# Patient Record
Sex: FEMALE | Race: WHITE | NOT HISPANIC OR LATINO | Employment: PART TIME | URBAN - METROPOLITAN AREA
[De-identification: names, ages, dates, MRNs, and addresses within clinical notes are randomized per-mention and may not be internally consistent; named-entity substitution may affect disease eponyms.]

---

## 2017-01-04 ENCOUNTER — OFFICE VISIT (OUTPATIENT)
Dept: BARIATRICS/WEIGHT MGMT | Facility: CLINIC | Age: 62
End: 2017-01-04

## 2017-01-04 VITALS
DIASTOLIC BLOOD PRESSURE: 76 MMHG | BODY MASS INDEX: 34.38 KG/M2 | HEART RATE: 82 BPM | HEIGHT: 63 IN | SYSTOLIC BLOOD PRESSURE: 122 MMHG | WEIGHT: 194 LBS | TEMPERATURE: 98.2 F | RESPIRATION RATE: 16 BRPM

## 2017-01-04 DIAGNOSIS — Z86.14 HX MRSA INFECTION: ICD-10-CM

## 2017-01-04 DIAGNOSIS — R53.82 CHRONIC FATIGUE: ICD-10-CM

## 2017-01-04 DIAGNOSIS — E66.9 OBESITY (BMI 30-39.9): ICD-10-CM

## 2017-01-04 DIAGNOSIS — IMO0001 INCISIONAL INFECTION, INITIAL ENCOUNTER: Primary | ICD-10-CM

## 2017-01-04 DIAGNOSIS — E11.8 TYPE 2 DIABETES MELLITUS WITH COMPLICATION, UNSPECIFIED LONG TERM INSULIN USE STATUS: ICD-10-CM

## 2017-01-04 PROBLEM — T81.49XA INCISIONAL INFECTION: Status: ACTIVE | Noted: 2017-01-04

## 2017-01-04 PROCEDURE — 99024 POSTOP FOLLOW-UP VISIT: CPT | Performed by: SURGERY

## 2017-01-04 RX ORDER — LINEZOLID 600 MG/1
600 TABLET, FILM COATED ORAL 2 TIMES DAILY
Qty: 20 TABLET | Refills: 0 | Status: SHIPPED | OUTPATIENT
Start: 2017-01-04 | End: 2017-02-09

## 2017-01-04 RX ORDER — AMOXICILLIN AND CLAVULANATE POTASSIUM 875; 125 MG/1; MG/1
1 TABLET, FILM COATED ORAL 2 TIMES DAILY
Qty: 20 TABLET | Refills: 0 | Status: SHIPPED | OUTPATIENT
Start: 2017-01-04 | End: 2017-02-09

## 2017-01-04 NOTE — MR AVS SNAPSHOT
Vivian Kauffman   1/4/2017 3:00 PM   Office Visit    Dept Phone:  [phone]   Encounter #:  [id number]    Provider:  Chris Ackerman Jr., MD   Department:  [facility] BARIATRIC SURGERY                Your Full Care Plan              Your Updated Medication List          This list is accurate as of: 1/4/17  1:25 PM.  Always use your most recent med list.                ADVAIR DISKUS 250-50 MCG/DOSE DISKUS   Generic drug:  fluticasone-salmeterol       colestipol 1 G tablet   Commonly known as:  COLESTID       FLUoxetine 20 MG tablet   Commonly known as:  PROzac       gabapentin 300 MG capsule   Commonly known as:  NEURONTIN       glipiZIDE 10 MG tablet   Commonly known as:  GLUCOTROL       HAIR/SKIN/NAILS tablet       HYDROcodone-acetaminophen 7.5-325 MG/15ML solution   Commonly known as:  HYCET   Take 15 mL by mouth Every 6 (Six) Hours As Needed for moderate pain (4-6).       levETIRAcetam 1000 MG tablet   Commonly known as:  KEPPRA       LIPITOR 20 MG tablet   Generic drug:  atorvastatin       MAGNESIUM OXIDE PO       metFORMIN 500 MG tablet   Commonly known as:  GLUCOPHAGE       metoprolol tartrate 100 MG tablet   Commonly known as:  LOPRESSOR       nortriptyline 10 MG capsule   Commonly known as:  PAMELOR       NOVOLIN N 100 UNIT/ML injection   Generic drug:  insulin NPH       PLAVIX 75 MG tablet   Generic drug:  clopidogrel       PROAIR  (90 BASE) MCG/ACT inhaler   Generic drug:  albuterol       promethazine 25 MG suppository   Commonly known as:  PHENERGAN   Insert 1 suppository into the rectum Every 6 (Six) Hours As Needed for nausea or vomiting.       quinapril 10 MG tablet   Commonly known as:  ACCUPRIL       SINGULAIR 10 MG tablet   Generic drug:  montelukast       spironolactone 25 MG tablet   Commonly known as:  ALDACTONE               You Were Diagnosed With        Codes Comments    Incisional infection, initial encounter    -  Primary ICD-10-CM:  "T81.4XXA  ICD-9-CM: 998.59     Obesity (BMI 30-39.9)     ICD-10-CM: E66.9  ICD-9-CM: 278.00     Type 2 diabetes mellitus with complication, unspecified long term insulin use status     ICD-10-CM: E11.8  ICD-9-CM: 250.90     Chronic fatigue     ICD-10-CM: R53.82  ICD-9-CM: 780.79       Instructions     None    Patient Instructions History      Upcoming Appointments     Visit Type Date Time Department    POST-OP 1/4/2017  3:00 PM [facility]    POST-OP 2/9/2017  1:30 PM [facility]      MyChart Signup     Our records indicate that you have declined [employer] signup. If you would like to sign up for [employer], please email [email or web address] or call [phone] to obtain an activation code.             Other Info from Your Visit           Your Appointments     Jan 04, 2017  3:00 PM EST   Post-Op with Chris Ackerman Jr., MD   [facility] BARIATRIC SURGERY (--)    [address] [id number] [phone]            Feb 09, 2017  1:30 PM EST   Post-Op with JIM Peña   [facility] BARIATRIC SURGERY (--)    [address] [address]   [phone]              Allergies     Latex  Other (See Comments)    SKIN BLISTERS    Peanut Oil      Peanuts w/ red shell    Sulfa Antibiotics  Other (See Comments)    unknnown      Reason for Visit     Follow-up s/p lapband removal c/o incisional soreness, fatigue and alopecia      Vital Signs     Blood Pressure Pulse Temperature Respirations Height Weight    122/76 82 98.2 °F (36.8 °C) (Oral) 16 62.5\" (158.8 cm) 194 lb (88 kg)    Body Mass Index Smoking Status                34.92 kg/m2 Former Smoker          Problems and Diagnoses Noted     Diabetes    Tiredness    Incisional infection    Obesity (BMI 30-39.9)      No Longer an Issue     Difficulty swallowing    Non-intractable vomiting with nausea    Regurgitation        "

## 2017-01-04 NOTE — PROGRESS NOTES
[facility] BARIATRIC SURGERY  [address]  [facility] [place] [facility] [address]  [address]  [place] [address]  Dept: [phone]  1/4/2017      Vivian Kauffman.  [id number]  [id number]  1955  female      Chief Complaint   Patient presents with   • Follow-up     s/p lapband removal c/o incisional soreness, fatigue and alopecia       [facility] Post-Op Bariatric Surgery:   Vivian Kauffman is status post laparopscopic Band removal procedure, performed on 11/30/16 with complaints of a redness around incision.     HPI:   Today's weight is 194 lb (88 kg) pounds, today's BMI is Body mass index is 34.92 kg/(m^2)., she has a  gain of 3 pounds since the last visit and her weight loss since surgery is  pounds.  Vivian Kauffman denies nausea, vomiting, dysphagia, or heartburn. The patient c/o post-op redness and drainage around previous port site incision that is improving and redness and swelling right upper quadrant incision that is new. Patient denies fever chills chest pain shortness of air or other problems.  She does complain of feeling fatigued.  She states that her sugars have been doing well. Denies fevers, chills, chest pain or shortness of air.      Diet and Exercise: Diet history reviewed and discussed with the patient. Weight loss/gains to date discussed with the patient. No carbonated beverage consumption and exercising regularly- walking frequently.   Supplements:     Review of Systems   Constitutional: Positive for fatigue.   Gastrointestinal: Positive for abdominal pain.   All other systems reviewed and are negative.      Patient Active Problem List   Diagnosis   • Essential hypertension   • Hyperlipidemia   • Fatigue   • Diabetes mellitus   • Alopecia   • Obesity (BMI 30-39.9)   • Dietary counseling   • Abnormal electrocardiogram   • Anxiety   • Asthma   • Carpal tunnel syndrome   • Chronic coronary artery disease   • Depression   • Exercise  counseling   • Incisional infection       The following portions of the patient's history were reviewed and updated as appropriate: allergies, current medications, past family history, past medical history, past social history, past surgical history and problem list.    Vitals:    01/04/17 1306   BP: 122/76   Pulse: 82   Resp: 16   Temp: 98.2 °F (36.8 °C)       Physical Exam   Constitutional: She is oriented to person, place, and time. She appears well-nourished.   HENT:   Head: Normocephalic and atraumatic.   Mouth/Throat: Oropharynx is clear and moist.   Eyes: Conjunctivae and EOM are normal. Pupils are equal, round, and reactive to light. No scleral icterus.   Neck: Normal range of motion. Neck supple. No thyromegaly present.   Cardiovascular: Normal rate and regular rhythm.    Pulmonary/Chest: Effort normal and breath sounds normal.   Abdominal: Soft. Bowel sounds are normal. She exhibits mass. She exhibits no distension. There is no tenderness. There is no rebound and no guarding. No hernia.   ruq incision with erythema and induration approx 1x1cm. Port site incision without erythema but did have induration but less   Musculoskeletal: Normal range of motion.   Lymphadenopathy:     She has no cervical adenopathy.   Neurological: She is alert and oriented to person, place, and time. No cranial nerve deficit. Coordination normal.   Skin: Skin is warm and dry. No erythema.   Psychiatric: She has a normal mood and affect. Her behavior is normal.   Vitals reviewed.      Assessment:   Post-op, the patient is one half months status post LAP-BAND and port removal with incisional infection right upper quadrant.  The small abscess was opened up with a scalpel blade in the office and evacuated.  The wound was then irrigated and then wicked with sterile gauze.  The area was approximately 5 mm in diameter and depth.     Plan:   I instructed the patient to irrigate the wound and change dressings twice a day to week with  gauze.  Does have a history of MRSA and we will call in antibiotic.  Patient follow-up in 2 weeks and to call if any fevers or chills pain or increase in redness.    Instructions / Recommendations: dietary counseling recommended, recommended a daily protein intake of  grams, vitamin supplement(s) recommended, recommended exercising at least 150 minutes per week, behavior modifications recommended and instructed to call the office for concerns, questions, or problems.     The patient was instructed to follow up in 2 wks follow up appt.

## 2017-02-09 ENCOUNTER — OFFICE VISIT (OUTPATIENT)
Dept: BARIATRICS/WEIGHT MGMT | Facility: CLINIC | Age: 62
End: 2017-02-09

## 2017-02-09 VITALS
TEMPERATURE: 98.2 F | HEART RATE: 73 BPM | WEIGHT: 199 LBS | SYSTOLIC BLOOD PRESSURE: 114 MMHG | HEIGHT: 63 IN | BODY MASS INDEX: 35.26 KG/M2 | DIASTOLIC BLOOD PRESSURE: 66 MMHG | RESPIRATION RATE: 16 BRPM

## 2017-02-09 DIAGNOSIS — Z71.3 DIETARY COUNSELING: ICD-10-CM

## 2017-02-09 DIAGNOSIS — E66.9 OBESITY (BMI 30-39.9): Primary | ICD-10-CM

## 2017-02-09 DIAGNOSIS — I10 ESSENTIAL HYPERTENSION: ICD-10-CM

## 2017-02-09 DIAGNOSIS — Z71.82 EXERCISE COUNSELING: ICD-10-CM

## 2017-02-09 DIAGNOSIS — E11.8 TYPE 2 DIABETES MELLITUS WITH COMPLICATION, UNSPECIFIED LONG TERM INSULIN USE STATUS: ICD-10-CM

## 2017-02-09 DIAGNOSIS — E78.5 HYPERLIPIDEMIA, UNSPECIFIED HYPERLIPIDEMIA TYPE: ICD-10-CM

## 2017-02-09 PROBLEM — T81.49XA INCISIONAL INFECTION: Status: RESOLVED | Noted: 2017-01-04 | Resolved: 2017-02-09

## 2017-02-09 PROCEDURE — 99024 POSTOP FOLLOW-UP VISIT: CPT | Performed by: NURSE PRACTITIONER

## 2017-02-09 NOTE — PROGRESS NOTES
[facility] BARIATRIC SURGERY  [address]  [facility] [address]  [address]  [facility] [address]  Dept: [phone]  2/9/2017      Vivian Kauffman.  [id number]  [id number]  1955  female      Chief Complaint   Patient presents with   • Follow-up     s/p lapband removal       BH Post-Op Bariatric Surgery:   Vivian Kauffman is status post laparopscopic Band removal procedure, performed on 11/30/16.     HPI:   Today's weight is 199 lb (90.3 kg) pounds, today's BMI is Body mass index is 35.82 kg/(m^2)., she has a  gain of 5 pounds since the last visit. The patient reports a decreased portion size and loss of appetite.  Vivian Kauffman denies nausea, vomiting, dysphagia, or heartburn. The patient c/o post-op pain that is resolved. she is doing well with an advanced diet without any problems. Taking their vitamins, walking and using IS. Denies fevers, chills, chest pain or shortness of air. Incision has now healed really well. Constipation improved with metamucil.     Diet and Exercise: Diet history reviewed and discussed with the patient. Weight loss/gains to date discussed with the patient. No carbonated beverage consumption and exercising regularly- walking frequently but not doing routine exercise yet.   Supplements: multivitamins, B-12, calcium, iron, B-1 and Vitamin D.     Review of Systems   All other systems reviewed and are negative.      Patient Active Problem List   Diagnosis   • Essential hypertension   • Hyperlipidemia   • Fatigue   • Diabetes mellitus   • Alopecia   • Obesity (BMI 30-39.9)   • Dietary counseling   • Abnormal electrocardiogram   • Anxiety   • Asthma   • Carpal tunnel syndrome   • Chronic coronary artery disease   • Depression   • Exercise counseling   • Hx MRSA infection       The following portions of the patient's history were reviewed and updated as appropriate: allergies, current medications, past family  history, past medical history, past social history, past surgical history and problem list.    Vitals:    02/09/17 1323   BP: 114/66   Pulse: 73   Resp: 16   Temp: 98.2 °F (36.8 °C)       Physical Exam   Constitutional: She is oriented to person, place, and time. She appears well-developed and well-nourished.   HENT:   Head: Normocephalic and atraumatic.   Eyes: EOM are normal.   Cardiovascular: Normal rate.    Pulmonary/Chest: Effort normal.   Abdominal: Soft. She exhibits no distension. There is no tenderness.   Musculoskeletal: Normal range of motion.   Neurological: She is alert and oriented to person, place, and time.   Skin: Skin is warm and dry.   Incision healing well   Psychiatric: She has a normal mood and affect. Her behavior is normal. Judgment and thought content normal.   Vitals reviewed.      Assessment:   Post-op, the patient is doing well.     Plan:   Activity restrictions: no lifting, pushing or pulling over 25lbs for 3 weeks.   Recommended patient be sure to get at least 70 grams of protein per day. Discussed with the patient the recommended amount of water per day to intake. Reviewed vitamin requirements. Be sure to do routine exercise and increase activity as tolerated.     Instructions / Recommendations: dietary counseling recommended, recommended a daily protein intake of  grams, vitamin supplement(s) recommended, recommended exercising at least 150 minutes per week, behavior modifications recommended and instructed to call the office for concerns, questions, or problems.     The patient was instructed to follow up with Dr. Ackerman regarding possible revision.     The patient was counseled regarding

## 2017-03-10 ENCOUNTER — OFFICE VISIT (OUTPATIENT)
Dept: CARDIOLOGY | Facility: CLINIC | Age: 62
End: 2017-03-10

## 2017-03-10 VITALS
WEIGHT: 201 LBS | HEIGHT: 63 IN | BODY MASS INDEX: 35.61 KG/M2 | HEART RATE: 78 BPM | SYSTOLIC BLOOD PRESSURE: 132 MMHG | DIASTOLIC BLOOD PRESSURE: 80 MMHG

## 2017-03-10 DIAGNOSIS — E78.5 HYPERLIPIDEMIA, UNSPECIFIED HYPERLIPIDEMIA TYPE: ICD-10-CM

## 2017-03-10 DIAGNOSIS — E11.8 TYPE 2 DIABETES MELLITUS WITH COMPLICATION, UNSPECIFIED LONG TERM INSULIN USE STATUS: ICD-10-CM

## 2017-03-10 DIAGNOSIS — I10 ESSENTIAL HYPERTENSION: Primary | ICD-10-CM

## 2017-03-10 DIAGNOSIS — I25.10 CHRONIC CORONARY ARTERY DISEASE: ICD-10-CM

## 2017-03-10 DIAGNOSIS — J45.909 UNCOMPLICATED ASTHMA, UNSPECIFIED ASTHMA SEVERITY: ICD-10-CM

## 2017-03-10 PROCEDURE — 93000 ELECTROCARDIOGRAM COMPLETE: CPT | Performed by: INTERNAL MEDICINE

## 2017-03-10 PROCEDURE — 99214 OFFICE O/P EST MOD 30 MIN: CPT | Performed by: INTERNAL MEDICINE

## 2017-03-10 NOTE — PROGRESS NOTES
Subjective:     Encounter Date:03/10/2017      Patient ID: Vivian Kauffman is a 62 y.o. female.    Chief Complaint:  History of Present Illness     The patient is a 52-year-old female with history of nonobstructive coronary artery disease, hypertension, dyslipidemia, diabetes mellitus type 2, prior stroke, seizure disorder, obesity status post lap band, and subsequent removal of the lap band, who presents for followup. I saw the patient initially for a preoperative evaluation.   She was getting ready to get her lab band removed and was noted to have an abnormal EKG at Dr. Ritter office.   She was previously followed by a cardiologist in [place], but was unable to get in with them. She came in to see us. I compared these findings with her prior EKGs and it showed no change.  Her prior cardiac workup included an evaluation for an abnormal EKG back in 11/2013.  She saw  ______ at that time.  She underwent a stress test that showed anterior ischemia and subsequently underwent a cardiac catheterization that showed 50% stenosis of her proximal LAD, luminal irregularities of her left circumflex, and 50% stenosis of her right coronary artery.  Her echocardiogram showed pulmonary artery pressure of 40 mmHg, moderate TR, but otherwise unremarkable.   She was not having any other symptoms, so I cleared her for surgery at that time.   When I saw her last she was doing well. She was planning on starting exercising, and we discussed whether we should do a stress test, like a treadmill stress test, at that point before she started exercising versus monitoring her symptoms.  The patient opted to just monitor her symptoms.      Today she presents for a routine annual followup.  She has been doing well over the last year.   She had lap band removal about six weeks ago, and she did fine with that.   She denies any chest pain, shortness of breath, PND, orthopnea, presyncope or syncope.  She developed some shortness of  breath associated with her asthma.  She has occasional palpitations that are chronic and unchanged.   She was followed by Dr. Acosta who asked her if she has been rechecked regarding her nonobstructive coronary artery disease, and she is wondering if something new should be done regarding this.           Review of Systems   Constitution: Positive for malaise/fatigue. Negative for weakness.   HENT: Positive for hearing loss and sore throat. Negative for headaches, hoarse voice and nosebleeds.    Eyes: Negative for pain.   Cardiovascular: Positive for dyspnea on exertion. Negative for chest pain, claudication, cyanosis, irregular heartbeat, leg swelling, near-syncope, orthopnea, palpitations, paroxysmal nocturnal dyspnea and syncope.   Respiratory: Positive for cough. Negative for shortness of breath and snoring.    Endocrine: Negative for cold intolerance, heat intolerance, polydipsia, polyphagia and polyuria.   Skin: Positive for itching and rash.   Musculoskeletal: Positive for myalgias. Negative for arthritis, falls, joint pain, joint swelling, muscle cramps and muscle weakness.   Gastrointestinal: Negative for constipation, diarrhea, dysphagia, heartburn, hematemesis, hematochezia, melena, nausea and vomiting.   Genitourinary: Positive for frequency. Negative for hematuria and hesitancy.   Neurological: Positive for numbness, paresthesias and seizures. Negative for excessive daytime sleepiness, dizziness and light-headedness.   Psychiatric/Behavioral: Positive for depression. The patient is nervous/anxious.           Current Outpatient Prescriptions:   •  albuterol (PROAIR HFA) 108 (90 BASE) MCG/ACT inhaler, ProAir  (90 Base) MCG/ACT Inhalation Aerosol Solution; Patient Sig: ProAir  (90 Base) MCG/ACT Inhalation Aerosol Solution INHALE 1-2 PUFFS EVERY 4-6 HOURS AS NEEDED AND AS DIRECTED.; 0; 26-Aug-2015; Active, Disp: , Rfl:   •  atorvastatin (LIPITOR) 20 MG tablet, Take 20 mg by mouth Daily., Disp:  , Rfl:   •  clopidogrel (PLAVIX) 75 MG tablet, Take 1 tablet by mouth Daily. HOLDING FOR SX, Disp: , Rfl:   •  colestipol (COLESTID) 1 G tablet, Take 1 tablet by mouth daily., Disp: , Rfl:   •  FLUoxetine (PROzac) 20 MG tablet, Take 4 tablets by mouth daily., Disp: , Rfl:   •  fluticasone-salmeterol (ADVAIR DISKUS) 250-50 MCG/DOSE DISKUS, Inhale 1 puff every 12 (twelve) hours., Disp: , Rfl:   •  gabapentin (NEURONTIN) 300 MG capsule, Take 300 mg by mouth 3 (Three) Times a Day., Disp: , Rfl:   •  glipiZIDE (GLUCOTROL) 10 MG tablet, Take by mouth. 1/2 tablet in the morning and 1 tablet in the evening, Disp: , Rfl:   •  insulin NPH (NOVOLIN N) 100 UNIT/ML injection, Inject  under the skin. 12 units at night/ 6units in am, Disp: , Rfl:   •  levETIRAcetam (KEPPRA) 1000 MG tablet, Take 1 tablet by mouth 2 (Two) Times a Day. 15OOMG AT HS, Disp: , Rfl:   •  MAGNESIUM OXIDE PO, Take 1 tablet by mouth Daily., Disp: , Rfl:   •  metFORMIN (GLUCOPHAGE) 500 MG tablet, Take 1,000 mg by mouth 2 (Two) Times a Day With Meals., Disp: , Rfl:   •  metoprolol tartrate (LOPRESSOR) 100 MG tablet, Take 50 mg by mouth 2 (Two) Times a Day. 100MG AT HS, Disp: , Rfl:   •  montelukast (SINGULAIR) 10 MG tablet, Take 1 tablet by mouth daily., Disp: , Rfl:   •  Multiple Vitamins-Minerals (HAIR/SKIN/NAILS) tablet, Take 1 tablet by mouth daily., Disp: , Rfl:   •  nortriptyline (PAMELOR) 10 MG capsule, Take 1 capsule by mouth daily., Disp: , Rfl:   •  quinapril (ACCUPRIL) 10 MG tablet, Take 1 tablet by mouth daily., Disp: , Rfl:   •  spironolactone (ALDACTONE) 25 MG tablet, Take 25 mg by mouth daily, Disp: , Rfl:     Past Medical History   Diagnosis Date   • Abdominal pain    • Ankle joint pain    • Anxiety    • Asthma    • Bilateral wrist pain    • Carpal tunnel syndrome    • Chronic lower back pain    • Coronary arteriosclerosis    • Depression    • Diabetes mellitus    • Diarrhea    • Dysphagia    • Fatigue    • Fatty liver    • Fibromyositis    •  GERD (gastroesophageal reflux disease)    • Heartburn    • Hyperlipidemia    • Hypertension    • Insomnia    • Morbid obesity    • Muscle spasm    • Nausea    • Neck pain    • Numbness    • Obesity    • Pain of both hip joints    • Polyphagia    • Polyphagia    • PONV (postoperative nausea and vomiting)    • Poor vision    • Problems with hearing    • Restless leg syndrome    • Seizures    • Sinus drainage    • Skin rash      Under both breasts   • Stroke syndrome    • Tingling    • Vomiting    • Weight gain      Past Surgical History   Procedure Laterality Date   • Cardiac catheterization       no stents 9/2013 at [facility]   • Cholecystectomy     • Foot surgery     • Laparoscopic gastric banding     • Nose surgery     • Tonsillectomy     • Colonoscopy     • Breast biopsy     • Cholecystectomy     • Nose surgery     • Endoscopy N/A 9/9/2016     Procedure: ESOPHAGOGASTRODUODENOSCOPY WITH BIOPSY;  Surgeon: Chris Ackerman Jr., MD;  Location: [facility] ENDOSCOPY;  Service:    • Gastric banding removal N/A 11/30/2016     Procedure: LAPAROSCOPIC GASTRIC BANDING AND PORT REMOVAL ;  Surgeon: Chris Ackerman Jr., MD;  Location: [facility] OR [facility];  Service:      Family History   Problem Relation Age of Onset   • Heart attack Mother    • Diabetes Mother    • Hypertension Mother    • Stroke Mother    • Heart attack Father    • Diabetes Father    • Hypertension Father    • Obesity Father      Morbid Obesity   • Stroke Father    • Heart attack Brother    • Cancer Brother      Bladder   • Heart attack Maternal Grandmother    • Heart attack Maternal Grandfather    • Stroke Paternal Grandmother    • Heart attack Paternal Grandfather      Social History   Substance Use Topics   • Smoking status: Former Smoker   • Smokeless tobacco: None      Comment: caffeine use   • Alcohol use No      Comment: very rare           ECG 12 Lead  Date/Time: 3/10/2017 10:38 AM  Performed by: ZENAIDA MARROQUIN  Authorized by: LOPEZ  "ZENAIDA   Comparison: compared with previous ECG   Similar to previous ECG  Rhythm: sinus rhythm  Comments: Non specific anterior T wave flattening               Objective:         Visit Vitals   • /80 (BP Location: Right arm, Patient Position: Sitting)   • Pulse 78   • Ht 63\" (160 cm)   • Wt 201 lb (91.2 kg)   • BMI 35.61 kg/m2          Physical Exam   Constitutional: She is oriented to person, place, and time. She appears well-developed and well-nourished.   HENT:   Head: Normocephalic and atraumatic.   Eyes: Conjunctivae, EOM and lids are normal. Pupils are equal, round, and reactive to light.   Neck: Normal range of motion and full passive range of motion without pain. Neck supple. No JVD present. Carotid bruit is not present.   Cardiovascular: Normal rate, regular rhythm, S1 normal and S2 normal.  Exam reveals no gallop.    No murmur heard.  Pulses:       Radial pulses are 2+ on the right side, and 2+ on the left side.   No bilateral lower extremity edema   Pulmonary/Chest: Effort normal and breath sounds normal.   Abdominal: Soft. Normal appearance.   Lymphadenopathy:     She has no cervical adenopathy.   Neurological: She is alert and oriented to person, place, and time.   Skin: Skin is warm, dry and intact.   Psychiatric: She has a normal mood and affect.       Lab Review:       Assessment:          Diagnosis Plan   1. Essential hypertension     2. Hyperlipidemia, unspecified hyperlipidemia type     3. Chronic coronary artery disease     4. Uncomplicated asthma, unspecified asthma severity     5. Type 2 diabetes mellitus with complication, unspecified long term insulin use status            Plan:        1. Nonobstructive coronary artery disease.   Again, we discussed possible stress test last  year.  We decided we would just monitor his symptoms at that time. I brought up the options again  of proceeding with a plain treadmill stress test and an echocardiogram versus just monitoring her for any " development of symptoms before pursuing any testing.   The patient at this time opts to pursue testing.  We will get her set up.  I think this is reasonable considering she is going to be starting an exercise regimen.    2. Hypertension. Her blood pressures are well controlled on her current regimen of medications.  Continue the same.     3. Dyslipidemia.  She is on atorvastatin as per Dr. Acosta.     4. Asthma.     5. Diabetes mellitus, type 2.       We will call and discuss the results of the stress test and the echocardiogram.   If our workup is normal, we will see the patient back in one year.

## 2017-03-20 ENCOUNTER — HOSPITAL ENCOUNTER (OUTPATIENT)
Dept: CARDIOLOGY | Facility: HOSPITAL | Age: 62
Discharge: HOME | End: 2017-03-20
Attending: INTERNAL MEDICINE

## 2017-03-20 ENCOUNTER — HOSPITAL ENCOUNTER (OUTPATIENT)
Dept: CARDIOLOGY | Facility: HOSPITAL | Age: 62
Discharge: HOME | End: 2017-03-20
Attending: INTERNAL MEDICINE | Admitting: INTERNAL MEDICINE

## 2017-03-20 VITALS
DIASTOLIC BLOOD PRESSURE: 60 MMHG | OXYGEN SATURATION: 97 % | SYSTOLIC BLOOD PRESSURE: 100 MMHG | HEART RATE: 76 BPM | HEIGHT: 63 IN | WEIGHT: 201 LBS | BODY MASS INDEX: 35.61 KG/M2

## 2017-03-20 LAB
BH CV ECHO MEAS - ACS: 1.8 CM
BH CV ECHO MEAS - AO MAX PG (FULL): 3.4 MMHG
BH CV ECHO MEAS - AO MAX PG: 7 MMHG
BH CV ECHO MEAS - AO MEAN PG (FULL): 1.6 MMHG
BH CV ECHO MEAS - AO MEAN PG: 3.5 MMHG
BH CV ECHO MEAS - AO ROOT AREA (BSA CORRECTED): 1.5
BH CV ECHO MEAS - AO ROOT AREA: 6.4 CM^2
BH CV ECHO MEAS - AO ROOT DIAM: 2.9 CM
BH CV ECHO MEAS - AO V2 MAX: 132.7 CM/SEC
BH CV ECHO MEAS - AO V2 MEAN: 86.7 CM/SEC
BH CV ECHO MEAS - AO V2 VTI: 28.4 CM
BH CV ECHO MEAS - AVA(I,A): 1.8 CM^2
BH CV ECHO MEAS - AVA(I,D): 1.8 CM^2
BH CV ECHO MEAS - AVA(V,A): 1.9 CM^2
BH CV ECHO MEAS - AVA(V,D): 1.9 CM^2
BH CV ECHO MEAS - BSA(HAYCOCK): 2.1 M^2
BH CV ECHO MEAS - BSA: 1.9 M^2
BH CV ECHO MEAS - BZI_BMI: 35.6 KILOGRAMS/M^2
BH CV ECHO MEAS - BZI_METRIC_HEIGHT: 160 CM
BH CV ECHO MEAS - BZI_METRIC_WEIGHT: 91.2 KG
BH CV ECHO MEAS - EDV(CUBED): 81.8 ML
BH CV ECHO MEAS - EDV(TEICH): 84.9 ML
BH CV ECHO MEAS - EF(CUBED): 78.2 %
BH CV ECHO MEAS - EF(TEICH): 70.7 %
BH CV ECHO MEAS - ESV(CUBED): 17.8 ML
BH CV ECHO MEAS - ESV(TEICH): 24.9 ML
BH CV ECHO MEAS - FS: 39.8 %
BH CV ECHO MEAS - IVS/LVPW: 0.96
BH CV ECHO MEAS - IVSD: 0.92 CM
BH CV ECHO MEAS - LAT PEAK E' VEL: 5 CM/SEC
BH CV ECHO MEAS - LV MASS(C)D: 133 GRAMS
BH CV ECHO MEAS - LV MASS(C)DI: 68.6 GRAMS/M^2
BH CV ECHO MEAS - LV MAX PG: 3.7 MMHG
BH CV ECHO MEAS - LV MEAN PG: 1.9 MMHG
BH CV ECHO MEAS - LV V1 MAX: 96 CM/SEC
BH CV ECHO MEAS - LV V1 MEAN: 65.2 CM/SEC
BH CV ECHO MEAS - LV V1 VTI: 19.3 CM
BH CV ECHO MEAS - LVIDD: 4.3 CM
BH CV ECHO MEAS - LVIDS: 2.6 CM
BH CV ECHO MEAS - LVOT AREA (M): 2.5 CM^2
BH CV ECHO MEAS - LVOT AREA: 2.6 CM^2
BH CV ECHO MEAS - LVOT DIAM: 1.8 CM
BH CV ECHO MEAS - LVPWD: 0.96 CM
BH CV ECHO MEAS - MED PEAK E' VEL: 7 CM/SEC
BH CV ECHO MEAS - MR MAX PG: 25.4 MMHG
BH CV ECHO MEAS - MR MAX VEL: 252.2 CM/SEC
BH CV ECHO MEAS - MV A DUR: 0.11 SEC
BH CV ECHO MEAS - MV A MAX VEL: 90.2 CM/SEC
BH CV ECHO MEAS - MV DEC SLOPE: 164.2 CM/SEC^2
BH CV ECHO MEAS - MV DEC TIME: 0.26 SEC
BH CV ECHO MEAS - MV E MAX VEL: 48.5 CM/SEC
BH CV ECHO MEAS - MV E/A: 0.54
BH CV ECHO MEAS - MV MAX PG: 4.6 MMHG
BH CV ECHO MEAS - MV MEAN PG: 1.7 MMHG
BH CV ECHO MEAS - MV P1/2T MAX VEL: 46.6 CM/SEC
BH CV ECHO MEAS - MV P1/2T: 83 MSEC
BH CV ECHO MEAS - MV V2 MAX: 106.9 CM/SEC
BH CV ECHO MEAS - MV V2 MEAN: 61.4 CM/SEC
BH CV ECHO MEAS - MV V2 VTI: 21 CM
BH CV ECHO MEAS - MVA P1/2T LCG: 4.7 CM^2
BH CV ECHO MEAS - MVA(P1/2T): 2.6 CM^2
BH CV ECHO MEAS - MVA(VTI): 2.4 CM^2
BH CV ECHO MEAS - PA ACC TIME: 0.1 SEC
BH CV ECHO MEAS - PA MAX PG (FULL): 2 MMHG
BH CV ECHO MEAS - PA MAX PG: 3.3 MMHG
BH CV ECHO MEAS - PA PR(ACCEL): 36.2 MMHG
BH CV ECHO MEAS - PA V2 MAX: 91.2 CM/SEC
BH CV ECHO MEAS - PVA(V,A): 2.1 CM^2
BH CV ECHO MEAS - PVA(V,D): 2.1 CM^2
BH CV ECHO MEAS - QP/QS: 0.84
BH CV ECHO MEAS - RAP SYSTOLE: 3 MMHG
BH CV ECHO MEAS - RV MAX PG: 1.4 MMHG
BH CV ECHO MEAS - RV MEAN PG: 0.78 MMHG
BH CV ECHO MEAS - RV V1 MAX: 58.7 CM/SEC
BH CV ECHO MEAS - RV V1 MEAN: 41.5 CM/SEC
BH CV ECHO MEAS - RV V1 VTI: 12.9 CM
BH CV ECHO MEAS - RVOT AREA: 3.3 CM^2
BH CV ECHO MEAS - RVOT DIAM: 2 CM
BH CV ECHO MEAS - RVSP: 28 MMHG
BH CV ECHO MEAS - SI(AO): 93.9 ML/M^2
BH CV ECHO MEAS - SI(CUBED): 33 ML/M^2
BH CV ECHO MEAS - SI(LVOT): 25.9 ML/M^2
BH CV ECHO MEAS - SI(TEICH): 31 ML/M^2
BH CV ECHO MEAS - SUP REN AO DIAM: 2.2 CM
BH CV ECHO MEAS - SV(AO): 182 ML
BH CV ECHO MEAS - SV(CUBED): 64 ML
BH CV ECHO MEAS - SV(LVOT): 50.2 ML
BH CV ECHO MEAS - SV(RVOT): 42.1 ML
BH CV ECHO MEAS - SV(TEICH): 60 ML
BH CV ECHO MEAS - TAPSE (>1.6): 2.6 CM2
BH CV ECHO MEAS - TR MAX VEL: 252.2 CM/SEC
BH CV STRESS BP STAGE 1: NORMAL
BH CV STRESS BP STAGE 2: NORMAL
BH CV STRESS DURATION MIN STAGE 1: 3
BH CV STRESS DURATION MIN STAGE 2: 1
BH CV STRESS DURATION SEC STAGE 1: 0
BH CV STRESS DURATION SEC STAGE 2: 30
BH CV STRESS GRADE STAGE 1: 10
BH CV STRESS GRADE STAGE 2: 12
BH CV STRESS HR STAGE 1: 119
BH CV STRESS HR STAGE 2: 142
BH CV STRESS METS STAGE 1: 5
BH CV STRESS METS STAGE 2: 7.5
BH CV STRESS PROTOCOL 1: NORMAL
BH CV STRESS RECOVERY BP: NORMAL MMHG
BH CV STRESS RECOVERY HR: 89 BPM
BH CV STRESS SPEED STAGE 1: 1.7
BH CV STRESS SPEED STAGE 2: 2.5
BH CV STRESS STAGE 1: 1
BH CV STRESS STAGE 2: 2
BH CV XLRA - RV BASE: 3.4 CM
BH CV XLRA - TDI S': 14 CM/SEC
E/E' RATIO: 6
LEFT ATRIUM VOLUME INDEX: 26 ML/M2
MAXIMAL PREDICTED HEART RATE: 158 BPM
PERCENT MAX PREDICTED HR: 89.87 %
STRESS BASELINE BP: NORMAL MMHG
STRESS BASELINE HR: 88 BPM
STRESS PERCENT HR: 106 %
STRESS POST ESTIMATED WORKLOAD: 5 METS
STRESS POST EXERCISE DUR MIN: 4 MIN
STRESS POST EXERCISE DUR SEC: 30 SEC
STRESS POST PEAK BP: NORMAL MMHG
STRESS POST PEAK HR: 142 BPM
STRESS TARGET HR: 134 BPM

## 2017-03-20 PROCEDURE — 93018 CV STRESS TEST I&R ONLY: CPT | Performed by: INTERNAL MEDICINE

## 2017-03-20 PROCEDURE — C8929 TTE W OR WO FOL WCON,DOPPLER: HCPCS

## 2017-03-20 PROCEDURE — 25010000002 PERFLUTREN (DEFINITY) 8.476 MG IN SODIUM CHLORIDE 10 ML INJECTION: Performed by: INTERNAL MEDICINE

## 2017-03-20 PROCEDURE — 93306 TTE W/DOPPLER COMPLETE: CPT | Performed by: INTERNAL MEDICINE

## 2017-03-20 PROCEDURE — 93016 CV STRESS TEST SUPVJ ONLY: CPT | Performed by: INTERNAL MEDICINE

## 2017-03-20 PROCEDURE — 93017 CV STRESS TEST TRACING ONLY: CPT

## 2017-03-20 RX ADMIN — PERFLUTREN 1.5 ML: 6.52 INJECTION, SUSPENSION INTRAVENOUS at 08:35

## 2017-03-22 ENCOUNTER — TELEPHONE (OUTPATIENT)
Dept: CARDIOLOGY | Facility: CLINIC | Age: 62
End: 2017-03-22

## 2017-03-22 DIAGNOSIS — R07.2 PRECORDIAL PAIN: Primary | ICD-10-CM

## 2017-03-22 NOTE — TELEPHONE ENCOUNTER
Pt called to request test results for Echo and stress test performed on 3/20/17. Please call pt. Thanks, Obey

## 2017-03-23 NOTE — TELEPHONE ENCOUNTER
Called and discussed results.  Due to treadmill stress test not being non-diagnostic will get set up for a treadmill myoview stress test.  She does report a couple of episodes of chest pressure at night.

## 2017-03-30 ENCOUNTER — HOSPITAL ENCOUNTER (OUTPATIENT)
Dept: CARDIOLOGY | Facility: HOSPITAL | Age: 62
Discharge: HOME | End: 2017-03-30
Attending: INTERNAL MEDICINE | Admitting: INTERNAL MEDICINE

## 2017-03-30 DIAGNOSIS — R07.2 PRECORDIAL PAIN: ICD-10-CM

## 2017-03-30 LAB
BH CV STRESS BP STAGE 1: NORMAL
BH CV STRESS DURATION MIN STAGE 1: 3
BH CV STRESS DURATION SEC STAGE 1: 0
BH CV STRESS GRADE STAGE 1: 10
BH CV STRESS HR STAGE 1: 118
BH CV STRESS METS STAGE 1: 5
BH CV STRESS PROTOCOL 1: NORMAL
BH CV STRESS PROTOCOL 2 BP STAGE 1: NORMAL
BH CV STRESS PROTOCOL 2 COMMENTS STAGE 1: NORMAL
BH CV STRESS PROTOCOL 2 DOSE REGADENOSON STAGE 1: 0.4
BH CV STRESS PROTOCOL 2 DURATION MIN STAGE 1: 0
BH CV STRESS PROTOCOL 2 DURATION SEC STAGE 1: 15
BH CV STRESS PROTOCOL 2 HR STAGE 1: 129
BH CV STRESS PROTOCOL 2 STAGE 1: 1
BH CV STRESS PROTOCOL 2: NORMAL
BH CV STRESS RECOVERY BP: NORMAL MMHG
BH CV STRESS RECOVERY HR: 95 BPM
BH CV STRESS SPEED STAGE 1: 1.7
BH CV STRESS STAGE 1: 1
LV EF NUC BP: 78 %
MAXIMAL PREDICTED HEART RATE: 158 BPM
PERCENT MAX PREDICTED HR: 81.65 %
STRESS BASELINE BP: NORMAL MMHG
STRESS BASELINE HR: 83 BPM
STRESS PERCENT HR: 96 %
STRESS POST EXERCISE DUR SEC: 15 SEC
STRESS POST PEAK BP: NORMAL MMHG
STRESS POST PEAK HR: 129 BPM
STRESS TARGET HR: 134 BPM

## 2017-03-30 PROCEDURE — 93018 CV STRESS TEST I&R ONLY: CPT | Performed by: INTERNAL MEDICINE

## 2017-03-30 PROCEDURE — 78452 HT MUSCLE IMAGE SPECT MULT: CPT | Performed by: INTERNAL MEDICINE

## 2017-03-30 PROCEDURE — 0 TECHNETIUM TETROFOSMIN KIT: Performed by: INTERNAL MEDICINE

## 2017-03-30 PROCEDURE — A9502 TC99M TETROFOSMIN: HCPCS | Performed by: INTERNAL MEDICINE

## 2017-03-30 PROCEDURE — 25010000002 REGADENOSON 0.4 MG/5ML SOLUTION: Performed by: INTERNAL MEDICINE

## 2017-03-30 PROCEDURE — 93016 CV STRESS TEST SUPVJ ONLY: CPT | Performed by: INTERNAL MEDICINE

## 2017-03-30 PROCEDURE — 93017 CV STRESS TEST TRACING ONLY: CPT

## 2017-03-30 PROCEDURE — 78452 HT MUSCLE IMAGE SPECT MULT: CPT

## 2017-03-30 RX ADMIN — TETROFOSMIN 1 DOSE: 1.38 INJECTION, POWDER, LYOPHILIZED, FOR SOLUTION INTRAVENOUS at 07:54

## 2017-03-30 RX ADMIN — TETROFOSMIN 1 DOSE: 1.38 INJECTION, POWDER, LYOPHILIZED, FOR SOLUTION INTRAVENOUS at 09:00

## 2017-03-30 RX ADMIN — REGADENOSON 0.4 MG: 0.08 INJECTION, SOLUTION INTRAVENOUS at 09:01

## 2017-04-21 ENCOUNTER — OFFICE VISIT (OUTPATIENT)
Dept: BARIATRICS/WEIGHT MGMT | Facility: CLINIC | Age: 62
End: 2017-04-21

## 2017-04-21 VITALS
TEMPERATURE: 98.6 F | BODY MASS INDEX: 35.97 KG/M2 | RESPIRATION RATE: 16 BRPM | DIASTOLIC BLOOD PRESSURE: 74 MMHG | HEIGHT: 63 IN | SYSTOLIC BLOOD PRESSURE: 114 MMHG | HEART RATE: 89 BPM | WEIGHT: 203 LBS

## 2017-04-21 DIAGNOSIS — E66.9 OBESITY (BMI 30-39.9): Primary | ICD-10-CM

## 2017-04-21 DIAGNOSIS — R53.82 CHRONIC FATIGUE: ICD-10-CM

## 2017-04-21 DIAGNOSIS — L65.9 ALOPECIA: ICD-10-CM

## 2017-04-21 DIAGNOSIS — Z86.14 HX MRSA INFECTION: ICD-10-CM

## 2017-04-21 DIAGNOSIS — I10 ESSENTIAL HYPERTENSION: ICD-10-CM

## 2017-04-21 DIAGNOSIS — E78.5 HYPERLIPIDEMIA, UNSPECIFIED HYPERLIPIDEMIA TYPE: ICD-10-CM

## 2017-04-21 DIAGNOSIS — E11.8 TYPE 2 DIABETES MELLITUS WITH COMPLICATION, UNSPECIFIED LONG TERM INSULIN USE STATUS: ICD-10-CM

## 2017-04-21 DIAGNOSIS — Z71.3 DIETARY COUNSELING: ICD-10-CM

## 2017-04-21 PROCEDURE — 99213 OFFICE O/P EST LOW 20 MIN: CPT | Performed by: SURGERY

## 2017-04-21 NOTE — PROGRESS NOTES
[facility] BARIATRIC SURGERY  [address]  [facility] [address]  [phone]  [address]  [facility] [id number] [phone]  Dept: [phone]  4/21/2017      Vivian Kauffman.  [id number]  [id number]  1955  female      Chief Complaint   Patient presents with   • Follow-up     s/p lapband removal        BH Post-Op Bariatric Surgery:   Vivian Kauffman is status post Lapband procedure (removal), performed on 11/30/16 Secondary to chronic dysphagia    HPI:   Today's weight is 203 lb (92.1 kg) pounds, today's BMI is Body mass index is 36.54 kg/(m^2). and she has a gain of 9 pounds since the last visit. The patient reports experiencing hunger, but decreased hunger and loss of appetite.     Vivian Kauffman denies nausea, dysphagia or abdominal pain. The patientdoes not have vomiting or regurgitation. The patientdoes not have heartburn/reflux.    62-year-old female status post LAP-BAND removal November 2016 at [facility] which was removed because of chronic dysphagia.  Patient has been regaining weight since the band has been removed in which she was around 185 pounds.  She now is interested in revision to sleeve gastrectomy.  She did have a little wound infection which has completely healed.  She denies fever chills chest pain shortness of air bowel or urinary problems.    Patient states their portion size is the small plate and their hunger is appropriate.    Diet and Exercise: Diet history reviewed and discussed with the patient. Weight loss/gains to date discussed with the patient. She reports eating 3 meals per day, a typical portion size of 2 cup, eating 2 snack per day, drinking 5 8-oz. glasses of water per day. The patient can tolerate solid protein.   The patient is eating protein first. The patient is not limiting food volume. The patient is taking vitamins. The patient is not limiting snacking. The patient is not  regular exercise. She is not drinking carbonated beverages.     The following portions of the patient's history were reviewed and updated as appropriate: allergies, current medications, past family history, past medical history, past social history, past surgical history and problem list.    Review of Systems   Constitutional: Positive for fatigue.   Musculoskeletal: Positive for arthralgias and back pain.   All other systems reviewed and are negative.      Vitals:    04/21/17 1003   BP: 114/74   Pulse: 89   Resp: 16   Temp: 98.6 °F (37 °C)       Physical Exam   Constitutional: She is oriented to person, place, and time. She appears well-nourished.   HENT:   Head: Normocephalic and atraumatic.   Mouth/Throat: Oropharynx is clear and moist.   Eyes: Conjunctivae and EOM are normal. Pupils are equal, round, and reactive to light. No scleral icterus.   Neck: Normal range of motion. Neck supple. No thyromegaly present.   Cardiovascular: Normal rate and regular rhythm.    Pulmonary/Chest: Effort normal and breath sounds normal.   Abdominal: Soft. Bowel sounds are normal. She exhibits no distension and no mass. There is no tenderness. There is no rebound and no guarding. A hernia is present.   Wound has completely healed   Musculoskeletal: Normal range of motion.   Lymphadenopathy:     She has no cervical adenopathy.   Neurological: She is alert and oriented to person, place, and time. No cranial nerve deficit. Coordination normal.   Skin: Skin is warm and dry. No erythema.   Psychiatric: She has a normal mood and affect. Her behavior is normal.   Vitals reviewed.      Assessment: Post-operatively the patient Status post LAP-BAND removal November 2016 is interested in revision to sleeve gastrectomy.  Her incisional infection has completely heals and is interested in the revision.  I spent time with her going over the revisional surgery and increased risk involved which she understands.  She is a diabetic and would like to  have this under better control.  She has a difficult time with weight gain since the band has come out.  We'll work on insurance approval and will follow-up in one month.     Plan:   As above        Activity restrictions: None.   Instructions / Recommendations: dietary counseling recommended, recommended a daily protein intake of  grams, patient was advised that the lap band system works best when consuming solid foods, vitamin supplement(s) recommended, recommended exercising at least 150 minutes per week inclduing both cardio and strength training, behavior modifications recommended and instructed to call the office for concerns, questions, or problems.     The patient was instructed to follow up in 4 weeks      The patient was counseled regarding. Total time spent face to face was 20 minutes and more than half the time was spent counseling.

## 2017-06-02 DIAGNOSIS — I10 ESSENTIAL HYPERTENSION: ICD-10-CM

## 2017-06-02 DIAGNOSIS — E66.9 DIABETES MELLITUS TYPE 2 IN OBESE (HCC): ICD-10-CM

## 2017-06-02 DIAGNOSIS — E66.9 OBESITY, CLASS II, BMI 35-39.9: Primary | ICD-10-CM

## 2017-06-02 DIAGNOSIS — E78.5 DYSLIPIDEMIA: ICD-10-CM

## 2017-06-02 DIAGNOSIS — E11.69 DIABETES MELLITUS TYPE 2 IN OBESE (HCC): ICD-10-CM

## 2017-06-06 ENCOUNTER — TELEPHONE (OUTPATIENT)
Dept: CARDIOLOGY | Facility: CLINIC | Age: 62
End: 2017-06-06

## 2017-06-06 ENCOUNTER — CONSULT (OUTPATIENT)
Dept: BARIATRICS/WEIGHT MGMT | Facility: CLINIC | Age: 62
End: 2017-06-06

## 2017-06-06 ENCOUNTER — APPOINTMENT (OUTPATIENT)
Dept: LAB | Facility: HOSPITAL | Age: 62
End: 2017-06-06

## 2017-06-06 VITALS
TEMPERATURE: 98.5 F | RESPIRATION RATE: 16 BRPM | HEART RATE: 80 BPM | DIASTOLIC BLOOD PRESSURE: 75 MMHG | SYSTOLIC BLOOD PRESSURE: 115 MMHG | WEIGHT: 210 LBS | HEIGHT: 63 IN | BODY MASS INDEX: 37.21 KG/M2

## 2017-06-06 DIAGNOSIS — E78.5 HYPERLIPIDEMIA, UNSPECIFIED HYPERLIPIDEMIA TYPE: ICD-10-CM

## 2017-06-06 DIAGNOSIS — E66.9 OBESITY (BMI 30-39.9): Primary | ICD-10-CM

## 2017-06-06 DIAGNOSIS — E66.9 DIABETES MELLITUS TYPE 2 IN OBESE (HCC): ICD-10-CM

## 2017-06-06 DIAGNOSIS — I10 ESSENTIAL HYPERTENSION: ICD-10-CM

## 2017-06-06 DIAGNOSIS — K76.0 FATTY LIVER: ICD-10-CM

## 2017-06-06 DIAGNOSIS — R53.82 CHRONIC FATIGUE: ICD-10-CM

## 2017-06-06 DIAGNOSIS — E11.69 DIABETES MELLITUS TYPE 2 IN OBESE (HCC): ICD-10-CM

## 2017-06-06 DIAGNOSIS — R60.9 EDEMA, UNSPECIFIED TYPE: ICD-10-CM

## 2017-06-06 PROBLEM — M25.50 MULTIPLE JOINT PAIN: Status: ACTIVE | Noted: 2017-06-06

## 2017-06-06 PROBLEM — G40.909 SEIZURE DISORDER: Status: ACTIVE | Noted: 2017-06-06

## 2017-06-06 LAB
ALBUMIN SERPL-MCNC: 4.4 G/DL (ref 3.5–5.2)
ALBUMIN/GLOB SERPL: 1.6 G/DL
ALP SERPL-CCNC: 86 U/L (ref 39–117)
ALT SERPL W P-5'-P-CCNC: 77 U/L (ref 1–33)
ANION GAP SERPL CALCULATED.3IONS-SCNC: 13.5 MMOL/L
AST SERPL-CCNC: 55 U/L (ref 1–32)
BASOPHILS # BLD AUTO: 0.03 10*3/MM3 (ref 0–0.2)
BASOPHILS NFR BLD AUTO: 0.6 % (ref 0–1.5)
BILIRUB SERPL-MCNC: 0.4 MG/DL (ref 0.1–1.2)
BUN BLD-MCNC: 9 MG/DL (ref 8–23)
BUN/CREAT SERPL: 19.6 (ref 7–25)
CALCIUM SPEC-SCNC: 9.5 MG/DL (ref 8.6–10.5)
CHLORIDE SERPL-SCNC: 95 MMOL/L (ref 98–107)
CHOLEST SERPL-MCNC: 163 MG/DL (ref 0–200)
CO2 SERPL-SCNC: 26.5 MMOL/L (ref 22–29)
CREAT BLD-MCNC: 0.46 MG/DL (ref 0.57–1)
DEPRECATED RDW RBC AUTO: 41.7 FL (ref 37–54)
EOSINOPHIL # BLD AUTO: 0.12 10*3/MM3 (ref 0–0.7)
EOSINOPHIL NFR BLD AUTO: 2.4 % (ref 0.3–6.2)
ERYTHROCYTE [DISTWIDTH] IN BLOOD BY AUTOMATED COUNT: 13.4 % (ref 11.7–13)
GFR SERPL CREATININE-BSD FRML MDRD: 138 ML/MIN/1.73
GLOBULIN UR ELPH-MCNC: 2.8 GM/DL
GLUCOSE BLD-MCNC: 178 MG/DL (ref 65–99)
HBA1C MFR BLD: 9.53 % (ref 4.8–5.6)
HCT VFR BLD AUTO: 42.9 % (ref 35.6–45.5)
HDLC SERPL-MCNC: 53 MG/DL (ref 40–60)
HGB BLD-MCNC: 14.3 G/DL (ref 11.9–15.5)
IMM GRANULOCYTES # BLD: 0 10*3/MM3 (ref 0–0.03)
IMM GRANULOCYTES NFR BLD: 0 % (ref 0–0.5)
LDLC SERPL CALC-MCNC: 81 MG/DL (ref 0–100)
LDLC/HDLC SERPL: 1.53 {RATIO}
LYMPHOCYTES # BLD AUTO: 1.76 10*3/MM3 (ref 0.9–4.8)
LYMPHOCYTES NFR BLD AUTO: 34.5 % (ref 19.6–45.3)
MCH RBC QN AUTO: 28.5 PG (ref 26.9–32)
MCHC RBC AUTO-ENTMCNC: 33.3 G/DL (ref 32.4–36.3)
MCV RBC AUTO: 85.6 FL (ref 80.5–98.2)
MONOCYTES # BLD AUTO: 0.43 10*3/MM3 (ref 0.2–1.2)
MONOCYTES NFR BLD AUTO: 8.4 % (ref 5–12)
NEUTROPHILS # BLD AUTO: 2.76 10*3/MM3 (ref 1.9–8.1)
NEUTROPHILS NFR BLD AUTO: 54.1 % (ref 42.7–76)
PLATELET # BLD AUTO: 152 10*3/MM3 (ref 140–500)
PMV BLD AUTO: 9.9 FL (ref 6–12)
POTASSIUM BLD-SCNC: 4.1 MMOL/L (ref 3.5–5.2)
PROT SERPL-MCNC: 7.2 G/DL (ref 6–8.5)
RBC # BLD AUTO: 5.01 10*6/MM3 (ref 3.9–5.2)
SODIUM BLD-SCNC: 135 MMOL/L (ref 136–145)
TRIGL SERPL-MCNC: 145 MG/DL (ref 0–150)
TSH SERPL DL<=0.05 MIU/L-ACNC: 1.71 MIU/ML (ref 0.27–4.2)
VLDLC SERPL-MCNC: 29 MG/DL (ref 5–40)
WBC NRBC COR # BLD: 5.1 10*3/MM3 (ref 4.5–10.7)

## 2017-06-06 PROCEDURE — 85025 COMPLETE CBC W/AUTO DIFF WBC: CPT | Performed by: NURSE PRACTITIONER

## 2017-06-06 PROCEDURE — 36415 COLL VENOUS BLD VENIPUNCTURE: CPT | Performed by: NURSE PRACTITIONER

## 2017-06-06 PROCEDURE — 84443 ASSAY THYROID STIM HORMONE: CPT | Performed by: NURSE PRACTITIONER

## 2017-06-06 PROCEDURE — 99214 OFFICE O/P EST MOD 30 MIN: CPT | Performed by: NURSE PRACTITIONER

## 2017-06-06 PROCEDURE — 80053 COMPREHEN METABOLIC PANEL: CPT | Performed by: NURSE PRACTITIONER

## 2017-06-06 PROCEDURE — 80061 LIPID PANEL: CPT | Performed by: NURSE PRACTITIONER

## 2017-06-06 PROCEDURE — 83036 HEMOGLOBIN GLYCOSYLATED A1C: CPT | Performed by: NURSE PRACTITIONER

## 2017-06-06 RX ORDER — BIOTIN 10000 MCG
CAPSULE ORAL
COMMUNITY
End: 2017-09-26 | Stop reason: SDUPTHER

## 2017-06-06 NOTE — PROGRESS NOTES
[facility] BARIATRIC SURGERY  [address]  [facility] [id number] [phone]  [address]  [facility] [id number] [phone]  Dept: [phone]  6/6/2017      Vivian Kauffman.  [id number]  [id number]  1955  female      Chief Complaint of weight gain; unable to maintain weight loss    History of Present Illness:   Vivian is a 62 y.o. female who presents today for evaluation, education and consultation regarding weight loss surgery. The patient is interested in the sleeve gastrectomy revision. Lapband removed 11/30/16.      Diet History:Vivian has been overweight for at least 34 years, has been 35 pounds or more overweight for at least 30+ years, has been 100 pounds or more overweight for 0 or more years currently but was in the past and started dieting at age 40.  The most weight Vivian lost was 75 pounds on lapband and maintained the weight loss for a year or so. Vivian describes her eating habits as snacker. Vivian Kauffman has tried Atkins, Weight Watchers, Fasting, OTC medications, prescription medications and previous weight loss surgery among others with success of losing up to 75 pounds, but in each instance regained the weight.     See dietician documentation for complete history.    Bariatric Surgery Evaluation: The patient is being seen for an initial visit for bariatric surgery evaluation.     Bariatric Co-morbidities:  diabetes, hypertension, dyslipidemia, cardiovascular disease, fibromyalgia, asthma, edema and depression    Patient Active Problem List   Diagnosis   • Essential hypertension   • Hyperlipidemia   • Fatigue   • Alopecia   • Obesity (BMI 30-39.9)   • Dietary counseling   • Abnormal electrocardiogram   • Anxiety   • Asthma   • Carpal tunnel syndrome   • Non-obstructive coronary artery disease   • Depression   • Exercise counseling   • Hx MRSA infection   • Diabetes mellitus type 2 in obese   • Edema   • Fatty liver    • Multiple joint pain   • Seizure disorder       Past Medical History:   Diagnosis Date   • Abdominal pain    • Ankle joint pain    • Anxiety    • Asthma    • Bilateral wrist pain    • Carpal tunnel syndrome    • Chronic lower back pain    • Coronary arteriosclerosis    • Depression    • Diabetes mellitus    • Diarrhea    • Dysphagia    • Fatigue    • Fatty liver    • Fibromyalgia    • Fibromyositis    • GERD (gastroesophageal reflux disease)    • Heartburn    • Hyperlipidemia    • Hypertension    • Insomnia    • Kidney stone    • Morbid obesity    • Muscle spasm    • Nausea    • Neck pain    • Numbness    • Obesity    • Pain of both hip joints    • Polyphagia    • Polyphagia    • PONV (postoperative nausea and vomiting)    • Poor vision    • Problems with hearing    • Restless leg syndrome    • Seizures    • Sinus drainage    • Skin rash     Under both breasts   • Stroke syndrome    • Tingling    • Vertigo    • Vomiting    • Weight gain        Past Surgical History:   Procedure Laterality Date   • BREAST BIOPSY     • CARDIAC CATHETERIZATION      no stents 9/2013 at [facility]   • COLONOSCOPY     • ENDOSCOPY N/A 9/9/2016    Procedure: ESOPHAGOGASTRODUODENOSCOPY WITH BIOPSY;  Surgeon: Chris Ackerman Jr., MD;  Location: [facility] ENDOSCOPY;  Service:    • EXTRACORPOREAL SHOCKWAVE LITHOTRIPSY (ESWL), STENT INSERTION/REMOVAL     • FOOT SURGERY     • GASTRIC BANDING REMOVAL N/A 11/30/2016    Procedure: LAPAROSCOPIC GASTRIC BANDING AND PORT REMOVAL ;  Surgeon: Chris Ackerman Jr., MD;  [place] [facility] [place] [facility] [place]  Service:    • LAPAROSCOPIC CHOLECYSTECTOMY     • LAPAROSCOPIC GASTRIC BANDING     • NOSE SURGERY     • TONSILLECTOMY         Allergies   Allergen Reactions   • Latex Other (See Comments)     SKIN BLISTERS   • Peanut Oil      Peanuts w/ red shell   • Sulfa Antibiotics Other (See Comments)     unknnown         Current Outpatient Prescriptions:   •  albuterol (PROAIR HFA) 108 (90 BASE) MCG/ACT  inhaler, ProAir  (90 Base) MCG/ACT Inhalation Aerosol Solution; Patient Sig: ProAir  (90 Base) MCG/ACT Inhalation Aerosol Solution INHALE 1-2 PUFFS EVERY 4-6 HOURS AS NEEDED AND AS DIRECTED.; 0; 26-Aug-2015; Active, Disp: , Rfl:   •  atorvastatin (LIPITOR) 20 MG tablet, Take 20 mg by mouth Daily., Disp: , Rfl:   •  Biotin 10 MG capsule, Take  by mouth., Disp: , Rfl:   •  clopidogrel (PLAVIX) 75 MG tablet, Take 1 tablet by mouth Daily. HOLDING FOR SX, Disp: , Rfl:   •  colestipol (COLESTID) 1 G tablet, Take 1 tablet by mouth daily., Disp: , Rfl:   •  FLUoxetine (PROzac) 20 MG tablet, Take 4 tablets by mouth daily., Disp: , Rfl:   •  fluticasone-salmeterol (ADVAIR) 500-50 MCG/DOSE DISKUS, Inhale 2 (Two) Times a Day., Disp: , Rfl:   •  gabapentin (NEURONTIN) 300 MG capsule, Take 300 mg by mouth 3 (Three) Times a Day., Disp: , Rfl:   •  glipiZIDE (GLUCOTROL) 10 MG tablet, Take by mouth. 1/2 tablet in the morning and 1 tablet in the evening, Disp: , Rfl:   •  insulin NPH (NOVOLIN N) 100 UNIT/ML injection, Inject  under the skin. 12 units at night/ 6units in am, Disp: , Rfl:   •  levETIRAcetam (KEPPRA) 1000 MG tablet, Take 1 tablet by mouth 2 (Two) Times a Day. 15OOMG AT HS, Disp: , Rfl:   •  MAGNESIUM OXIDE PO, Take 1 tablet by mouth Daily., Disp: , Rfl:   •  metFORMIN (GLUCOPHAGE) 500 MG tablet, Take 1,000 mg by mouth 2 (Two) Times a Day With Meals., Disp: , Rfl:   •  metoprolol tartrate (LOPRESSOR) 100 MG tablet, Take 50 mg by mouth 2 (Two) Times a Day. 100MG AT HS, Disp: , Rfl:   •  montelukast (SINGULAIR) 10 MG tablet, Take 1 tablet by mouth daily., Disp: , Rfl:   •  Multiple Vitamins-Minerals (HAIR/SKIN/NAILS) tablet, Take 1 tablet by mouth daily., Disp: , Rfl:   •  nortriptyline (PAMELOR) 10 MG capsule, Take 1 capsule by mouth daily., Disp: , Rfl:   •  Probiotic Product (PROBIOTIC ADVANCED PO), Take  by mouth., Disp: , Rfl:   •  quinapril (ACCUPRIL) 10 MG tablet, Take 1 tablet by mouth daily., Disp: ,  Rfl:   •  spironolactone (ALDACTONE) 25 MG tablet, Take 25 mg by mouth daily, Disp: , Rfl:     Social History     Social History   • Marital status: Single     Spouse name: N/A   • Number of children: 1   • Years of education: N/A     Occupational History   • on disability      Social History Main Topics   • Smoking status: Former Smoker   • Smokeless tobacco: Not on file      Comment: caffeine use   • Alcohol use No      Comment: very rare   • Drug use: No   • Sexual activity: Defer     Other Topics Concern   • Not on file     Social History Narrative       Family History   Problem Relation Age of Onset   • Heart attack Mother    • Diabetes Mother    • Hypertension Mother    • Stroke Mother    • Heart attack Father    • Diabetes Father    • Hypertension Father    • Obesity Father      Morbid Obesity   • Stroke Father    • Heart attack Brother    • Cancer Brother      Bladder   • Heart attack Maternal Grandmother    • Heart attack Maternal Grandfather    • Stroke Paternal Grandmother    • Heart attack Paternal Grandfather          Review of Systems:  Review of Systems   All other systems reviewed and are negative.      Physical Exam:  Vital Signs:  Weight: 210 lb (95.3 kg)   Body mass index is 37.8 kg/(m^2).  Temp: 98.5 °F (36.9 °C)   Heart Rate: 80   BP: 115/75     Physical Exam   Constitutional: She is oriented to person, place, and time. She appears well-developed and well-nourished.   HENT:   Head: Normocephalic and atraumatic.   Eyes: EOM are normal.   Cardiovascular: Normal rate, regular rhythm and normal heart sounds.    Pulmonary/Chest: Effort normal and breath sounds normal.   Abdominal: Soft. Bowel sounds are normal. She exhibits no distension. There is no tenderness.   Musculoskeletal: Normal range of motion.   Neurological: She is alert and oriented to person, place, and time.   Skin: Skin is warm and dry.   Psychiatric: She has a normal mood and affect. Her behavior is normal. Judgment and thought  content normal.   Vitals reviewed.         Assessment:         Vivian Kauffman is a 62 y.o. year old female with medically complicated severe obesity. Weight: 210 lb (95.3 kg), Body mass index is 37.8 kg/(m^2). and weight related problems including diabetes, hypertension, dyslipidemia, cardiovascular disease, fibromyalgia, asthma, edema and depression.    I explained in detail the procedures that we are performing.  All of those procedures can be performed laparoscopically but there is a chance to convert to open if any technical challenges or complications do occur.  Bariatric surgery is not cosmetic surgery but rather a tool to help a patient make a life-long commitment lifestyle changes including diet, exercise, behavior changes, and taking supplemental vitamins and minerals.    Due to the patient's BMI and co-morbidities they are at a high risk for surgery and will obtain the following:  The patient has been advised that a letter of medical support and a history and physical must be obtained from her primary care physician. A psychological evaluation will be arranged for this patient. CBC, CMP, FLP, TSH and HgbA1C will be drawn. Vivian Kauffman will obtain a pre-operative CXR and EKG. Vivian Kauffman will obtain clearance from her cardiologist prior to surgery along with clearance to stop her blood thinner.     Vivian Kauffman has already had a diagnostic esophagogastroduodenoscopy with biopsy for evaluation.     The risks, benefits, alternatives, and potential complications of all of the procedures were explained in detail including, but not limited to death, anesthesia and medication adverse effect/DVT, pulmonary embolism, trocar site/incisional hernia, wound infection, abdominal infection, bleeding, failure to lose weight or gain weight and change in body image, metabolic complications with calcium, thiamine, vitamin B12, folate, iron, and anemia.    The patient was advised to start a high protein,  low fat and low carbohydrate diet. The patient was given individualized information by our dietician along with general group information and handouts.     If the patient had the Basal Metabolic Rate test performed in our office today then I reviewed the results with them including changes to help increase metabolism and a recommended daily caloric intake range- see scanned results.    The patient was given information regarding the NADYA educational video. NADYA is an internet based educational video which explains the surgical procedure and answers basic questions regarding the procedure. The patient was provided with instructions and a password to watch the video.    The patient was encouraged to start routine exercise including but not limited to 150 minutes per week. The patient received a resistance band along with a handout of exercises.     The consultation plan was reviewed with the patient.    The patient understands the surgical procedures and the different surgical options that are available.  She understands the lifestyle changes that would be required after surgery and has agreed to participate in a pre-operative and postoperative weight management program.  She also expressed understanding of possible risks, had several questions answered and desires to proceed.    I think she is a good candidate for this surgery, and is interested in a sleeve gastrectomy revision.    Plan:    Patient will have evaluations and follow up with bariatric dieticians and a psychologist before undergoing a multidisciplinary review of her candidacy.  We also discussed the weight loss requirement and rationale, and other program requirements.      Lorene Stevens, APRN  6/6/2017

## 2017-06-06 NOTE — TELEPHONE ENCOUNTER
Pt called requesting surgery clearance to have gastric sleeve done by Dr. Ackerman. Pt also requesting to know when to stop blood-thinner. Please advise! Thanks, Obey

## 2017-06-23 NOTE — PROGRESS NOTES
"Bariatric Nutrition Counseling Interview    Patient Name:  Vivian Kauffman  YOB: 1955  Age:  62 y.o.  Sex:  female  MRN: [id number]  Date:  06/23/17    Procedure Considering:  Sleeve,revision from LAGB    Last Documented Height:    Ht Readings from Last 1 Encounters:   06/06/17 62.5\" (158.8 cm)     Last Documented Weight:   Wt Readings from Last 1 Encounters:   06/06/17 210 lb (95.3 kg)      Body mass index is 37.8 kg/(m^2).    Highest Weight:  256 lb.  Goal Weight: 145 lb.    History:  Past Medical History:   Diagnosis Date   • Abdominal pain    • Ankle joint pain    • Anxiety    • Asthma    • Bilateral wrist pain    • Carpal tunnel syndrome    • Chronic lower back pain    • Coronary arteriosclerosis    • Depression    • Diabetes mellitus    • Diarrhea    • Dysphagia    • Fatigue    • Fatty liver    • Fibromyalgia    • Fibromyositis    • GERD (gastroesophageal reflux disease)    • Heartburn    • Hyperlipidemia    • Hypertension    • Insomnia    • Kidney stone    • Morbid obesity    • Muscle spasm    • Nausea    • Neck pain    • Numbness    • Obesity    • Pain of both hip joints    • Polyphagia    • Polyphagia    • PONV (postoperative nausea and vomiting)    • Poor vision    • Problems with hearing    • Restless leg syndrome    • Seizures    • Sinus drainage    • Skin rash     Under both breasts   • Stroke syndrome    • Tingling    • Vertigo    • Vomiting    • Weight gain      Past Surgical History:   Procedure Laterality Date   • BREAST BIOPSY     • CARDIAC CATHETERIZATION      no stents 9/2013 at [facility]   • COLONOSCOPY     • ENDOSCOPY N/A 9/9/2016    Procedure: ESOPHAGOGASTRODUODENOSCOPY WITH BIOPSY;  Surgeon: Chris Ackerman Jr., MD;  Location: [facility] ENDOSCOPY;  Service:    • EXTRACORPOREAL SHOCKWAVE LITHOTRIPSY (ESWL), STENT INSERTION/REMOVAL     • FOOT SURGERY     • GASTRIC BANDING REMOVAL N/A 11/30/2016    Procedure: LAPAROSCOPIC GASTRIC BANDING AND PORT REMOVAL ;  " Surgeon: Chris Ackerman Jr., MD;  [place] [facility] [place] [facility] [place]  [place]:    • LAPAROSCOPIC CHOLECYSTECTOMY     • LAPAROSCOPIC GASTRIC BANDING     • NOSE SURGERY     • TONSILLECTOMY       Family History   Problem Relation Age of Onset   • Heart attack Mother    • Diabetes Mother    • Hypertension Mother    • Stroke Mother    • Heart attack Father    • Diabetes Father    • Hypertension Father    • Obesity Father      Morbid Obesity   • Stroke Father    • Heart attack Brother    • Cancer Brother      Bladder   • Heart attack Maternal Grandmother    • Heart attack Maternal Grandfather    • Stroke Paternal Grandmother    • Heart attack Paternal Grandfather      Social History     Social History   • Marital status: Single     Spouse name: N/A   • Number of children: 1   • Years of education: N/A     Occupational History   • on disability      Social History Main Topics   • Smoking status: Former Smoker   • Smokeless tobacco: None      Comment: caffeine use   • Alcohol use No      Comment: very rare   • Drug use: No   • Sexual activity: Defer     Other Topics Concern   • None     Social History Narrative     Additional Health Issues to Consider:  HTN, Diabetes,Coronary Artery Disease    Weight History:  Always been overweight    Previous Weight Loss Efforts:  Weight Watchers, TOPPS, LAGB  Most Successful Weight Loss Effort:  LAGB    Eating Habits: Eat in response to stress, Eat out of boredom, Snacking on high calorie foods  Eat three meals on most days?  No  Worst eating habit?  Snacking on high calorie foods    How often do you eat fast food? weekly    Do you exercise regularly? (at least 3 times each week)  No    Occupation:  Disabled    Personal Goal After Procedure:  Vivian believes that the sleeve procedure will be the best remaining option for long term weight. Success from the LAGB was moderately successful prior to complications that  resulted in removal.   Personal Support:  family    Assessment:  We reviewed  program requirements for weight loss following surgery. We discussed personal habits and lifestyle behaviors that may influence diet efforts. Program materials including a reduced calorie diet, were provided, discussed and recommended as the regimen to follow for pre and post diet planning. Vivian demonstrated a fair comprehension of diet requirements. She will benefit from additional nutrition and behavior modification education. She will make a fair candidate for weight loss surgery.    Electronically signed by:  Idalia Mclain RD  06/23/17 2:51 PM

## 2017-07-06 ENCOUNTER — HOSPITAL ENCOUNTER (OUTPATIENT)
Dept: GENERAL RADIOLOGY | Facility: HOSPITAL | Age: 62
Discharge: HOME | End: 2017-07-06
Admitting: NURSE PRACTITIONER

## 2017-07-06 ENCOUNTER — OFFICE VISIT (OUTPATIENT)
Dept: BARIATRICS/WEIGHT MGMT | Facility: CLINIC | Age: 62
End: 2017-07-06

## 2017-07-06 VITALS
HEIGHT: 63 IN | DIASTOLIC BLOOD PRESSURE: 63 MMHG | WEIGHT: 211 LBS | TEMPERATURE: 98 F | BODY MASS INDEX: 37.39 KG/M2 | SYSTOLIC BLOOD PRESSURE: 133 MMHG | HEART RATE: 73 BPM | RESPIRATION RATE: 16 BRPM

## 2017-07-06 DIAGNOSIS — E66.9 DIABETES MELLITUS TYPE 2 IN OBESE (HCC): ICD-10-CM

## 2017-07-06 DIAGNOSIS — Z71.82 EXERCISE COUNSELING: ICD-10-CM

## 2017-07-06 DIAGNOSIS — E66.9 OBESITY (BMI 30-39.9): Primary | ICD-10-CM

## 2017-07-06 DIAGNOSIS — Z71.3 DIETARY COUNSELING: ICD-10-CM

## 2017-07-06 DIAGNOSIS — L65.9 ALOPECIA: ICD-10-CM

## 2017-07-06 DIAGNOSIS — I10 ESSENTIAL HYPERTENSION: ICD-10-CM

## 2017-07-06 DIAGNOSIS — E11.69 DIABETES MELLITUS TYPE 2 IN OBESE (HCC): ICD-10-CM

## 2017-07-06 DIAGNOSIS — R53.82 CHRONIC FATIGUE: ICD-10-CM

## 2017-07-06 DIAGNOSIS — E66.9 OBESITY (BMI 30-39.9): ICD-10-CM

## 2017-07-06 DIAGNOSIS — R60.9 EDEMA, UNSPECIFIED TYPE: ICD-10-CM

## 2017-07-06 DIAGNOSIS — E78.5 HYPERLIPIDEMIA, UNSPECIFIED HYPERLIPIDEMIA TYPE: ICD-10-CM

## 2017-07-06 DIAGNOSIS — K76.0 FATTY LIVER: ICD-10-CM

## 2017-07-06 PROCEDURE — 71020 HC CHEST PA AND LATERAL: CPT

## 2017-07-06 PROCEDURE — 99214 OFFICE O/P EST MOD 30 MIN: CPT | Performed by: NURSE PRACTITIONER

## 2017-07-06 NOTE — PROGRESS NOTES
[facility] BARIATRIC SURGERY  [address]  [place] [address] [phone]  [address]  [facility] [id number] [phone]  Dept: [phone]  7/6/2017      Vivian Kauffman.  [id number]  [id number]  1955  female      Chief Complaint   Patient presents with   • Obesity     Diet management 2/4       The patient is here for month 2/4 of their pre-operative physician supervised diet. She had a gain of 1 lbs. The patient states that she is following the recommendations given by our office and dietician including a high lean protein, low carb and low fat diet. We recommended adequate fruits and vegetable intake along with limited portion sizes. Patient is working on eliminating fast foods, fried foods, sweets and soda. Vivian Kauffman has been increasing her daily water intake. She has been exercising: walking at the gym 4 days per week at least 1 mile    Patient states they have made positive changes including getting at least 64 oz of water, avoiding sodas and sugar sweetened beverages, getting at least 70 grams of protein daily  The patient admits to be struggling with skipping meals, especially breakfast or lunch many days    Review of Systems   Constitutional: Positive for appetite change. Negative for fatigue and unexpected weight change.   HENT: Negative.    Eyes: Negative.    Respiratory: Negative.    Cardiovascular: Negative.  Negative for leg swelling.   Gastrointestinal: Negative for abdominal distention, abdominal pain, constipation, diarrhea, nausea and vomiting.   Genitourinary: Negative for difficulty urinating, frequency and urgency.   Musculoskeletal: Negative for back pain.   Skin: Negative.    Psychiatric/Behavioral: Negative.    All other systems reviewed and are negative.    Vitals:    07/06/17 0926   BP: 133/63   Pulse: 73   Resp: 16   Temp: 98 °F (36.7 °C)     Patient Active Problem List   Diagnosis   • Essential  hypertension   • Hyperlipidemia   • Fatigue   • Alopecia   • Obesity (BMI 30-39.9)   • Dietary counseling   • Abnormal electrocardiogram   • Anxiety   • Asthma   • Carpal tunnel syndrome   • Non-obstructive coronary artery disease   • Depression   • Exercise counseling   • Hx MRSA infection   • Diabetes mellitus type 2 in obese   • Edema   • Fatty liver   • Multiple joint pain   • Seizure disorder     Body mass index is 37.98 kg/(m^2).    The following portions of the patient's history were reviewed and updated as appropriate: active problem list, allergies, family history, notes from last encounter, lab results    Physical Exam   Cardiovascular: Normal rate, regular rhythm, normal heart sounds and intact distal pulses.    Pulmonary/Chest: Effort normal and breath sounds normal. No respiratory distress. She has no wheezes.   Abdominal: Soft. Bowel sounds are normal. She exhibits no distension. There is no tenderness.   Skin: Skin is warm, dry and intact. No rash noted.       Discussion/Plan:  Obesity/Morbid Obesity: Currently the patient's weight is stable. There are no medications prescribed.Treatment plan includes prescribed diet, prescribed exercise regimen and behavior modification.    I reviewed the appropriate dietary choices with the patient and encouraged the necessary changes. Recommended at least 70 grams of protein per day, around 35 grams of fats and less than 100 grams of carbohydrates. Reviewed calorie intake if patient wanted to calorie count and/or had BMR. Instructed patient to drink half of body weight in ounces per day and exercise a minimum of 150 minutes per week including both cardio and strength training. Discussed the option of keeping a food journal which will help patient become more aware of the nutritional value of foods so they are more prepared after surgery.    Patient encouraged not to skip meals and to focus on trying to eat every 3-4 hours throughout the day getting high protein  meals and snacks. Patient reports that she has recently had and EGD and EKG done but has not yet obtained her CXR. Insurance requirements prior to surgery were reviewed. Patient encouraged to vary her exercise routine and to keep up the good work regarding fluid intake and avoiding sugar sweetened beverages.     The patient was given written materials from our office for education.   I answered all of the patients questions regarding dietary changes, exercise or surgical options.  The patient will follow up in 1 month. The total time spent face to face was 28 minutes with more than half our time was spent counseling.

## 2017-08-07 ENCOUNTER — OFFICE VISIT (OUTPATIENT)
Dept: BARIATRICS/WEIGHT MGMT | Facility: CLINIC | Age: 62
End: 2017-08-07

## 2017-08-07 VITALS
SYSTOLIC BLOOD PRESSURE: 125 MMHG | HEART RATE: 77 BPM | TEMPERATURE: 98.5 F | BODY MASS INDEX: 37.03 KG/M2 | DIASTOLIC BLOOD PRESSURE: 62 MMHG | RESPIRATION RATE: 16 BRPM | HEIGHT: 63 IN | WEIGHT: 209 LBS

## 2017-08-07 DIAGNOSIS — E11.69 DIABETES MELLITUS TYPE 2 IN OBESE (HCC): ICD-10-CM

## 2017-08-07 DIAGNOSIS — R53.82 CHRONIC FATIGUE: ICD-10-CM

## 2017-08-07 DIAGNOSIS — Z71.82 EXERCISE COUNSELING: ICD-10-CM

## 2017-08-07 DIAGNOSIS — I10 ESSENTIAL HYPERTENSION: ICD-10-CM

## 2017-08-07 DIAGNOSIS — E78.5 HYPERLIPIDEMIA, UNSPECIFIED HYPERLIPIDEMIA TYPE: ICD-10-CM

## 2017-08-07 DIAGNOSIS — Z71.3 DIETARY COUNSELING: ICD-10-CM

## 2017-08-07 DIAGNOSIS — E66.9 DIABETES MELLITUS TYPE 2 IN OBESE (HCC): ICD-10-CM

## 2017-08-07 DIAGNOSIS — E66.9 OBESITY (BMI 30-39.9): Primary | ICD-10-CM

## 2017-08-07 PROCEDURE — 99213 OFFICE O/P EST LOW 20 MIN: CPT | Performed by: NURSE PRACTITIONER

## 2017-08-07 NOTE — PROGRESS NOTES
[facility] BARIATRIC SURGERY  [address]  [place] [id number] [phone]  [address]  [place] [address] [phone]  Dept: [phone]  8/7/2017      Vivian Kauffman.  [id number]  [id number]  1955  female      Chief Complaint   Patient presents with   • Obesity     Diet management 3/4       The patient is here for month 3/4 of their pre-operative physician supervised diet. She had a loss of 2 lbs. The patient states that she is following the recommendations given by our office and dietician including a high lean protein, low carb and low fat diet. We recommended adequate fruits and vegetable intake along with limited portion sizes. Patient is working on eliminating fast foods, fried foods, sweets and soda. Vivian Kauffman has been increasing her daily water intake. She has been exercising: reports that she has been walking 3 times per week 1 mile per day, swimming 1-2 times per week.    Patient previously had a gastric band that was removed on 11/30/16.    Patient states they have made positive changes including getting at least 70 grams of protein, gets at least 9 glasses of water per day. Patient denies struggling with sweet cravings or mindless snacking. Reports that she has been measuring out snack sizes portions of high protein foods between meals and keeping her portions controlled.     Review of Systems   Constitutional: Negative for fatigue and unexpected weight change.   HENT: Negative.    Eyes: Negative.    Respiratory: Negative.    Cardiovascular: Negative.  Negative for leg swelling.   Gastrointestinal: Negative for abdominal distention, abdominal pain, constipation, diarrhea, nausea and vomiting.   Genitourinary: Negative for difficulty urinating, frequency and urgency.   Musculoskeletal: Negative for back pain.   Skin: Negative.    Psychiatric/Behavioral: Negative.    All other systems reviewed and are  negative.    Vitals:    08/07/17 0906   BP: 125/62   Pulse: 77   Resp: 16   Temp: 98.5 °F (36.9 °C)     Patient Active Problem List   Diagnosis   • Essential hypertension   • Hyperlipidemia   • Fatigue   • Alopecia   • Obesity (BMI 30-39.9)   • Dietary counseling   • Abnormal electrocardiogram   • Anxiety   • Asthma   • Carpal tunnel syndrome   • Non-obstructive coronary artery disease   • Depression   • Exercise counseling   • Hx MRSA infection   • Diabetes mellitus type 2 in obese   • Edema   • Fatty liver   • Multiple joint pain   • Seizure disorder     Body mass index is 37.62 kg/(m^2).    The following portions of the patient's history were reviewed and updated as appropriate: active problem list, medication list, family history, social history, health maintenance, notes from last encounter    Physical Exam   Cardiovascular: Normal rate, regular rhythm, normal heart sounds and intact distal pulses.    Pulmonary/Chest: Effort normal and breath sounds normal. No respiratory distress. She has no wheezes.   Abdominal: Soft. Bowel sounds are normal. She exhibits no distension. There is no tenderness.   Skin: Skin is warm, dry and intact. No rash noted.       Discussion/Plan:  Obesity/Morbid Obesity: Currently the patient's weight is decreasing. There are no medications prescribed.Treatment plan includes prescribed diet, prescribed exercise regimen and behavior modification.    I reviewed the appropriate dietary choices with the patient and encouraged the necessary changes. Recommended at least 70 grams of protein per day, around 35 grams of fats and less than 100 grams of carbohydrates. Reviewed calorie intake if patient wanted to calorie count and/or had BMR. Instructed patient to drink half of body weight in ounces per day and exercise a minimum of 150 minutes per week including both cardio and strength training. Discussed the option of keeping a food journal which will help patient become more aware of the  nutritional value of foods so they are more prepared after surgery.    The patient was given written materials from our office for education.   I answered all of the patients questions regarding dietary changes, exercise or surgical options.  The patient will follow up in 1 month. The total time spent face to face was 20 minutes with more than half our time was spent counseling.    Encounter Diagnoses   Name Primary?   • Obesity (BMI 30-39.9) Yes   • Essential hypertension    • Hyperlipidemia, unspecified hyperlipidemia type    • Diabetes mellitus type 2 in obese    • Chronic fatigue    • Dietary counseling

## 2017-09-06 ENCOUNTER — OFFICE VISIT (OUTPATIENT)
Dept: BARIATRICS/WEIGHT MGMT | Facility: CLINIC | Age: 62
End: 2017-09-06

## 2017-09-06 VITALS
BODY MASS INDEX: 36.59 KG/M2 | WEIGHT: 206.5 LBS | SYSTOLIC BLOOD PRESSURE: 148 MMHG | RESPIRATION RATE: 16 BRPM | DIASTOLIC BLOOD PRESSURE: 80 MMHG | HEART RATE: 69 BPM | TEMPERATURE: 98.4 F | HEIGHT: 63 IN

## 2017-09-06 DIAGNOSIS — G40.909 SEIZURE DISORDER (HCC): ICD-10-CM

## 2017-09-06 DIAGNOSIS — I10 ESSENTIAL HYPERTENSION: ICD-10-CM

## 2017-09-06 DIAGNOSIS — Z71.82 EXERCISE COUNSELING: ICD-10-CM

## 2017-09-06 DIAGNOSIS — E66.9 DIABETES MELLITUS TYPE 2 IN OBESE (HCC): ICD-10-CM

## 2017-09-06 DIAGNOSIS — F32.A DEPRESSION, UNSPECIFIED DEPRESSION TYPE: ICD-10-CM

## 2017-09-06 DIAGNOSIS — Z71.3 DIETARY COUNSELING: ICD-10-CM

## 2017-09-06 DIAGNOSIS — E11.69 DIABETES MELLITUS TYPE 2 IN OBESE (HCC): ICD-10-CM

## 2017-09-06 DIAGNOSIS — E66.9 OBESITY (BMI 30-39.9): Primary | ICD-10-CM

## 2017-09-06 DIAGNOSIS — R53.82 CHRONIC FATIGUE: ICD-10-CM

## 2017-09-06 DIAGNOSIS — E78.5 HYPERLIPIDEMIA, UNSPECIFIED HYPERLIPIDEMIA TYPE: ICD-10-CM

## 2017-09-06 PROCEDURE — 99213 OFFICE O/P EST LOW 20 MIN: CPT | Performed by: NURSE PRACTITIONER

## 2017-09-06 NOTE — PROGRESS NOTES
[facility] BARIATRIC SURGERY  [address]  [facility] [id number] [phone]  [address]  [facility] [id number] [phone]  Dept: [phone]  9/6/2017      Vivian Kauffman.  [id number]  [id number]  1955  female      Chief Complaint   Patient presents with   • Follow-up     Diet 4/4       The patient is here for month 4/4 of their pre-operative physician supervised diet. She had a loss of 3.5 lbs. The patient states that she is following the recommendations given by our office and dietician including a high lean protein, low carb and low fat diet. We recommended adequate fruits and vegetable intake along with limited portion sizes. Patient is working on eliminating fast foods, fried foods, sweets and soda. Vivian Kauffman has been increasing her daily water intake. She has been exercising: walking most days around 20-30 minutes    Patient states they have made positive changes including paying close attention to what she is eating. Reports that she has been doing more steamed vegetables and crock pot meals. Reports drinking one protein shake per day. Reports that she gets at least 70 grams of protein per day. Reports that she only drinks water or unsweetened iced tea and gets 70ml per day.   The patient admits to be struggling with getting enough sleep and having a stressful month.     Review of Systems   Constitutional: Negative for fatigue and unexpected weight change.   HENT: Negative.    Eyes: Negative.    Respiratory: Negative.    Cardiovascular: Negative.  Negative for leg swelling.   Gastrointestinal: Negative for abdominal distention, abdominal pain, constipation, diarrhea, nausea and vomiting.   Genitourinary: Negative for difficulty urinating, frequency and urgency.   Musculoskeletal: Negative for back pain.   Skin: Negative.    Psychiatric/Behavioral: Negative.    All other systems reviewed and are negative.    Vitals:     09/06/17 0917   BP: 148/80   Pulse: 69   Resp: 16   Temp: 98.4 °F (36.9 °C)     Patient Active Problem List   Diagnosis   • Essential hypertension   • Hyperlipidemia   • Fatigue   • Alopecia   • Obesity (BMI 30-39.9)   • Dietary counseling   • Abnormal electrocardiogram   • Anxiety   • Asthma   • Carpal tunnel syndrome   • Non-obstructive coronary artery disease   • Depression   • Exercise counseling   • Hx MRSA infection   • Diabetes mellitus type 2 in obese   • Edema   • Fatty liver   • Multiple joint pain   • Seizure disorder     Body mass index is 37.17 kg/(m^2).    The following portions of the patient's history were reviewed and updated as appropriate: active problem list, medication list, health maintenance, notes from last encounter    Physical Exam   Constitutional: She appears well-developed and well-nourished.   Neck: No thyromegaly present.   Cardiovascular: Normal rate, regular rhythm and normal heart sounds.    Pulmonary/Chest: Effort normal and breath sounds normal. No respiratory distress. She has no wheezes.   Abdominal: Soft. Bowel sounds are normal. She exhibits no distension. There is no tenderness. There is no guarding. No hernia.   Musculoskeletal: She exhibits no edema or tenderness.   Neurological: She is alert.   Skin: Skin is warm and dry. No rash noted. No erythema.   Psychiatric: She has a normal mood and affect. Her behavior is normal.   Nursing note and vitals reviewed.      Discussion/Plan:  Obesity/Morbid Obesity: Currently the patient's weight is decreasing. There are no medications prescribed.Treatment plan includes prescribed diet, prescribed exercise regimen and behavior modification.    I reviewed the appropriate dietary choices with the patient and encouraged the necessary changes. Recommended at least 70 grams of protein per day, around 35 grams of fats and less than 100 grams of carbohydrates. Reviewed calorie intake if patient wanted to calorie count and/or had BMR.  Instructed patient to drink half of body weight in ounces per day and exercise a minimum of 150 minutes per week including both cardio and strength training. Discussed the option of keeping a food journal which will help patient become more aware of the nutritional value of foods so they are more prepared after surgery.    The patient was given written materials from our office for education.   I answered all of the patients questions regarding dietary changes, exercise or surgical options.  The patient will follow up in 1 month. The total time spent face to face was 20 minutes with 15 minutes spent counseling.    Encounter Diagnoses   Name Primary?   • Obesity (BMI 30-39.9) Yes   • Essential hypertension    • Hyperlipidemia, unspecified hyperlipidemia type    • Diabetes mellitus type 2 in obese    • Seizure disorder    • Chronic fatigue    • Dietary counseling    • Depression, unspecified depression type    • Exercise counseling

## 2017-09-14 ENCOUNTER — PREP FOR SURGERY (OUTPATIENT)
Dept: BARIATRICS/WEIGHT MGMT | Facility: CLINIC | Age: 62
End: 2017-09-14

## 2017-09-14 DIAGNOSIS — E66.9 OBESITY, CLASS II, BMI 35-39.9: Primary | ICD-10-CM

## 2017-09-14 RX ORDER — SODIUM CHLORIDE 0.9 % (FLUSH) 0.9 %
1-10 SYRINGE (ML) INJECTION AS NEEDED
Status: CANCELLED | OUTPATIENT
Start: 2017-10-02

## 2017-09-14 RX ORDER — SODIUM CHLORIDE, SODIUM LACTATE, POTASSIUM CHLORIDE, CALCIUM CHLORIDE 600; 310; 30; 20 MG/100ML; MG/100ML; MG/100ML; MG/100ML
100 INJECTION, SOLUTION INTRAVENOUS CONTINUOUS
Status: CANCELLED | OUTPATIENT
Start: 2017-10-02

## 2017-09-14 RX ORDER — CHLORHEXIDINE GLUCONATE 0.12 MG/ML
15 RINSE ORAL SEE ADMIN INSTRUCTIONS
Status: CANCELLED | OUTPATIENT
Start: 2017-10-02

## 2017-09-14 RX ORDER — SCOLOPAMINE TRANSDERMAL SYSTEM 1 MG/1
1 PATCH, EXTENDED RELEASE TRANSDERMAL ONCE
Status: CANCELLED | OUTPATIENT
Start: 2017-10-02 | End: 2017-09-14

## 2017-09-14 RX ORDER — PANTOPRAZOLE SODIUM 40 MG/10ML
40 INJECTION, POWDER, LYOPHILIZED, FOR SOLUTION INTRAVENOUS ONCE
Status: CANCELLED | OUTPATIENT
Start: 2017-10-02 | End: 2017-09-14

## 2017-09-14 RX ORDER — CEFAZOLIN SODIUM IN 0.9 % NACL 3 G/100 ML
3 INTRAVENOUS SOLUTION, PIGGYBACK (ML) INTRAVENOUS ONCE
Status: CANCELLED | OUTPATIENT
Start: 2017-10-02 | End: 2017-09-14

## 2017-09-18 PROBLEM — E66.9 OBESITY, CLASS II, BMI 35-39.9: Status: ACTIVE | Noted: 2017-09-18

## 2017-09-18 PROBLEM — E66.812 OBESITY, CLASS II, BMI 35-39.9: Status: ACTIVE | Noted: 2017-09-18

## 2017-09-21 ENCOUNTER — CONSULT (OUTPATIENT)
Dept: BARIATRICS/WEIGHT MGMT | Facility: CLINIC | Age: 62
End: 2017-09-21

## 2017-09-21 VITALS
SYSTOLIC BLOOD PRESSURE: 116 MMHG | TEMPERATURE: 98.2 F | BODY MASS INDEX: 37.21 KG/M2 | RESPIRATION RATE: 16 BRPM | HEART RATE: 67 BPM | HEIGHT: 63 IN | WEIGHT: 210 LBS | DIASTOLIC BLOOD PRESSURE: 64 MMHG

## 2017-09-21 DIAGNOSIS — Z71.3 DIETARY COUNSELING: ICD-10-CM

## 2017-09-21 DIAGNOSIS — J45.909 UNCOMPLICATED ASTHMA, UNSPECIFIED ASTHMA SEVERITY: ICD-10-CM

## 2017-09-21 DIAGNOSIS — E66.9 DIABETES MELLITUS TYPE 2 IN OBESE (HCC): ICD-10-CM

## 2017-09-21 DIAGNOSIS — E78.5 HYPERLIPIDEMIA, UNSPECIFIED HYPERLIPIDEMIA TYPE: ICD-10-CM

## 2017-09-21 DIAGNOSIS — R53.82 CHRONIC FATIGUE: ICD-10-CM

## 2017-09-21 DIAGNOSIS — K76.0 FATTY LIVER: ICD-10-CM

## 2017-09-21 DIAGNOSIS — Z98.84 HISTORY OF REMOVAL OF LAPAROSCOPIC GASTRIC BANDING DEVICE: ICD-10-CM

## 2017-09-21 DIAGNOSIS — E66.9 OBESITY, CLASS II, BMI 35-39.9: Primary | ICD-10-CM

## 2017-09-21 DIAGNOSIS — I10 ESSENTIAL HYPERTENSION: ICD-10-CM

## 2017-09-21 DIAGNOSIS — I25.10 CHRONIC CORONARY ARTERY DISEASE: ICD-10-CM

## 2017-09-21 DIAGNOSIS — Z86.14 HX MRSA INFECTION: ICD-10-CM

## 2017-09-21 DIAGNOSIS — E11.69 DIABETES MELLITUS TYPE 2 IN OBESE (HCC): ICD-10-CM

## 2017-09-21 DIAGNOSIS — M25.50 MULTIPLE JOINT PAIN: ICD-10-CM

## 2017-09-21 PROCEDURE — 99215 OFFICE O/P EST HI 40 MIN: CPT | Performed by: SURGERY

## 2017-09-21 RX ORDER — FOLIC ACID 1 MG/1
TABLET ORAL
Qty: 40 TABLET | Refills: 0 | Status: SHIPPED | OUTPATIENT
Start: 2017-09-21 | End: 2018-03-13 | Stop reason: ALTCHOICE

## 2017-09-21 RX ORDER — ASCORBIC ACID 1000 MG
100 TABLET ORAL EVERY EVENING
COMMUNITY
End: 2018-03-13 | Stop reason: ALTCHOICE

## 2017-09-21 RX ORDER — PROCHLORPERAZINE MALEATE 5 MG/1
5 TABLET ORAL EVERY 6 HOURS PRN
COMMUNITY
End: 2017-12-27

## 2017-09-21 NOTE — H&P
Bariatric Consult:  Referred by Shahbaz Acosta MD    Vivian Kauffman is here today for consult on Consult (Obesity, unable to maintain weight loss; HTN, DM, hyperlipidemia)      History of Present Illness:     Vivian Kauffman is a 62 y.o. female with morbid obesity with co-morbidities including diabetes, hypertension, dyslipidemia, osteoarthritis and mental health disease who presents for surgical consultation for the above procedure. Vivian has completed the initial intake visit and has been examined by our nurse practitioner, dietician, psychologist and underwent the extensive educational teaching process under the guidance of our bariatric coordinator and myself. Vivian also has seen the educational video NADYA on the surgical procedure if available. Vivian attended today more educational teaching from our bariatric coordinator and myself. Vivian has had an extensive medical workup including a visit with their primary care physician, EKG, chest radiograph, blood work, EGD or UGI and possibly further testing. These have been reviewed by me and discussed with the patient. Vivian is now ready to proceed with surgery. Vivian presently denies nausea, vomiting, fever, chills, chest pain, shortness of air, melena, hematochezia, hemetemesis, dysuria, frequency, hematuria, jaundice or abdominal pain.       Past Medical History:   Diagnosis Date   • Abdominal pain    • Ankle joint pain    • Anxiety    • Asthma    • Bilateral wrist pain    • Carpal tunnel syndrome    • Chronic lower back pain    • Coronary arteriosclerosis    • Depression    • Diabetes mellitus    • Diarrhea    • Dysphagia    • Fatigue    • Fatty liver    • Fibromyalgia    • Fibromyositis    • GERD (gastroesophageal reflux disease)    • Heartburn    • Hyperlipidemia    • Hypertension    • Insomnia    • Kidney stone    • Morbid obesity    • Muscle spasm    • Nausea    • Neck pain    • Numbness    • Obesity    • Pain of both hip joints    • Polyphagia    •  Polyphagia    • PONV (postoperative nausea and vomiting)    • Poor vision    • Problems with hearing    • Restless leg syndrome    • Seizures    • Sinus drainage    • Skin rash     Under both breasts   • Stroke syndrome    • Tingling    • Vertigo    • Vomiting    • Weight gain        Encounter Diagnoses   Name Primary?   • Obesity, Class II, BMI 35-39.9 Yes   • Multiple joint pain    • Fatty liver    • Diabetes mellitus type 2 in obese    • Hx MRSA infection    • Non-obstructive coronary artery disease    • Uncomplicated asthma, unspecified asthma severity    • Dietary counseling    • Chronic fatigue    • Essential hypertension    • Hyperlipidemia, unspecified hyperlipidemia type    • History of removal of laparoscopic gastric banding device        Past Surgical History:   Procedure Laterality Date   • BREAST BIOPSY     • CARDIAC CATHETERIZATION      no stents 9/2013 at [facility]   • COLONOSCOPY     • ENDOSCOPY N/A 9/9/2016    Procedure: ESOPHAGOGASTRODUODENOSCOPY WITH BIOPSY;  Surgeon: Chris Ackerman Jr., MD;  Location: [facility] ENDOSCOPY;  Service:    • EXTRACORPOREAL SHOCKWAVE LITHOTRIPSY (ESWL), STENT INSERTION/REMOVAL     • FOOT SURGERY     • GASTRIC BANDING REMOVAL N/A 11/30/2016    Procedure: LAPAROSCOPIC GASTRIC BANDING AND PORT REMOVAL ;  Surgeon: Chris Ackerman Jr., MD;  Location: [facility] OR [facility];  Service:    • LAPAROSCOPIC CHOLECYSTECTOMY     • LAPAROSCOPIC GASTRIC BANDING     • NOSE SURGERY     • TONSILLECTOMY         Patient Active Problem List   Diagnosis   • Essential hypertension   • Hyperlipidemia   • Fatigue   • Alopecia   • Dietary counseling   • Abnormal electrocardiogram   • Anxiety   • Asthma   • Carpal tunnel syndrome   • Non-obstructive coronary artery disease   • Depression   • Exercise counseling   • Hx MRSA infection   • Diabetes mellitus type 2 in obese   • Edema   • Fatty liver   • Multiple joint pain   • Seizure disorder   • Obesity, Class II, BMI 35-39.9   • History  of removal of laparoscopic gastric banding device       Allergies   Allergen Reactions   • Latex Other (See Comments)     SKIN BLISTERS   • Peanut Oil      Peanuts w/ red shell   • Sulfa Antibiotics Other (See Comments)     unknnown         Current Outpatient Prescriptions:   •  albuterol (PROAIR HFA) 108 (90 BASE) MCG/ACT inhaler, ProAir  (90 Base) MCG/ACT Inhalation Aerosol Solution; Patient Sig: ProAir  (90 Base) MCG/ACT Inhalation Aerosol Solution INHALE 1-2 PUFFS EVERY 4-6 HOURS AS NEEDED AND AS DIRECTED.; 0; 26-Aug-2015; Active, Disp: , Rfl:   •  atorvastatin (LIPITOR) 20 MG tablet, Take 20 mg by mouth Daily., Disp: , Rfl:   •  Biotin 10 MG capsule, Take  by mouth., Disp: , Rfl:   •  clopidogrel (PLAVIX) 75 MG tablet, Take 1 tablet by mouth Daily. HOLDING FOR SX, Disp: , Rfl:   •  Coenzyme Q10 (CO Q 10) 10 MG capsule, Take  by mouth., Disp: , Rfl:   •  colestipol (COLESTID) 1 G tablet, Take 1 tablet by mouth daily., Disp: , Rfl:   •  FLUoxetine (PROzac) 20 MG tablet, Take 4 tablets by mouth daily., Disp: , Rfl:   •  fluticasone-salmeterol (ADVAIR) 500-50 MCG/DOSE DISKUS, Inhale 2 (Two) Times a Day., Disp: , Rfl:   •  gabapentin (NEURONTIN) 300 MG capsule, Take 300 mg by mouth 3 (Three) Times a Day., Disp: , Rfl:   •  glipiZIDE (GLUCOTROL) 10 MG tablet, Take by mouth. 1/2 tablet in the morning and 1 tablet in the evening, Disp: , Rfl:   •  insulin NPH (NOVOLIN N) 100 UNIT/ML injection, Inject  under the skin. 12 units at night/ 6units in am, Disp: , Rfl:   •  levETIRAcetam (KEPPRA) 1000 MG tablet, Take 1 tablet by mouth 2 (Two) Times a Day. 15OOMG AT HS, Disp: , Rfl:   •  metFORMIN (GLUCOPHAGE) 500 MG tablet, Take 1,000 mg by mouth 2 (Two) Times a Day With Meals., Disp: , Rfl:   •  metoprolol tartrate (LOPRESSOR) 100 MG tablet, Take 50 mg by mouth 2 (Two) Times a Day. 100MG AT HS, Disp: , Rfl:   •  montelukast (SINGULAIR) 10 MG tablet, Take 1 tablet by mouth daily., Disp: , Rfl:   •  Multiple  Vitamins-Minerals (HAIR/SKIN/NAILS) tablet, Take 1 tablet by mouth daily., Disp: , Rfl:   •  nortriptyline (PAMELOR) 10 MG capsule, Take 1 capsule by mouth daily., Disp: , Rfl:   •  Probiotic Product (PROBIOTIC ADVANCED PO), Take  by mouth., Disp: , Rfl:   •  prochlorperazine (COMPAZINE) 5 MG tablet, Take 5 mg by mouth Every 6 (Six) Hours As Needed for Nausea or Vomiting., Disp: , Rfl:   •  quinapril (ACCUPRIL) 10 MG tablet, Take 1 tablet by mouth daily., Disp: , Rfl:   •  spironolactone (ALDACTONE) 25 MG tablet, Take 25 mg by mouth daily, Disp: , Rfl:     Social History     Social History   • Marital status: Single     Spouse name: N/A   • Number of children: 1   • Years of education: N/A     Occupational History   • on disability      Social History Main Topics   • Smoking status: Former Smoker   • Smokeless tobacco: Not on file      Comment: caffeine use   • Alcohol use No      Comment: very rare   • Drug use: No   • Sexual activity: Defer     Other Topics Concern   • Not on file     Social History Narrative       Family History   Problem Relation Age of Onset   • Heart attack Mother    • Diabetes Mother    • Hypertension Mother    • Stroke Mother    • Heart attack Father    • Diabetes Father    • Hypertension Father    • Obesity Father      Morbid Obesity   • Stroke Father    • Heart attack Brother    • Cancer Brother      Bladder   • Heart attack Maternal Grandmother    • Heart attack Maternal Grandfather    • Stroke Paternal Grandmother    • Heart attack Paternal Grandfather        Review of Systems:  Review of Systems   Constitutional: Positive for fatigue.   Musculoskeletal: Positive for arthralgias and back pain.   All other systems reviewed and are negative.        Physical Exam:    Vital Signs:  Weight: 210 lb (95.3 kg)   Body mass index is 37.8 kg/(m^2).  Temp: 98.2 °F (36.8 °C)   Heart Rate: 67   BP: 116/64       Physical Exam   Constitutional: She is oriented to person, place, and time. She appears  well-nourished.   HENT:   Head: Normocephalic and atraumatic.   Mouth/Throat: Oropharynx is clear and moist.   Eyes: Conjunctivae and EOM are normal. Pupils are equal, round, and reactive to light. No scleral icterus.   Neck: Normal range of motion. Neck supple. No thyromegaly present.   Cardiovascular: Normal rate and regular rhythm.    Pulmonary/Chest: Effort normal and breath sounds normal.   Abdominal: Soft. Bowel sounds are normal. She exhibits no distension and no mass. There is no tenderness. There is no rebound and no guarding. No hernia.   Musculoskeletal: Normal range of motion.   Lymphadenopathy:     She has no cervical adenopathy.   Neurological: She is alert and oriented to person, place, and time. No cranial nerve deficit. Coordination normal.   Skin: Skin is warm and dry. No erythema.   Psychiatric: She has a normal mood and affect. Her behavior is normal.   Vitals reviewed.        Assessment:    Vivian Kauffman is a 62 y.o. year old female with medically complicated severe obesity with a BMI of Body mass index is 37.8 kg/(m^2). and multiple co-morbidities listed in the encounter diagnosis.    I think she is an appropriate candidate for this surgery, and is ready to proceed.      Plan/Discussion/Summary:  No hiatal hernia per me.  No PPI.  No polyps.  Helicobacter pylori negative.  Patient status post LAP-BAND removal November 2016    Patient understands that they are at an increased risk for complications because of this being a revisional surgery  to another procedure.  The patient understands the increased risk of gastric injury/leak, bleeding, etc because of the scarring and previous surgery.  Also increased risk of other complications that are listed because of increased OR time.       The patient has returned to the office for a surgical consultation and has requested to proceed with a laparoscopic gastric sleeve.  I have had the opportunity to obtain a history, examine the patient and review  the patient's chart.    The patient understands that surgery is a tool and that weight loss is not guaranteed but only seen in the context of appropriate use, regular follow up, exercise and making appropriate food choices.     I personally discussed the potential complications of the laparoscopic gastric sleeve with this patient.  The patient is well aware of potential complications of the surgery that include but not limited to bleeding, infections, deep vein thrombosis, pulmonary embolism, pulmonary complications such as pneumonia, cardiac event, hernias, small bowel obstruction, damage to the spleen or other organs, bowel injury, disfiguring scars, failure to lose weight, need for additional surgery, conversion to an open procedure and death.  The patient is also aware of complications which apply in particular to the gastric sleeve and can include but not limited to the leakage of gastric contents at the staple line, the development of an intra-abdominal abscess, gastroesophageal reflux disease, Jenkins's esophagus, ulcers, vitamin/mineral deficiencies, strictures, and the possibility of converting this procedure to a Ruthie-en-Y gastric bypass. The patient also understands the possibility of requiring an acid reducer medication for the rest of their life.    The risks, benefits, potential complications and alternative therapies were discussed at great length as outlined in our extensive consent forms, online consent and educational teaching processes.    The patient has confirmed the participation in the programs extensive educational activities.    All questions and concerns were answered to patient's satisfaction.  The patient now wishes to proceed with surgery.    Patient has declined the pre-operative insertion of an IVC filter.     The patient has declined a postoperative course of anitcoagulant therapy.        I instructed patient to start on a H2 blocker or proton pump inhibitor if not already on one of  these medications.    I explained in detail the procedures that we perform.  All of these procedures have a chance to convert to open if any technical challenges or complications do occur.  Bariatric surgery is not cosmetic surgery but rather a tool to help a patient make a life-long commitment lifestyle change including diet, exercise, behavior changes, and taking supplemental vitamins and minerals.    Problems after surgery may require more operations to correct them.    The risks, benefits, alternatives, and potential complications of all of the procedures were explained in detail including, but not limited to death, anesthesia and medication adverse effect, deep venous thrombosis, pulmonary embolism, trocar site/incisional hernia, wound infection, abdominal infection, bleeding, failure to lose weight, gain weight, a change in body image, metabolic complications with vitamin deficiences and anemia.    Weight loss expectations were discussed with the patient in detail. The weight loss operations most commonly performed are the sleeve gastrectomy and the Ruthie-en-Y gastric bypass. These operations result in weight losses up to approximately 25-35% of initial body weight 12 to 24 months after surgery with the gastric bypass usually the higher percent of weight loss. The longitudinal data shows maintenance of 75% of lost weight after 10 years for the gastric bypass.    For the gastric bypass and loop duodenal switch (PRADIP-S) the risks include but not limited to the following early complications:  Anastomotic leak/peritonitis, Ruthie/Alimentary/biliopancreatic limb obstruction, severe & minor wound infection/seroma, and nausea/vomiting.  Late complications can include but are not limited to malnutrition, vitamin deficiencies, frequent loose stools,  stomal stenosis, marginal ulcer, bowel obstruction, intussusception, internal, and incisional hernia.    Regarding the gastric sleeve, there is less long-term outcome data  and higher risk of dysphagia and reflux compared to a gastric bypass, as well as risk of internal visceral/organ injury, splenectomy, bleeding, infection, leak (which could require further intervention possible conversion to Ruthie-en-Y gastric bypass), stenosis and possibility of regaining weight.    Vivian was counseled regarding diagnostic results, instructions for management, risk factor reductions, prognosis, patient and family education, impressions, risks and benefits of treatment options and importance of compliance with treatment. Total face to face time of the encounter was over 45 minutes and over 30 minutes was spent counseling.     Joann Report   As part of this patient's treatment plan I am prescribing controlled substances. The patient has been made aware of appropriate use of such medications, including potential risk of somnolence, limited ability to drive and /or work safely, and potential for dependence or overdose. It has also been made clear that these medications are for use by this patient only, without concomitant use of alcohol or other substances unless prescribed.    Vivian has completed prescribing agreement detailing terms of continued prescribing of controlled substances, including monitoring JOANN reports, urine drug screening, and pill counts if necessary. Vivian is aware that inappropriate use will result in cessation of prescribing such medications.    JOANN report has been reviewed      History and physical exam exhibit continued safe and appropriate use of controlled substances.      Vivian understands the surgical procedures and the different surgical options that are available.  She understands the lifestyle changes that are required after surgery and has agreed to follow the guidelines outlined in the weight management program.  She also expressed understanding of the risks involved and had all of female questions answered and desires to proceed.      Chris Ackerman,  MD  9/21/2017

## 2017-09-21 NOTE — PATIENT INSTRUCTIONS
Bariatric Manual    You were provided a manual specific to the procedure that you have chosen.  Please refer to that with any questions or call the office at [phone]

## 2017-09-26 ENCOUNTER — APPOINTMENT (OUTPATIENT)
Dept: PREADMISSION TESTING | Facility: HOSPITAL | Age: 62
End: 2017-09-26

## 2017-09-26 ENCOUNTER — TELEPHONE (OUTPATIENT)
Dept: BARIATRICS/WEIGHT MGMT | Facility: CLINIC | Age: 62
End: 2017-09-26

## 2017-09-26 VITALS
HEART RATE: 72 BPM | SYSTOLIC BLOOD PRESSURE: 107 MMHG | TEMPERATURE: 98.3 F | OXYGEN SATURATION: 98 % | RESPIRATION RATE: 20 BRPM | HEIGHT: 63 IN | DIASTOLIC BLOOD PRESSURE: 72 MMHG

## 2017-09-26 DIAGNOSIS — E66.9 OBESITY, CLASS II, BMI 35-39.9: ICD-10-CM

## 2017-09-26 LAB
ALBUMIN SERPL-MCNC: 4.5 G/DL (ref 3.5–5.2)
ALBUMIN/GLOB SERPL: 1.4 G/DL
ALP SERPL-CCNC: 72 U/L (ref 39–117)
ALT SERPL W P-5'-P-CCNC: 80 U/L (ref 1–33)
ANION GAP SERPL CALCULATED.3IONS-SCNC: 15.3 MMOL/L
AST SERPL-CCNC: 60 U/L (ref 1–32)
BILIRUB SERPL-MCNC: 0.6 MG/DL (ref 0.1–1.2)
BUN BLD-MCNC: 20 MG/DL (ref 8–23)
BUN/CREAT SERPL: 32.3 (ref 7–25)
CALCIUM SPEC-SCNC: 9.8 MG/DL (ref 8.6–10.5)
CHLORIDE SERPL-SCNC: 94 MMOL/L (ref 98–107)
CO2 SERPL-SCNC: 24.7 MMOL/L (ref 22–29)
CREAT BLD-MCNC: 0.62 MG/DL (ref 0.57–1)
DEPRECATED RDW RBC AUTO: 43 FL (ref 37–54)
ERYTHROCYTE [DISTWIDTH] IN BLOOD BY AUTOMATED COUNT: 13.9 % (ref 11.7–13)
GFR SERPL CREATININE-BSD FRML MDRD: 98 ML/MIN/1.73
GLOBULIN UR ELPH-MCNC: 3.2 GM/DL
GLUCOSE BLD-MCNC: 245 MG/DL (ref 65–99)
HCT VFR BLD AUTO: 41.2 % (ref 35.6–45.5)
HGB BLD-MCNC: 14.1 G/DL (ref 11.9–15.5)
MCH RBC QN AUTO: 29.1 PG (ref 26.9–32)
MCHC RBC AUTO-ENTMCNC: 34.2 G/DL (ref 32.4–36.3)
MCV RBC AUTO: 85.1 FL (ref 80.5–98.2)
PLATELET # BLD AUTO: 167 10*3/MM3 (ref 140–500)
PMV BLD AUTO: 10.2 FL (ref 6–12)
POTASSIUM BLD-SCNC: 4.3 MMOL/L (ref 3.5–5.2)
PROT SERPL-MCNC: 7.7 G/DL (ref 6–8.5)
RBC # BLD AUTO: 4.84 10*6/MM3 (ref 3.9–5.2)
SODIUM BLD-SCNC: 134 MMOL/L (ref 136–145)
WBC NRBC COR # BLD: 5.45 10*3/MM3 (ref 4.5–10.7)

## 2017-09-26 PROCEDURE — 80053 COMPREHEN METABOLIC PANEL: CPT | Performed by: SURGERY

## 2017-09-26 PROCEDURE — 36415 COLL VENOUS BLD VENIPUNCTURE: CPT

## 2017-09-26 PROCEDURE — 85027 COMPLETE CBC AUTOMATED: CPT | Performed by: SURGERY

## 2017-09-26 RX ORDER — METOPROLOL TARTRATE 100 MG/1
100 TABLET ORAL EVERY EVENING
COMMUNITY
End: 2018-09-28 | Stop reason: DRUGHIGH

## 2017-09-26 RX ORDER — GABAPENTIN 600 MG/1
600 TABLET ORAL EVERY EVENING
COMMUNITY
End: 2018-03-13 | Stop reason: ALTCHOICE

## 2017-09-26 RX ORDER — METOPROLOL TARTRATE 50 MG/1
50 TABLET, FILM COATED ORAL EVERY MORNING
COMMUNITY
End: 2017-12-27

## 2017-09-26 RX ORDER — FAMOTIDINE 20 MG/1
20 TABLET, FILM COATED ORAL EVERY EVENING
COMMUNITY
End: 2017-12-27

## 2017-09-26 RX ORDER — QUINAPRIL 5 1/1
5 TABLET ORAL EVERY MORNING
COMMUNITY
End: 2019-03-13

## 2017-09-26 RX ORDER — LEVETIRACETAM 1000 MG/1
3000 TABLET ORAL EVERY EVENING
COMMUNITY
End: 2018-09-28 | Stop reason: DRUGHIGH

## 2017-09-26 RX ORDER — CHOLECALCIFEROL (VITAMIN D3) 125 MCG
2000 CAPSULE ORAL EVERY EVENING
COMMUNITY
End: 2018-03-13 | Stop reason: ALTCHOICE

## 2017-09-26 NOTE — TELEPHONE ENCOUNTER
Spoke with the patient.  Her insurance will not cover the Nascobal.  I explained to the patient that she should followup with her PCP to have her vitamin levels re-checked.  We discussed injections with her PCP versus sublingual b12 that can be purchased here in the office.

## 2017-10-02 ENCOUNTER — ANESTHESIA EVENT (OUTPATIENT)
Dept: PERIOP | Facility: HOSPITAL | Age: 62
End: 2017-10-02

## 2017-10-02 ENCOUNTER — ANESTHESIA (OUTPATIENT)
Dept: PERIOP | Facility: HOSPITAL | Age: 62
End: 2017-10-02

## 2017-10-02 ENCOUNTER — HOSPITAL ENCOUNTER (INPATIENT)
Facility: HOSPITAL | Age: 62
LOS: 2 days | Discharge: HOME | End: 2017-10-04
Attending: SURGERY | Admitting: SURGERY

## 2017-10-02 DIAGNOSIS — E66.9 OBESITY, CLASS II, BMI 35-39.9: ICD-10-CM

## 2017-10-02 LAB
GLUCOSE BLDC GLUCOMTR-MCNC: 172 MG/DL (ref 70–130)
GLUCOSE BLDC GLUCOMTR-MCNC: 226 MG/DL (ref 70–130)
GLUCOSE BLDC GLUCOMTR-MCNC: 230 MG/DL (ref 70–130)
GLUCOSE BLDC GLUCOMTR-MCNC: 262 MG/DL (ref 70–130)

## 2017-10-02 PROCEDURE — 94799 UNLISTED PULMONARY SVC/PX: CPT

## 2017-10-02 PROCEDURE — 43775 LAP SLEEVE GASTRECTOMY: CPT | Performed by: PHYSICIAN ASSISTANT

## 2017-10-02 PROCEDURE — 63710000001 INSULIN ASPART PER 5 UNITS: Performed by: SURGERY

## 2017-10-02 PROCEDURE — 25010000002 PROMETHAZINE PER 50 MG: Performed by: SURGERY

## 2017-10-02 PROCEDURE — 25010000002 METOCLOPRAMIDE PER 10 MG: Performed by: SURGERY

## 2017-10-02 PROCEDURE — 25010000002 ONDANSETRON PER 1 MG: Performed by: SURGERY

## 2017-10-02 PROCEDURE — 0FN24ZZ RELEASE LEFT LOBE LIVER, PERCUTANEOUS ENDOSCOPIC APPROACH: ICD-10-PCS | Performed by: SURGERY

## 2017-10-02 PROCEDURE — 43775 LAP SLEEVE GASTRECTOMY: CPT | Performed by: SURGERY

## 2017-10-02 PROCEDURE — 25010000002 HYDROMORPHONE PER 4 MG: Performed by: SURGERY

## 2017-10-02 PROCEDURE — 94640 AIRWAY INHALATION TREATMENT: CPT

## 2017-10-02 PROCEDURE — 25010000002 PROPOFOL 10 MG/ML EMULSION: Performed by: NURSE ANESTHETIST, CERTIFIED REGISTERED

## 2017-10-02 PROCEDURE — 25010000002 FENTANYL CITRATE (PF) 100 MCG/2ML SOLUTION: Performed by: NURSE ANESTHETIST, CERTIFIED REGISTERED

## 2017-10-02 PROCEDURE — 25010000003 CEFAZOLIN IN DEXTROSE 2-4 GM/100ML-% SOLUTION: Performed by: SURGERY

## 2017-10-02 PROCEDURE — 0DB64Z3 EXCISION OF STOMACH, PERCUTANEOUS ENDOSCOPIC APPROACH, VERTICAL: ICD-10-PCS | Performed by: SURGERY

## 2017-10-02 PROCEDURE — 25010000002 ONDANSETRON PER 1 MG: Performed by: NURSE ANESTHETIST, CERTIFIED REGISTERED

## 2017-10-02 PROCEDURE — 25010000002 MIDAZOLAM PER 1 MG: Performed by: ANESTHESIOLOGY

## 2017-10-02 PROCEDURE — 25010000002 ENOXAPARIN PER 10 MG: Performed by: SURGERY

## 2017-10-02 PROCEDURE — 0BQT4ZZ REPAIR DIAPHRAGM, PERCUTANEOUS ENDOSCOPIC APPROACH: ICD-10-PCS | Performed by: SURGERY

## 2017-10-02 PROCEDURE — 82962 GLUCOSE BLOOD TEST: CPT

## 2017-10-02 DEVICE — PERI-STRIPS DRY WITH VERITAS COLLAGEN MATRIX (PSD-V) IS PREPARED FROM DEHYDRATED BOVINE PERICARDIUM PROCURED FROM CATTLE UNDER 30 MONTHS OF AGE IN THE UNITED STATES. ONE (1) TUBE OF PSD GEL (GEL) IS PROVIDED FOR EVERY TWO (2) POUCHES OF PSD-V. THE GEL IS USED TO CREATE A TEMPORARY BOND BETWEEN THE PSD-V BUTTRESS AND THE SURGICAL STAPLER JAWS UNTIL THE STAPLER IS POSITIONED AND FIRED.
Type: IMPLANTABLE DEVICE | Site: STOMACH | Status: FUNCTIONAL
Brand: PERI-STRIPS DRY WITH VERITAS COLLAGEN MATRIX

## 2017-10-02 DEVICE — SEALANT FIBRIN TISSEEL FZ 4ML: Type: IMPLANTABLE DEVICE | Site: STOMACH | Status: FUNCTIONAL

## 2017-10-02 RX ORDER — PROMETHAZINE HYDROCHLORIDE 25 MG/1
25 SUPPOSITORY RECTAL ONCE AS NEEDED
Status: DISCONTINUED | OUTPATIENT
Start: 2017-10-02 | End: 2017-10-02 | Stop reason: HOSPADM

## 2017-10-02 RX ORDER — CHLORHEXIDINE GLUCONATE 0.12 MG/ML
15 RINSE ORAL SEE ADMIN INSTRUCTIONS
Status: COMPLETED | OUTPATIENT
Start: 2017-10-02 | End: 2017-10-02

## 2017-10-02 RX ORDER — LISINOPRIL 5 MG/1
5 TABLET ORAL
Status: DISCONTINUED | OUTPATIENT
Start: 2017-10-02 | End: 2017-10-04 | Stop reason: HOSPADM

## 2017-10-02 RX ORDER — SODIUM CHLORIDE, SODIUM LACTATE, POTASSIUM CHLORIDE, CALCIUM CHLORIDE 600; 310; 30; 20 MG/100ML; MG/100ML; MG/100ML; MG/100ML
100 INJECTION, SOLUTION INTRAVENOUS CONTINUOUS
Status: DISCONTINUED | OUTPATIENT
Start: 2017-10-02 | End: 2017-10-02 | Stop reason: HOSPADM

## 2017-10-02 RX ORDER — ACETAMINOPHEN 160 MG/5ML
975 SOLUTION ORAL ONCE
Status: DISCONTINUED | OUTPATIENT
Start: 2017-10-02 | End: 2017-10-02 | Stop reason: HOSPADM

## 2017-10-02 RX ORDER — OXYCODONE AND ACETAMINOPHEN 7.5; 325 MG/1; MG/1
1 TABLET ORAL ONCE AS NEEDED
Status: DISCONTINUED | OUTPATIENT
Start: 2017-10-02 | End: 2017-10-02 | Stop reason: HOSPADM

## 2017-10-02 RX ORDER — SODIUM CHLORIDE 0.9 % (FLUSH) 0.9 %
1-10 SYRINGE (ML) INJECTION AS NEEDED
Status: DISCONTINUED | OUTPATIENT
Start: 2017-10-02 | End: 2017-10-02 | Stop reason: HOSPADM

## 2017-10-02 RX ORDER — ONDANSETRON 2 MG/ML
INJECTION INTRAMUSCULAR; INTRAVENOUS AS NEEDED
Status: DISCONTINUED | OUTPATIENT
Start: 2017-10-02 | End: 2017-10-02 | Stop reason: SURG

## 2017-10-02 RX ORDER — DIPHENHYDRAMINE HYDROCHLORIDE 50 MG/ML
25 INJECTION INTRAMUSCULAR; INTRAVENOUS EVERY 4 HOURS PRN
Status: DISCONTINUED | OUTPATIENT
Start: 2017-10-02 | End: 2017-10-04 | Stop reason: HOSPADM

## 2017-10-02 RX ORDER — LABETALOL HYDROCHLORIDE 5 MG/ML
5 INJECTION, SOLUTION INTRAVENOUS
Status: DISCONTINUED | OUTPATIENT
Start: 2017-10-02 | End: 2017-10-02 | Stop reason: HOSPADM

## 2017-10-02 RX ORDER — SODIUM CHLORIDE, SODIUM LACTATE, POTASSIUM CHLORIDE, CALCIUM CHLORIDE 600; 310; 30; 20 MG/100ML; MG/100ML; MG/100ML; MG/100ML
150 INJECTION, SOLUTION INTRAVENOUS CONTINUOUS
Status: DISCONTINUED | OUTPATIENT
Start: 2017-10-02 | End: 2017-10-04 | Stop reason: HOSPADM

## 2017-10-02 RX ORDER — ACETAMINOPHEN 160 MG/5ML
650 SOLUTION ORAL EVERY 4 HOURS PRN
Status: DISCONTINUED | OUTPATIENT
Start: 2017-10-02 | End: 2017-10-04 | Stop reason: HOSPADM

## 2017-10-02 RX ORDER — ONDANSETRON 2 MG/ML
4 INJECTION INTRAMUSCULAR; INTRAVENOUS ONCE AS NEEDED
Status: DISCONTINUED | OUTPATIENT
Start: 2017-10-02 | End: 2017-10-02 | Stop reason: HOSPADM

## 2017-10-02 RX ORDER — HYDROCODONE BITARTRATE AND ACETAMINOPHEN 7.5; 325 MG/1; MG/1
1 TABLET ORAL ONCE AS NEEDED
Status: DISCONTINUED | OUTPATIENT
Start: 2017-10-02 | End: 2017-10-02 | Stop reason: HOSPADM

## 2017-10-02 RX ORDER — ONDANSETRON 2 MG/ML
4 INJECTION INTRAMUSCULAR; INTRAVENOUS EVERY 6 HOURS PRN
Status: DISCONTINUED | OUTPATIENT
Start: 2017-10-02 | End: 2017-10-02

## 2017-10-02 RX ORDER — HYDROMORPHONE HYDROCHLORIDE 1 MG/ML
0.5 INJECTION, SOLUTION INTRAMUSCULAR; INTRAVENOUS; SUBCUTANEOUS
Status: DISCONTINUED | OUTPATIENT
Start: 2017-10-02 | End: 2017-10-02 | Stop reason: HOSPADM

## 2017-10-02 RX ORDER — MIDAZOLAM HYDROCHLORIDE 1 MG/ML
2 INJECTION INTRAMUSCULAR; INTRAVENOUS
Status: DISCONTINUED | OUTPATIENT
Start: 2017-10-02 | End: 2017-10-02 | Stop reason: HOSPADM

## 2017-10-02 RX ORDER — NALOXONE HCL 0.4 MG/ML
0.1 VIAL (ML) INJECTION
Status: DISCONTINUED | OUTPATIENT
Start: 2017-10-02 | End: 2017-10-04 | Stop reason: HOSPADM

## 2017-10-02 RX ORDER — BUPIVACAINE HYDROCHLORIDE AND EPINEPHRINE 5; 5 MG/ML; UG/ML
INJECTION, SOLUTION PERINEURAL AS NEEDED
Status: DISCONTINUED | OUTPATIENT
Start: 2017-10-02 | End: 2017-10-02 | Stop reason: HOSPADM

## 2017-10-02 RX ORDER — ONDANSETRON 4 MG/1
4 TABLET, FILM COATED ORAL EVERY 6 HOURS PRN
Status: DISCONTINUED | OUTPATIENT
Start: 2017-10-02 | End: 2017-10-04 | Stop reason: HOSPADM

## 2017-10-02 RX ORDER — PROMETHAZINE HYDROCHLORIDE 25 MG/ML
12.5 INJECTION, SOLUTION INTRAMUSCULAR; INTRAVENOUS
Status: DISCONTINUED | OUTPATIENT
Start: 2017-10-02 | End: 2017-10-04 | Stop reason: HOSPADM

## 2017-10-02 RX ORDER — HYDROMORPHONE HYDROCHLORIDE 1 MG/ML
0.5 INJECTION, SOLUTION INTRAMUSCULAR; INTRAVENOUS; SUBCUTANEOUS
Status: DISCONTINUED | OUTPATIENT
Start: 2017-10-02 | End: 2017-10-04 | Stop reason: HOSPADM

## 2017-10-02 RX ORDER — FENTANYL CITRATE 50 UG/ML
50 INJECTION, SOLUTION INTRAMUSCULAR; INTRAVENOUS
Status: DISCONTINUED | OUTPATIENT
Start: 2017-10-02 | End: 2017-10-02 | Stop reason: HOSPADM

## 2017-10-02 RX ORDER — FENTANYL CITRATE 50 UG/ML
INJECTION, SOLUTION INTRAMUSCULAR; INTRAVENOUS AS NEEDED
Status: DISCONTINUED | OUTPATIENT
Start: 2017-10-02 | End: 2017-10-02 | Stop reason: SURG

## 2017-10-02 RX ORDER — ONDANSETRON 4 MG/1
4 TABLET, FILM COATED ORAL EVERY 6 HOURS PRN
Status: DISCONTINUED | OUTPATIENT
Start: 2017-10-02 | End: 2017-10-02

## 2017-10-02 RX ORDER — SCOLOPAMINE TRANSDERMAL SYSTEM 1 MG/1
1 PATCH, EXTENDED RELEASE TRANSDERMAL ONCE
Status: DISCONTINUED | OUTPATIENT
Start: 2017-10-02 | End: 2017-10-02 | Stop reason: HOSPADM

## 2017-10-02 RX ORDER — ONDANSETRON 2 MG/ML
4 INJECTION INTRAMUSCULAR; INTRAVENOUS
Status: DISCONTINUED | OUTPATIENT
Start: 2017-10-02 | End: 2017-10-04 | Stop reason: HOSPADM

## 2017-10-02 RX ORDER — MORPHINE SULFATE 2 MG/ML
2 INJECTION, SOLUTION INTRAMUSCULAR; INTRAVENOUS
Status: DISCONTINUED | OUTPATIENT
Start: 2017-10-02 | End: 2017-10-04 | Stop reason: HOSPADM

## 2017-10-02 RX ORDER — FAMOTIDINE 10 MG/ML
20 INJECTION, SOLUTION INTRAVENOUS ONCE
Status: COMPLETED | OUTPATIENT
Start: 2017-10-02 | End: 2017-10-02

## 2017-10-02 RX ORDER — NALOXONE HCL 0.4 MG/ML
0.2 VIAL (ML) INJECTION AS NEEDED
Status: DISCONTINUED | OUTPATIENT
Start: 2017-10-02 | End: 2017-10-02 | Stop reason: HOSPADM

## 2017-10-02 RX ORDER — CLINDAMYCIN PHOSPHATE 900 MG/50ML
900 INJECTION INTRAVENOUS EVERY 12 HOURS
Status: COMPLETED | OUTPATIENT
Start: 2017-10-02 | End: 2017-10-02

## 2017-10-02 RX ORDER — LABETALOL HYDROCHLORIDE 5 MG/ML
10 INJECTION, SOLUTION INTRAVENOUS
Status: DISCONTINUED | OUTPATIENT
Start: 2017-10-02 | End: 2017-10-04 | Stop reason: HOSPADM

## 2017-10-02 RX ORDER — ALBUTEROL SULFATE 2.5 MG/3ML
2.5 SOLUTION RESPIRATORY (INHALATION)
Status: DISCONTINUED | OUTPATIENT
Start: 2017-10-02 | End: 2017-10-04 | Stop reason: HOSPADM

## 2017-10-02 RX ORDER — PROPOFOL 10 MG/ML
VIAL (ML) INTRAVENOUS AS NEEDED
Status: DISCONTINUED | OUTPATIENT
Start: 2017-10-02 | End: 2017-10-02 | Stop reason: SURG

## 2017-10-02 RX ORDER — QUINAPRIL 5 1/1
5 TABLET ORAL DAILY
Status: DISCONTINUED | OUTPATIENT
Start: 2017-10-02 | End: 2017-10-02 | Stop reason: CLARIF

## 2017-10-02 RX ORDER — METOCLOPRAMIDE HYDROCHLORIDE 5 MG/ML
INJECTION INTRAMUSCULAR; INTRAVENOUS
Status: DISPENSED
Start: 2017-10-02 | End: 2017-10-03

## 2017-10-02 RX ORDER — PROMETHAZINE HYDROCHLORIDE 25 MG/ML
12.5 INJECTION, SOLUTION INTRAMUSCULAR; INTRAVENOUS ONCE AS NEEDED
Status: DISCONTINUED | OUTPATIENT
Start: 2017-10-02 | End: 2017-10-02 | Stop reason: HOSPADM

## 2017-10-02 RX ORDER — DIPHENHYDRAMINE HYDROCHLORIDE 50 MG/ML
12.5 INJECTION INTRAMUSCULAR; INTRAVENOUS
Status: DISCONTINUED | OUTPATIENT
Start: 2017-10-02 | End: 2017-10-02 | Stop reason: HOSPADM

## 2017-10-02 RX ORDER — HYDROMORPHONE HYDROCHLORIDE 2 MG/1
2 TABLET ORAL EVERY 4 HOURS PRN
Status: DISCONTINUED | OUTPATIENT
Start: 2017-10-02 | End: 2017-10-04 | Stop reason: HOSPADM

## 2017-10-02 RX ORDER — LORAZEPAM 2 MG/ML
1 INJECTION INTRAMUSCULAR EVERY 12 HOURS PRN
Status: DISCONTINUED | OUTPATIENT
Start: 2017-10-02 | End: 2017-10-04 | Stop reason: HOSPADM

## 2017-10-02 RX ORDER — SCOLOPAMINE TRANSDERMAL SYSTEM 1 MG/1
1 PATCH, EXTENDED RELEASE TRANSDERMAL ONCE
Status: DISCONTINUED | OUTPATIENT
Start: 2017-10-02 | End: 2017-10-02

## 2017-10-02 RX ORDER — HYDRALAZINE HYDROCHLORIDE 20 MG/ML
5 INJECTION INTRAMUSCULAR; INTRAVENOUS
Status: DISCONTINUED | OUTPATIENT
Start: 2017-10-02 | End: 2017-10-02 | Stop reason: HOSPADM

## 2017-10-02 RX ORDER — METOPROLOL TARTRATE 50 MG/1
50 TABLET, FILM COATED ORAL DAILY
Status: DISCONTINUED | OUTPATIENT
Start: 2017-10-03 | End: 2017-10-04 | Stop reason: HOSPADM

## 2017-10-02 RX ORDER — PROMETHAZINE HYDROCHLORIDE 25 MG/1
25 TABLET ORAL ONCE AS NEEDED
Status: DISCONTINUED | OUTPATIENT
Start: 2017-10-02 | End: 2017-10-02 | Stop reason: HOSPADM

## 2017-10-02 RX ORDER — SODIUM CHLORIDE 9 MG/ML
INJECTION, SOLUTION INTRAVENOUS AS NEEDED
Status: DISCONTINUED | OUTPATIENT
Start: 2017-10-02 | End: 2017-10-02 | Stop reason: HOSPADM

## 2017-10-02 RX ORDER — NITROGLYCERIN 0.4 MG/1
0.4 TABLET SUBLINGUAL
Status: DISCONTINUED | OUTPATIENT
Start: 2017-10-02 | End: 2017-10-04 | Stop reason: HOSPADM

## 2017-10-02 RX ORDER — METOCLOPRAMIDE HYDROCHLORIDE 5 MG/ML
10 INJECTION INTRAMUSCULAR; INTRAVENOUS EVERY 6 HOURS
Status: DISCONTINUED | OUTPATIENT
Start: 2017-10-02 | End: 2017-10-04 | Stop reason: HOSPADM

## 2017-10-02 RX ORDER — CEFAZOLIN SODIUM 2 G/100ML
2 INJECTION, SOLUTION INTRAVENOUS EVERY 8 HOURS
Status: COMPLETED | OUTPATIENT
Start: 2017-10-02 | End: 2017-10-03

## 2017-10-02 RX ORDER — SODIUM CHLORIDE, SODIUM LACTATE, POTASSIUM CHLORIDE, CALCIUM CHLORIDE 600; 310; 30; 20 MG/100ML; MG/100ML; MG/100ML; MG/100ML
9 INJECTION, SOLUTION INTRAVENOUS CONTINUOUS
Status: DISCONTINUED | OUTPATIENT
Start: 2017-10-02 | End: 2017-10-02 | Stop reason: HOSPADM

## 2017-10-02 RX ORDER — FAMOTIDINE 10 MG/ML
20 INJECTION, SOLUTION INTRAVENOUS EVERY 12 HOURS SCHEDULED
Status: DISCONTINUED | OUTPATIENT
Start: 2017-10-02 | End: 2017-10-04 | Stop reason: HOSPADM

## 2017-10-02 RX ORDER — ROCURONIUM BROMIDE 10 MG/ML
INJECTION, SOLUTION INTRAVENOUS AS NEEDED
Status: DISCONTINUED | OUTPATIENT
Start: 2017-10-02 | End: 2017-10-02 | Stop reason: SURG

## 2017-10-02 RX ORDER — FLUMAZENIL 0.1 MG/ML
0.2 INJECTION INTRAVENOUS AS NEEDED
Status: DISCONTINUED | OUTPATIENT
Start: 2017-10-02 | End: 2017-10-02 | Stop reason: HOSPADM

## 2017-10-02 RX ORDER — EPHEDRINE SULFATE 50 MG/ML
INJECTION, SOLUTION INTRAVENOUS AS NEEDED
Status: DISCONTINUED | OUTPATIENT
Start: 2017-10-02 | End: 2017-10-02 | Stop reason: SURG

## 2017-10-02 RX ORDER — ONDANSETRON 4 MG/1
4 TABLET, ORALLY DISINTEGRATING ORAL EVERY 6 HOURS PRN
Status: DISCONTINUED | OUTPATIENT
Start: 2017-10-02 | End: 2017-10-04 | Stop reason: HOSPADM

## 2017-10-02 RX ORDER — NALOXONE HCL 0.4 MG/ML
0.4 VIAL (ML) INJECTION
Status: DISCONTINUED | OUTPATIENT
Start: 2017-10-02 | End: 2017-10-04 | Stop reason: HOSPADM

## 2017-10-02 RX ORDER — EPHEDRINE SULFATE 50 MG/ML
5 INJECTION, SOLUTION INTRAVENOUS ONCE AS NEEDED
Status: DISCONTINUED | OUTPATIENT
Start: 2017-10-02 | End: 2017-10-02 | Stop reason: HOSPADM

## 2017-10-02 RX ORDER — PROMETHAZINE HYDROCHLORIDE 25 MG/ML
5 INJECTION, SOLUTION INTRAMUSCULAR; INTRAVENOUS
Status: DISCONTINUED | OUTPATIENT
Start: 2017-10-02 | End: 2017-10-02 | Stop reason: HOSPADM

## 2017-10-02 RX ORDER — CYANOCOBALAMIN 1000 UG/ML
1000 INJECTION, SOLUTION INTRAMUSCULAR; SUBCUTANEOUS ONCE
Status: COMPLETED | OUTPATIENT
Start: 2017-10-03 | End: 2017-10-03

## 2017-10-02 RX ORDER — CEFAZOLIN SODIUM IN 0.9 % NACL 3 G/100 ML
3 INTRAVENOUS SOLUTION, PIGGYBACK (ML) INTRAVENOUS ONCE
Status: DISCONTINUED | OUTPATIENT
Start: 2017-10-02 | End: 2017-10-02

## 2017-10-02 RX ORDER — MAGNESIUM HYDROXIDE 1200 MG/15ML
LIQUID ORAL AS NEEDED
Status: DISCONTINUED | OUTPATIENT
Start: 2017-10-02 | End: 2017-10-02 | Stop reason: HOSPADM

## 2017-10-02 RX ORDER — LIDOCAINE HYDROCHLORIDE 20 MG/ML
INJECTION, SOLUTION INFILTRATION; PERINEURAL AS NEEDED
Status: DISCONTINUED | OUTPATIENT
Start: 2017-10-02 | End: 2017-10-02 | Stop reason: SURG

## 2017-10-02 RX ORDER — CLONIDINE HYDROCHLORIDE 0.1 MG/1
0.1 TABLET ORAL EVERY 6 HOURS PRN
Status: DISCONTINUED | OUTPATIENT
Start: 2017-10-02 | End: 2017-10-04 | Stop reason: HOSPADM

## 2017-10-02 RX ORDER — PANTOPRAZOLE SODIUM 40 MG/10ML
40 INJECTION, POWDER, LYOPHILIZED, FOR SOLUTION INTRAVENOUS ONCE
Status: COMPLETED | OUTPATIENT
Start: 2017-10-02 | End: 2017-10-02

## 2017-10-02 RX ORDER — MIDAZOLAM HYDROCHLORIDE 1 MG/ML
1 INJECTION INTRAMUSCULAR; INTRAVENOUS
Status: DISCONTINUED | OUTPATIENT
Start: 2017-10-02 | End: 2017-10-02 | Stop reason: HOSPADM

## 2017-10-02 RX ORDER — ONDANSETRON 4 MG/1
4 TABLET, ORALLY DISINTEGRATING ORAL EVERY 6 HOURS PRN
Status: DISCONTINUED | OUTPATIENT
Start: 2017-10-02 | End: 2017-10-02

## 2017-10-02 RX ORDER — PROMETHAZINE HYDROCHLORIDE 25 MG/1
12.5 TABLET ORAL ONCE AS NEEDED
Status: DISCONTINUED | OUTPATIENT
Start: 2017-10-02 | End: 2017-10-02 | Stop reason: HOSPADM

## 2017-10-02 RX ADMIN — ONDANSETRON 4 MG: 2 INJECTION INTRAMUSCULAR; INTRAVENOUS at 12:11

## 2017-10-02 RX ADMIN — CLINDAMYCIN PHOSPHATE 900 MG: 18 INJECTION, SOLUTION INTRAMUSCULAR; INTRAVENOUS at 11:01

## 2017-10-02 RX ADMIN — SUGAMMADEX 200 MG: 100 INJECTION, SOLUTION INTRAVENOUS at 12:26

## 2017-10-02 RX ADMIN — HYOSCYAMINE SULFATE 125 MCG: 0.12 TABLET ORAL at 17:06

## 2017-10-02 RX ADMIN — CHLORHEXIDINE GLUCONATE 15 ML: 1.2 RINSE ORAL at 09:07

## 2017-10-02 RX ADMIN — SCOPALAMINE 1 PATCH: 1 PATCH, EXTENDED RELEASE TRANSDERMAL at 09:07

## 2017-10-02 RX ADMIN — ROCURONIUM BROMIDE 40 MG: 10 INJECTION INTRAVENOUS at 11:16

## 2017-10-02 RX ADMIN — SODIUM CHLORIDE, POTASSIUM CHLORIDE, SODIUM LACTATE AND CALCIUM CHLORIDE 150 ML/HR: 600; 310; 30; 20 INJECTION, SOLUTION INTRAVENOUS at 13:49

## 2017-10-02 RX ADMIN — ALBUTEROL SULFATE 2.5 MG: 2.5 SOLUTION RESPIRATORY (INHALATION) at 20:18

## 2017-10-02 RX ADMIN — PROPOFOL 200 MG: 10 INJECTION, EMULSION INTRAVENOUS at 11:16

## 2017-10-02 RX ADMIN — ENOXAPARIN SODIUM 40 MG: 40 INJECTION SUBCUTANEOUS at 11:01

## 2017-10-02 RX ADMIN — SODIUM CHLORIDE, POTASSIUM CHLORIDE, SODIUM LACTATE AND CALCIUM CHLORIDE: 600; 310; 30; 20 INJECTION, SOLUTION INTRAVENOUS at 11:20

## 2017-10-02 RX ADMIN — MIDAZOLAM 2 MG: 1 INJECTION INTRAMUSCULAR; INTRAVENOUS at 09:36

## 2017-10-02 RX ADMIN — LIDOCAINE HYDROCHLORIDE 100 MG: 20 INJECTION, SOLUTION INFILTRATION; PERINEURAL at 11:16

## 2017-10-02 RX ADMIN — INSULIN ASPART 8 UNITS: 100 INJECTION, SOLUTION INTRAVENOUS; SUBCUTANEOUS at 18:38

## 2017-10-02 RX ADMIN — HYOSCYAMINE SULFATE 125 MCG: 0.12 TABLET ORAL at 20:01

## 2017-10-02 RX ADMIN — METOCLOPRAMIDE 10 MG: 5 INJECTION, SOLUTION INTRAMUSCULAR; INTRAVENOUS at 13:08

## 2017-10-02 RX ADMIN — CHLORHEXIDINE GLUCONATE 15 ML: 1.2 RINSE ORAL at 09:20

## 2017-10-02 RX ADMIN — METOCLOPRAMIDE 10 MG: 5 INJECTION, SOLUTION INTRAMUSCULAR; INTRAVENOUS at 20:03

## 2017-10-02 RX ADMIN — CEFAZOLIN SODIUM 2 G: 2 INJECTION, SOLUTION INTRAVENOUS at 17:04

## 2017-10-02 RX ADMIN — PANTOPRAZOLE SODIUM 40 MG: 40 INJECTION, POWDER, FOR SOLUTION INTRAVENOUS at 09:07

## 2017-10-02 RX ADMIN — HYDROMORPHONE HYDROCHLORIDE 0.5 MG: 1 INJECTION, SOLUTION INTRAMUSCULAR; INTRAVENOUS; SUBCUTANEOUS at 18:47

## 2017-10-02 RX ADMIN — SODIUM CHLORIDE, POTASSIUM CHLORIDE, SODIUM LACTATE AND CALCIUM CHLORIDE 500 ML: 600; 310; 30; 20 INJECTION, SOLUTION INTRAVENOUS at 08:36

## 2017-10-02 RX ADMIN — LISINOPRIL 5 MG: 5 TABLET ORAL at 22:30

## 2017-10-02 RX ADMIN — EPHEDRINE SULFATE 10 MG: 50 INJECTION INTRAMUSCULAR; INTRAVENOUS; SUBCUTANEOUS at 11:30

## 2017-10-02 RX ADMIN — SODIUM CHLORIDE, POTASSIUM CHLORIDE, SODIUM LACTATE AND CALCIUM CHLORIDE 150 ML/HR: 600; 310; 30; 20 INJECTION, SOLUTION INTRAVENOUS at 23:24

## 2017-10-02 RX ADMIN — PROMETHAZINE HYDROCHLORIDE 12.5 MG: 25 INJECTION INTRAMUSCULAR; INTRAVENOUS at 14:21

## 2017-10-02 RX ADMIN — FENTANYL CITRATE 100 MCG: 50 INJECTION INTRAMUSCULAR; INTRAVENOUS at 11:16

## 2017-10-02 RX ADMIN — FAMOTIDINE 20 MG: 10 INJECTION, SOLUTION INTRAVENOUS at 20:02

## 2017-10-02 RX ADMIN — FAMOTIDINE 20 MG: 10 INJECTION, SOLUTION INTRAVENOUS at 09:35

## 2017-10-02 RX ADMIN — SODIUM CHLORIDE, POTASSIUM CHLORIDE, SODIUM LACTATE AND CALCIUM CHLORIDE 100 ML/HR: 600; 310; 30; 20 INJECTION, SOLUTION INTRAVENOUS at 09:00

## 2017-10-02 RX ADMIN — PROMETHAZINE HYDROCHLORIDE 12.5 MG: 25 INJECTION INTRAMUSCULAR; INTRAVENOUS at 22:42

## 2017-10-02 NOTE — ANESTHESIA PREPROCEDURE EVALUATION
Anesthesia Evaluation     Patient summary reviewed and Nursing notes reviewed   history of anesthetic complications: PONV         Airway   Mallampati: II  TM distance: >3 FB  Neck ROM: full  no difficulty expected  Dental - normal exam   (+) lower dentures and upper dentures    Pulmonary     breath sounds clear to auscultation  (+) asthma, shortness of breath,   (-) sleep apnea, decreased breath sounds, wheezes  Cardiovascular - normal exam  Exercise tolerance: good (4-7 METS)    Rhythm: regular  Rate: normal    (+) hypertension, CAD, DIAZ, hyperlipidemia  (-) past MI, angina, CHF, orthopnea, PND, PVD      Neuro/Psych  (+) seizures well controlled, CVA, dizziness/light headedness, numbness, psychiatric history Anxiety and Depression,    (-) neuromuscular disease, TIA, weakness  GI/Hepatic/Renal/Endo    (+) morbid obesity, liver disease (fatty liver), diabetes mellitus type 2 poorly controlled using insulin,     Musculoskeletal     (+) myalgias, neck pain,   Abdominal  - normal exam   Substance History - negative use  (-) alcohol use, drug use     OB/GYN negative ob/gyn ROS         Other - negative ROS                                       Anesthesia Plan    ASA 4     general     intravenous induction   Anesthetic plan and risks discussed with patient.    Plan discussed with CRNA.

## 2017-10-02 NOTE — PLAN OF CARE
Problem: Perioperative Period (Adult)  Goal: Signs and Symptoms of Listed Potential Problems Will be Absent or Manageable (Perioperative Period)  Outcome: Ongoing (interventions implemented as appropriate)    10/02/17 1314   Perioperative Period   Problems Assessed (Perioperative Period) all   Problems Present (Perioperative Period) none

## 2017-10-02 NOTE — PLAN OF CARE
Problem: Patient Care Overview (Adult)  Goal: Discharge Needs Assessment  Outcome: Ongoing (interventions implemented as appropriate)    10/02/17 1313   Discharge Needs Assessment   Concerns To Be Addressed no discharge needs identified   Readmission Within The Last 30 Days no previous admission in last 30 days   Living Environment   Transportation Available car

## 2017-10-02 NOTE — ANESTHESIA PROCEDURE NOTES
Airway  Urgency: elective    Airway not difficult    General Information and Staff    Patient location during procedure: OR  Anesthesiologist: IZA ORTEGA  CRNA: CHERYLE SOMMER    Indications and Patient Condition  Indications for airway management: airway protection    Preoxygenated: yes  MILS maintained throughout  Mask difficulty assessment: 1 - vent by mask    Final Airway Details  Final airway type: endotracheal airway      Successful airway: ETT  Cuffed: yes   Successful intubation technique: direct laryngoscopy  Facilitating devices/methods: intubating stylet  Endotracheal tube insertion site: oral  Blade: Quintanilla  Blade size: #2  ETT size: 7.0 mm  Cormack-Lehane Classification: grade I - full view of glottis  Placement verified by: chest auscultation   Measured from: lips  ETT to lips (cm): 21  Number of attempts at approach: 1    Additional Comments  Smooth iv induction with no complications

## 2017-10-02 NOTE — OP NOTE
PREOPERATIVE DIAGNOSIS:  Morbid obesity with multiple comorbidities as referenced in the most recent history and physical.  Patient status post laparoscopic adjustable gastric band removal and now presents for revision    POSTOPERATIVE DIAGNOSIS:  Morbid obesity with multiple comorbidities as referenced in the most recent history and physical.  Status post laparoscopic adjustable gastric band removal with adhesions. Hiatal Hernia    PROCEDURES PERFORMED:  1.  Laparoscopic sleeve gastrectomy revision from previous adjustable gastric band  2.  Laparoscopic hiatal hernia repair  3.  Extensive laparoscopic lysis of adhesions  4.  Tisseel application    SURGEON:  Chris Ackerman Jr., MD    ASSISTANT:  THOMAS Shepherd    Surgery assisted and facilitated by a certified physician assistant, who directly resulted in a decreased operative time, anesthetic time, wound exposure, and possibly of an operative wound infection, thereby decreasing patient morbidity and ultimately total expenditures. The surgical assistant assisted in placement of trochars, take down of the gastrocolic omentum, short gastric vessels and dissection at the angle of His.  Also assisted in retraction of the stomach during stapling so as not to kink the gastric sleeve.  Also assisted in removing of the gastric specimen, closure of the fascial defect as well as closure of the skin incisions. They also assisted in retraction of the stomach during the hiatal hernia repair, dissection of the hernia sac and closure of the crura.      ANESTHESIA:  General endotracheal.    ESTIMATED BLOOD LOSS:   Less than 25 mL unless dictated below.    FLUIDS:  Crystalloids.    SPECIMENS:  None.    DRAINS:  None.    COUNTS:  Correct.    COMPLICATIONS:  None.    INDICATIONS:  This patient with morbid obesity and associated comorbidities presents for elective laparoscopic, possible open sleeve gastrectomy revision.  The patient has received medical clearance to proceed.  The  patient has undergone our extensive educational process and consent process and wishes to proceed.    DESCRIPTION OF PROCEDURE:  The patient was brought to the operating room and placed supine upon the operating room table. SCD hose were placed.  The patient underwent uneventful general endotracheal anesthesia per the anesthesiology staff. The abdomen was prepped with ChloraPrep and draped in the usual sterile fashion.  An Ioban was used as well if not allergic. Anesthesia staff then passed a 36-Czech ViSiGi bougie into the stomach to decompress and then was pulled back to the mouth.    A 5-10 mm transverse incision was made a few centimeters above and to the left of the umbilicus and the peritoneal cavity entered under direct camera visualization using a 5 or 10 mm 0° laparoscope and an Optiview trocar.  The abdomen was then insufflated to a pressure of 16 mmHg with CO2 gas.  Exploratory laparoscopy revealed no evidence of injury from the entrance technique and no significant abnormalities unless addendum dictated below.  An angled laparoscope was then used.  The patient was placed in reverse Trendelenburg position.  Under direct camera visualization, a 5 mm trocar was placed in the right lateral subcostal position.  A 12 mm trocar was placed in the right midabdominal position.  A 5 mm trocar was placed in the left midabdominal position. A Jimi retractor was placed through an epigastric incision and used to elevate the left lobe of the liver.  Patient did have extensive adhesions in the upper abdomen from previous adjustable gastric band. Prior to placing the liver retractor the adhesions from the stomach to the left lobe of the liver were taking down using electrocautery, scissors and Enseal device.  The gastric plication from previous adjustable gastric band was taking down using sharp dissection with the scissors thus exposing the left james.  Any remaining sutures were removed or made sure that they were  lateral in the gastric specimen prior to dividing the stomach forming the gastric sleeve.  At this point, approximately senior care along the greater curvature, the gastrocolic omentum was divided with the Enseal and this proceeded superiorly to the angle of His taking down the short gastric vessels.  All posterior attachments of the lesser sac and posterior aspect of the stomach to the pancreas were taken down as well.  The left james was exposed along its length.  Dissection then proceeded medially taking down the greater curvature with an Enseal until just proximal to the pylorus.  The 36-French ViSiGi bougie was passed back down into the stomach by anesthesia and the sleeve gastrectomy was then performed.  The 1st load was a black, thick tissue load on the Stedman Flex stapler with Veritas Julisa-Strip and this was placed 3-4 cm proximal to the pylorus and up against the bougie pulling it anteriorly and posteriorly up against the bougie but paying close attention not to narrow the incisura.  The next 5-7 loads were green with absorbable Veritas Julisa-Strips depending on the size of the stomach. Careful attention was made to stay about 1 cm from the esophagus. Areas of the reinforced staple line were oversewn with absorbable sutures as needed for bleeding or questionable staple lines.  At this point, the gastrectomy specimen was withdrawn through the 12 mm trocar site incision. The specimen was then opened up on a back table and the staple line was inspected and was intact.  If no abnormalities were seen in the specimen the gastric specimen was then discarded.  If abnormalities were seen the specimen was then sent off to pathology. At this point, the sleeve was submerged under saline and using the ViSiGi bougie to insufflate the stomach, a leak test was performed.  This revealed the sleeve to be watertight, no air bubbles, no leak, and no bleeding seen from the staple lines and no significant abnormalities.  Irrigation  fluid from the abdomen was then suctioned free.  The gastric sleeve staple line was then treated with 4 mL Tisseel fibrin glue. The fascia at the 12 mm trocar site incision was closed with a single 0 Vicryl suture in a figure-of-eight fashion placed under direct laparoscopic camera visualization with a suture passer and tying the knot extracorporeally.  The fascia in the area was infiltrated with local anesthesia. All incisions were then infiltrated with local anesthetic. The remaining trocars were removed under direct camera visualization with no bleeding noted from their sites.  The abdomen was desufflated of gas. The skin in each incision was closed using 4-0 antibiotic impregnated Monocryl in a subcuticular stitch followed by Dermabond.  The patient tolerated the procedure well without complication and was taken to the recovery room in stable condition.  All sponge, needle, and instrument counts were correct.    The patient was noted to have a hiatal hernia.  The phrenoesophageal membrane was opened up with electrocautery and the right and left crura were cleaned off using sharp and blunt dissection.  The hernia sac was completely dissected free.  The intra-abdominal esophagus was now approximately 3 cm in length.  The base of the left james was exposed to evaluate for a posterior defect and lipomas.The short gastric vessels were taken down using the Enseal device and the fundus was removed as dictated above.  The hiatus was then reapproximated with 0 ethibond sutures in a figure-of-eight fashion.

## 2017-10-02 NOTE — PLAN OF CARE
Problem: Patient Care Overview (Adult)  Goal: Plan of Care Review  Outcome: Ongoing (interventions implemented as appropriate)    10/02/17 1313   Coping/Psychosocial Response Interventions   Plan Of Care Reviewed With patient   Patient Care Overview   Progress progress toward functional goals as expected

## 2017-10-02 NOTE — H&P (VIEW-ONLY)
Bariatric Consult:  Referred by Shahbaz Acosta MD    Vivian Kauffman is here today for consult on Consult (Obesity, unable to maintain weight loss; HTN, DM, hyperlipidemia)      History of Present Illness:     Vivian Kauffman is a 62 y.o. female with morbid obesity with co-morbidities including diabetes, hypertension, dyslipidemia, osteoarthritis and mental health disease who presents for surgical consultation for the above procedure. Vivian has completed the initial intake visit and has been examined by our nurse practitioner, dietician, psychologist and underwent the extensive educational teaching process under the guidance of our bariatric coordinator and myself. Vivian also has seen the educational video NADYA on the surgical procedure if available. Vivian attended today more educational teaching from our bariatric coordinator and myself. Vivian has had an extensive medical workup including a visit with their primary care physician, EKG, chest radiograph, blood work, EGD or UGI and possibly further testing. These have been reviewed by me and discussed with the patient. Vivian is now ready to proceed with surgery. Vivian presently denies nausea, vomiting, fever, chills, chest pain, shortness of air, melena, hematochezia, hemetemesis, dysuria, frequency, hematuria, jaundice or abdominal pain.       Past Medical History:   Diagnosis Date   • Abdominal pain    • Ankle joint pain    • Anxiety    • Asthma    • Bilateral wrist pain    • Carpal tunnel syndrome    • Chronic lower back pain    • Coronary arteriosclerosis    • Depression    • Diabetes mellitus    • Diarrhea    • Dysphagia    • Fatigue    • Fatty liver    • Fibromyalgia    • Fibromyositis    • GERD (gastroesophageal reflux disease)    • Heartburn    • Hyperlipidemia    • Hypertension    • Insomnia    • Kidney stone    • Morbid obesity    • Muscle spasm    • Nausea    • Neck pain    • Numbness    • Obesity    • Pain of both hip joints    • Polyphagia    •  Polyphagia    • PONV (postoperative nausea and vomiting)    • Poor vision    • Problems with hearing    • Restless leg syndrome    • Seizures    • Sinus drainage    • Skin rash     Under both breasts   • Stroke syndrome    • Tingling    • Vertigo    • Vomiting    • Weight gain        Encounter Diagnoses   Name Primary?   • Obesity, Class II, BMI 35-39.9 Yes   • Multiple joint pain    • Fatty liver    • Diabetes mellitus type 2 in obese    • Hx MRSA infection    • Non-obstructive coronary artery disease    • Uncomplicated asthma, unspecified asthma severity    • Dietary counseling    • Chronic fatigue    • Essential hypertension    • Hyperlipidemia, unspecified hyperlipidemia type    • History of removal of laparoscopic gastric banding device        Past Surgical History:   Procedure Laterality Date   • BREAST BIOPSY     • CARDIAC CATHETERIZATION      no stents 9/2013 at [facility]   • COLONOSCOPY     • ENDOSCOPY N/A 9/9/2016    Procedure: ESOPHAGOGASTRODUODENOSCOPY WITH BIOPSY;  Surgeon: Chris Ackerman Jr., MD;  Location: [facility] ENDOSCOPY;  Service:    • EXTRACORPOREAL SHOCKWAVE LITHOTRIPSY (ESWL), STENT INSERTION/REMOVAL     • FOOT SURGERY     • GASTRIC BANDING REMOVAL N/A 11/30/2016    Procedure: LAPAROSCOPIC GASTRIC BANDING AND PORT REMOVAL ;  Surgeon: Chris Ackerman Jr., MD;  [place] [facility] [place] [facility] [place]:    • LAPAROSCOPIC CHOLECYSTECTOMY     • LAPAROSCOPIC GASTRIC BANDING     • NOSE SURGERY     • TONSILLECTOMY         Patient Active Problem List   Diagnosis   • Essential hypertension   • Hyperlipidemia   • Fatigue   • Alopecia   • Dietary counseling   • Abnormal electrocardiogram   • Anxiety   • Asthma   • Carpal tunnel syndrome   • Non-obstructive coronary artery disease   • Depression   • Exercise counseling   • Hx MRSA infection   • Diabetes mellitus type 2 in obese   • Edema   • Fatty liver   • Multiple joint pain   • Seizure disorder   • Obesity, Class II, BMI 35-39.9   • History  of removal of laparoscopic gastric banding device       Allergies   Allergen Reactions   • Latex Other (See Comments)     SKIN BLISTERS   • Peanut Oil      Peanuts w/ red shell   • Sulfa Antibiotics Other (See Comments)     unknnown         Current Outpatient Prescriptions:   •  albuterol (PROAIR HFA) 108 (90 BASE) MCG/ACT inhaler, ProAir  (90 Base) MCG/ACT Inhalation Aerosol Solution; Patient Sig: ProAir  (90 Base) MCG/ACT Inhalation Aerosol Solution INHALE 1-2 PUFFS EVERY 4-6 HOURS AS NEEDED AND AS DIRECTED.; 0; 26-Aug-2015; Active, Disp: , Rfl:   •  atorvastatin (LIPITOR) 20 MG tablet, Take 20 mg by mouth Daily., Disp: , Rfl:   •  Biotin 10 MG capsule, Take  by mouth., Disp: , Rfl:   •  clopidogrel (PLAVIX) 75 MG tablet, Take 1 tablet by mouth Daily. HOLDING FOR SX, Disp: , Rfl:   •  Coenzyme Q10 (CO Q 10) 10 MG capsule, Take  by mouth., Disp: , Rfl:   •  colestipol (COLESTID) 1 G tablet, Take 1 tablet by mouth daily., Disp: , Rfl:   •  FLUoxetine (PROzac) 20 MG tablet, Take 4 tablets by mouth daily., Disp: , Rfl:   •  fluticasone-salmeterol (ADVAIR) 500-50 MCG/DOSE DISKUS, Inhale 2 (Two) Times a Day., Disp: , Rfl:   •  gabapentin (NEURONTIN) 300 MG capsule, Take 300 mg by mouth 3 (Three) Times a Day., Disp: , Rfl:   •  glipiZIDE (GLUCOTROL) 10 MG tablet, Take by mouth. 1/2 tablet in the morning and 1 tablet in the evening, Disp: , Rfl:   •  insulin NPH (NOVOLIN N) 100 UNIT/ML injection, Inject  under the skin. 12 units at night/ 6units in am, Disp: , Rfl:   •  levETIRAcetam (KEPPRA) 1000 MG tablet, Take 1 tablet by mouth 2 (Two) Times a Day. 15OOMG AT HS, Disp: , Rfl:   •  metFORMIN (GLUCOPHAGE) 500 MG tablet, Take 1,000 mg by mouth 2 (Two) Times a Day With Meals., Disp: , Rfl:   •  metoprolol tartrate (LOPRESSOR) 100 MG tablet, Take 50 mg by mouth 2 (Two) Times a Day. 100MG AT HS, Disp: , Rfl:   •  montelukast (SINGULAIR) 10 MG tablet, Take 1 tablet by mouth daily., Disp: , Rfl:   •  Multiple  Vitamins-Minerals (HAIR/SKIN/NAILS) tablet, Take 1 tablet by mouth daily., Disp: , Rfl:   •  nortriptyline (PAMELOR) 10 MG capsule, Take 1 capsule by mouth daily., Disp: , Rfl:   •  Probiotic Product (PROBIOTIC ADVANCED PO), Take  by mouth., Disp: , Rfl:   •  prochlorperazine (COMPAZINE) 5 MG tablet, Take 5 mg by mouth Every 6 (Six) Hours As Needed for Nausea or Vomiting., Disp: , Rfl:   •  quinapril (ACCUPRIL) 10 MG tablet, Take 1 tablet by mouth daily., Disp: , Rfl:   •  spironolactone (ALDACTONE) 25 MG tablet, Take 25 mg by mouth daily, Disp: , Rfl:     Social History     Social History   • Marital status: Single     Spouse name: N/A   • Number of children: 1   • Years of education: N/A     Occupational History   • on disability      Social History Main Topics   • Smoking status: Former Smoker   • Smokeless tobacco: Not on file      Comment: caffeine use   • Alcohol use No      Comment: very rare   • Drug use: No   • Sexual activity: Defer     Other Topics Concern   • Not on file     Social History Narrative       Family History   Problem Relation Age of Onset   • Heart attack Mother    • Diabetes Mother    • Hypertension Mother    • Stroke Mother    • Heart attack Father    • Diabetes Father    • Hypertension Father    • Obesity Father      Morbid Obesity   • Stroke Father    • Heart attack Brother    • Cancer Brother      Bladder   • Heart attack Maternal Grandmother    • Heart attack Maternal Grandfather    • Stroke Paternal Grandmother    • Heart attack Paternal Grandfather        Review of Systems:  Review of Systems   Constitutional: Positive for fatigue.   Musculoskeletal: Positive for arthralgias and back pain.   All other systems reviewed and are negative.        Physical Exam:    Vital Signs:  Weight: 210 lb (95.3 kg)   Body mass index is 37.8 kg/(m^2).  Temp: 98.2 °F (36.8 °C)   Heart Rate: 67   BP: 116/64       Physical Exam   Constitutional: She is oriented to person, place, and time. She appears  well-nourished.   HENT:   Head: Normocephalic and atraumatic.   Mouth/Throat: Oropharynx is clear and moist.   Eyes: Conjunctivae and EOM are normal. Pupils are equal, round, and reactive to light. No scleral icterus.   Neck: Normal range of motion. Neck supple. No thyromegaly present.   Cardiovascular: Normal rate and regular rhythm.    Pulmonary/Chest: Effort normal and breath sounds normal.   Abdominal: Soft. Bowel sounds are normal. She exhibits no distension and no mass. There is no tenderness. There is no rebound and no guarding. No hernia.   Musculoskeletal: Normal range of motion.   Lymphadenopathy:     She has no cervical adenopathy.   Neurological: She is alert and oriented to person, place, and time. No cranial nerve deficit. Coordination normal.   Skin: Skin is warm and dry. No erythema.   Psychiatric: She has a normal mood and affect. Her behavior is normal.   Vitals reviewed.        Assessment:    Vivian Kauffman is a 62 y.o. year old female with medically complicated severe obesity with a BMI of Body mass index is 37.8 kg/(m^2). and multiple co-morbidities listed in the encounter diagnosis.    I think she is an appropriate candidate for this surgery, and is ready to proceed.      Plan/Discussion/Summary:  No hiatal hernia per me.  No PPI.  No polyps.  Helicobacter pylori negative.  Patient status post LAP-BAND removal November 2016    Patient understands that they are at an increased risk for complications because of this being a revisional surgery  to another procedure.  The patient understands the increased risk of gastric injury/leak, bleeding, etc because of the scarring and previous surgery.  Also increased risk of other complications that are listed because of increased OR time.       The patient has returned to the office for a surgical consultation and has requested to proceed with a laparoscopic gastric sleeve.  I have had the opportunity to obtain a history, examine the patient and review  the patient's chart.    The patient understands that surgery is a tool and that weight loss is not guaranteed but only seen in the context of appropriate use, regular follow up, exercise and making appropriate food choices.     I personally discussed the potential complications of the laparoscopic gastric sleeve with this patient.  The patient is well aware of potential complications of the surgery that include but not limited to bleeding, infections, deep vein thrombosis, pulmonary embolism, pulmonary complications such as pneumonia, cardiac event, hernias, small bowel obstruction, damage to the spleen or other organs, bowel injury, disfiguring scars, failure to lose weight, need for additional surgery, conversion to an open procedure and death.  The patient is also aware of complications which apply in particular to the gastric sleeve and can include but not limited to the leakage of gastric contents at the staple line, the development of an intra-abdominal abscess, gastroesophageal reflux disease, Jenkins's esophagus, ulcers, vitamin/mineral deficiencies, strictures, and the possibility of converting this procedure to a Ruthie-en-Y gastric bypass. The patient also understands the possibility of requiring an acid reducer medication for the rest of their life.    The risks, benefits, potential complications and alternative therapies were discussed at great length as outlined in our extensive consent forms, online consent and educational teaching processes.    The patient has confirmed the participation in the programs extensive educational activities.    All questions and concerns were answered to patient's satisfaction.  The patient now wishes to proceed with surgery.    Patient has declined the pre-operative insertion of an IVC filter.     The patient has declined a postoperative course of anitcoagulant therapy.        I instructed patient to start on a H2 blocker or proton pump inhibitor if not already on one of  these medications.    I explained in detail the procedures that we perform.  All of these procedures have a chance to convert to open if any technical challenges or complications do occur.  Bariatric surgery is not cosmetic surgery but rather a tool to help a patient make a life-long commitment lifestyle change including diet, exercise, behavior changes, and taking supplemental vitamins and minerals.    Problems after surgery may require more operations to correct them.    The risks, benefits, alternatives, and potential complications of all of the procedures were explained in detail including, but not limited to death, anesthesia and medication adverse effect, deep venous thrombosis, pulmonary embolism, trocar site/incisional hernia, wound infection, abdominal infection, bleeding, failure to lose weight, gain weight, a change in body image, metabolic complications with vitamin deficiences and anemia.    Weight loss expectations were discussed with the patient in detail. The weight loss operations most commonly performed are the sleeve gastrectomy and the Ruthie-en-Y gastric bypass. These operations result in weight losses up to approximately 25-35% of initial body weight 12 to 24 months after surgery with the gastric bypass usually the higher percent of weight loss. The longitudinal data shows maintenance of 75% of lost weight after 10 years for the gastric bypass.    For the gastric bypass and loop duodenal switch (PRADIP-S) the risks include but not limited to the following early complications:  Anastomotic leak/peritonitis, Ruthie/Alimentary/biliopancreatic limb obstruction, severe & minor wound infection/seroma, and nausea/vomiting.  Late complications can include but are not limited to malnutrition, vitamin deficiencies, frequent loose stools,  stomal stenosis, marginal ulcer, bowel obstruction, intussusception, internal, and incisional hernia.    Regarding the gastric sleeve, there is less long-term outcome data  and higher risk of dysphagia and reflux compared to a gastric bypass, as well as risk of internal visceral/organ injury, splenectomy, bleeding, infection, leak (which could require further intervention possible conversion to Ruthie-en-Y gastric bypass), stenosis and possibility of regaining weight.    Vivian was counseled regarding diagnostic results, instructions for management, risk factor reductions, prognosis, patient and family education, impressions, risks and benefits of treatment options and importance of compliance with treatment. Total face to face time of the encounter was over 45 minutes and over 30 minutes was spent counseling.     Joann Report   As part of this patient's treatment plan I am prescribing controlled substances. The patient has been made aware of appropriate use of such medications, including potential risk of somnolence, limited ability to drive and /or work safely, and potential for dependence or overdose. It has also been made clear that these medications are for use by this patient only, without concomitant use of alcohol or other substances unless prescribed.    Vivian has completed prescribing agreement detailing terms of continued prescribing of controlled substances, including monitoring JOANN reports, urine drug screening, and pill counts if necessary. Vivian is aware that inappropriate use will result in cessation of prescribing such medications.    JOANN report has been reviewed      History and physical exam exhibit continued safe and appropriate use of controlled substances.      Vivian understands the surgical procedures and the different surgical options that are available.  She understands the lifestyle changes that are required after surgery and has agreed to follow the guidelines outlined in the weight management program.  She also expressed understanding of the risks involved and had all of female questions answered and desires to proceed.      Chris Ackerman,  MD  9/21/2017

## 2017-10-02 NOTE — ANESTHESIA POSTPROCEDURE EVALUATION
Patient: Vivian Kauffman    Procedure Summary     Date Anesthesia Start Anesthesia Stop Room / Location    10/02/17 1107 1238  [facility] /  [facility]       Procedure Diagnosis Surgeon Provider    GASTRIC SLEEVE LAPAROSCOPIC revision WITH LYSIS OF ADHESIONS AND HITAL HERNIA REPAIR  (N/A Abdomen) Obesity, Class II, BMI 35-39.9  (Obesity, Class II, BMI 35-39.9 [E66.01]) MD Doyle Simpson Jr., MD          Anesthesia Type: general  Last vitals  BP   148/75 (10/02/17 1245)    Temp   36.1 °C (97 °F) (10/02/17 1234)    Pulse   80 (10/02/17 1245)   Resp   16 (10/02/17 1245)    SpO2   100 % (10/02/17 1245)      Post Anesthesia Care and Evaluation    Patient location during evaluation: PACU  Patient participation: complete - patient participated  Level of consciousness: awake and alert  Pain management: adequate  Airway patency: patent  Anesthetic complications: No anesthetic complications    Cardiovascular status: acceptable  Respiratory status: acceptable  Hydration status: acceptable    Comments: /75  Pulse 80  Temp 36.1 °C (97 °F) (Temporal Artery )   Resp 16  Wt 204 lb 8 oz (92.8 kg)  SpO2 100%  BMI 36.23 kg/m2

## 2017-10-02 NOTE — PLAN OF CARE
Problem: Patient Care Overview (Adult)  Goal: Adult Individualization and Mutuality  Outcome: Ongoing (interventions implemented as appropriate)    10/02/17 1313   Individualization   Patient Specific Goals PAIN CONTROL AND EARLY AMBULATION

## 2017-10-02 NOTE — PLAN OF CARE
Problem: Patient Care Overview (Adult)  Goal: Plan of Care Review  Outcome: Ongoing (interventions implemented as appropriate)    10/02/17 6891   Coping/Psychosocial Response Interventions   Plan Of Care Reviewed With patient   Patient Care Overview   Progress improving   Outcome Evaluation   Outcome Summary/Follow up Plan VSS, nausea improved with IV phenergan x1, pt voiding without difficulty, pt has only ambulated once on my shift due to c/o nausea. Education given on importance of ambulating and patient verbalizes understanding but still refuses. will continue to monitor.          Problem: Pain, Acute (Adult)  Goal: Identify Related Risk Factors and Signs and Symptoms  Outcome: Ongoing (interventions implemented as appropriate)

## 2017-10-02 NOTE — PLAN OF CARE
Problem: Patient Care Overview (Adult)  Goal: Plan of Care Review  Outcome: Ongoing (interventions implemented as appropriate)    10/02/17 0813   Coping/Psychosocial Response Interventions   Plan Of Care Reviewed With patient       Goal: Adult Individualization and Mutuality  Outcome: Ongoing (interventions implemented as appropriate)    10/02/17 0813   Individualization   Patient Specific Preferences Vivian       Goal: Discharge Needs Assessment  Outcome: Ongoing (interventions implemented as appropriate)    10/02/17 0813   Discharge Needs Assessment   Concerns To Be Addressed denies needs/concerns at this time         Problem: Perioperative Period (Adult)  Goal: Signs and Symptoms of Listed Potential Problems Will be Absent or Manageable (Perioperative Period)  Outcome: Ongoing (interventions implemented as appropriate)    10/02/17 0813   Perioperative Period   Problems Assessed (Perioperative Period) all

## 2017-10-03 LAB
ALBUMIN SERPL-MCNC: 3.6 G/DL (ref 3.5–5.2)
ALBUMIN/GLOB SERPL: 1.2 G/DL
ALP SERPL-CCNC: 66 U/L (ref 39–117)
ALT SERPL W P-5'-P-CCNC: 95 U/L (ref 1–33)
ANION GAP SERPL CALCULATED.3IONS-SCNC: 20.9 MMOL/L
AST SERPL-CCNC: 63 U/L (ref 1–32)
BASOPHILS # BLD AUTO: 0.01 10*3/MM3 (ref 0–0.2)
BASOPHILS NFR BLD AUTO: 0.2 % (ref 0–1.5)
BILIRUB SERPL-MCNC: 0.5 MG/DL (ref 0.1–1.2)
BUN BLD-MCNC: 7 MG/DL (ref 8–23)
BUN/CREAT SERPL: 13.7 (ref 7–25)
CALCIUM SPEC-SCNC: 8.5 MG/DL (ref 8.6–10.5)
CHLORIDE SERPL-SCNC: 98 MMOL/L (ref 98–107)
CO2 SERPL-SCNC: 18.1 MMOL/L (ref 22–29)
CREAT BLD-MCNC: 0.51 MG/DL (ref 0.57–1)
DEPRECATED RDW RBC AUTO: 45.5 FL (ref 37–54)
EOSINOPHIL # BLD AUTO: 0.01 10*3/MM3 (ref 0–0.7)
EOSINOPHIL NFR BLD AUTO: 0.2 % (ref 0.3–6.2)
ERYTHROCYTE [DISTWIDTH] IN BLOOD BY AUTOMATED COUNT: 14 % (ref 11.7–13)
GFR SERPL CREATININE-BSD FRML MDRD: 122 ML/MIN/1.73
GLOBULIN UR ELPH-MCNC: 2.9 GM/DL
GLUCOSE BLD-MCNC: 210 MG/DL (ref 65–99)
GLUCOSE BLDC GLUCOMTR-MCNC: 161 MG/DL (ref 70–130)
GLUCOSE BLDC GLUCOMTR-MCNC: 251 MG/DL (ref 70–130)
GLUCOSE BLDC GLUCOMTR-MCNC: 261 MG/DL (ref 70–130)
GLUCOSE BLDC GLUCOMTR-MCNC: 317 MG/DL (ref 70–130)
HCT VFR BLD AUTO: 40 % (ref 35.6–45.5)
HGB BLD-MCNC: 13.2 G/DL (ref 11.9–15.5)
IMM GRANULOCYTES # BLD: 0 10*3/MM3 (ref 0–0.03)
IMM GRANULOCYTES NFR BLD: 0 % (ref 0–0.5)
LYMPHOCYTES # BLD AUTO: 1 10*3/MM3 (ref 0.9–4.8)
LYMPHOCYTES NFR BLD AUTO: 15.2 % (ref 19.6–45.3)
MAGNESIUM SERPL-MCNC: 1.8 MG/DL (ref 1.6–2.4)
MCH RBC QN AUTO: 29.7 PG (ref 26.9–32)
MCHC RBC AUTO-ENTMCNC: 33 G/DL (ref 32.4–36.3)
MCV RBC AUTO: 89.9 FL (ref 80.5–98.2)
MONOCYTES # BLD AUTO: 0.51 10*3/MM3 (ref 0.2–1.2)
MONOCYTES NFR BLD AUTO: 7.7 % (ref 5–12)
NEUTROPHILS # BLD AUTO: 5.06 10*3/MM3 (ref 1.9–8.1)
NEUTROPHILS NFR BLD AUTO: 76.7 % (ref 42.7–76)
PHOSPHATE SERPL-MCNC: 2.8 MG/DL (ref 2.5–4.5)
PLATELET # BLD AUTO: 129 10*3/MM3 (ref 140–500)
PMV BLD AUTO: 10.2 FL (ref 6–12)
POTASSIUM BLD-SCNC: 4.2 MMOL/L (ref 3.5–5.2)
PROT SERPL-MCNC: 6.5 G/DL (ref 6–8.5)
RBC # BLD AUTO: 4.45 10*6/MM3 (ref 3.9–5.2)
SODIUM BLD-SCNC: 137 MMOL/L (ref 136–145)
WBC NRBC COR # BLD: 6.59 10*3/MM3 (ref 4.5–10.7)

## 2017-10-03 PROCEDURE — 94799 UNLISTED PULMONARY SVC/PX: CPT

## 2017-10-03 PROCEDURE — 80053 COMPREHEN METABOLIC PANEL: CPT | Performed by: SURGERY

## 2017-10-03 PROCEDURE — 82962 GLUCOSE BLOOD TEST: CPT

## 2017-10-03 PROCEDURE — 25010000002 METOCLOPRAMIDE PER 10 MG: Performed by: SURGERY

## 2017-10-03 PROCEDURE — 25010000002 ONDANSETRON PER 1 MG: Performed by: SURGERY

## 2017-10-03 PROCEDURE — 25010000002 PROMETHAZINE PER 50 MG: Performed by: SURGERY

## 2017-10-03 PROCEDURE — 25010000003 CEFAZOLIN IN DEXTROSE 2-4 GM/100ML-% SOLUTION: Performed by: SURGERY

## 2017-10-03 PROCEDURE — 85025 COMPLETE CBC W/AUTO DIFF WBC: CPT | Performed by: SURGERY

## 2017-10-03 PROCEDURE — 25010000002 CYANOCOBALAMIN PER 1000 MCG: Performed by: SURGERY

## 2017-10-03 PROCEDURE — 25010000002 THIAMINE PER 100 MG: Performed by: SURGERY

## 2017-10-03 PROCEDURE — 84100 ASSAY OF PHOSPHORUS: CPT | Performed by: SURGERY

## 2017-10-03 PROCEDURE — 25010000002 PYRIDOXINE PER 100 MG: Performed by: SURGERY

## 2017-10-03 PROCEDURE — 63710000001 INSULIN ASPART PER 5 UNITS: Performed by: SURGERY

## 2017-10-03 PROCEDURE — 83735 ASSAY OF MAGNESIUM: CPT | Performed by: SURGERY

## 2017-10-03 PROCEDURE — 25010000002 ENOXAPARIN PER 10 MG: Performed by: SURGERY

## 2017-10-03 RX ORDER — CEFAZOLIN SODIUM 2 G/100ML
2 INJECTION, SOLUTION INTRAVENOUS ONCE
Status: COMPLETED | OUTPATIENT
Start: 2017-10-03 | End: 2017-10-03

## 2017-10-03 RX ADMIN — CYANOCOBALAMIN 1000 MCG: 1000 INJECTION, SOLUTION INTRAMUSCULAR at 08:24

## 2017-10-03 RX ADMIN — METOCLOPRAMIDE 10 MG: 5 INJECTION, SOLUTION INTRAMUSCULAR; INTRAVENOUS at 20:15

## 2017-10-03 RX ADMIN — CEFAZOLIN SODIUM 2 G: 2 INJECTION, SOLUTION INTRAVENOUS at 08:35

## 2017-10-03 RX ADMIN — FAMOTIDINE 20 MG: 10 INJECTION, SOLUTION INTRAVENOUS at 08:23

## 2017-10-03 RX ADMIN — PYRIDOXINE HYDROCHLORIDE 250 ML/HR: 100 INJECTION, SOLUTION INTRAMUSCULAR; INTRAVENOUS at 02:09

## 2017-10-03 RX ADMIN — HYOSCYAMINE SULFATE 125 MCG: 0.12 TABLET ORAL at 17:45

## 2017-10-03 RX ADMIN — ALBUTEROL SULFATE 2.5 MG: 2.5 SOLUTION RESPIRATORY (INHALATION) at 19:55

## 2017-10-03 RX ADMIN — PROMETHAZINE HYDROCHLORIDE 12.5 MG: 25 INJECTION INTRAMUSCULAR; INTRAVENOUS at 06:27

## 2017-10-03 RX ADMIN — ALBUTEROL SULFATE 2.5 MG: 2.5 SOLUTION RESPIRATORY (INHALATION) at 11:02

## 2017-10-03 RX ADMIN — METOCLOPRAMIDE 10 MG: 5 INJECTION, SOLUTION INTRAMUSCULAR; INTRAVENOUS at 08:23

## 2017-10-03 RX ADMIN — INSULIN ASPART 8 UNITS: 100 INJECTION, SOLUTION INTRAVENOUS; SUBCUTANEOUS at 17:46

## 2017-10-03 RX ADMIN — METOCLOPRAMIDE 10 MG: 5 INJECTION, SOLUTION INTRAMUSCULAR; INTRAVENOUS at 02:22

## 2017-10-03 RX ADMIN — FAMOTIDINE 20 MG: 10 INJECTION, SOLUTION INTRAVENOUS at 20:14

## 2017-10-03 RX ADMIN — SODIUM CHLORIDE, POTASSIUM CHLORIDE, SODIUM LACTATE AND CALCIUM CHLORIDE 150 ML/HR: 600; 310; 30; 20 INJECTION, SOLUTION INTRAVENOUS at 20:15

## 2017-10-03 RX ADMIN — CEFAZOLIN SODIUM 2 G: 2 INJECTION, SOLUTION INTRAVENOUS at 00:59

## 2017-10-03 RX ADMIN — HYDROCODONE BITARTRATE AND ACETAMINOPHEN 15 ML: 7.5; 325 SOLUTION ORAL at 13:54

## 2017-10-03 RX ADMIN — INSULIN ASPART 10 UNITS: 100 INJECTION, SOLUTION INTRAVENOUS; SUBCUTANEOUS at 11:30

## 2017-10-03 RX ADMIN — HYOSCYAMINE SULFATE 125 MCG: 0.12 TABLET ORAL at 11:51

## 2017-10-03 RX ADMIN — HYDROCODONE BITARTRATE AND ACETAMINOPHEN 15 ML: 7.5; 325 SOLUTION ORAL at 21:03

## 2017-10-03 RX ADMIN — METOCLOPRAMIDE 10 MG: 5 INJECTION, SOLUTION INTRAMUSCULAR; INTRAVENOUS at 13:48

## 2017-10-03 RX ADMIN — HYOSCYAMINE SULFATE 125 MCG: 0.12 TABLET ORAL at 20:15

## 2017-10-03 RX ADMIN — HYDROCODONE BITARTRATE AND ACETAMINOPHEN 15 ML: 7.5; 325 SOLUTION ORAL at 04:40

## 2017-10-03 RX ADMIN — ENOXAPARIN SODIUM 40 MG: 40 INJECTION SUBCUTANEOUS at 08:24

## 2017-10-03 RX ADMIN — ONDANSETRON 4 MG: 2 INJECTION INTRAMUSCULAR; INTRAVENOUS at 21:03

## 2017-10-03 RX ADMIN — HYDROCODONE BITARTRATE AND ACETAMINOPHEN 15 ML: 7.5; 325 SOLUTION ORAL at 08:33

## 2017-10-03 RX ADMIN — PROMETHAZINE HYDROCHLORIDE 12.5 MG: 25 INJECTION INTRAMUSCULAR; INTRAVENOUS at 02:08

## 2017-10-03 RX ADMIN — ALBUTEROL SULFATE 2.5 MG: 2.5 SOLUTION RESPIRATORY (INHALATION) at 06:55

## 2017-10-03 NOTE — PROGRESS NOTES
"..  Subjective:       Vivian Kauffman  is post op day one status post procedure listed. Patient denies shortness of air and lower extremity pain. Feels better than yesterday. No nausea or vomiting this am. Ambulating well and using incentive spirometer.       Objective:        /65 (BP Location: Right arm, Patient Position: Lying)  Pulse 87  Temp 98.4 °F (36.9 °C) (Oral)   Resp 20  Ht 63\" (160 cm)  Wt 204 lb (92.5 kg)  SpO2 93%  BMI 36.14 kg/m2    General:  alert, appears stated age and cooperative   Abdomen: soft, bowel sounds active, appropriate tenderness   Incision:   healing well, no drainage, no erythema, no hernia, no seroma, no swelling, no dehiscence, incision well approximated   Heart: Regular rate   Lungs: Clear to auscultation bilaterally     I reviewed the patient's new clinical results.     Lab Results (last 24 hours)     Procedure Component Value Units Date/Time    POC Glucose Fingerstick [[id number]]  (Abnormal) Collected:  10/02/17 1241    Specimen:  Blood Updated:  10/02/17 1243     Glucose 226 (H) mg/dL     Narrative:       Meter: [id number] : [id number] See SALDIVAR    POC Glucose Fingerstick [[id number]]  (Abnormal) Collected:  10/02/17 1749    Specimen:  Blood Updated:  10/02/17 1751     Glucose 262 (H) mg/dL     Narrative:       Meter: [id number] : [id number] Marcus HENRY    POC Glucose Fingerstick [[id number]]  (Abnormal) Collected:  10/02/17 2214    Specimen:  Blood Updated:  10/02/17 2215     Glucose 172 (H) mg/dL     Narrative:       Meter: [id number] : [id number] Jessica Jenna    CBC & Differential [[id number]] Collected:  10/03/17 0448    Specimen:  Blood Updated:  10/03/17 0502    Narrative:       The following orders were created for panel order CBC & Differential.  Procedure                               Abnormality         Status                     ---------                               -----------         ------                     CBC Auto " Differential[[id number]]        Abnormal            Final result                 Please view results for these tests on the individual orders.    CBC Auto Differential [[id number]]  (Abnormal) Collected:  10/03/17 0448    Specimen:  Blood Updated:  10/03/17 0525     WBC 6.59 10*3/mm3      RBC 4.45 10*6/mm3      Hemoglobin 13.2 g/dL      Hematocrit 40.0 %      MCV 89.9 fL      MCH 29.7 pg      MCHC 33.0 g/dL      RDW 14.0 (H) %      RDW-SD 45.5 fl      MPV 10.2 fL      Platelets 129 (L) 10*3/mm3      Neutrophil % 76.7 (H) %      Lymphocyte % 15.2 (L) %      Monocyte % 7.7 %      Eosinophil % 0.2 (L) %      Basophil % 0.2 %      Immature Grans % 0.0 %      Neutrophils, Absolute 5.06 10*3/mm3      Lymphocytes, Absolute 1.00 10*3/mm3      Monocytes, Absolute 0.51 10*3/mm3      Eosinophils, Absolute 0.01 10*3/mm3      Basophils, Absolute 0.01 10*3/mm3      Immature Grans, Absolute 0.00 10*3/mm3     Phosphorus [[id number]]  (Normal) Collected:  10/03/17 0448    Specimen:  Blood Updated:  10/03/17 0546     Phosphorus 2.8 mg/dL     Magnesium [[id number]]  (Normal) Collected:  10/03/17 0448    Specimen:  Blood Updated:  10/03/17 0546     Magnesium 1.8 mg/dL     Comprehensive Metabolic Panel [[id number]]  (Abnormal) Collected:  10/03/17 0448    Specimen:  Blood Updated:  10/03/17 0550     Glucose 210 (H) mg/dL      BUN 7 (L) mg/dL      Creatinine 0.51 (L) mg/dL      Sodium 137 mmol/L      Potassium 4.2 mmol/L      Chloride 98 mmol/L      CO2 18.1 (L) mmol/L      Calcium 8.5 (L) mg/dL      Total Protein 6.5 g/dL      Albumin 3.60 g/dL      ALT (SGPT) 95 (H) U/L      AST (SGOT) 63 (H) U/L      Alkaline Phosphatase 66 U/L      Total Bilirubin 0.5 mg/dL      eGFR Non African Amer 122 mL/min/1.73      Globulin 2.9 gm/dL      A/G Ratio 1.2 g/dL      BUN/Creatinine Ratio 13.7     Anion Gap 20.9 mmol/L     POC Glucose Fingerstick [[id number]]  (Abnormal) Collected:  10/03/17 0610    Specimen:  Blood Updated:  10/03/17 0611     Glucose 261  (H) mg/dL     Narrative:       Meter: [id number] : [id number] Karl Nazario             Assessment:      Doing well postoperatively.  Postoperative course complicated by nausea and inability to tolerate oral fluids this morning.       Plan:   1. Start diet  2. Continue with ambulation and Incentive spirometry  3. Plan for d/c home later today if tolerating diet  4. Continue Lovenox     Patient was seen and examined by JIM Mims.

## 2017-10-03 NOTE — PLAN OF CARE
Problem: Patient Care Overview (Adult)  Goal: Plan of Care Review  Outcome: Ongoing (interventions implemented as appropriate)    10/03/17 6595   Outcome Evaluation   Outcome Summary/Follow up Plan c/o nausea this am but better throughout the day. rec'd hycet for pain, ambulated in [place]       Goal: Adult Individualization and Mutuality  Outcome: Ongoing (interventions implemented as appropriate)  Goal: Discharge Needs Assessment  Outcome: Ongoing (interventions implemented as appropriate)    Problem: Perioperative Period (Adult)  Goal: Signs and Symptoms of Listed Potential Problems Will be Absent or Manageable (Perioperative Period)  Outcome: Ongoing (interventions implemented as appropriate)    Problem: Pain, Acute (Adult)  Goal: Identify Related Risk Factors and Signs and Symptoms  Outcome: Ongoing (interventions implemented as appropriate)  Goal: Acceptable Pain Control/Comfort Level  Outcome: Ongoing (interventions implemented as appropriate)

## 2017-10-03 NOTE — PLAN OF CARE
Problem: Patient Care Overview (Adult)  Goal: Plan of Care Review  Outcome: Ongoing (interventions implemented as appropriate)    10/03/17 0546   Patient Care Overview   Progress improving   Outcome Evaluation   Outcome Summary/Follow up Plan VSS. Pt ambulated in [place] 4 times during the night. Pt c/o pain at incision site that was moderately relieved by PO hycet. Pt had nausea and was given phenergan x2. No acute changes. Continue to monitor       Goal: Adult Individualization and Mutuality  Outcome: Ongoing (interventions implemented as appropriate)  Goal: Discharge Needs Assessment  Outcome: Ongoing (interventions implemented as appropriate)    Problem: Perioperative Period (Adult)  Goal: Signs and Symptoms of Listed Potential Problems Will be Absent or Manageable (Perioperative Period)  Outcome: Ongoing (interventions implemented as appropriate)    Problem: Pain, Acute (Adult)  Goal: Identify Related Risk Factors and Signs and Symptoms  Outcome: Ongoing (interventions implemented as appropriate)  Goal: Acceptable Pain Control/Comfort Level  Outcome: Ongoing (interventions implemented as appropriate)

## 2017-10-04 VITALS
SYSTOLIC BLOOD PRESSURE: 138 MMHG | RESPIRATION RATE: 16 BRPM | HEIGHT: 63 IN | TEMPERATURE: 98.5 F | WEIGHT: 204 LBS | BODY MASS INDEX: 36.14 KG/M2 | OXYGEN SATURATION: 95 % | DIASTOLIC BLOOD PRESSURE: 73 MMHG | HEART RATE: 92 BPM

## 2017-10-04 PROBLEM — E66.812 OBESITY, CLASS II, BMI 35-39.9: Status: ACTIVE | Noted: 2017-09-18

## 2017-10-04 PROBLEM — E66.9 OBESITY, CLASS II, BMI 35-39.9: Status: ACTIVE | Noted: 2017-09-18

## 2017-10-04 LAB
ANION GAP SERPL CALCULATED.3IONS-SCNC: 20.6 MMOL/L
BASOPHILS # BLD AUTO: 0.01 10*3/MM3 (ref 0–0.2)
BASOPHILS NFR BLD AUTO: 0.2 % (ref 0–1.5)
BUN BLD-MCNC: 3 MG/DL (ref 8–23)
BUN/CREAT SERPL: 6 (ref 7–25)
CALCIUM SPEC-SCNC: 8.8 MG/DL (ref 8.6–10.5)
CHLORIDE SERPL-SCNC: 99 MMOL/L (ref 98–107)
CO2 SERPL-SCNC: 18.4 MMOL/L (ref 22–29)
CREAT BLD-MCNC: 0.5 MG/DL (ref 0.57–1)
DEPRECATED RDW RBC AUTO: 45.5 FL (ref 37–54)
EOSINOPHIL # BLD AUTO: 0.02 10*3/MM3 (ref 0–0.7)
EOSINOPHIL NFR BLD AUTO: 0.3 % (ref 0.3–6.2)
ERYTHROCYTE [DISTWIDTH] IN BLOOD BY AUTOMATED COUNT: 14.1 % (ref 11.7–13)
GFR SERPL CREATININE-BSD FRML MDRD: 125 ML/MIN/1.73
GLUCOSE BLD-MCNC: 256 MG/DL (ref 65–99)
HCT VFR BLD AUTO: 40.1 % (ref 35.6–45.5)
HGB BLD-MCNC: 13.4 G/DL (ref 11.9–15.5)
IMM GRANULOCYTES # BLD: 0 10*3/MM3 (ref 0–0.03)
IMM GRANULOCYTES NFR BLD: 0 % (ref 0–0.5)
LYMPHOCYTES # BLD AUTO: 1.13 10*3/MM3 (ref 0.9–4.8)
LYMPHOCYTES NFR BLD AUTO: 19.2 % (ref 19.6–45.3)
MCH RBC QN AUTO: 29.6 PG (ref 26.9–32)
MCHC RBC AUTO-ENTMCNC: 33.4 G/DL (ref 32.4–36.3)
MCV RBC AUTO: 88.7 FL (ref 80.5–98.2)
MONOCYTES # BLD AUTO: 0.71 10*3/MM3 (ref 0.2–1.2)
MONOCYTES NFR BLD AUTO: 12.1 % (ref 5–12)
NEUTROPHILS # BLD AUTO: 4.01 10*3/MM3 (ref 1.9–8.1)
NEUTROPHILS NFR BLD AUTO: 68.2 % (ref 42.7–76)
PLATELET # BLD AUTO: 139 10*3/MM3 (ref 140–500)
PMV BLD AUTO: 10 FL (ref 6–12)
POTASSIUM BLD-SCNC: 4.4 MMOL/L (ref 3.5–5.2)
RBC # BLD AUTO: 4.52 10*6/MM3 (ref 3.9–5.2)
SODIUM BLD-SCNC: 138 MMOL/L (ref 136–145)
WBC NRBC COR # BLD: 5.88 10*3/MM3 (ref 4.5–10.7)

## 2017-10-04 PROCEDURE — 94799 UNLISTED PULMONARY SVC/PX: CPT

## 2017-10-04 PROCEDURE — 25010000002 METOCLOPRAMIDE PER 10 MG: Performed by: SURGERY

## 2017-10-04 PROCEDURE — 85025 COMPLETE CBC W/AUTO DIFF WBC: CPT | Performed by: SURGERY

## 2017-10-04 PROCEDURE — 80048 BASIC METABOLIC PNL TOTAL CA: CPT | Performed by: SURGERY

## 2017-10-04 PROCEDURE — 63710000001 INSULIN ASPART PER 5 UNITS: Performed by: SURGERY

## 2017-10-04 PROCEDURE — 25010000002 ONDANSETRON PER 1 MG: Performed by: SURGERY

## 2017-10-04 PROCEDURE — 25010000002 ENOXAPARIN PER 10 MG: Performed by: SURGERY

## 2017-10-04 RX ORDER — ONDANSETRON 4 MG/1
4 TABLET, FILM COATED ORAL EVERY 8 HOURS PRN
Qty: 16 TABLET | Refills: 0 | Status: SHIPPED | OUTPATIENT
Start: 2017-10-04 | End: 2017-12-27

## 2017-10-04 RX ADMIN — INSULIN ASPART 8 UNITS: 100 INJECTION, SOLUTION INTRAVENOUS; SUBCUTANEOUS at 09:24

## 2017-10-04 RX ADMIN — HYOSCYAMINE SULFATE 125 MCG: 0.12 TABLET ORAL at 09:24

## 2017-10-04 RX ADMIN — ONDANSETRON 4 MG: 2 INJECTION INTRAMUSCULAR; INTRAVENOUS at 10:36

## 2017-10-04 RX ADMIN — ENOXAPARIN SODIUM 40 MG: 40 INJECTION SUBCUTANEOUS at 09:23

## 2017-10-04 RX ADMIN — HYDROCODONE BITARTRATE AND ACETAMINOPHEN 15 ML: 7.5; 325 SOLUTION ORAL at 06:42

## 2017-10-04 RX ADMIN — HYDROCODONE BITARTRATE AND ACETAMINOPHEN 15 ML: 7.5; 325 SOLUTION ORAL at 10:36

## 2017-10-04 RX ADMIN — METOCLOPRAMIDE 10 MG: 5 INJECTION, SOLUTION INTRAMUSCULAR; INTRAVENOUS at 02:12

## 2017-10-04 RX ADMIN — FAMOTIDINE 20 MG: 10 INJECTION, SOLUTION INTRAVENOUS at 09:24

## 2017-10-04 RX ADMIN — METOCLOPRAMIDE 10 MG: 5 INJECTION, SOLUTION INTRAMUSCULAR; INTRAVENOUS at 06:43

## 2017-10-04 RX ADMIN — LISINOPRIL 5 MG: 5 TABLET ORAL at 09:24

## 2017-10-04 RX ADMIN — ALBUTEROL SULFATE 2.5 MG: 2.5 SOLUTION RESPIRATORY (INHALATION) at 08:58

## 2017-10-04 RX ADMIN — METOPROLOL TARTRATE 50 MG: 50 TABLET, FILM COATED ORAL at 09:25

## 2017-10-04 RX ADMIN — SODIUM CHLORIDE, POTASSIUM CHLORIDE, SODIUM LACTATE AND CALCIUM CHLORIDE 150 ML/HR: 600; 310; 30; 20 INJECTION, SOLUTION INTRAVENOUS at 04:54

## 2017-10-04 NOTE — DISCHARGE SUMMARY
"Discharge Summary    Patient name: Vivian Kauffman    Medical record number: [id number]    Admission date: 10/2/2017  Discharge date:  10/4/2017    Attending physician: Dr. Chris Ackerman    Primary care physician: Shahbaz Acosta MD    Referring physician: Chris Ackerman Jr., MD  [address] [address]    Condition on discharge: Stable    Primary Diagnoses:  Morbid obesity with co-morbidities    Operative Procedure:  Laparoscopic gastric sleeve revision with hiatal hernia repair and lysis of adhesions     Vivian Kauffman  is post op day one status post procedure listed. Patient denies shortness of air and lower extremity pain. Feels better than yesterday. No nausea or vomiting this am. Ambulating well and using incentive spirometer.          /73 (BP Location: Right arm, Patient Position: Sitting)  Pulse 92  Temp 98.5 °F (36.9 °C) (Oral)   Resp 16  Ht 63\" (160 cm)  Wt 204 lb (92.5 kg)  SpO2 95%  BMI 36.14 kg/m2    General:  alert, appears stated age and cooperative   Abdomen: soft, bowel sounds active, appropriate tenderness   Incision:   healing well, no drainage, no erythema, no hernia, no seroma, no swelling, no dehiscence, incision well approximated   Heart: Regular rate   Lungs: Clear to auscultation bilaterally     I reviewed the patient's new clinical results.     Lab Results (last 24 hours)     Procedure Component Value Units Date/Time    POC Glucose Fingerstick [[id number]]  (Abnormal) Collected:  10/03/17 1613    Specimen:  Blood Updated:  10/03/17 1614     Glucose 251 (H) mg/dL     Narrative:       Meter: [id number] : [id number] Leesa HENRY    POC Glucose Fingerstick [[id number]]  (Abnormal) Collected:  10/03/17 2033    Specimen:  Blood Updated:  10/03/17 2034     Glucose 161 (H) mg/dL     Narrative:       Meter: [id number] : [id number] MARTHA ARIZA    CBC & Differential [[id number]] Collected:  10/04/17 0348    Specimen:  Blood Updated:  10/04/17 0424 "    Narrative:       The following orders were created for panel order CBC & Differential.  Procedure                               Abnormality         Status                     ---------                               -----------         ------                     CBC Auto Differential[[id number]]        Abnormal            Final result                 Please view results for these tests on the individual orders.    CBC Auto Differential [[id number]]  (Abnormal) Collected:  10/04/17 0348    Specimen:  Blood Updated:  10/04/17 0424     WBC 5.88 10*3/mm3      RBC 4.52 10*6/mm3      Hemoglobin 13.4 g/dL      Hematocrit 40.1 %      MCV 88.7 fL      MCH 29.6 pg      MCHC 33.4 g/dL      RDW 14.1 (H) %      RDW-SD 45.5 fl      MPV 10.0 fL      Platelets 139 (L) 10*3/mm3      Neutrophil % 68.2 %      Lymphocyte % 19.2 (L) %      Monocyte % 12.1 (H) %      Eosinophil % 0.3 %      Basophil % 0.2 %      Immature Grans % 0.0 %      Neutrophils, Absolute 4.01 10*3/mm3      Lymphocytes, Absolute 1.13 10*3/mm3      Monocytes, Absolute 0.71 10*3/mm3      Eosinophils, Absolute 0.02 10*3/mm3      Basophils, Absolute 0.01 10*3/mm3      Immature Grans, Absolute 0.00 10*3/mm3     Basic Metabolic Panel [[id number]]  (Abnormal) Collected:  10/04/17 0348    Specimen:  Blood Updated:  10/04/17 0455     Glucose 256 (H) mg/dL      BUN 3 (L) mg/dL      Creatinine 0.50 (L) mg/dL      Sodium 138 mmol/L      Potassium 4.4 mmol/L      Chloride 99 mmol/L      CO2 18.4 (L) mmol/L      Calcium 8.8 mg/dL      eGFR Non African Amer 125 mL/min/1.73      BUN/Creatinine Ratio 6.0 (L)     Anion Gap 20.6 mmol/L     Narrative:       GFR Normal >60  Chronic Kidney Disease <60  Kidney Failure <15             Assessment:      Doing well postoperatively.      Plan:   1. Continue Stage 1 diet  2. Continue with ambulation and Incentive spirometry  3. Plan for d/c home    Patient was seen and examined by Dr. Ackerman.    Hospital Course: The patient is a very pleasant  62 y.o. female that was admitted to the hospital with morbid obesity with co-morbidities. Patient underwent laparoscopic sleeve gastrectomy revision with hiatal hernia repair and lysis of adhesions (see OP note) without complication. The patient was then admitted to the bariatric unit per protocol where they remained stable. POD #1 she was started on a stage 1 bariatric diet which she tolerated so she was able to be discharged home in good condition.  .    Discharge medications:    Vivian Kauffman   Home Medication Instructions ELLI:[id number]    Printed on:10/04/17 6484   Medication Information                      albuterol (PROAIR HFA) 108 (90 BASE) MCG/ACT inhaler  Inhale 2 puffs Every 4 (Four) Hours As Needed.             atorvastatin (LIPITOR) 20 MG tablet  Take 20 mg by mouth Every Night.             Biotin 5000 MCG capsule  Take 5,000 mcg by mouth Every Evening.             Cholecalciferol (VITAMIN D3) 2000 units tablet  Take 2,000 Units by mouth Every Evening.             clopidogrel (PLAVIX) 75 MG tablet  Take 75 mg by mouth Every Evening. HOLDING FOR SX             Coenzyme Q10 (CO Q 10) 10 MG capsule  Take 100 mg by mouth Every Evening.             colestipol (COLESTID) 1 G tablet  Take 1 g by mouth Every Morning.             famotidine (PEPCID) 20 MG tablet  Take 20 mg by mouth Every Evening.             FLUoxetine (PROzac) 20 MG tablet  Take 80 mg by mouth Every Morning.             fluticasone-salmeterol (ADVAIR) 500-50 MCG/DOSE DISKUS  Inhale 1 puff 2 (Two) Times a Day As Needed.             folic acid (FOLVITE) 1 MG tablet  Begin two weeks prior to surgery             gabapentin (NEURONTIN) 300 MG capsule  Take 300 mg by mouth Every Morning.             gabapentin (NEURONTIN) 600 MG tablet  Take 600 mg by mouth Every Evening.             glipiZIDE (GLUCOTROL) 10 MG tablet  Take 20 mg by mouth 2 (Two) Times a Day Before Meals. WITH BREAKFAST AND SUPPER             HYDROcodone-acetaminophen  (HYCET) 7.5-325 MG/15ML solution  Take 15 mL by mouth Every 4 (Four) Hours As Needed for Moderate Pain  for up to 3 days.             insulin NPH (NOVOLIN N) 100 UNIT/ML injection  Inject 12 Units under the skin 2 (Two) Times a Day Before Meals. 12 units at am and supper time             levETIRAcetam (KEPPRA) 1000 MG tablet  Take 1,500 mg by mouth Every Evening.             metFORMIN (GLUCOPHAGE) 500 MG tablet  Take 1,000 mg by mouth 2 (Two) Times a Day With Meals.             metoprolol tartrate (LOPRESSOR) 100 MG tablet  Take 100 mg by mouth Every Evening.             metoprolol tartrate (LOPRESSOR) 50 MG tablet  Take 50 mg by mouth Every Morning.             montelukast (SINGULAIR) 10 MG tablet  Take 10 mg by mouth Every Night.             nortriptyline (PAMELOR) 10 MG capsule  Take 10 mg by mouth Every Night.             ondansetron (ZOFRAN) 4 MG tablet  Take 1 tablet by mouth Every 8 (Eight) Hours As Needed for Nausea or Vomiting.             Probiotic Product (PROBIOTIC ADVANCED PO)  Take 1 tablet by mouth Every Morning.             prochlorperazine (COMPAZINE) 5 MG tablet  Take 5 mg by mouth Every 6 (Six) Hours As Needed for Nausea or Vomiting.             Psyllium (METAMUCIL PO)  Take 2 teaspoon(s) by mouth As Needed.             quinapril (ACCUPRIL) 5 MG tablet  Take 5 mg by mouth Every Morning.             spironolactone (ALDACTONE) 25 MG tablet  Take 25 mg by mouth Every Morning.                 Discharge instructions:  Per Bariatric manual; per our protocol      Follow-up appointment: Follow up with Dr. Ackerman in the office as scheduled.  If not already scheduled call for appointment at [phone].

## 2017-10-04 NOTE — DISCHARGE INSTRUCTIONS
GOING HOME AFTER GASTRIC SLEEVE/ GASTRIC BYPASS SURGERY  [facility] Weight Loss: Post-Operative Information/Instructions  Chris Ackerman Jr., MD  General Patient Instructions for Discharge   - Call Surgeon's office at [phone] for follow-up appointment.    - Be sure you, the patient, have a follow-up appointment to be seen within three (3) days after discharge. If not, please call [phone] to schedule an appointment. If you are discharged on a Saturday or Sunday, please call Monday to schedule the appointment.  - Contact the Surgeon at [phone] for any questions or concerns, including temperature greater than or equal to 101F, shortness of breath, leg swelling, redness at incision sites, nausea, vomiting, chills, or problems or questions.    - Follow the Gastric Stage 1 Diet    à Clear liquids, room temperature, sugar-free, caffeine-free, non-carbonated, 70 grams of protein, No Straws.  - You may shower. No tub bath for 2 weeks.  - No lifting, pushing, pulling, or tugging >25 pounds for 3 weeks.  - Ambulate 4 x per day minimum, increase distance daily.  - For the next several weeks, you are at an increased risk for blood clot formation. Therefore, you should walk regularly. You should not sit for prolonged periods of time, more than 45 minutes, without getting up and walking for 5-10 minutes. This includes any car rides, including the drive home from the hospital. If driving any distance greater than 30 miles over the next two (2) weeks, stop every 30-45 minutes and walk for 5-10 minutes each time.  - Continue using Incentive Spirometer and coughing exercises at least every two (2) hours while awake for one week.  - If you, the patient, use CPAP/BIPAP for diagnosis of sleep apnea, you may resume use tonight at home.  - No driving or operating machinery allowed while taking narcotic (prescription) pain medication, and until you feel comfortable forcefully applying the brakes if needed. (This  usually takes more than 3 days.)    - Make an appointment with your Primary Care Physician within one week post-op to look at your home medications for possible changes or discontinuity.   Medications  - The nurse will provide a list of medications for you to continue at home   - If you received a Lovenox (Enoxaparin) or Apixiban (Eliquis) prescription at pre-op visit with Surgeon, start taking the medicine the morning after discharge unless directed otherwise.    - If you were prescribed Lovenox (Enoxaparin), review the education/teaching material/video with the nurse.    - Take post op pain meds as prescribed as needed.   - Continue Foltx until finished.   - Start Actigall (Ursodiol) one (1) week after surgery if patient still has gallbladder. You should have been given a prescription at your pre-op visit. Contact the office if you do not have the prescription.   - Start bariatric vitamin regimen as instructed in pre-op education with bariatric coordinator.    - Zegerid or Prilosec OTC (or generic) by mouth once daily for four (4) weeks unless you are already taking a proton pump inhibitor as home medication. Follow dosing instructions on package.   Nausea/Vomiting:  The following are possible causes for nausea/vomiting:  - Drinking too much or too fast.  - Sinus drainage/post nasal drip for allergy sufferers (you may take Sudafed, Claritin, Tylenol Sinus/Allergy, or other decongestants and nose sprays to help with this discomfort).  - Low blood sugar (sweating, shaky, irritable, weakness, dizzy or tunnel-vision) - treatment is to sip 100% fruit juice - no sugar added until symptoms subside.  - Acid in fruit juice - (may dilute with water or avoid).  - Eating or drinking something that is not on clear liquid (stage 1) diet.  Any nausea/vomiting that prohibits you from keeping fluids down for greater than 24 hours requires a call to the surgeon's office.  Urine:  Use your urine color as a guide to determine if you  are drinking enough fluid. The darker the urine, the more fluids you need to drink. Urine should be clear to light yellow if you are getting enough fluid. If you should experience frequency, burning or pain with urination, blood in urine, contact us or your primary care physician for possible UTI (urinary tract infection), which could require antibiotics (liquid preferred).  Bowel Movements:  You may not have a bowel movement for 2-5 days after going home. You may then experience liquid, runny or loose stools for approximately 3-4 weeks following surgery. This would require you to drink even more fluids to prevent dehydration. Some patients may experience constipation, which can be treated with increased fluids, drinking warm liquids, increased activity and the use of a Fleets Enema, Milk of Magnesia, or suppositories. The first couple of bowel movements could be bloody, tarry black or dark maroon in color. This is OK as long as the stool returns to a normal color in 1-2 days. If however, you have frequent or a large amount of bloody or tarry black stools and/or become light-headed or dizzy, you may be bleeding and require urgent attention. Please call us right away.  Abdominal Incisions:  You will have small incisions. Do not scrub incisions, but allow the warm, soapy water to run over the incisions, rinse well, and pat dry. You may use any brand of anti-bacterial soap. Do not use Peroxide or Neosporin type ointments on sites, unless instructed to do so by a surgeon or nurse. Monitor daily for signs/symptoms of infection, which might include: drainage with a foul odor, pain, redness, swelling or heat at the incision sight; fever, body aches and chills. If you suspect infection or have a fever, give us a call.  Pain:  You will be given a prescription for pain medication to control your pain. If you feel the dose is too strong, you may take half the ordered dose, or you may take Tylenol adult liquid per package  instructions for minor pain. Do not take any medications that contain aspirin or aspirin products.  Do not take medications like: Motrin, Aleve, Ibuprofen, Advil, Naproxen, Celebrex, Daypro, Bextra, Meloxicam or other medications commonly used for arthritis or joint pain.  No steroids or cortisone injections. There may be pain, which should improve every few days. Pain should not suddenly get worse or more intense. Pain that suddenly changes and is constant and severe should be called in to the surgeon's office. Any sudden pain in the lower extremities with associated warmth and redness should be called in to the surgeon's office immediately. Do not rub or massage this area, as it could be a blood clot.  Diet:  Remain on the clear liquid diet (stage 1) per your  which includes 70 grams of protein each day, sugar free, non carbonated and no straws. Day 1 is the day of surgery. If you are tolerating the stage 1 diet, you may then proceed to stage 2 diet, as instructed in the . Do not progress to the stage 2 diet if you are having nausea/vomiting. Refer to the Basic Nutrition and Food Principles guide.  Medications:  The nurse will let you know which medications you will need to continue once you go home. Do not take any medications that are extended or time released if you had the gastric bypass procedure, OK to take if you had the gastric sleeve procedure. Large capsules can be opened and diluted with clear liquids. Check with your physician or pharmacist as to which pills may be crushed and which capsules may be opened and diluted safely. Continue taking Foltx as surgeon orders. If you still have your gall bladder and were prescribed Actigall (Ursodiol), you may start this medication one week after your surgery. You will remain on Actigall (Ursodiol) for approximately 6 months. The dose is 1 pill, 2 times each day for 6 months.  Activity:  Continue your deep breathing and coughing  exercises with your Incentive Spirometer breathing device at least every 2 hours while awake (10 repetitions each time) for one week. May use CPAP. This will help to prevent respiratory problems such as pneumonia. No lifting, pulling or tugging anything over 25 pounds for 3 weeks after surgery. You may shower but no tub baths, hot tubs or swimming for 2 weeks. Moderate walking is recommended every 2 hours and at least 4 times per day minimum, increase distance daily. Further exercise will be discussed at the first post-op visit. No driving or operating machinery allow until off narcotic pain medication and until you feel comfortable forcefully applying the brakes (usually takes 3 or more days). For the next few weeks you are at an increased risk for blood clot formation. Therefore you should walk regularly and you should not sit for prolonged periods of time, more than 45 minutes without getting up and walking for 5-10 minutes. This includes car rides. Including riding home from the hospital. If riding a distance greater than 30 miles over the next 2 weeks stop every 30-45 minutes and walk 5-10 mintues each time. No tanning bed use for 8 weeks after surgery and in general, not recommended due to the increased risk for skin cancer. Incisions will burn/blister very badly with tanning bed use.  Illness:  Your primary care physician should treat general illness such as ear infections, sinus infections, and viral type illnesses, etc. Medications prescribed should be liquid/elixir form when possible, for the first 30 days.  General:  In general, it is recommended that you weigh yourself no more than once per week. Let the weight come off you and concentrate on more important things. Remember the weight was not gained overnight, nor will it be lost overnight. Gastric Bypass/ Gastric Sleeve weight loss will continue over a period of 12-18 months. Do not  yourself according to how others are doing after surgery, as this  will cause unnecessary discouragement.  THE ABOVE ARE GENERAL GUIDELINES TO ASSIST YOU ONCE HOME, IF YOU ARE IN DOUBT, OR YOU HAVE ANY QUESTIONS, CALL US AT THE NUMBERS LISTED BELOW.  IN THE EVENT OF SUDDEN CHEST PAIN, SHORTNESS OF BREATH, OR ANY LIFE THREATENING CONDITION, CALL 911.  Any time you are evaluated or admitted to another facility, please have someone notify the surgeon's office.  Supplements:  70 grams of protein taken EVERY DAY. Remember to drink at least 64 ounces of fluid a day, sipping slowly early on. Increase this amount during the summertime. Sipping slowly will not stretch your new stomach. Drinking too fast or gulping liquids will cause brief discomfort and early could cause staple line disruption (leak). With eating, tiny bites, then chew, chew, chew, and swallow. Lay your fork/spoon down for 2-3 minutes, and then take your next bite. Your pouch will tell you within 1-2 bites if it is going to tolerate what you are eating.   Protein Vendors:  Refer to protein vendors' handout from consult class. You can always find protein drinks at the bariatric office, grocery stores, [employer], drug [employer], [employer], health food stores, and on the Internet. Find one high in protein (15-30 grams per serving) and low carb (less than 18 grams per serving).  Now is a great time to re-read your . Please review specific instructions given to you at discharge by your physician (surgeon).  HOW/WHEN TO CONTACT US:  It is imperative that you contact us with any of the following:    Ÿ fever greater than 101 degrees  Ÿ shortness of breath  Ÿ leg swelling  Ÿ body aches  Ÿ shaking chills  Ÿ nausea and vomitting  Ÿ pain that has worsened  Ÿ redness at incision sites  Ÿ pus or foul smelling drainage from an incision or wound  Ÿ inability to keep fluids down for more than a day  Ÿ any other condition you feel needs our attention.  [facility]: [phone] call this number anytime 24 hours  a day / 7 days a week.  Teach-back Questions to be answered by the patient prior to discharge.   What complications would prompt you to call your doctor when you return home? _________________    What is the purpose of your prescribed medication? ________________  What are some potential side effects of the medications you will be taking at home? _______________

## 2017-10-04 NOTE — PLAN OF CARE
Problem: Patient Care Overview (Adult)  Goal: Plan of Care Review  Outcome: Ongoing (interventions implemented as appropriate)    10/03/17 0546 10/03/17 2022 10/04/17 0601   Coping/Psychosocial Response Interventions   Plan Of Care Reviewed With --  patient --    Patient Care Overview   Progress improving --  --    Outcome Evaluation   Outcome Summary/Follow up Plan --  --  VSS. PT had some nausea treated with zofran. Will continue to monitor.        Goal: Adult Individualization and Mutuality  Outcome: Ongoing (interventions implemented as appropriate)  Goal: Discharge Needs Assessment  Outcome: Ongoing (interventions implemented as appropriate)    Problem: Perioperative Period (Adult)  Goal: Signs and Symptoms of Listed Potential Problems Will be Absent or Manageable (Perioperative Period)  Outcome: Ongoing (interventions implemented as appropriate)    Problem: Pain, Acute (Adult)  Goal: Identify Related Risk Factors and Signs and Symptoms  Outcome: Ongoing (interventions implemented as appropriate)  Goal: Acceptable Pain Control/Comfort Level  Outcome: Ongoing (interventions implemented as appropriate)

## 2017-10-05 ENCOUNTER — OFFICE VISIT (OUTPATIENT)
Dept: BARIATRICS/WEIGHT MGMT | Facility: CLINIC | Age: 62
End: 2017-10-05

## 2017-10-05 VITALS
HEIGHT: 63 IN | RESPIRATION RATE: 16 BRPM | SYSTOLIC BLOOD PRESSURE: 112 MMHG | WEIGHT: 202 LBS | TEMPERATURE: 97.3 F | BODY MASS INDEX: 35.79 KG/M2 | HEART RATE: 72 BPM | DIASTOLIC BLOOD PRESSURE: 82 MMHG

## 2017-10-05 DIAGNOSIS — I10 ESSENTIAL HYPERTENSION: ICD-10-CM

## 2017-10-05 DIAGNOSIS — M25.50 MULTIPLE JOINT PAIN: ICD-10-CM

## 2017-10-05 DIAGNOSIS — E66.9 DIABETES MELLITUS TYPE 2 IN OBESE (HCC): ICD-10-CM

## 2017-10-05 DIAGNOSIS — E78.5 HYPERLIPIDEMIA, UNSPECIFIED HYPERLIPIDEMIA TYPE: ICD-10-CM

## 2017-10-05 DIAGNOSIS — E66.9 OBESITY, CLASS II, BMI 35-39.9: Primary | ICD-10-CM

## 2017-10-05 DIAGNOSIS — F32.A DEPRESSION, UNSPECIFIED DEPRESSION TYPE: ICD-10-CM

## 2017-10-05 DIAGNOSIS — Z71.3 DIETARY COUNSELING: ICD-10-CM

## 2017-10-05 DIAGNOSIS — E11.69 DIABETES MELLITUS TYPE 2 IN OBESE (HCC): ICD-10-CM

## 2017-10-05 DIAGNOSIS — Z71.82 EXERCISE COUNSELING: ICD-10-CM

## 2017-10-05 PROCEDURE — 99024 POSTOP FOLLOW-UP VISIT: CPT | Performed by: NURSE PRACTITIONER

## 2017-10-05 NOTE — PROGRESS NOTES
[facility]  [address]  [facility] [id number] [phone]  [address]  [facility] [id number] [phone]  Dept: [phone]  10/5/2017      Vivian Kauffman.  [id number]  [id number]  1955  female      Chief Complaint   Patient presents with   • Follow-up     1 week PO GS, RVN from AGB       BH Post-Op Bariatric Surgery:   Vivian Kauffman is status post laparopscopic Laparoscopic Sleeve/HH revision procedure, performed on 10/2/17. Removal of AGB on 11/30/16    HPI:   Today's weight is 202 lb (91.6 kg) pounds, today's BMI is Body mass index is 35.78 kg/(m^2)., she has a  loss of 8 pounds since the last visit and her weight loss since surgery is 8 pounds. The patient reports a decreased portion size and loss of appetite.  Vivian Kauffman denies nausea, vomiting, dysphagia, or heartburn. The patient c/o post-op pain that is improving. she is doing well with protein and water intake so far. Taking their vitamins, walking and using IS. Denies fevers, chills, chest pain or shortness of air.      Diet and Exercise: Diet history reviewed and discussed with the patient. Weight loss/gains to date discussed with the patient. No carbonated beverage consumption and exercising regularly- walking frequently.   Supplements: multivitamins, B-12, calcium, iron, B-1 and Vitamin D.     Reports that she drank two shakes yesterday for her protein and is doing soups. Reports that she got three bottles of water yesterday.     Review of Systems   Constitutional: Positive for appetite change and fatigue. Negative for unexpected weight change.   HENT: Negative.    Eyes: Negative.    Respiratory: Negative.    Cardiovascular: Negative.  Negative for leg swelling.   Gastrointestinal: Positive for vomiting. Negative for abdominal distention, abdominal pain, constipation, diarrhea and nausea.   Genitourinary: Negative for difficulty urinating,  frequency and urgency.   Musculoskeletal: Negative for back pain.   Skin: Negative.    Psychiatric/Behavioral: Negative.    All other systems reviewed and are negative.      Patient Active Problem List   Diagnosis   • Essential hypertension   • Hyperlipidemia   • Fatigue   • Alopecia   • Dietary counseling   • Abnormal electrocardiogram   • Anxiety   • Asthma   • Carpal tunnel syndrome   • Non-obstructive coronary artery disease   • Depression   • Exercise counseling   • Hx MRSA infection   • Diabetes mellitus type 2 in obese   • Edema   • Fatty liver   • Multiple joint pain   • Seizure disorder   • Obesity, Class II, BMI 35-39.9   • History of removal of laparoscopic gastric banding device       The following portions of the patient's history were reviewed and updated as appropriate: allergies, current medications, past family history, past medical history, past social history, past surgical history and problem list.    Vitals:    10/05/17 1056   BP: 112/82   Pulse: 72   Resp: 16   Temp: 97.3 °F (36.3 °C)       Physical Exam   Constitutional: She appears well-developed and well-nourished.   Neck: No thyromegaly present.   Cardiovascular: Normal rate, regular rhythm and normal heart sounds.    Pulmonary/Chest: Effort normal and breath sounds normal. No respiratory distress. She has no wheezes.   Abdominal: Soft. Bowel sounds are normal. She exhibits no distension. There is no tenderness. There is no guarding. No hernia.   Musculoskeletal: She exhibits no edema or tenderness.   Neurological: She is alert.   Skin: Skin is warm and dry. No rash noted. No erythema.   Psychiatric: She has a normal mood and affect. Her behavior is normal.   Nursing note and vitals reviewed.      Assessment:   Post-op, the patient is doing well.     Encounter Diagnoses   Name Primary?   • Obesity, Class II, BMI 35-39.9 Yes   • Essential hypertension    • Hyperlipidemia, unspecified hyperlipidemia type    • Diabetes mellitus type 2 in obese     • Multiple joint pain    • Dietary counseling    • Depression, unspecified depression type    • Exercise counseling        Plan:   Reviewed with patient the importance of following the manual for diet progression. Increase activity as tolerated. Continue increasing daily intake of protein and water.   Return to work: the patient is to return to 3 weeks from their surgery date with no restrictions unless they develop medical problems in which we will see them back in the office. They received a note in our office today with their return to work date.  Activity restrictions: no lifting, pushing or pulling over 25lbs for 3 weeks.   Recommended patient be sure to get at least 70 grams of protein per day. Discussed with the patient the recommended amount of water per day to intake. Reviewed vitamin requirements. Be sure to do routine exercise and increase activity as tolerated. No asa, nsaids or steroids for 8 weeks. Patient may use miralax as needed if necessary.     Instructions / Recommendations: dietary counseling recommended, recommended a daily protein intake of  grams, vitamin supplement(s) recommended, recommended exercising at least 150 minutes per week, behavior modifications recommended and instructed to call the office for concerns, questions, or problems.     The patient was instructed to follow up at one month follow up appt.     The patient was counseled regarding post op bariatric manual

## 2017-10-06 ENCOUNTER — TELEPHONE (OUTPATIENT)
Dept: BARIATRICS/WEIGHT MGMT | Facility: CLINIC | Age: 62
End: 2017-10-06

## 2017-10-16 ENCOUNTER — TELEPHONE (OUTPATIENT)
Dept: BARIATRICS/WEIGHT MGMT | Facility: CLINIC | Age: 62
End: 2017-10-16

## 2017-10-16 NOTE — TELEPHONE ENCOUNTER
Spoke with the patient. She notes that she has been very sick over the last few days due to elevated blood sugars (noting that her readings have been above 390).     Advised the patient to get in touch with endocrinology or her PCP for advice on elevated blood sugars, as this is not our expertise.  She will followup with them.

## 2017-11-03 ENCOUNTER — OFFICE VISIT (OUTPATIENT)
Dept: BARIATRICS/WEIGHT MGMT | Facility: CLINIC | Age: 62
End: 2017-11-03

## 2017-11-03 ENCOUNTER — LAB (OUTPATIENT)
Dept: LAB | Facility: HOSPITAL | Age: 62
End: 2017-11-03

## 2017-11-03 VITALS
RESPIRATION RATE: 16 BRPM | HEART RATE: 70 BPM | HEIGHT: 63 IN | TEMPERATURE: 97.6 F | WEIGHT: 197 LBS | SYSTOLIC BLOOD PRESSURE: 126 MMHG | BODY MASS INDEX: 34.91 KG/M2 | DIASTOLIC BLOOD PRESSURE: 77 MMHG

## 2017-11-03 DIAGNOSIS — E66.9 DIABETES MELLITUS TYPE 2 IN OBESE (HCC): ICD-10-CM

## 2017-11-03 DIAGNOSIS — Z71.3 DIETARY COUNSELING: ICD-10-CM

## 2017-11-03 DIAGNOSIS — E78.5 HYPERLIPIDEMIA, UNSPECIFIED HYPERLIPIDEMIA TYPE: ICD-10-CM

## 2017-11-03 DIAGNOSIS — M25.50 MULTIPLE JOINT PAIN: ICD-10-CM

## 2017-11-03 DIAGNOSIS — E11.69 DIABETES MELLITUS TYPE 2 IN OBESE (HCC): ICD-10-CM

## 2017-11-03 DIAGNOSIS — I10 ESSENTIAL HYPERTENSION: ICD-10-CM

## 2017-11-03 DIAGNOSIS — F32.A DEPRESSION, UNSPECIFIED DEPRESSION TYPE: ICD-10-CM

## 2017-11-03 LAB
25(OH)D3 SERPL-MCNC: 58.9 NG/ML (ref 30–100)
ALBUMIN SERPL-MCNC: 4.5 G/DL (ref 3.5–5.2)
ALBUMIN/GLOB SERPL: 1.3 G/DL
ALP SERPL-CCNC: 91 U/L (ref 39–117)
ALT SERPL W P-5'-P-CCNC: 67 U/L (ref 1–33)
ANION GAP SERPL CALCULATED.3IONS-SCNC: 14.7 MMOL/L
AST SERPL-CCNC: 43 U/L (ref 1–32)
BASOPHILS # BLD AUTO: 0.02 10*3/MM3 (ref 0–0.2)
BASOPHILS NFR BLD AUTO: 0.4 % (ref 0–1.5)
BILIRUB SERPL-MCNC: 0.3 MG/DL (ref 0.1–1.2)
BUN BLD-MCNC: 15 MG/DL (ref 8–23)
BUN/CREAT SERPL: 27.3 (ref 7–25)
CALCIUM SPEC-SCNC: 9.9 MG/DL (ref 8.6–10.5)
CHLORIDE SERPL-SCNC: 101 MMOL/L (ref 98–107)
CO2 SERPL-SCNC: 27.3 MMOL/L (ref 22–29)
CREAT BLD-MCNC: 0.55 MG/DL (ref 0.57–1)
DEPRECATED RDW RBC AUTO: 44.4 FL (ref 37–54)
EOSINOPHIL # BLD AUTO: 0.11 10*3/MM3 (ref 0–0.7)
EOSINOPHIL NFR BLD AUTO: 2 % (ref 0.3–6.2)
ERYTHROCYTE [DISTWIDTH] IN BLOOD BY AUTOMATED COUNT: 13.4 % (ref 11.7–13)
FERRITIN SERPL-MCNC: 227.1 NG/ML (ref 13–150)
FOLATE SERPL-MCNC: >20 NG/ML (ref 4.78–24.2)
GFR SERPL CREATININE-BSD FRML MDRD: 112 ML/MIN/1.73
GLOBULIN UR ELPH-MCNC: 3.4 GM/DL
GLUCOSE BLD-MCNC: 106 MG/DL (ref 65–99)
HCT VFR BLD AUTO: 43.3 % (ref 35.6–45.5)
HGB BLD-MCNC: 13.9 G/DL (ref 11.9–15.5)
IMM GRANULOCYTES # BLD: 0 10*3/MM3 (ref 0–0.03)
IMM GRANULOCYTES NFR BLD: 0 % (ref 0–0.5)
IRON 24H UR-MRATE: 70 MCG/DL (ref 37–145)
LYMPHOCYTES # BLD AUTO: 1.9 10*3/MM3 (ref 0.9–4.8)
LYMPHOCYTES NFR BLD AUTO: 34.1 % (ref 19.6–45.3)
MAGNESIUM SERPL-MCNC: 2.2 MG/DL (ref 1.6–2.4)
MCH RBC QN AUTO: 29 PG (ref 26.9–32)
MCHC RBC AUTO-ENTMCNC: 32.1 G/DL (ref 32.4–36.3)
MCV RBC AUTO: 90.2 FL (ref 80.5–98.2)
MONOCYTES # BLD AUTO: 0.44 10*3/MM3 (ref 0.2–1.2)
MONOCYTES NFR BLD AUTO: 7.9 % (ref 5–12)
NEUTROPHILS # BLD AUTO: 3.1 10*3/MM3 (ref 1.9–8.1)
NEUTROPHILS NFR BLD AUTO: 55.6 % (ref 42.7–76)
PHOSPHATE SERPL-MCNC: 3.3 MG/DL (ref 2.5–4.5)
PLATELET # BLD AUTO: 171 10*3/MM3 (ref 140–500)
PMV BLD AUTO: 9.9 FL (ref 6–12)
POTASSIUM BLD-SCNC: 4 MMOL/L (ref 3.5–5.2)
PREALB SERPL-MCNC: 27.6 MG/DL (ref 20–40)
PROT SERPL-MCNC: 7.9 G/DL (ref 6–8.5)
RBC # BLD AUTO: 4.8 10*6/MM3 (ref 3.9–5.2)
SODIUM BLD-SCNC: 143 MMOL/L (ref 136–145)
WBC NRBC COR # BLD: 5.57 10*3/MM3 (ref 4.5–10.7)

## 2017-11-03 PROCEDURE — 84425 ASSAY OF VITAMIN B-1: CPT

## 2017-11-03 PROCEDURE — 84446 ASSAY OF VITAMIN E: CPT

## 2017-11-03 PROCEDURE — 82306 VITAMIN D 25 HYDROXY: CPT

## 2017-11-03 PROCEDURE — 83735 ASSAY OF MAGNESIUM: CPT

## 2017-11-03 PROCEDURE — 83540 ASSAY OF IRON: CPT

## 2017-11-03 PROCEDURE — 99024 POSTOP FOLLOW-UP VISIT: CPT | Performed by: NURSE PRACTITIONER

## 2017-11-03 PROCEDURE — 80053 COMPREHEN METABOLIC PANEL: CPT

## 2017-11-03 PROCEDURE — 84100 ASSAY OF PHOSPHORUS: CPT

## 2017-11-03 PROCEDURE — 84597 ASSAY OF VITAMIN K: CPT

## 2017-11-03 PROCEDURE — 83921 ORGANIC ACID SINGLE QUANT: CPT

## 2017-11-03 PROCEDURE — 84630 ASSAY OF ZINC: CPT

## 2017-11-03 PROCEDURE — 84134 ASSAY OF PREALBUMIN: CPT

## 2017-11-03 PROCEDURE — 82728 ASSAY OF FERRITIN: CPT

## 2017-11-03 PROCEDURE — 84590 ASSAY OF VITAMIN A: CPT

## 2017-11-03 PROCEDURE — 36415 COLL VENOUS BLD VENIPUNCTURE: CPT

## 2017-11-03 PROCEDURE — 85025 COMPLETE CBC W/AUTO DIFF WBC: CPT

## 2017-11-03 PROCEDURE — 82746 ASSAY OF FOLIC ACID SERUM: CPT

## 2017-11-03 NOTE — PROGRESS NOTES
[facility] BARIATRIC SURGERY  [address]  [facility] [address] [phone]  [address]  [facility] [address] [phone]  Dept: [phone]  11/3/2017      Vivian Kauffman.  [id number]  [id number]  1955  female      Chief Complaint   Patient presents with   • Follow-up     1 month postop GS       [facility] Post-Op Bariatric Surgery:   Vivian Kauffman is status post Laparoscopic Sleeve/HH revision procedure, performed on 10/2/2017     HPI:   Today's weight is 197 lb (89.4 kg) pounds, today's BMI is Body mass index is 34.9 kg/(m^2)., she has a  loss of 5 pounds since the last visit and her weight loss since surgery is 13 pounds. The patient reports a decreased portion size and loss of appetite.      Vivian Kauffman denies nausea, vomiting, dysphagia, abdominal pain or heartburn.     Diet and Exercise: Diet history reviewed and discussed with the patient. Weight loss/gains to date discussed with the patient. The patient states they are eating 60 grams of protein per day. She reports eating 3 meals per day, a typical portion size of 1 cup, eating 3 snacks per day, drinking 6 or more 8-oz. glasses of water per day, no carbonated beverage consumption and exercising regularly- walking daily    Supplements: BA MTV with iron and calcium.     Review of Systems   Constitutional: Positive for appetite change and fatigue. Negative for unexpected weight change.   HENT: Negative.    Eyes: Negative.    Respiratory: Negative.    Cardiovascular: Negative.  Negative for leg swelling.   Gastrointestinal: Negative for abdominal distention, abdominal pain, constipation, diarrhea, nausea and vomiting.   Genitourinary: Negative for difficulty urinating, frequency and urgency.   Musculoskeletal: Negative for back pain.   Skin: Negative.    Psychiatric/Behavioral: Negative.    All other systems reviewed and are negative.      Patient Active Problem List    Diagnosis   • Essential hypertension   • Hyperlipidemia   • Fatigue   • Alopecia   • Dietary counseling   • Abnormal electrocardiogram   • Anxiety   • Asthma   • Carpal tunnel syndrome   • Non-obstructive coronary artery disease   • Depression   • Exercise counseling   • Hx MRSA infection   • Diabetes mellitus type 2 in obese   • Edema   • Fatty liver   • Multiple joint pain   • Seizure disorder   • BMI 34.0-34.9,adult   • History of removal of laparoscopic gastric banding device       Past Medical History:   Diagnosis Date   • Anxiety    • Asthma    • Chronic lower back pain    • Coronary arteriosclerosis    • Depression    • Diabetes mellitus     TYPE 2   • Diarrhea    • Fatigue    • Fatty liver    • Fibromyalgia    • Fibromyositis    • GERD (gastroesophageal reflux disease)    • Heartburn    • Hyperlipidemia    • Hypertension    • Insomnia    • Kidney stone    • Morbid obesity    • Muscle spasm    • Neck pain    • Obesity    • Pain of both hip joints    • Polyphagia(783.6)    • Polyphagia(783.6)    • PONV (postoperative nausea and vomiting)    • Poor vision    • Problems with hearing    • Seizures    • Shortness of breath on exertion    • Sinus drainage    • Stroke syndrome    • Tremor, essential     IN NECK  AND HANDS   • Vertigo    • Weight gain        The following portions of the patient's history were reviewed and updated as appropriate: allergies, current medications, past family history, past medical history, past social history, past surgical history and problem list.    Vitals:    11/03/17 1455   BP: 126/77   Pulse: 70   Resp: 16   Temp: 97.6 °F (36.4 °C)       Physical Exam   Constitutional: She appears well-developed and well-nourished.   Neck: No thyromegaly present.   Cardiovascular: Normal rate, regular rhythm and normal heart sounds.    Pulmonary/Chest: Effort normal and breath sounds normal. No respiratory distress. She has no wheezes.   Abdominal: Soft. Bowel sounds are normal. She exhibits no  distension. There is no tenderness. There is no guarding. No hernia.   Musculoskeletal: She exhibits no edema or tenderness.   Neurological: She is alert.   Skin: Skin is warm and dry. No rash noted. No erythema.   Psychiatric: She has a normal mood and affect. Her behavior is normal.   Nursing note and vitals reviewed.        Assessment:   Post-op, the patient is doing well.     Encounter Diagnoses   Name Primary?   • BMI 34.0-34.9,adult Yes   • Essential hypertension    • Hyperlipidemia, unspecified hyperlipidemia type    • Diabetes mellitus type 2 in obese    • Multiple joint pain    • Dietary counseling    • Depression, unspecified depression type        Plan:     Encouraged patient to be sure to get plenty of lean protein per day through small frequent meals all with a protein source.   Activity restrictions: none.   Recommended patient be sure to get at least 70 grams of protein per day by eating small, frequent meals all with high lean protein choices. Be sure to limit/cut back on daily carbohydrate intake. Discussed with the patient the recommended amount of water per day to intake- half of body weight in ounces. Reviewed vitamin requirements. Be sure to do routine exercise, 150 minutes per week minimum, including both cardio and strength training.     Instructions / Recommendations: dietary counseling recommended, recommended a daily protein intake of  grams, vitamin supplement(s) recommended, recommended exercising at least 150 minutes per week, behavior modifications recommended and instructed to call the office for concerns, questions, or problems.     The patient was instructed to follow up in 2 months .     The patient was counseled regarding meal frequency, exercise, lab work, protein intake. Total time spent face to face was 25 minutes and 20 minutes was spent counseling.

## 2017-11-07 LAB
PHYTONADIONE SERPL-MCNC: 1.46 NG/ML (ref 0.13–1.88)
VIT B1 BLD-SCNC: 213 NMOL/L (ref 66.5–200)
ZINC SERPL-MCNC: 86 UG/DL (ref 56–134)

## 2017-11-08 LAB
A-TOCOPHEROL VIT E SERPL-MCNC: 13.2 MG/L (ref 6.5–21.5)
METHYLMALONATE SERPL-SCNC: 102 NMOL/L (ref 0–378)
VIT A SERPL-MCNC: 57 UG/DL (ref 26–82)

## 2017-11-09 ENCOUNTER — TELEPHONE (OUTPATIENT)
Dept: BARIATRICS/WEIGHT MGMT | Facility: CLINIC | Age: 62
End: 2017-11-09

## 2017-11-09 NOTE — TELEPHONE ENCOUNTER
----- Message from JIM Gabmle sent at 11/8/2017  4:08 PM EST -----  Labs look good except ferritin (stored iron) is trending up. I'd recommend patient switch to a bariatric MTV without iron

## 2017-11-09 NOTE — TELEPHONE ENCOUNTER
Spoke with the patient and went over her lab work with her.  She will switch to BMV without iron.

## 2017-12-14 ENCOUNTER — TELEPHONE (OUTPATIENT)
Dept: BARIATRICS/WEIGHT MGMT | Facility: CLINIC | Age: 62
End: 2017-12-14

## 2017-12-14 NOTE — TELEPHONE ENCOUNTER
The patient came by the office today and notes that she hasn't been losing any weight.  She notes that she is getting 0 protein and that her hair has begun to fall out.  She notes that she is working on getting a new job because she cannot afford the protein supplementation.    Discussed with Susi. The patient will increase her protein intake with eggs, nuts and peanut butter.  Encouraged her to start a food journal so we can take a look at how her eating habits are at her next appointment.    Of note, she has been eating a lot of peanut butter and boiled eggs, so she is getting some protein, but I encouraged her to make sure that she is getting her 60-70 grams of protein per day.    She will f/u as scheduled.

## 2017-12-27 ENCOUNTER — OFFICE VISIT (OUTPATIENT)
Dept: BARIATRICS/WEIGHT MGMT | Facility: CLINIC | Age: 62
End: 2017-12-27

## 2017-12-27 VITALS
HEART RATE: 74 BPM | TEMPERATURE: 97.8 F | BODY MASS INDEX: 35.44 KG/M2 | HEIGHT: 63 IN | SYSTOLIC BLOOD PRESSURE: 104 MMHG | RESPIRATION RATE: 16 BRPM | DIASTOLIC BLOOD PRESSURE: 66 MMHG | WEIGHT: 200 LBS

## 2017-12-27 DIAGNOSIS — Z71.3 DIETARY COUNSELING: ICD-10-CM

## 2017-12-27 DIAGNOSIS — F32.A DEPRESSION, UNSPECIFIED DEPRESSION TYPE: ICD-10-CM

## 2017-12-27 DIAGNOSIS — R53.82 CHRONIC FATIGUE: ICD-10-CM

## 2017-12-27 DIAGNOSIS — E66.9 OBESITY, CLASS II, BMI 35-39.9: Primary | ICD-10-CM

## 2017-12-27 PROCEDURE — 99024 POSTOP FOLLOW-UP VISIT: CPT | Performed by: NURSE PRACTITIONER

## 2017-12-27 NOTE — PROGRESS NOTES
[facility] BARIATRIC SURGERY  [address]  [facility] [address] [phone]  [address]  [facility] [id number] [phone]  Dept: [phone]  12/27/2017      Vivian Kauffman.  [id number]  [id number]  1955  female      Chief Complaint   Patient presents with   • Follow-up     3 month followup [facility] Post-Op Bariatric Surgery:   Vivian Kauffman is status post Laparoscopic Sleeve revision procedure, performed on 10/2/17     HPI:   Today's weight is 90.7 kg (200 lb) pounds, today's BMI is Body mass index is 35.44 kg/(m^2)., she has a  gain of 3 pounds since the last visit and her weight loss since surgery is 10 pounds. The patient reports a decreased portion size and loss of appetite.  Reports that she does rehab a few times per week to strengthen her arms and neck. Reports that she uses the resistance bands there for strength training. Reports that she cannot afford strength training.     Vivian Kauffman denies nausea, vomiting, dysphagia, abdominal pain or heartburn.     Diet and Exercise: Diet history reviewed and discussed with the patient. Weight loss/gains to date discussed with the patient. The patient states they are eating 30 grams of protein per day. She reports eating 2 meals per day, a typical portion size of 1/2 cup, eating 3 snacks per day, drinking 5-6 or more 8-oz. glasses of water per day, no carbonated beverage consumption and exercising regularly- walking 5 days per week. Patient reports that she is on a fixed budget and complains that she can't afford high protein foods.     Reports that she is drinking two premier protein shakes per day. Reports that she has been eggs, peanut butter, and tuna for dinner.     Supplements: KINJAL MTV with iron and calcium.     Review of Systems   Constitutional: Positive for appetite change and fatigue. Negative for unexpected weight change.   HENT: Negative.    Eyes: Negative.     Respiratory: Negative.    Cardiovascular: Negative.  Negative for leg swelling.   Gastrointestinal: Positive for constipation. Negative for abdominal distention, abdominal pain, diarrhea, nausea and vomiting.   Genitourinary: Negative for difficulty urinating, frequency and urgency.   Musculoskeletal: Negative for back pain.   Skin: Negative.    Psychiatric/Behavioral: Negative.    All other systems reviewed and are negative.      Patient Active Problem List   Diagnosis   • Essential hypertension   • Hyperlipidemia   • Fatigue   • Alopecia   • Dietary counseling   • Abnormal electrocardiogram   • Anxiety   • Asthma   • Carpal tunnel syndrome   • Non-obstructive coronary artery disease   • Depression   • Exercise counseling   • Hx MRSA infection   • Diabetes mellitus type 2 in obese   • Edema   • Fatty liver   • Multiple joint pain   • Seizure disorder   • Obesity, Class II, BMI 35-39.9   • History of removal of laparoscopic gastric banding device       Past Medical History:   Diagnosis Date   • Anxiety    • Asthma    • Chronic lower back pain    • Coronary arteriosclerosis    • Depression    • Diabetes mellitus     TYPE 2   • Diarrhea    • Fatigue    • Fatty liver    • Fibromyalgia    • Fibromyositis    • GERD (gastroesophageal reflux disease)    • Heartburn    • Hyperlipidemia    • Hypertension    • Insomnia    • Kidney stone    • Morbid obesity    • Muscle spasm    • Neck pain    • Obesity    • Pain of both hip joints    • Polyphagia(783.6)    • Polyphagia(783.6)    • PONV (postoperative nausea and vomiting)    • Poor vision    • Problems with hearing    • Seizures    • Shortness of breath on exertion    • Sinus drainage    • Stroke syndrome    • Tremor, essential     IN NECK  AND HANDS   • Vertigo    • Weight gain        The following portions of the patient's history were reviewed and updated as appropriate: allergies, current medications, past family history, past medical history, past social history, past  surgical history and problem list.    Vitals:    12/27/17 0954   BP: 104/66   Pulse: 74   Resp: 16   Temp: 97.8 °F (36.6 °C)       Physical Exam   Constitutional: She appears well-developed and well-nourished.   Neck: No thyromegaly present.   Cardiovascular: Normal rate, regular rhythm and normal heart sounds.    Pulmonary/Chest: Effort normal and breath sounds normal. No respiratory distress. She has no wheezes.   Abdominal: Soft. Bowel sounds are normal. She exhibits no distension. There is no tenderness. There is no guarding. No hernia.   Musculoskeletal: She exhibits no edema or tenderness.   Neurological: She is alert.   Skin: Skin is warm and dry. No rash noted. No erythema.   Psychiatric: She has a normal mood and affect. Her behavior is normal.   Nursing note and vitals reviewed.    Assessment:   Post-op, the patient is regressing.     Encounter Diagnoses   Name Primary?   • Obesity, Class II, BMI 35-39.9 Yes   • Dietary counseling    • Chronic fatigue    • Depression, unspecified depression type        Plan:     Encouraged patient to be sure to get plenty of lean protein per day through small frequent meals all with a protein source.   Activity restrictions: none.   Recommended patient be sure to get at least 70 grams of protein per day by eating small, frequent meals all with high lean protein choices. Be sure to limit/cut back on daily carbohydrate intake. Discussed with the patient the recommended amount of water per day to intake- half of body weight in ounces. Reviewed vitamin requirements. Be sure to do routine exercise, 150 minutes per week minimum, including both cardio and strength training.     Instructions / Recommendations: dietary counseling recommended, recommended a daily protein intake of  grams, vitamin supplement(s) recommended, recommended exercising at least 150 minutes per week, behavior modifications recommended and instructed to call the office for concerns, questions, or  problems.     The patient was instructed to follow up in 3 months .     The patient was counseled regarding exercise, fluid intake, supplements vs solid foods. Total time spent face to face was 20 minutes and 15 minutes was spent counseling.

## 2018-03-13 ENCOUNTER — OFFICE VISIT (OUTPATIENT)
Dept: CARDIOLOGY | Facility: CLINIC | Age: 63
End: 2018-03-13

## 2018-03-13 VITALS
DIASTOLIC BLOOD PRESSURE: 72 MMHG | BODY MASS INDEX: 35.08 KG/M2 | WEIGHT: 198 LBS | HEIGHT: 63 IN | SYSTOLIC BLOOD PRESSURE: 136 MMHG | HEART RATE: 67 BPM

## 2018-03-13 DIAGNOSIS — I10 ESSENTIAL HYPERTENSION: ICD-10-CM

## 2018-03-13 DIAGNOSIS — E66.9 DIABETES MELLITUS TYPE 2 IN OBESE (HCC): ICD-10-CM

## 2018-03-13 DIAGNOSIS — Z98.84 HISTORY OF REMOVAL OF LAPAROSCOPIC GASTRIC BANDING DEVICE: ICD-10-CM

## 2018-03-13 DIAGNOSIS — E78.5 HYPERLIPIDEMIA, UNSPECIFIED HYPERLIPIDEMIA TYPE: ICD-10-CM

## 2018-03-13 DIAGNOSIS — I25.10 CHRONIC CORONARY ARTERY DISEASE: Primary | ICD-10-CM

## 2018-03-13 DIAGNOSIS — E11.69 DIABETES MELLITUS TYPE 2 IN OBESE (HCC): ICD-10-CM

## 2018-03-13 PROCEDURE — 93000 ELECTROCARDIOGRAM COMPLETE: CPT | Performed by: INTERNAL MEDICINE

## 2018-03-13 PROCEDURE — 99213 OFFICE O/P EST LOW 20 MIN: CPT | Performed by: INTERNAL MEDICINE

## 2018-03-13 NOTE — PROGRESS NOTES
Subjective:     Encounter Date:03/13/2018      Patient ID: Vivian Kauffman is a 63 y.o. female.    Chief Complaint:  History of Present Illness    The patient is a 63-year-old female with history of nonobstructive coronary artery disease, hypertension, dyslipidemia, diabetes mellitus type 2, prior stroke, seizure disorder, obesity status post lap band, and subsequent removal of the lap band, who presents for followup.     I saw the patient initially for a preoperative evaluation in 8/2015.   She was getting ready to get her lap band removed and was noted to have an abnormal EKG at Dr. Ackerman's office.   She was previously followed by a cardiologist in [place], but was unable to get in with them at the time. Her prior cardiac workup included an evaluation for an abnormal EKG back in 11/2013 at which time she saw Dr. Duarte.  She underwent a stress test that showed anterior ischemia and subsequently underwent a cardiac catheterization that showed 50% stenosis of her proximal LAD, luminal irregularities of her left circumflex, and 50% stenosis of her right coronary artery.  Her echocardiogram showed pulmonary artery pressure of 40 mmHg, moderate TR, but otherwise unremarkable.     When I saw the patient initially, I compared these findings with her prior EKGs and it showed no change.  She also reported no new symptoms at the time and she was subsequently cleared for surgery.    Her last back in 3/2017 she had successfully undergone removal of her lap band.  She denied any symptoms but wanted to proceed with repeat workup to ensure that her nonobstructive coronary artery disease has not progressed.  Proceeded with an echocardiogram that showed normal left ventricular systolic function and wall motion with an estimated ejection fraction of 65-70% and grade 1 diastolic dysfunction.  We attempted to proceed with a treadmill stress test but her baseline EKG abnormalities precluded a diagnostic tests so we  proceeded with a patent stress test.  This was performed on 3/30/2017 and was negative for ischemia.    He presents for routine follow-up.  She feels well overall.  She denies any change in her chronic dyspnea on exertion.  She denies chest pain, palpitations, PND or orthopnea, presyncope or syncope, or lower extremity edema.  She does admit that she feels like her heart rate gets low when she lays down to sleep at night but denies any associated symptoms with this.  Her heart rate rises appropriately when she is up and active.    Review of Systems   Constitution: Positive for malaise/fatigue. Negative for weakness.   HENT: Negative for hearing loss, hoarse voice, nosebleeds and sore throat.    Eyes: Negative for pain.   Cardiovascular: Positive for dyspnea on exertion. Negative for chest pain, claudication, cyanosis, irregular heartbeat, leg swelling, near-syncope, orthopnea, palpitations, paroxysmal nocturnal dyspnea and syncope.   Respiratory: Negative for shortness of breath and snoring.    Endocrine: Negative for cold intolerance, heat intolerance, polydipsia, polyphagia and polyuria.   Skin: Negative for itching and rash.   Musculoskeletal: Negative for arthritis, falls, joint pain, joint swelling, muscle cramps, muscle weakness and myalgias.   Gastrointestinal: Negative for constipation, diarrhea, dysphagia, heartburn, hematemesis, hematochezia, melena, nausea and vomiting.   Genitourinary: Negative for frequency, hematuria and hesitancy.   Neurological: Negative for excessive daytime sleepiness, dizziness, headaches, light-headedness and numbness.   Psychiatric/Behavioral: Negative for depression. The patient is not nervous/anxious.           Current Outpatient Prescriptions:   •  atorvastatin (LIPITOR) 20 MG tablet, Take 20 mg by mouth Every Night., Disp: , Rfl:   •  Biotin 5000 MCG capsule, Take 5,000 mcg by mouth Every Evening., Disp: , Rfl:   •  clopidogrel (PLAVIX) 75 MG tablet, Take 75 mg by mouth  Every Evening. HOLDING FOR SX, Disp: , Rfl:   •  colestipol (COLESTID) 1 G tablet, Take 1 g by mouth Every Morning., Disp: , Rfl:   •  FLUoxetine (PROzac) 20 MG tablet, Take 80 mg by mouth Every Morning., Disp: , Rfl:   •  fluticasone-salmeterol (ADVAIR) 500-50 MCG/DOSE DISKUS, Inhale 1 puff 2 (Two) Times a Day As Needed., Disp: , Rfl:   •  gabapentin (NEURONTIN) 300 MG capsule, Take 300 mg by mouth Every Morning., Disp: , Rfl:   •  glipiZIDE (GLUCOTROL) 10 MG tablet, Take 20 mg by mouth 2 (Two) Times a Day Before Meals. WITH BREAKFAST AND SUPPER, Disp: , Rfl:   •  insulin NPH (NOVOLIN N) 100 UNIT/ML injection, Inject 12 Units under the skin 2 (Two) Times a Day Before Meals. 12 units at am and supper time, Disp: , Rfl:   •  levETIRAcetam (KEPPRA) 1000 MG tablet, Take 3,000 mg by mouth Every Evening., Disp: , Rfl:   •  metFORMIN (GLUCOPHAGE) 500 MG tablet, Take 500 mg by mouth 2 (Two) Times a Day With Meals., Disp: , Rfl:   •  metoprolol tartrate (LOPRESSOR) 100 MG tablet, Take 100 mg by mouth Every Evening., Disp: , Rfl:   •  montelukast (SINGULAIR) 10 MG tablet, Take 10 mg by mouth Every Night., Disp: , Rfl:   •  nortriptyline (PAMELOR) 10 MG capsule, Take 10 mg by mouth Every Night., Disp: , Rfl:   •  quinapril (ACCUPRIL) 5 MG tablet, Take 5 mg by mouth Every Morning., Disp: , Rfl:   •  spironolactone (ALDACTONE) 25 MG tablet, Take 25 mg by mouth Every Morning., Disp: , Rfl:     Past Medical History:   Diagnosis Date   • Anxiety    • Asthma    • Chronic lower back pain    • Coronary arteriosclerosis    • Depression    • Diabetes mellitus     TYPE 2   • Diarrhea    • Fatigue    • Fatty liver    • Fibromyalgia    • Fibromyositis    • GERD (gastroesophageal reflux disease)    • Heartburn    • Hyperlipidemia    • Hypertension    • Insomnia    • Kidney stone    • Morbid obesity    • Muscle spasm    • Neck pain    • Obesity    • Pain of both hip joints    • Polyphagia(783.6)    • Polyphagia(783.6)    • PONV  (postoperative nausea and vomiting)    • Poor vision    • Problems with hearing    • Seizures    • Shortness of breath on exertion    • Sinus drainage    • Stroke syndrome    • Tremor, essential     IN NECK  AND HANDS   • Vertigo    • Weight gain      Past Surgical History:   Procedure Laterality Date   • BREAST BIOPSY Right    • CARDIAC CATHETERIZATION      no stents 9/2013 at [facility]   • CARPAL TUNNEL RELEASE Bilateral 2009   • COLONOSCOPY     • ENDOSCOPY N/A 9/9/2016    Procedure: ESOPHAGOGASTRODUODENOSCOPY WITH BIOPSY;  Surgeon: Chris Ackerman Jr., MD;  Location: [facility] ENDOSCOPY;  Service:    • EXTRACORPOREAL SHOCKWAVE LITHOTRIPSY (ESWL), STENT INSERTION/REMOVAL  09/2013   • FOOT SURGERY      TOENAILS REMOVED   • GASTRIC BANDING REMOVAL N/A 11/30/2016    Procedure: LAPAROSCOPIC GASTRIC BANDING AND PORT REMOVAL ;  Surgeon: Chris Ackerman Jr., MD;  Location: [facility] OR [facility];  Service:    • GASTRIC SLEEVE LAPAROSCOPIC N/A 10/2/2017    Procedure: GASTRIC SLEEVE LAPAROSCOPIC revision WITH LYSIS OF ADHESIONS AND HITAL HERNIA REPAIR ;  Surgeon: Chris Ackerman Jr., MD;  Location: [facility] OR [facility];  Service:    • LAPAROSCOPIC CHOLECYSTECTOMY     • LAPAROSCOPIC GASTRIC BANDING     • MASS EXCISION Right     RT FOOT    • NOSE SURGERY     • TONSILLECTOMY       Family History   Problem Relation Age of Onset   • Heart attack Mother    • Diabetes Mother    • Hypertension Mother    • Stroke Mother    • Heart attack Father    • Diabetes Father    • Hypertension Father    • Obesity Father      Morbid Obesity   • Stroke Father    • Heart attack Brother    • Cancer Brother      Bladder   • Heart attack Maternal Grandmother    • Heart attack Maternal Grandfather    • Stroke Paternal Grandmother    • Heart attack Paternal Grandfather    • Malig Hyperthermia Neg Hx      Social History   Substance Use Topics   • Smoking status: Former Smoker     Packs/day: 2.00     Years: 30.00     Types: Cigarettes     Quit  "date: 1995   • Smokeless tobacco: Never Used      Comment: caffeine use   • Alcohol use Yes      Comment: OCCAS           ECG 12 Lead  Date/Time: 3/13/2018 12:57 PM  Performed by: ZENAIDA MARROQUIN  Authorized by: ZENAIDA MARROQUIN   Comparison: compared with previous ECG   Similar to previous ECG  Rhythm: sinus rhythm  Comments: Non-specific anterior T wave changes               Objective:         Visit Vitals  /72 (BP Location: Right arm, Patient Position: Sitting)   Pulse 67   Ht 160 cm (63\")   Wt 89.8 kg (198 lb)   BMI 35.07 kg/m²          Physical Exam   Constitutional: She is oriented to person, place, and time. She appears well-developed and well-nourished.   HENT:   Head: Normocephalic and atraumatic.   Eyes: Conjunctivae, EOM and lids are normal. Pupils are equal, round, and reactive to light.   Neck: Normal range of motion and full passive range of motion without pain. Neck supple. No JVD present. Carotid bruit is not present.   Cardiovascular: Normal rate, regular rhythm, S1 normal and S2 normal.  Exam reveals no gallop.    No murmur heard.  Pulses:       Radial pulses are 2+ on the right side, and 2+ on the left side.   No bilateral lower extremity edema   Pulmonary/Chest: Effort normal and breath sounds normal.   Abdominal: Soft. Normal appearance.   Lymphadenopathy:     She has no cervical adenopathy.   Neurological: She is alert and oriented to person, place, and time.   Skin: Skin is warm, dry and intact.   Psychiatric: She has a normal mood and affect.       Lab Review:       Assessment:          Diagnosis Plan   1. Non-obstructive coronary artery disease     2. Essential hypertension     3. Hyperlipidemia, unspecified hyperlipidemia type     4. Diabetes mellitus type 2 in obese     5. History of removal of laparoscopic gastric banding device            Plan:       1.  Nonobstructive coronary artery disease.  No new symptoms.  Ischemic workup about a year ago was unremarkable.  Continue medical " management.  2.  Hypertension.  Well-controlled on her current medications.  3.  Hyperlipidemia.  On atorvastatin followed by Dr. Acosta.  Her last lipid panel performed in 6/2017 showed her lipids to be at goal.  4.  Diabetes mellitus type 2  5.  History of stroke.  On chronic dual antiplatelet therapy.  6.  History of lap band removal    We'll plan on seeing the patient back again in one year or sooner further issues arise.    Coronary Artery Disease  Assessment  • The patient has no angina    Plan  • Lifestyle modifications discussed include adhering to a heart healthy diet, avoidance of tobacco products, maintenance of a healthy weight, medication compliance, regular exercise and regular monitoring of cholesterol and blood pressure    Subjective - Objective  • Current antiplatelet therapy includes aspirin 81 mg and clopidogrel 75 mg

## 2018-03-27 ENCOUNTER — LAB (OUTPATIENT)
Dept: LAB | Facility: HOSPITAL | Age: 63
End: 2018-03-27

## 2018-03-27 ENCOUNTER — OFFICE VISIT (OUTPATIENT)
Dept: BARIATRICS/WEIGHT MGMT | Facility: CLINIC | Age: 63
End: 2018-03-27

## 2018-03-27 VITALS
SYSTOLIC BLOOD PRESSURE: 110 MMHG | WEIGHT: 198 LBS | HEIGHT: 63 IN | TEMPERATURE: 97.5 F | DIASTOLIC BLOOD PRESSURE: 66 MMHG | RESPIRATION RATE: 16 BRPM | BODY MASS INDEX: 35.08 KG/M2 | HEART RATE: 68 BPM

## 2018-03-27 DIAGNOSIS — G56.00 CARPAL TUNNEL SYNDROME, UNSPECIFIED LATERALITY: ICD-10-CM

## 2018-03-27 DIAGNOSIS — Z98.84 S/P LAPAROSCOPIC SLEEVE GASTRECTOMY: ICD-10-CM

## 2018-03-27 DIAGNOSIS — Z71.82 EXERCISE COUNSELING: ICD-10-CM

## 2018-03-27 DIAGNOSIS — Z71.3 DIETARY COUNSELING: ICD-10-CM

## 2018-03-27 DIAGNOSIS — E66.9 OBESITY, CLASS II, BMI 35-39.9: ICD-10-CM

## 2018-03-27 DIAGNOSIS — Z91.119 NONCOMPLIANCE WITH DIETARY REGIMEN REQUIRED STATUS POST BARIATRIC SURGERY: ICD-10-CM

## 2018-03-27 DIAGNOSIS — F32.A DEPRESSION, UNSPECIFIED DEPRESSION TYPE: ICD-10-CM

## 2018-03-27 DIAGNOSIS — Z98.84 NONCOMPLIANCE WITH DIETARY REGIMEN REQUIRED STATUS POST BARIATRIC SURGERY: ICD-10-CM

## 2018-03-27 DIAGNOSIS — E66.9 OBESITY, CLASS II, BMI 35-39.9: Primary | ICD-10-CM

## 2018-03-27 LAB
ALBUMIN SERPL-MCNC: 4.4 G/DL (ref 3.5–5.2)
ALBUMIN/GLOB SERPL: 1.5 G/DL
ALP SERPL-CCNC: 94 U/L (ref 39–117)
ALT SERPL W P-5'-P-CCNC: 55 U/L (ref 1–33)
ANION GAP SERPL CALCULATED.3IONS-SCNC: 13.2 MMOL/L
AST SERPL-CCNC: 31 U/L (ref 1–32)
BASOPHILS # BLD AUTO: 0.03 10*3/MM3 (ref 0–0.2)
BASOPHILS NFR BLD AUTO: 0.5 % (ref 0–1.5)
BILIRUB SERPL-MCNC: 0.3 MG/DL (ref 0.1–1.2)
BUN BLD-MCNC: 21 MG/DL (ref 8–23)
BUN/CREAT SERPL: 38.9 (ref 7–25)
CALCIUM SPEC-SCNC: 9.8 MG/DL (ref 8.6–10.5)
CHLORIDE SERPL-SCNC: 98 MMOL/L (ref 98–107)
CO2 SERPL-SCNC: 26.8 MMOL/L (ref 22–29)
CREAT BLD-MCNC: 0.54 MG/DL (ref 0.57–1)
DEPRECATED RDW RBC AUTO: 42.7 FL (ref 37–54)
EOSINOPHIL # BLD AUTO: 0.12 10*3/MM3 (ref 0–0.7)
EOSINOPHIL NFR BLD AUTO: 2 % (ref 0.3–6.2)
ERYTHROCYTE [DISTWIDTH] IN BLOOD BY AUTOMATED COUNT: 13.9 % (ref 11.7–13)
FERRITIN SERPL-MCNC: 60.47 NG/ML (ref 13–150)
GFR SERPL CREATININE-BSD FRML MDRD: 114 ML/MIN/1.73
GLOBULIN UR ELPH-MCNC: 2.9 GM/DL
GLUCOSE BLD-MCNC: 252 MG/DL (ref 65–99)
HCT VFR BLD AUTO: 42.4 % (ref 35.6–45.5)
HGB BLD-MCNC: 14.1 G/DL (ref 11.9–15.5)
IMM GRANULOCYTES # BLD: 0 10*3/MM3 (ref 0–0.03)
IMM GRANULOCYTES NFR BLD: 0 % (ref 0–0.5)
IRON 24H UR-MRATE: 72 MCG/DL (ref 37–145)
LYMPHOCYTES # BLD AUTO: 1.73 10*3/MM3 (ref 0.9–4.8)
LYMPHOCYTES NFR BLD AUTO: 28.9 % (ref 19.6–45.3)
MCH RBC QN AUTO: 27.9 PG (ref 26.9–32)
MCHC RBC AUTO-ENTMCNC: 33.3 G/DL (ref 32.4–36.3)
MCV RBC AUTO: 83.8 FL (ref 80.5–98.2)
MONOCYTES # BLD AUTO: 0.53 10*3/MM3 (ref 0.2–1.2)
MONOCYTES NFR BLD AUTO: 8.8 % (ref 5–12)
NEUTROPHILS # BLD AUTO: 3.58 10*3/MM3 (ref 1.9–8.1)
NEUTROPHILS NFR BLD AUTO: 59.8 % (ref 42.7–76)
PLATELET # BLD AUTO: 177 10*3/MM3 (ref 140–500)
PMV BLD AUTO: 9.8 FL (ref 6–12)
POTASSIUM BLD-SCNC: 4.3 MMOL/L (ref 3.5–5.2)
PROT SERPL-MCNC: 7.3 G/DL (ref 6–8.5)
RBC # BLD AUTO: 5.06 10*6/MM3 (ref 3.9–5.2)
SODIUM BLD-SCNC: 138 MMOL/L (ref 136–145)
WBC NRBC COR # BLD: 5.99 10*3/MM3 (ref 4.5–10.7)

## 2018-03-27 PROCEDURE — 36415 COLL VENOUS BLD VENIPUNCTURE: CPT

## 2018-03-27 PROCEDURE — 83540 ASSAY OF IRON: CPT

## 2018-03-27 PROCEDURE — 84425 ASSAY OF VITAMIN B-1: CPT

## 2018-03-27 PROCEDURE — 83921 ORGANIC ACID SINGLE QUANT: CPT

## 2018-03-27 PROCEDURE — 80053 COMPREHEN METABOLIC PANEL: CPT

## 2018-03-27 PROCEDURE — 99213 OFFICE O/P EST LOW 20 MIN: CPT | Performed by: NURSE PRACTITIONER

## 2018-03-27 PROCEDURE — 82728 ASSAY OF FERRITIN: CPT

## 2018-03-27 PROCEDURE — 85025 COMPLETE CBC W/AUTO DIFF WBC: CPT

## 2018-03-27 RX ORDER — POLYETHYLENE GLYCOL 3350 17 G/17G
17 POWDER, FOR SOLUTION ORAL DAILY
Qty: 9 PACKET | Refills: 0 | Status: SHIPPED | OUTPATIENT
Start: 2018-03-27 | End: 2019-09-25

## 2018-03-27 NOTE — PROGRESS NOTES
[facility] BARIATRIC SURGERY  [address]  [facility] [address] [phone]  [address]  [facility] [id number] [phone]  Dept: [phone]  3/27/2018      Vivian Kauffman.  [id number]  [id number]  1955  female      Chief Complaint   Patient presents with   • Follow-up     6 month followup [facility] Post-Op Bariatric Surgery:   Vivian Kauffman is status post Laparoscopic Sleeve procedure, performed on 10/2/17     HPI:   Today's weight is 89.8 kg (198 lb) pounds, today's BMI is Body mass index is 35.08 kg/m²., she has a  loss of 2 pounds since the last visit and her weight loss since surgery is 12 pounds. The patient reports a decreased portion size and loss of appetite.      Vivian Kauffman denies nausea, vomiting, dysphagia, abdominal pain or heartburn.     Diet and Exercise: Diet history reviewed and discussed with the patient. Weight loss/gains to date discussed with the patient. The patient states they are eating 60 grams of protein per day. She reports eating 3 meals per day, a typical portion size of 1/2 cup, eating 3+4 snacks per day, drinking 5 or more 8-oz. glasses of water per day, no carbonated beverage consumption. Denies regular exercise.     Does one premier shake per day, for breakfast does cottage cheese with fruit, 1 egg burrito, a sandwich or burger or dinner and fruit for snacks throughout the day.  Reports that she can't tolerate spicy foods due to burning when she stool and is still struggling with constipation and hemorrhoids despite metamucil or plenty of fluid intake. Reports that she spoke to her PCP and he tried to prescribe her suppositories     Supplements: none- reports that she didn't tolerate vitamins and also can't afford them    Review of Systems   Constitutional: Positive for appetite change and fatigue. Negative for unexpected weight change.   HENT: Negative.    Eyes: Negative.    Respiratory:  Negative.    Cardiovascular: Negative.  Negative for leg swelling.   Gastrointestinal: Positive for diarrhea. Negative for abdominal distention, abdominal pain, constipation, nausea and vomiting.   Genitourinary: Negative for difficulty urinating, frequency and urgency.   Musculoskeletal: Negative for back pain.   Skin: Negative.    Psychiatric/Behavioral: Negative.    All other systems reviewed and are negative.      Patient Active Problem List   Diagnosis   • Essential hypertension   • Hyperlipidemia   • Fatigue   • Alopecia   • Dietary counseling   • Abnormal electrocardiogram   • Anxiety   • Asthma   • Carpal tunnel syndrome   • Non-obstructive coronary artery disease   • Depression   • Exercise counseling   • Hx MRSA infection   • Diabetes mellitus type 2 in obese   • Edema   • Fatty liver   • Multiple joint pain   • Seizure disorder   • Obesity, Class II, BMI 35-39.9   • History of removal of laparoscopic gastric banding device       Past Medical History:   Diagnosis Date   • Anxiety    • Asthma    • Chronic lower back pain    • Coronary arteriosclerosis    • Depression    • Diabetes mellitus     TYPE 2   • Diarrhea    • Fatigue    • Fatty liver    • Fibromyalgia    • Fibromyositis    • GERD (gastroesophageal reflux disease)    • Heartburn    • Hyperlipidemia    • Hypertension    • Insomnia    • Kidney stone    • Morbid obesity    • Muscle spasm    • Neck pain    • Obesity    • Pain of both hip joints    • Polyphagia(783.6)    • Polyphagia(783.6)    • PONV (postoperative nausea and vomiting)    • Poor vision    • Problems with hearing    • Seizures    • Shortness of breath on exertion    • Sinus drainage    • Stroke syndrome    • Tremor, essential     IN NECK  AND HANDS   • Vertigo    • Weight gain        The following portions of the patient's history were reviewed and updated as appropriate: allergies, current medications, past family history, past medical history, past social history, past surgical history  and problem list.    Vitals:    03/27/18 0823   BP: 110/66   Pulse: 68   Resp: 16   Temp: 97.5 °F (36.4 °C)       Physical Exam   Constitutional: She appears well-developed and well-nourished.   Neck: No thyromegaly present.   Cardiovascular: Normal rate, regular rhythm and normal heart sounds.    Pulmonary/Chest: Effort normal and breath sounds normal. No respiratory distress. She has no wheezes.   Abdominal: Soft. Bowel sounds are normal. She exhibits no distension. There is no tenderness. There is no guarding. No hernia.   Musculoskeletal: She exhibits no edema or tenderness.   Neurological: She is alert.   Skin: Skin is warm and dry. No rash noted. No erythema.   Psychiatric: She has a normal mood and affect. Her behavior is normal.   Nursing note and vitals reviewed.      Assessment:   Post-op, the patient is doing well.     Encounter Diagnoses   Name Primary?   • Obesity, Class II, BMI 35-39.9 Yes   • Carpal tunnel syndrome, unspecified laterality    • Depression, unspecified depression type    • Dietary counseling    • Exercise counseling        Plan:   Discussed subbing mirilax out with metamucil. Discussed seeing the dietition at her next appointment. Discussed carbohydrate intake as well as focused exercise including strength training. Discussed the glycemic index of bananas and cutting back to no more than half a banana today. Encouraged to work in high fiber vegetables over fruits as she is eating them at every meal and snacks, encouraged to get a fruit a time or two per day but focus on high protein foods and high fiber vegetables. Discussed using PatchMD as an option for vitamin supplementation and will check lab work today to rule out deficiencies.     Encouraged patient to be sure to get plenty of lean protein per day through small frequent meals all with a protein source.   Activity restrictions: none.   Recommended patient be sure to get at least 70 grams of protein per day by eating small,  frequent meals all with high lean protein choices. Be sure to limit/cut back on daily carbohydrate intake. Discussed with the patient the recommended amount of water per day to intake- half of body weight in ounces. Reviewed vitamin requirements. Be sure to do routine exercise, 150 minutes per week minimum, including both cardio and strength training.     Instructions / Recommendations: dietary counseling recommended, recommended a daily protein intake of  grams, vitamin supplement(s) recommended, recommended exercising at least 150 minutes per week, behavior modifications recommended and instructed to call the office for concerns, questions, or problems.     The patient was instructed to follow up in 3 months .     The patient was counseled regarding exercise, fluid intake, lab work. Total time spent face to face was 20 minutes and 15 minutes was spent counseling.

## 2018-03-29 LAB — METHYLMALONATE SERPL-SCNC: 134 NMOL/L (ref 0–378)

## 2018-03-30 LAB — VIT B1 BLD-SCNC: 206.7 NMOL/L (ref 66.5–200)

## 2018-05-31 ENCOUNTER — CONVERSION ENCOUNTER (OUTPATIENT)
Dept: MAMMOGRAPHY | Facility: HOSPITAL | Age: 63
End: 2018-05-31

## 2018-06-27 ENCOUNTER — HOSPITAL ENCOUNTER (OUTPATIENT)
Dept: CARDIOLOGY | Facility: HOSPITAL | Age: 63
End: 2018-06-27

## 2018-06-27 ENCOUNTER — LAB (OUTPATIENT)
Dept: LAB | Facility: HOSPITAL | Age: 63
End: 2018-06-27

## 2018-06-27 ENCOUNTER — OFFICE VISIT (OUTPATIENT)
Dept: BARIATRICS/WEIGHT MGMT | Facility: CLINIC | Age: 63
End: 2018-06-27

## 2018-06-27 VITALS
RESPIRATION RATE: 16 BRPM | SYSTOLIC BLOOD PRESSURE: 101 MMHG | WEIGHT: 191 LBS | BODY MASS INDEX: 33.84 KG/M2 | HEART RATE: 66 BPM | DIASTOLIC BLOOD PRESSURE: 60 MMHG | TEMPERATURE: 97.5 F | HEIGHT: 63 IN

## 2018-06-27 DIAGNOSIS — Z71.3 DIETARY COUNSELING: ICD-10-CM

## 2018-06-27 DIAGNOSIS — Z98.84 S/P LAPAROSCOPIC SLEEVE GASTRECTOMY: ICD-10-CM

## 2018-06-27 DIAGNOSIS — Z71.82 EXERCISE COUNSELING: ICD-10-CM

## 2018-06-27 DIAGNOSIS — F32.A DEPRESSION, UNSPECIFIED DEPRESSION TYPE: ICD-10-CM

## 2018-06-27 DIAGNOSIS — E66.9 DIABETES MELLITUS TYPE 2 IN OBESE (HCC): ICD-10-CM

## 2018-06-27 DIAGNOSIS — Z98.84 HISTORY OF REMOVAL OF LAPAROSCOPIC GASTRIC BANDING DEVICE: ICD-10-CM

## 2018-06-27 DIAGNOSIS — Z98.84 NONCOMPLIANCE WITH DIETARY REGIMEN REQUIRED STATUS POST BARIATRIC SURGERY: ICD-10-CM

## 2018-06-27 DIAGNOSIS — E11.69 DIABETES MELLITUS TYPE 2 IN OBESE (HCC): ICD-10-CM

## 2018-06-27 DIAGNOSIS — I10 ESSENTIAL HYPERTENSION: ICD-10-CM

## 2018-06-27 DIAGNOSIS — Z91.119 NONCOMPLIANCE WITH DIETARY REGIMEN REQUIRED STATUS POST BARIATRIC SURGERY: ICD-10-CM

## 2018-06-27 DIAGNOSIS — E66.9 OBESITY, CLASS II, BMI 35-39.9: ICD-10-CM

## 2018-06-27 DIAGNOSIS — M25.50 MULTIPLE JOINT PAIN: ICD-10-CM

## 2018-06-27 DIAGNOSIS — E66.9 OBESITY, CLASS II, BMI 35-39.9: Primary | ICD-10-CM

## 2018-06-27 LAB
25(OH)D3 SERPL-MCNC: 54.9 NG/ML (ref 30–100)
FERRITIN SERPL-MCNC: 92.1 NG/ML (ref 13–150)
IRON 24H UR-MRATE: 77 MCG/DL (ref 37–145)
PREALB SERPL-MCNC: 28.1 MG/DL (ref 20–40)

## 2018-06-27 PROCEDURE — 84134 ASSAY OF PREALBUMIN: CPT

## 2018-06-27 PROCEDURE — 83540 ASSAY OF IRON: CPT

## 2018-06-27 PROCEDURE — 82728 ASSAY OF FERRITIN: CPT

## 2018-06-27 PROCEDURE — 99213 OFFICE O/P EST LOW 20 MIN: CPT | Performed by: NURSE PRACTITIONER

## 2018-06-27 PROCEDURE — 82306 VITAMIN D 25 HYDROXY: CPT

## 2018-06-27 PROCEDURE — 36415 COLL VENOUS BLD VENIPUNCTURE: CPT

## 2018-06-27 NOTE — PROGRESS NOTES
[facility] BARIATRIC SURGERY  [address]  [facility] [id number] [phone]  [address]  [facility] [id number] [phone]  Dept: [phone]  6/27/2018      Vivian Kauffman.  [id number]  [id number]  1955  female      Chief Complaint   Patient presents with   • Follow-up     9 month Follow Up Sleeve       [facility] Post-Op Bariatric Surgery:   Vivian Kauffman is status post Laparoscopic Sleeve procedure and revision from gastric band, performed on 10/2/17. She has been struggling with making lifestyle changes, getting exercise, and meeting her minimum nutritional goals since surgery. She has struggled with lack of weight loss and even some weight gain since her revision. She has reported struggling to afford bariatric vitamins, protein supplements, and simple high protein foods on her budget. She did not follow up for her scheduled 6 month p/o appointment and is in today for her 9 month follow up.    HPI:   Today's weight is 86.6 kg (191 lb) pounds, today's BMI is Body mass index is 33.84 kg/m²., she has a  loss of 7 pounds since the last visit and her weight loss since surgery is 19 pounds. The patient reports a decreased portion size and loss of appetite.      Vivian Kauffman denies nausea, vomiting, dysphagia, abdominal pain or heartburn.     Diet and Exercise: Diet history reviewed and discussed with the patient. Weight loss/gains to date discussed with the patient. The patient states they are eating 30-40 grams of protein per day. She reports eating 3 meals per day, a typical portion size of 1-3 cup, eating 2 snacks per day, drinking 6-8 or more 8-oz. glasses of water per day. She does report eating sweets, having strong simple carb cravings, drinking liquids at mealtimes, and drinking sodas. Feels that her portion sizes and hunger have increased since surgery.    She is getting two protein shakes per day and getting her meat in.      Supplements: none.     Review of Systems   Constitutional: Positive for appetite change and fatigue. Negative for unexpected weight change.        Hair loss   HENT: Negative.    Eyes: Negative.    Respiratory: Negative.    Cardiovascular: Negative.  Negative for leg swelling.   Gastrointestinal: Positive for diarrhea. Negative for abdominal distention, abdominal pain, constipation, nausea and vomiting.   Genitourinary: Negative for difficulty urinating, frequency and urgency.   Musculoskeletal: Negative for back pain.   Skin: Negative.    Psychiatric/Behavioral: Negative.    All other systems reviewed and are negative.      Patient Active Problem List   Diagnosis   • Essential hypertension   • Hyperlipidemia   • Fatigue   • Alopecia   • Dietary counseling   • Abnormal electrocardiogram   • Anxiety   • Asthma   • Carpal tunnel syndrome   • Non-obstructive coronary artery disease   • Depression   • Exercise counseling   • Hx MRSA infection   • Diabetes mellitus type 2 in obese   • Edema   • Fatty liver   • Multiple joint pain   • Seizure disorder   • Obesity, Class II, BMI 35-39.9   • History of removal of laparoscopic gastric banding device   • S/P laparoscopic sleeve gastrectomy   • Noncompliance with dietary regimen required status post bariatric surgery       Past Medical History:   Diagnosis Date   • Anxiety    • Asthma    • Chronic lower back pain    • Coronary arteriosclerosis    • Depression    • Diabetes mellitus     TYPE 2   • Diarrhea    • Fatigue    • Fatty liver    • Fibromyalgia    • Fibromyositis    • GERD (gastroesophageal reflux disease)    • Heartburn    • Hyperlipidemia    • Hypertension    • Insomnia    • Kidney stone    • Morbid obesity    • Muscle spasm    • Neck pain    • Obesity    • Pain of both hip joints    • Polyphagia(783.6)    • Polyphagia(783.6)    • PONV (postoperative nausea and vomiting)    • Poor vision    • Problems with hearing    • Seizures    • Shortness of breath on exertion     • Sinus drainage    • Stroke syndrome    • Tremor, essential     IN NECK  AND HANDS   • Vertigo    • Weight gain        The following portions of the patient's history were reviewed and updated as appropriate: allergies, current medications, past family history, past medical history, past social history, past surgical history and problem list.    Vitals:    06/27/18 0839   BP: 101/60   Pulse: 66   Resp: 16   Temp: 97.5 °F (36.4 °C)       Physical Exam   Constitutional: She appears well-developed and well-nourished.   Neck: No thyromegaly present.   Cardiovascular: Normal rate, regular rhythm and normal heart sounds.    Pulmonary/Chest: Effort normal and breath sounds normal. No respiratory distress. She has no wheezes.   Abdominal: Soft. Bowel sounds are normal. She exhibits no distension. There is no tenderness. There is no guarding. No hernia.   Musculoskeletal: She exhibits no edema or tenderness.   Neurological: She is alert.   Skin: Skin is warm and dry. No rash noted. No erythema.   Psychiatric: She has a normal mood and affect. Her behavior is normal.   Nursing note and vitals reviewed.      Assessment:   Post-op, the patient is doing well.     Encounter Diagnoses   Name Primary?   • Obesity, Class II, BMI 35-39.9 Yes   • Essential hypertension    • Diabetes mellitus type 2 in obese    • Multiple joint pain    • Depression, unspecified depression type    • Dietary counseling    • Exercise counseling    • History of removal of laparoscopic gastric banding device    • S/P laparoscopic sleeve gastrectomy    • Noncompliance with dietary regimen required status post bariatric surgery        Plan:   Discussed high protein sources of affordable foods from the grocery. Encouraged to focus on getting lean proteins and high fiber foods and supplementing for protein where necessary. Discussed PatchMD over bariatric multi related to affordability as well as premier shakes at the grocery as options related to ease of  use and affordability. Discussed how simple carbs not only affect weight gain but blood sugar control, sugar cravings, and may lead to an increase in Ghrelin playing into her increased hunger. Discussed cutting back on simple carbs, cutting out sodas, and focusing on filling up to a limited portion at mealtimes and snacks on whole foods that provide quality nutrition. Given a list of high protein food sources. Reminded that 70g of protein daily is the goal to avoid hair loss and muscle wasting. Discussed taking biotin and using nioxin shampoo to help with hair thinning. We will check some lab work today to evaluate for vitamin deficiencies related to lack of supplementation and poor food choices.     Encouraged patient to be sure to get plenty of lean protein per day through small frequent meals all with a protein source.   Activity restrictions: none.   Recommended patient be sure to get at least 70 grams of protein per day by eating small, frequent meals all with high lean protein choices. Be sure to limit/cut back on daily carbohydrate intake. Discussed with the patient the recommended amount of water per day to intake- half of body weight in ounces. Reviewed vitamin requirements. Be sure to do routine exercise, 150 minutes per week minimum, including both cardio and strength training.     Instructions / Recommendations: dietary counseling recommended, recommended a daily protein intake of  grams, vitamin supplement(s) recommended, recommended exercising at least 150 minutes per week, behavior modifications recommended and instructed to call the office for concerns, questions, or problems.     The patient was instructed to follow up in 2-3 .     The patient was counseled regarding exercise, lab work, fluid intake, protein intake. Total time spent face to face was 20 minutes and 15 minutes was spent counseling.

## 2018-08-29 ENCOUNTER — OFFICE VISIT CONVERTED (OUTPATIENT)
Dept: GASTROENTEROLOGY | Facility: CLINIC | Age: 63
End: 2018-08-29
Attending: NURSE PRACTITIONER

## 2018-09-28 ENCOUNTER — LAB (OUTPATIENT)
Dept: LAB | Facility: HOSPITAL | Age: 63
End: 2018-09-28

## 2018-09-28 ENCOUNTER — OFFICE VISIT (OUTPATIENT)
Dept: BARIATRICS/WEIGHT MGMT | Facility: CLINIC | Age: 63
End: 2018-09-28

## 2018-09-28 VITALS
RESPIRATION RATE: 16 BRPM | HEIGHT: 63 IN | TEMPERATURE: 98.1 F | DIASTOLIC BLOOD PRESSURE: 82 MMHG | SYSTOLIC BLOOD PRESSURE: 103 MMHG | BODY MASS INDEX: 31.98 KG/M2 | HEART RATE: 70 BPM | WEIGHT: 180.5 LBS

## 2018-09-28 DIAGNOSIS — E66.9 DIABETES MELLITUS TYPE 2 IN OBESE (HCC): ICD-10-CM

## 2018-09-28 DIAGNOSIS — E78.5 HYPERLIPIDEMIA, UNSPECIFIED HYPERLIPIDEMIA TYPE: ICD-10-CM

## 2018-09-28 DIAGNOSIS — Z98.84 HISTORY OF REMOVAL OF LAPAROSCOPIC GASTRIC BANDING DEVICE: ICD-10-CM

## 2018-09-28 DIAGNOSIS — Z71.82 EXERCISE COUNSELING: ICD-10-CM

## 2018-09-28 DIAGNOSIS — R53.82 CHRONIC FATIGUE: ICD-10-CM

## 2018-09-28 DIAGNOSIS — Z71.3 DIETARY COUNSELING: Primary | ICD-10-CM

## 2018-09-28 DIAGNOSIS — I10 ESSENTIAL HYPERTENSION: ICD-10-CM

## 2018-09-28 DIAGNOSIS — Z71.3 DIETARY COUNSELING: ICD-10-CM

## 2018-09-28 DIAGNOSIS — E66.9 OBESITY, CLASS I, BMI 30-34.9: ICD-10-CM

## 2018-09-28 DIAGNOSIS — E78.49 OTHER HYPERLIPIDEMIA: ICD-10-CM

## 2018-09-28 DIAGNOSIS — E66.9 OBESITY, CLASS I, BMI 30-34.9: Primary | ICD-10-CM

## 2018-09-28 DIAGNOSIS — Z98.84 S/P LAPAROSCOPIC SLEEVE GASTRECTOMY: ICD-10-CM

## 2018-09-28 DIAGNOSIS — E11.69 DIABETES MELLITUS TYPE 2 IN OBESE (HCC): ICD-10-CM

## 2018-09-28 PROBLEM — E66.811 OBESITY, CLASS I, BMI 30-34.9: Status: ACTIVE | Noted: 2017-09-18

## 2018-09-28 LAB
ALBUMIN SERPL-MCNC: 4.6 G/DL (ref 3.5–5.2)
ALBUMIN/GLOB SERPL: 1.6 G/DL
ALP SERPL-CCNC: 78 U/L (ref 39–117)
ALT SERPL W P-5'-P-CCNC: 84 U/L (ref 1–33)
ANION GAP SERPL CALCULATED.3IONS-SCNC: 14.6 MMOL/L
AST SERPL-CCNC: 51 U/L (ref 1–32)
BASOPHILS # BLD AUTO: 0.02 10*3/MM3 (ref 0–0.2)
BASOPHILS NFR BLD AUTO: 0.4 % (ref 0–1.5)
BILIRUB SERPL-MCNC: 0.3 MG/DL (ref 0.1–1.2)
BUN BLD-MCNC: 11 MG/DL (ref 8–23)
BUN/CREAT SERPL: 19 (ref 7–25)
CALCIUM SPEC-SCNC: 9.5 MG/DL (ref 8.6–10.5)
CHLORIDE SERPL-SCNC: 102 MMOL/L (ref 98–107)
CO2 SERPL-SCNC: 24.4 MMOL/L (ref 22–29)
CREAT BLD-MCNC: 0.58 MG/DL (ref 0.57–1)
DEPRECATED RDW RBC AUTO: 43.7 FL (ref 37–54)
EOSINOPHIL # BLD AUTO: 0.08 10*3/MM3 (ref 0–0.7)
EOSINOPHIL NFR BLD AUTO: 1.7 % (ref 0.3–6.2)
ERYTHROCYTE [DISTWIDTH] IN BLOOD BY AUTOMATED COUNT: 13.8 % (ref 11.7–13)
FERRITIN SERPL-MCNC: 61.81 NG/ML (ref 13–150)
FOLATE SERPL-MCNC: 19.82 NG/ML (ref 4.78–24.2)
GFR SERPL CREATININE-BSD FRML MDRD: 105 ML/MIN/1.73
GLOBULIN UR ELPH-MCNC: 2.9 GM/DL
GLUCOSE BLD-MCNC: 149 MG/DL (ref 65–99)
HBA1C MFR BLD: 7.88 % (ref 4.8–5.6)
HCT VFR BLD AUTO: 44 % (ref 35.6–45.5)
HGB BLD-MCNC: 14.8 G/DL (ref 11.9–15.5)
IMM GRANULOCYTES # BLD: 0.02 10*3/MM3 (ref 0–0.03)
IMM GRANULOCYTES NFR BLD: 0.4 % (ref 0–0.5)
IRON 24H UR-MRATE: 75 MCG/DL (ref 37–145)
LYMPHOCYTES # BLD AUTO: 1.44 10*3/MM3 (ref 0.9–4.8)
LYMPHOCYTES NFR BLD AUTO: 30.3 % (ref 19.6–45.3)
MAGNESIUM SERPL-MCNC: 1.8 MG/DL (ref 1.6–2.4)
MCH RBC QN AUTO: 29 PG (ref 26.9–32)
MCHC RBC AUTO-ENTMCNC: 33.6 G/DL (ref 32.4–36.3)
MCV RBC AUTO: 86.3 FL (ref 80.5–98.2)
MONOCYTES # BLD AUTO: 0.3 10*3/MM3 (ref 0.2–1.2)
MONOCYTES NFR BLD AUTO: 6.3 % (ref 5–12)
NEUTROPHILS # BLD AUTO: 2.91 10*3/MM3 (ref 1.9–8.1)
NEUTROPHILS NFR BLD AUTO: 61.3 % (ref 42.7–76)
PHOSPHATE SERPL-MCNC: 3.2 MG/DL (ref 2.5–4.5)
PLATELET # BLD AUTO: 157 10*3/MM3 (ref 140–500)
PMV BLD AUTO: 10 FL (ref 6–12)
POTASSIUM BLD-SCNC: 4 MMOL/L (ref 3.5–5.2)
PREALB SERPL-MCNC: 28.5 MG/DL (ref 20–40)
PROT SERPL-MCNC: 7.5 G/DL (ref 6–8.5)
PTH-INTACT SERPL-MCNC: 62.9 PG/ML (ref 15–65)
RBC # BLD AUTO: 5.1 10*6/MM3 (ref 3.9–5.2)
SODIUM BLD-SCNC: 141 MMOL/L (ref 136–145)
WBC NRBC COR # BLD: 4.75 10*3/MM3 (ref 4.5–10.7)

## 2018-09-28 PROCEDURE — 36415 COLL VENOUS BLD VENIPUNCTURE: CPT

## 2018-09-28 PROCEDURE — 83970 ASSAY OF PARATHORMONE: CPT

## 2018-09-28 PROCEDURE — 82746 ASSAY OF FOLIC ACID SERUM: CPT

## 2018-09-28 PROCEDURE — 83735 ASSAY OF MAGNESIUM: CPT

## 2018-09-28 PROCEDURE — 83036 HEMOGLOBIN GLYCOSYLATED A1C: CPT

## 2018-09-28 PROCEDURE — 84100 ASSAY OF PHOSPHORUS: CPT

## 2018-09-28 PROCEDURE — 85025 COMPLETE CBC W/AUTO DIFF WBC: CPT

## 2018-09-28 PROCEDURE — 84590 ASSAY OF VITAMIN A: CPT

## 2018-09-28 PROCEDURE — 84134 ASSAY OF PREALBUMIN: CPT

## 2018-09-28 PROCEDURE — 84425 ASSAY OF VITAMIN B-1: CPT

## 2018-09-28 PROCEDURE — 82728 ASSAY OF FERRITIN: CPT

## 2018-09-28 PROCEDURE — 99214 OFFICE O/P EST MOD 30 MIN: CPT | Performed by: SURGERY

## 2018-09-28 PROCEDURE — 83921 ORGANIC ACID SINGLE QUANT: CPT

## 2018-09-28 PROCEDURE — 83540 ASSAY OF IRON: CPT

## 2018-09-28 PROCEDURE — 80053 COMPREHEN METABOLIC PANEL: CPT

## 2018-09-28 PROCEDURE — 84446 ASSAY OF VITAMIN E: CPT

## 2018-09-28 PROCEDURE — 84597 ASSAY OF VITAMIN K: CPT

## 2018-09-28 PROCEDURE — 84630 ASSAY OF ZINC: CPT

## 2018-09-28 RX ORDER — PROCHLORPERAZINE MALEATE 5 MG/1
TABLET ORAL
COMMUNITY
Start: 2018-09-19 | End: 2019-09-25

## 2018-09-28 RX ORDER — EMPAGLIFLOZIN 10 MG/1
TABLET, FILM COATED ORAL
COMMUNITY
Start: 2018-09-06 | End: 2019-09-25

## 2018-09-28 RX ORDER — PRIMIDONE 50 MG/1
25 TABLET ORAL NIGHTLY
COMMUNITY
Start: 2018-09-20 | End: 2021-07-14 | Stop reason: SDUPTHER

## 2018-09-28 RX ORDER — LEVETIRACETAM 500 MG/1
TABLET, EXTENDED RELEASE ORAL
COMMUNITY
Start: 2018-09-12 | End: 2020-07-17

## 2018-09-28 RX ORDER — METOPROLOL TARTRATE 50 MG/1
TABLET, FILM COATED ORAL
COMMUNITY
Start: 2018-09-19 | End: 2019-09-25

## 2018-09-28 NOTE — PROGRESS NOTES
[facility] BARIATRIC SURGERY  [address]  [facility] [address]  [phone]  [address]  [facility] [id number] [phone]  Dept: [phone]  9/28/2018      Vivian Kauffman.  [id number]  [id number]  1955  female      Chief Complaint   Patient presents with   • Follow-up     1 year sleeve follow up        Post-Op Bariatric Surgery:   Vivian Kauffman is status post Laparoscopic Sleeve/HH revision procedure, performed on 10/2/17     HPI:   Today's weight is 81.9 kg (180 lb 8 oz) pounds, today's BMI is Body mass index is 31.98 kg/m²., she has a  loss of 11 pounds since the last visit and her weight loss since surgery is 76 pounds. The patient reports a decreased portion size and loss of appetite.      Vivian Kauffman denies nausea, vomiting, dysphagia, abdominal pain or heartburn.     Diet and Exercise: Diet history reviewed and discussed with the patient. Weight loss/gains to date discussed with the patient. The patient states they are eating 50 grams of protein per day. She reports eating 2 meals per day, a typical portion size of 1 cup, eating 3 snacks per day, drinking 1 or more 8-oz. glasses of water per day, no carbonated beverage consumption and exercising regularly.     Doing well without any complaints except for fatigue and occasional loose stool.  She is not taking her vitamins and went to her diet and exercise routine.  She is not exercising and is eating more snacks.    Supplements: None.     Review of Systems   Constitutional: Positive for fatigue.   Gastrointestinal: Positive for diarrhea.   Musculoskeletal: Positive for arthralgias.   All other systems reviewed and are negative.      Patient Active Problem List   Diagnosis   • Essential hypertension   • Hyperlipidemia   • Fatigue   • Alopecia   • Dietary counseling   • Abnormal electrocardiogram   • Anxiety   • Asthma   • Carpal tunnel syndrome   • Non-obstructive  coronary artery disease   • Depression   • Exercise counseling   • Hx MRSA infection   • Diabetes mellitus type 2 in obese (CMS/HCC)   • Edema   • Fatty liver   • Multiple joint pain   • Seizure disorder (CMS/HCC)   • Obesity, Class I, BMI 30-34.9   • History of removal of laparoscopic gastric banding device   • S/P laparoscopic sleeve gastrectomy   • Noncompliance with dietary regimen required status post bariatric surgery       Past Medical History:   Diagnosis Date   • Anxiety    • Asthma    • Chronic lower back pain    • Coronary arteriosclerosis    • Depression    • Diabetes mellitus (CMS/HCC)     TYPE 2   • Diarrhea    • Fatigue    • Fatty liver    • Fibromyalgia    • Fibromyositis    • GERD (gastroesophageal reflux disease)    • Heartburn    • Hyperlipidemia    • Hypertension    • Insomnia    • Kidney stone    • Morbid obesity (CMS/HCC)    • Muscle spasm    • Neck pain    • Obesity    • Pain of both hip joints    • Polyphagia(783.6)    • Polyphagia(783.6)    • PONV (postoperative nausea and vomiting)    • Poor vision    • Problems with hearing    • Seizures (CMS/HCC)    • Shortness of breath on exertion    • Sinus drainage    • Stroke syndrome (CMS/HCC)    • Tremor, essential     IN NECK  AND HANDS   • Vertigo    • Weight gain        The following portions of the patient's history were reviewed and updated as appropriate: allergies, current medications, past family history, past medical history, past social history, past surgical history and problem list.    Vitals:    09/28/18 0919   BP: 103/82   Pulse: 70   Resp: 16   Temp: 98.1 °F (36.7 °C)       Physical Exam   Constitutional: She is oriented to person, place, and time. She appears well-nourished.   HENT:   Head: Normocephalic and atraumatic.   Mouth/Throat: Oropharynx is clear and moist.   Eyes: Pupils are equal, round, and reactive to light. Conjunctivae and EOM are normal. No scleral icterus.   Neck: Normal range of motion. Neck supple. No thyromegaly  present.   Cardiovascular: Normal rate and regular rhythm.    Pulmonary/Chest: Effort normal and breath sounds normal.   Abdominal: Soft. Bowel sounds are normal. She exhibits no distension and no mass. There is no tenderness. There is no rebound and no guarding. No hernia.   Musculoskeletal: Normal range of motion.   Lymphadenopathy:     She has no cervical adenopathy.   Neurological: She is alert and oriented to person, place, and time. No cranial nerve deficit. Coordination normal.   Skin: Skin is warm and dry. No erythema.   Psychiatric: She has a normal mood and affect. Her behavior is normal.   Vitals reviewed.        Assessment:   Post-op, the patient one year status post laparoscopic sleeve gastrectomy and hiatal hernia repair revision from previous LAP-BAND.  Patient doing well except for fatigue and occasional loose stool.  She is not taking her vitamins which I instructed her that she needs to do.  We will check one-year labs today.  Also blood pressure was slightly low low which she will continue to follow.  She will work on concentrating on lean protein and vegetables and meals instead of snacking and will add exercise to routine.     Encounter Diagnoses   Name Primary?   • Obesity, Class I, BMI 30-34.9 Yes   • Diabetes mellitus type 2 in obese (CMS/HCC)    • History of removal of laparoscopic gastric banding device    • S/P laparoscopic sleeve gastrectomy    • Chronic fatigue    • Dietary counseling    • Exercise counseling    • Essential hypertension    • Hyperlipidemia, unspecified hyperlipidemia type        Plan:     Encouraged patient to be sure to get plenty of lean protein per day through small frequent meals all with a protein source.   Activity restrictions: none.   Recommended patient be sure to get at least 70 grams of protein per day by eating small, frequent meals all with high lean protein choices. Be sure to limit/cut back on daily carbohydrate intake. Discussed with the patient the  recommended amount of water per day to intake- half of body weight in ounces. Reviewed vitamin requirements. Be sure to do routine exercise, 150 minutes per week minimum, including both cardio and strength training.     Instructions / Recommendations: dietary counseling recommended, recommended a daily protein intake of  grams, vitamin supplement(s) recommended, recommended exercising at least 150 minutes per week, behavior modifications recommended and instructed to call the office for concerns, questions, or problems.     The patient was instructed to follow up in 6 months .     The patient was counseled regarding. Total time spent face to face was 25 minutes and 20 minutes was spent counseling.

## 2018-10-01 LAB
A-TOCOPHEROL VIT E SERPL-MCNC: 13.3 MG/L (ref 9–29)
GAMMA TOCOPHEROL SERPL-MCNC: 2 MG/L (ref 0.5–4.9)
VIT A SERPL-MCNC: 51.6 UG/DL (ref 36.4–108)
ZINC SERPL-MCNC: 138 UG/DL (ref 56–134)

## 2018-10-02 LAB — VIT B1 BLD-SCNC: 175.9 NMOL/L (ref 66.5–200)

## 2018-10-03 ENCOUNTER — RESULTS ENCOUNTER (OUTPATIENT)
Dept: BARIATRICS/WEIGHT MGMT | Facility: CLINIC | Age: 63
End: 2018-10-03

## 2018-10-03 DIAGNOSIS — R53.82 CHRONIC FATIGUE: ICD-10-CM

## 2018-10-03 DIAGNOSIS — E66.9 OBESITY, CLASS I, BMI 30-34.9: ICD-10-CM

## 2018-10-03 DIAGNOSIS — Z71.3 DIETARY COUNSELING: ICD-10-CM

## 2018-10-03 DIAGNOSIS — Z98.84 S/P LAPAROSCOPIC SLEEVE GASTRECTOMY: ICD-10-CM

## 2018-10-03 DIAGNOSIS — I10 ESSENTIAL HYPERTENSION: ICD-10-CM

## 2018-10-03 DIAGNOSIS — Z98.84 HISTORY OF REMOVAL OF LAPAROSCOPIC GASTRIC BANDING DEVICE: ICD-10-CM

## 2018-10-03 DIAGNOSIS — E78.5 HYPERLIPIDEMIA, UNSPECIFIED HYPERLIPIDEMIA TYPE: ICD-10-CM

## 2018-10-03 DIAGNOSIS — E66.9 DIABETES MELLITUS TYPE 2 IN OBESE (HCC): ICD-10-CM

## 2018-10-03 DIAGNOSIS — E11.69 DIABETES MELLITUS TYPE 2 IN OBESE (HCC): ICD-10-CM

## 2018-10-03 DIAGNOSIS — Z71.82 EXERCISE COUNSELING: ICD-10-CM

## 2018-10-03 LAB
Lab: NORMAL
METHYLMALONATE SERPL-SCNC: 100 NMOL/L (ref 0–378)

## 2018-10-05 LAB — PHYTONADIONE SERPL-MCNC: 0.39 NG/ML (ref 0.13–1.88)

## 2018-10-09 ENCOUNTER — TELEPHONE (OUTPATIENT)
Dept: BARIATRICS/WEIGHT MGMT | Facility: CLINIC | Age: 63
End: 2018-10-09

## 2018-10-09 NOTE — TELEPHONE ENCOUNTER
Spoke to patient regarding her lab results and sent a copy to her PCP as well.         ----- Message from Chris Ackerman Jr., MD sent at 10/8/2018  7:03 AM EDT -----  Please call the patient regarding her lab results and if abnormal treat per protocol. Make sure she has follow up appointment if needed.

## 2018-12-05 ENCOUNTER — CONVERSION ENCOUNTER (OUTPATIENT)
Dept: SURGERY | Facility: CLINIC | Age: 63
End: 2018-12-05

## 2018-12-05 ENCOUNTER — OFFICE VISIT CONVERTED (OUTPATIENT)
Dept: UROLOGY | Facility: CLINIC | Age: 63
End: 2018-12-05
Attending: UROLOGY

## 2018-12-12 ENCOUNTER — OFFICE VISIT CONVERTED (OUTPATIENT)
Dept: GASTROENTEROLOGY | Facility: CLINIC | Age: 63
End: 2018-12-12
Attending: NURSE PRACTITIONER

## 2019-01-04 ENCOUNTER — PROCEDURE VISIT CONVERTED (OUTPATIENT)
Dept: UROLOGY | Facility: CLINIC | Age: 64
End: 2019-01-04
Attending: UROLOGY

## 2019-01-08 ENCOUNTER — HOSPITAL ENCOUNTER (OUTPATIENT)
Dept: GENERAL RADIOLOGY | Facility: HOSPITAL | Age: 64
Discharge: HOME | End: 2019-01-08
Attending: INTERNAL MEDICINE

## 2019-01-10 ENCOUNTER — HOSPITAL ENCOUNTER (OUTPATIENT)
Dept: OTHER | Facility: HOSPITAL | Age: 64
Discharge: HOME | End: 2019-01-10

## 2019-01-16 LAB
CONV NEUTRAL FATS: NORMAL
FAT STL QL: NORMAL

## 2019-01-24 ENCOUNTER — HOSPITAL ENCOUNTER (OUTPATIENT)
Dept: OTHER | Facility: HOSPITAL | Age: 64
Discharge: HOME | End: 2019-01-24
Attending: PODIATRIST

## 2019-01-24 LAB
ALBUMIN SERPL-MCNC: 4.5 G/DL (ref 3.5–5)
ALBUMIN/GLOB SERPL: 1.7 {RATIO} (ref 1.4–2.6)
ALP SERPL-CCNC: 80 U/L (ref 43–160)
ALT SERPL-CCNC: 89 U/L (ref 10–40)
ANION GAP SERPL CALC-SCNC: 20 MMOL/L (ref 8–19)
AST SERPL-CCNC: 68 U/L (ref 15–50)
BILIRUB SERPL-MCNC: 0.24 MG/DL (ref 0.2–1.3)
BUN SERPL-MCNC: 12 MG/DL (ref 5–25)
BUN/CREAT SERPL: 18 {RATIO} (ref 6–20)
CALCIUM SERPL-MCNC: 9.5 MG/DL (ref 8.7–10.4)
CHLORIDE SERPL-SCNC: 101 MMOL/L (ref 99–111)
CONV CO2: 22 MMOL/L (ref 22–32)
CONV TOTAL PROTEIN: 7.1 G/DL (ref 6.3–8.2)
CREAT UR-MCNC: 0.66 MG/DL (ref 0.5–0.9)
GFR SERPLBLD BASED ON 1.73 SQ M-ARVRAT: >60 ML/MIN/{1.73_M2}
GLOBULIN UR ELPH-MCNC: 2.6 G/DL (ref 2–3.5)
GLUCOSE SERPL-MCNC: 224 MG/DL (ref 65–99)
INR PPP: 1.01 (ref 2–3)
OSMOLALITY SERPL CALC.SUM OF ELEC: 295 MOSM/KG (ref 273–304)
POTASSIUM SERPL-SCNC: 4.2 MMOL/L (ref 3.5–5.3)
PROTHROMBIN TIME: 10.5 S (ref 9.4–12)
SODIUM SERPL-SCNC: 139 MMOL/L (ref 135–147)

## 2019-01-25 LAB
BASOPHILS # BLD AUTO: 0.05 10*3/UL (ref 0–0.2)
BASOPHILS NFR BLD AUTO: 0.81 % (ref 0–3)
EOSINOPHIL # BLD AUTO: 0.1 10*3/UL (ref 0–0.7)
EOSINOPHIL # BLD AUTO: 1.75 % (ref 0–7)
ERYTHROCYTE [DISTWIDTH] IN BLOOD BY AUTOMATED COUNT: 14.2 % (ref 11.5–14.5)
HBA1C MFR BLD: 15.2 G/DL (ref 12–16)
HCT VFR BLD AUTO: 43.7 % (ref 37–47)
LYMPHOCYTES # BLD AUTO: 1.88 10*3/UL (ref 1–5)
MCH RBC QN AUTO: 29.5 PG (ref 27–31)
MCHC RBC AUTO-ENTMCNC: 34.8 G/DL (ref 33–37)
MCV RBC AUTO: 84.7 FL (ref 81–99)
MONOCYTES # BLD AUTO: 0.31 10*3/UL (ref 0.2–1.2)
MONOCYTES NFR BLD AUTO: 5.65 % (ref 3–10)
NEUTROPHILS # BLD AUTO: 3.18 10*3/UL (ref 2–8)
NEUTROPHILS NFR BLD AUTO: 57.7 % (ref 30–85)
NRBC BLD AUTO-RTO: 0 % (ref 0–0.01)
PLATELET # BLD AUTO: 205 10*3/UL (ref 130–400)
PMV BLD AUTO: 10.8 FL (ref 7.4–10.4)
RBC # BLD AUTO: 5.16 10*6/UL (ref 4.2–5.4)
VARIANT LYMPHS NFR BLD MANUAL: 34.1 % (ref 20–45)
WBC # BLD AUTO: 5.5 10*3/UL (ref 4.8–10.8)

## 2019-03-13 ENCOUNTER — OFFICE VISIT (OUTPATIENT)
Dept: CARDIOLOGY | Facility: CLINIC | Age: 64
End: 2019-03-13

## 2019-03-13 VITALS
SYSTOLIC BLOOD PRESSURE: 128 MMHG | WEIGHT: 166 LBS | HEART RATE: 70 BPM | BODY MASS INDEX: 29.41 KG/M2 | HEIGHT: 63 IN | DIASTOLIC BLOOD PRESSURE: 80 MMHG

## 2019-03-13 DIAGNOSIS — I25.10 CHRONIC CORONARY ARTERY DISEASE: Primary | ICD-10-CM

## 2019-03-13 DIAGNOSIS — E78.49 OTHER HYPERLIPIDEMIA: ICD-10-CM

## 2019-03-13 DIAGNOSIS — E11.69 DIABETES MELLITUS TYPE 2 IN OBESE (HCC): ICD-10-CM

## 2019-03-13 DIAGNOSIS — E66.9 DIABETES MELLITUS TYPE 2 IN OBESE (HCC): ICD-10-CM

## 2019-03-13 DIAGNOSIS — I10 ESSENTIAL HYPERTENSION: ICD-10-CM

## 2019-03-13 PROCEDURE — 99213 OFFICE O/P EST LOW 20 MIN: CPT | Performed by: INTERNAL MEDICINE

## 2019-03-13 PROCEDURE — 93000 ELECTROCARDIOGRAM COMPLETE: CPT | Performed by: INTERNAL MEDICINE

## 2019-03-13 NOTE — PROGRESS NOTES
Subjective:     Encounter Date:03/13/2019      Patient ID: Vivian Kauffman is a 64 y.o. female.    Chief Complaint:  History of Present Illness    The patient is a 64-year-old female with history of nonobstructive coronary artery disease, hypertension, dyslipidemia, diabetes mellitus type 2, prior stroke, seizure disorder, obesity status post lap band, and subsequent removal of the lap band, who presents for followup.      I saw the patient initially for a preoperative evaluation in 8/2015.   She was getting ready to get her lap band removed and was noted to have an abnormal EKG at Dr. Ackerman's office.   She was previously followed by a cardiologist in [place], but was unable to get in with them at the time. Her prior cardiac workup included an evaluation for an abnormal EKG back in 11/2013 at which time she saw Dr. Duarte.  She underwent a stress test that showed anterior ischemia and subsequently underwent a cardiac catheterization that showed 50% stenosis of her proximal LAD, luminal irregularities of her left circumflex, and 50% stenosis of her right coronary artery.  Her echocardiogram showed pulmonary artery pressure of 40 mmHg, moderate TR, but otherwise unremarkable.      When I saw the patient initially, I compared these findings with her prior EKGs and it showed no change.  She also reported no new symptoms at the time and she was subsequently cleared for surgery.    Her last back in 3/2017 she had successfully undergone removal of her lap band.  She denied any symptoms but wanted to proceed with repeat workup to ensure that her nonobstructive coronary artery disease has not progressed.  Proceeded with an echocardiogram that showed normal left ventricular systolic function and wall motion with an estimated ejection fraction of 65-70% and grade 1 diastolic dysfunction.  We attempted to proceed with a treadmill stress test but her baseline EKG abnormalities precluded a diagnostic tests so we  proceeded with a perfusion stress test.  This was performed on 3/30/2017 and was negative for ischemia.     Today she presents for routine follow-up.  She denies any issues over the last year.  Denies any chest pain, shortness of breath, palpitations, lower extremity edema, near-syncope or syncope.  She has lost about 32 pounds since I saw her last in the office.  She is come off of insulin and had her quinapril discontinued due to onset of low blood pressures.  He has not been able to exercise since January after she broke her foot and required surgery.  She has been on a scooter up until last week.      Review of Systems   Constitution: Negative for weakness and malaise/fatigue.   HENT: Negative for hearing loss, hoarse voice, nosebleeds and sore throat.    Eyes: Negative for pain.   Cardiovascular: Negative for chest pain, claudication, cyanosis, dyspnea on exertion, irregular heartbeat, leg swelling, near-syncope, orthopnea, palpitations, paroxysmal nocturnal dyspnea and syncope.   Respiratory: Negative for shortness of breath and snoring.    Endocrine: Negative for cold intolerance, heat intolerance, polydipsia, polyphagia and polyuria.   Skin: Negative for itching and rash.   Musculoskeletal: Negative for arthritis, falls, joint pain, joint swelling, muscle cramps, muscle weakness and myalgias.   Gastrointestinal: Negative for constipation, diarrhea, dysphagia, heartburn, hematemesis, hematochezia, melena, nausea and vomiting.   Genitourinary: Negative for frequency, hematuria and hesitancy.   Neurological: Negative for excessive daytime sleepiness, dizziness, headaches, light-headedness and numbness.   Psychiatric/Behavioral: Negative for depression. The patient is not nervous/anxious.           Current Outpatient Medications:   •  atorvastatin (LIPITOR) 20 MG tablet, Take 20 mg by mouth Every Night., Disp: , Rfl:   •  Biotin 5000 MCG capsule, Take 5,000 mcg by mouth Every Evening., Disp: , Rfl:   •   clopidogrel (PLAVIX) 75 MG tablet, Take 75 mg by mouth Every Evening. HOLDING FOR SX, Disp: , Rfl:   •  colestipol (COLESTID) 1 G tablet, Take 1 g by mouth Every Morning., Disp: , Rfl:   •  FLUoxetine (PROzac) 20 MG tablet, Take 80 mg by mouth Every Morning., Disp: , Rfl:   •  fluticasone-salmeterol (ADVAIR) 500-50 MCG/DOSE DISKUS, Inhale 1 puff 2 (Two) Times a Day As Needed., Disp: , Rfl:   •  gabapentin (NEURONTIN) 300 MG capsule, Take 300 mg by mouth Every Morning., Disp: , Rfl:   •  glipiZIDE (GLUCOTROL) 10 MG tablet, Take 20 mg by mouth 2 (Two) Times a Day Before Meals. WITH BREAKFAST AND SUPPER, Disp: , Rfl:   •  JARDIANCE 10 MG tablet, , Disp: , Rfl:   •  levETIRAcetam XR (KEPPRA XR) 500 MG 24 hr tablet, , Disp: , Rfl:   •  metFORMIN (GLUCOPHAGE) 500 MG tablet, Take 500 mg by mouth 2 (Two) Times a Day With Meals., Disp: , Rfl:   •  metoprolol tartrate (LOPRESSOR) 50 MG tablet, , Disp: , Rfl:   •  montelukast (SINGULAIR) 10 MG tablet, Take 10 mg by mouth Every Night., Disp: , Rfl:   •  nortriptyline (PAMELOR) 10 MG capsule, Take 10 mg by mouth Every Night., Disp: , Rfl:   •  polyethylene glycol (MIRALAX) packet, Take 17 g by mouth Daily., Disp: 9 packet, Rfl: 0  •  primidone (MYSOLINE) 50 MG tablet, , Disp: , Rfl:   •  prochlorperazine (COMPAZINE) 5 MG tablet, , Disp: , Rfl:   •  spironolactone (ALDACTONE) 25 MG tablet, Take 25 mg by mouth Every Morning., Disp: , Rfl:     Past Medical History:   Diagnosis Date   • Anxiety    • Asthma    • Chronic lower back pain    • Coronary arteriosclerosis    • Depression    • Diabetes mellitus (CMS/HCC)     TYPE 2   • Diarrhea    • Fatigue    • Fatty liver    • Fibromyalgia    • Fibromyositis    • GERD (gastroesophageal reflux disease)    • Heartburn    • Hyperlipidemia    • Hypertension    • Insomnia    • Kidney stone    • Morbid obesity (CMS/HCC)    • Muscle spasm    • Neck pain    • Obesity    • Pain of both hip joints    • Polyphagia(783.6)    • PONV (postoperative  nausea and vomiting)    • Poor vision    • Problems with hearing    • Seizures (CMS/HCC)    • Shortness of breath on exertion    • Sinus drainage    • Stroke syndrome    • Tremor, essential     IN NECK  AND HANDS   • Vertigo    • Weight gain      Past Surgical History:   Procedure Laterality Date   • BREAST BIOPSY Right    • CARDIAC CATHETERIZATION      no stents 9/2013 at [facility]   • CARPAL TUNNEL RELEASE Bilateral 2009   • COLONOSCOPY     • ENDOSCOPY N/A 9/9/2016    Procedure: ESOPHAGOGASTRODUODENOSCOPY WITH BIOPSY;  Surgeon: Chris Ackerman Jr., MD;  Location: [facility] ENDOSCOPY;  Service:    • EXTRACORPOREAL SHOCKWAVE LITHOTRIPSY (ESWL), STENT INSERTION/REMOVAL  09/2013   • FOOT SURGERY      TOENAILS REMOVED   • GASTRIC BANDING REMOVAL N/A 11/30/2016    Procedure: LAPAROSCOPIC GASTRIC BANDING AND PORT REMOVAL ;  Surgeon: Chris Ackerman Jr., MD;  Location: [facility] OR [facility];  Service:    • GASTRIC SLEEVE LAPAROSCOPIC N/A 10/2/2017    Procedure: GASTRIC SLEEVE LAPAROSCOPIC revision WITH LYSIS OF ADHESIONS AND HITAL HERNIA REPAIR ;  Surgeon: Chris Ackerman Jr., MD;  Location: [facility] OR [facility];  Service:    • LAPAROSCOPIC CHOLECYSTECTOMY     • LAPAROSCOPIC GASTRIC BANDING     • MASS EXCISION Right     RT FOOT    • NOSE SURGERY     • TONSILLECTOMY       Family History   Problem Relation Age of Onset   • Heart attack Mother    • Diabetes Mother    • Hypertension Mother    • Stroke Mother    • Heart attack Father    • Diabetes Father    • Hypertension Father    • Obesity Father         Morbid Obesity   • Stroke Father    • Heart attack Brother    • Cancer Brother         Bladder   • Heart attack Maternal Grandmother    • Heart attack Maternal Grandfather    • Stroke Paternal Grandmother    • Heart attack Paternal Grandfather    • Malig Hyperthermia Neg Hx      Social History     Tobacco Use   • Smoking status: Former Smoker     Packs/day: 2.00     Years: 30.00     Pack years: 60.00     Types:  "Cigarettes     Last attempt to quit: 1995     Years since quittin.2   • Smokeless tobacco: Never Used   • Tobacco comment: caffeine use   Substance Use Topics   • Alcohol use: Yes     Comment: OCCAS   • Drug use: No           ECG 12 Lead  Date/Time: 3/13/2019 11:49 AM  Performed by: Kia Waters MD  Authorized by: Kia Waters MD   Comparison: compared with previous ECG   Similar to previous ECG  Rhythm: sinus rhythm  T flattening: V1, V2, V3 and V4                 Objective:         Visit Vitals  /80 (BP Location: Right arm, Patient Position: Sitting, Cuff Size: Adult)   Pulse 70   Ht 160 cm (63\")   Wt 75.3 kg (166 lb)   BMI 29.41 kg/m²          Physical Exam   Constitutional: She is oriented to person, place, and time. She appears well-developed and well-nourished.   HENT:   Head: Normocephalic and atraumatic.   Eyes: Conjunctivae, EOM and lids are normal. Pupils are equal, round, and reactive to light.   Neck: Normal range of motion and full passive range of motion without pain. Neck supple. No JVD present. Carotid bruit is not present.   Cardiovascular: Normal rate, regular rhythm, S1 normal and S2 normal. Exam reveals no gallop.   No murmur heard.  Pulses:       Radial pulses are 2+ on the right side, and 2+ on the left side.   No bilateral lower extremity edema   Pulmonary/Chest: Effort normal and breath sounds normal.   Abdominal: Soft. Normal appearance.   Lymphadenopathy:     She has no cervical adenopathy.   Neurological: She is alert and oriented to person, place, and time.   Skin: Skin is warm, dry and intact.   Psychiatric: She has a normal mood and affect.       Lab Review:       Assessment:          Diagnosis Plan   1. Non-obstructive coronary artery disease     2. Essential hypertension     3. Other hyperlipidemia     4. Diabetes mellitus type 2 in obese (CMS/HCC)            Plan:       1.  Nonobstructive coronary artery disease.  Ischemic workup in 2017 was unremarkable.  She is " not having any new symptoms.  Continue medical management.  2.  Hypertension.  Well controlled on her current medications.  Due to her weight loss she has been able to reduce the amount of antihypertensive medications required.  3.  Hyperlipidemia.  On atorvastatin which is managed by Dr. Acosta.  4.  Diabetes mellitus type 2  5.  History of stroke.  On chronic dual e antiplatelet therapy.    We will plan on seeing the patient back again in 1 year or sooner if further issues arise.    Coronary Artery Disease  Assessment  • The patient has no angina    Plan  • Lifestyle modifications discussed include adhering to a heart healthy diet, avoidance of tobacco products, maintenance of a healthy weight, medication compliance, regular exercise and regular monitoring of cholesterol and blood pressure    Subjective - Objective  • Current antiplatelet therapy includes aspirin 81 mg and clopidogrel 75 mg

## 2019-03-20 ENCOUNTER — HOSPITAL ENCOUNTER (OUTPATIENT)
Dept: OTHER | Facility: HOSPITAL | Age: 64
Discharge: HOME | End: 2019-03-20

## 2019-03-20 LAB
ALBUMIN SERPL-MCNC: 4.5 G/DL (ref 3.5–5)
ALBUMIN/GLOB SERPL: 1.6 {RATIO} (ref 1.4–2.6)
ALP SERPL-CCNC: 79 U/L (ref 43–160)
ALT SERPL-CCNC: 65 U/L (ref 10–40)
ANION GAP SERPL CALC-SCNC: 17 MMOL/L (ref 8–19)
AST SERPL-CCNC: 42 U/L (ref 15–50)
BILIRUB SERPL-MCNC: 0.47 MG/DL (ref 0.2–1.3)
BUN SERPL-MCNC: 16 MG/DL (ref 5–25)
BUN/CREAT SERPL: 25 {RATIO} (ref 6–20)
CALCIUM SERPL-MCNC: 9.6 MG/DL (ref 8.7–10.4)
CHLORIDE SERPL-SCNC: 99 MMOL/L (ref 99–111)
CHOLEST SERPL-MCNC: 120 MG/DL (ref 107–200)
CHOLEST/HDLC SERPL: 2.3 {RATIO} (ref 3–6)
CONV CO2: 26 MMOL/L (ref 22–32)
CONV TOTAL PROTEIN: 7.4 G/DL (ref 6.3–8.2)
CREAT UR-MCNC: 0.64 MG/DL (ref 0.5–0.9)
EST. AVERAGE GLUCOSE BLD GHB EST-MCNC: 160 MG/DL
GFR SERPLBLD BASED ON 1.73 SQ M-ARVRAT: >60 ML/MIN/{1.73_M2}
GLOBULIN UR ELPH-MCNC: 2.9 G/DL (ref 2–3.5)
GLUCOSE SERPL-MCNC: 208 MG/DL (ref 65–99)
HBA1C MFR BLD: 7.2 % (ref 3.5–5.7)
HDLC SERPL-MCNC: 52 MG/DL (ref 40–60)
LDLC SERPL CALC-MCNC: 40 MG/DL (ref 70–100)
OSMOLALITY SERPL CALC.SUM OF ELEC: 293 MOSM/KG (ref 273–304)
POTASSIUM SERPL-SCNC: 4.2 MMOL/L (ref 3.5–5.3)
SODIUM SERPL-SCNC: 138 MMOL/L (ref 135–147)
TRIGL SERPL-MCNC: 138 MG/DL (ref 40–150)
TSH SERPL-ACNC: 3.18 M[IU]/L (ref 0.27–4.2)
VLDLC SERPL-MCNC: 28 MG/DL (ref 5–37)

## 2019-03-27 ENCOUNTER — OFFICE VISIT (OUTPATIENT)
Dept: BARIATRICS/WEIGHT MGMT | Facility: CLINIC | Age: 64
End: 2019-03-27

## 2019-03-27 VITALS
DIASTOLIC BLOOD PRESSURE: 76 MMHG | SYSTOLIC BLOOD PRESSURE: 127 MMHG | HEIGHT: 63 IN | HEART RATE: 66 BPM | RESPIRATION RATE: 16 BRPM | TEMPERATURE: 97.8 F | BODY MASS INDEX: 31.89 KG/M2 | WEIGHT: 180 LBS

## 2019-03-27 DIAGNOSIS — E66.9 DIABETES MELLITUS TYPE 2 IN OBESE (HCC): ICD-10-CM

## 2019-03-27 DIAGNOSIS — E78.49 OTHER HYPERLIPIDEMIA: ICD-10-CM

## 2019-03-27 DIAGNOSIS — Z71.82 EXERCISE COUNSELING: ICD-10-CM

## 2019-03-27 DIAGNOSIS — Z71.3 DIETARY COUNSELING: ICD-10-CM

## 2019-03-27 DIAGNOSIS — E11.69 DIABETES MELLITUS TYPE 2 IN OBESE (HCC): ICD-10-CM

## 2019-03-27 DIAGNOSIS — E66.9 OBESITY, CLASS I, BMI 30-34.9: Primary | ICD-10-CM

## 2019-03-27 DIAGNOSIS — I10 ESSENTIAL HYPERTENSION: ICD-10-CM

## 2019-03-27 PROCEDURE — 99213 OFFICE O/P EST LOW 20 MIN: CPT | Performed by: NURSE PRACTITIONER

## 2019-03-27 NOTE — PROGRESS NOTES
[facility]  [address]  [facility] [id number] [phone]  [address]  [facility] [id number] [phone]  Dept: [phone]  3/27/2019      Vivian Kauffamn.  [id number]  [id number]  1955  female      Chief Complaint   Patient presents with   • Follow-up     1.5 year sleeve       BH Post-Op Bariatric Surgery:   Vivian Kauffman is status post Laparoscopic Sleeve/HH procedure revision, performed on 10/2/17     HPI:   Today's weight is 81.6 kg (180 lb) pounds, today's BMI is Body mass index is 31.89 kg/m²., she has a  loss of 16 pounds since the last visit and her weight loss since surgery is 46 pounds. The patient reports a decreased portion size and loss of appetite.      Vivian Kauffman denies nausea, vomiting, dysphagia, abdominal pain or heartburn.     Diet and Exercise: Diet history reviewed and discussed with the patient. Weight loss/gains to date discussed with the patient. The patient states they are eating 30-60 grams of protein per day. She reports eating 3 meals per day, a typical portion size of 1/2 cup, eating 3 snacks per day, drinking 0 or more 8-oz. glasses of water per day, no carbonated beverage consumption. Denies regular exercise, reports eating sweets and drinking sodas.     Has been drinking diet mtn dew at work for the caffeine. Does feel that she has lost a lot of hair and muscle mass, she has increased her protein intake with [employer] proteins. She is being seen by her family doctor. She does report feeling overly tired. She has been on thyroid hormone for two weeks, will be rechecked in May.    Supplements: BA MTV with iron.     Review of Systems   Constitutional: Positive for appetite change and fatigue. Negative for unexpected weight change.   HENT: Negative.    Eyes: Negative.    Respiratory: Negative.    Cardiovascular: Negative.  Negative for leg swelling.   Gastrointestinal: Negative for  abdominal distention, abdominal pain, constipation, diarrhea, nausea and vomiting.   Genitourinary: Negative for difficulty urinating, frequency and urgency.   Musculoskeletal: Negative for back pain.   Skin: Negative.    Psychiatric/Behavioral: Negative.    All other systems reviewed and are negative.      Patient Active Problem List   Diagnosis   • Essential hypertension   • Hyperlipidemia   • Fatigue   • Alopecia   • Dietary counseling   • Abnormal electrocardiogram   • Anxiety   • Asthma   • Carpal tunnel syndrome   • Non-obstructive coronary artery disease   • Depression   • Exercise counseling   • Hx MRSA infection   • Diabetes mellitus type 2 in obese (CMS/HCC)   • Edema   • Fatty liver   • Multiple joint pain   • Seizure disorder (CMS/HCC)   • Obesity, Class I, BMI 30-34.9   • History of removal of laparoscopic gastric banding device   • S/P laparoscopic sleeve gastrectomy   • Noncompliance with dietary regimen required status post bariatric surgery       Past Medical History:   Diagnosis Date   • Anxiety    • Asthma    • Chronic lower back pain    • Coronary arteriosclerosis    • Depression    • Diabetes mellitus (CMS/HCC)     TYPE 2   • Diarrhea    • Fatigue    • Fatty liver    • Fibromyalgia    • Fibromyositis    • GERD (gastroesophageal reflux disease)    • Heartburn    • Hyperlipidemia    • Hypertension    • Insomnia    • Kidney stone    • Morbid obesity (CMS/HCC)    • Muscle spasm    • Neck pain    • Obesity    • Pain of both hip joints    • Polyphagia(783.6)    • PONV (postoperative nausea and vomiting)    • Poor vision    • Problems with hearing    • Seizures (CMS/HCC)    • Shortness of breath on exertion    • Sinus drainage    • Stroke syndrome    • Tremor, essential     IN NECK  AND HANDS   • Vertigo    • Weight gain        The following portions of the patient's history were reviewed and updated as appropriate: allergies, current medications, past family history, past medical history, past social  history, past surgical history and problem list.    Vitals:    03/27/19 0907   BP: 127/76   Pulse: 66   Resp: 16   Temp: 97.8 °F (36.6 °C)       Physical Exam   Constitutional: She appears well-developed and well-nourished.   Neck: No thyromegaly present.   Cardiovascular: Normal rate, regular rhythm and normal heart sounds.   Pulmonary/Chest: Effort normal and breath sounds normal. No respiratory distress. She has no wheezes.   Abdominal: Soft. Bowel sounds are normal. She exhibits no distension. There is no tenderness. There is no guarding. No hernia.   Musculoskeletal: She exhibits no edema or tenderness.   Neurological: She is alert.   Skin: Skin is warm and dry. No rash noted. No erythema.   Psychiatric: She has a normal mood and affect. Her behavior is normal.   Nursing note and vitals reviewed.      Assessment:   Post-op, the patient is doing well.     Encounter Diagnoses   Name Primary?   • Obesity, Class I, BMI 30-34.9 Yes   • Exercise counseling    • Dietary counseling    • Diabetes mellitus type 2 in obese (CMS/HCC)    • Essential hypertension    • Other hyperlipidemia        Plan:   We discussed risks of drinking soda and drinking with meals as it may put her at risk of dilation in her sleeve. We discussed cutting out diet mountain dew and doing crystal light or shanique in her water instead as well as pushing her fluid intake. Discussed the importance of walking again and adding in strength or resistance exercises to help increase her muscle mass to drive her metabolic rate. Encouraged to follow up with PCP for further evaluation of her thyroid as this is a factor in her hair thinning and energy levels.  Encouraged patient to be sure to get plenty of lean protein per day through small frequent meals all with a protein source.   Activity restrictions: none.   Recommended patient be sure to get at least 70 grams of protein per day by eating small, frequent meals all with high lean protein choices. Be sure to  limit/cut back on daily carbohydrate intake. Discussed with the patient the recommended amount of water per day to intake- half of body weight in ounces. Reviewed vitamin requirements. Be sure to do routine exercise, 150 minutes per week minimum, including both cardio and strength training.     Instructions / Recommendations: dietary counseling recommended, recommended a daily protein intake of  grams, vitamin supplement(s) recommended, recommended exercising at least 150 minutes per week, behavior modifications recommended and instructed to call the office for concerns, questions, or problems.     The patient was instructed to follow up in 6 months .   Total time spent face to face was 20 minutes and 15 minutes was spent counseling.

## 2019-05-15 ENCOUNTER — HOSPITAL ENCOUNTER (OUTPATIENT)
Dept: OTHER | Facility: HOSPITAL | Age: 64
Discharge: HOME | End: 2019-05-15
Attending: INTERNAL MEDICINE

## 2019-05-15 LAB
ALBUMIN SERPL-MCNC: 4.3 G/DL (ref 3.5–5)
ALBUMIN/GLOB SERPL: 1.6 {RATIO} (ref 1.4–2.6)
ALP SERPL-CCNC: 75 U/L (ref 43–160)
ALT SERPL-CCNC: 57 U/L (ref 10–40)
ANION GAP SERPL CALC-SCNC: 16 MMOL/L (ref 8–19)
AST SERPL-CCNC: 43 U/L (ref 15–50)
BILIRUB SERPL-MCNC: 0.45 MG/DL (ref 0.2–1.3)
BUN SERPL-MCNC: 14 MG/DL (ref 5–25)
BUN/CREAT SERPL: 25 {RATIO} (ref 6–20)
CALCIUM SERPL-MCNC: 9.1 MG/DL (ref 8.7–10.4)
CHLORIDE SERPL-SCNC: 102 MMOL/L (ref 99–111)
CHOLEST SERPL-MCNC: 138 MG/DL (ref 107–200)
CHOLEST/HDLC SERPL: 2.5 {RATIO} (ref 3–6)
CONV CO2: 26 MMOL/L (ref 22–32)
CONV TOTAL PROTEIN: 7 G/DL (ref 6.3–8.2)
CREAT UR-MCNC: 0.55 MG/DL (ref 0.5–0.9)
EST. AVERAGE GLUCOSE BLD GHB EST-MCNC: 171 MG/DL
GFR SERPLBLD BASED ON 1.73 SQ M-ARVRAT: >60 ML/MIN/{1.73_M2}
GLOBULIN UR ELPH-MCNC: 2.7 G/DL (ref 2–3.5)
GLUCOSE SERPL-MCNC: 180 MG/DL (ref 65–99)
HBA1C MFR BLD: 7.6 % (ref 3.5–5.7)
HDLC SERPL-MCNC: 55 MG/DL (ref 40–60)
LDLC SERPL CALC-MCNC: 56 MG/DL (ref 70–100)
OSMOLALITY SERPL CALC.SUM OF ELEC: 295 MOSM/KG (ref 273–304)
POTASSIUM SERPL-SCNC: 4.2 MMOL/L (ref 3.5–5.3)
SODIUM SERPL-SCNC: 140 MMOL/L (ref 135–147)
TRIGL SERPL-MCNC: 134 MG/DL (ref 40–150)
TSH SERPL-ACNC: 2.12 M[IU]/L (ref 0.27–4.2)
VLDLC SERPL-MCNC: 27 MG/DL (ref 5–37)

## 2019-06-06 ENCOUNTER — HOSPITAL ENCOUNTER (OUTPATIENT)
Dept: OTHER | Facility: HOSPITAL | Age: 64
Discharge: HOME | End: 2019-06-06

## 2019-06-06 LAB
25(OH)D3 SERPL-MCNC: 30.2 NG/ML (ref 30–100)
ALBUMIN SERPL-MCNC: 2.6 G/DL (ref 3.5–5)
ALBUMIN/GLOB SERPL: 0.8 {RATIO} (ref 1.4–2.6)
ALP SERPL-CCNC: 135 U/L (ref 43–160)
ALT SERPL-CCNC: 43 U/L (ref 10–40)
ANION GAP SERPL CALC-SCNC: 17 MMOL/L (ref 8–19)
APPEARANCE UR: CLEAR
AST SERPL-CCNC: 39 U/L (ref 15–50)
BASOPHILS # BLD AUTO: 0.09 10*3/UL (ref 0–0.2)
BASOPHILS # BLD: 0 % (ref 0–3)
BASOPHILS NFR BLD AUTO: 2.2 % (ref 0–3)
BILIRUB SERPL-MCNC: 0.44 MG/DL (ref 0.2–1.3)
BILIRUB UR QL: NEGATIVE
BUN SERPL-MCNC: 9 MG/DL (ref 5–25)
BUN/CREAT SERPL: 21 {RATIO} (ref 6–20)
CALCIUM SERPL-MCNC: 9.4 MG/DL (ref 8.7–10.4)
CHLORIDE SERPL-SCNC: 104 MMOL/L (ref 99–111)
COLOR UR: YELLOW
CONV ABS BANDS: 0 % (ref 1–5)
CONV ABS IMM GRAN: 0.3 10*3/UL (ref 0–0.2)
CONV ANISOCYTES: SLIGHT
CONV CO2: 26 MMOL/L (ref 22–32)
CONV COLLECTION SOURCE (UA): ABNORMAL
CONV IMMATURE GRAN: 7.2 % (ref 0–1.8)
CONV SEGMENTED NEUTROPHILS: 53 % (ref 45–70)
CONV TOTAL PROTEIN: 5.9 G/DL (ref 6.3–8.2)
CONV UROBILINOGEN IN URINE BY AUTOMATED TEST STRIP: 0.2 {EHRLICHU}/DL (ref 0.1–1)
CREAT UR-MCNC: 0.42 MG/DL (ref 0.5–0.9)
DEPRECATED RDW RBC AUTO: 46.5 FL (ref 36.4–46.3)
EOSINOPHIL # BLD AUTO: 0.12 10*3/UL (ref 0–0.7)
EOSINOPHIL # BLD AUTO: 2.9 % (ref 0–7)
EOSINOPHIL NFR BLD AUTO: 5 % (ref 0–7)
ERYTHROCYTE [DISTWIDTH] IN BLOOD BY AUTOMATED COUNT: 14.4 % (ref 11.7–14.4)
GFR SERPLBLD BASED ON 1.73 SQ M-ARVRAT: >60 ML/MIN/{1.73_M2}
GLOBULIN UR ELPH-MCNC: 3.3 G/DL (ref 2–3.5)
GLUCOSE SERPL-MCNC: 129 MG/DL (ref 65–99)
GLUCOSE UR QL: 500 MG/DL
HBA1C MFR BLD: 11.3 G/DL (ref 12–16)
HCT VFR BLD AUTO: 35.8 % (ref 37–47)
HGB UR QL STRIP: NEGATIVE
KETONES UR QL STRIP: NEGATIVE MG/DL
LARGE PLATELETS: SLIGHT
LEUKOCYTE ESTERASE UR QL STRIP: NEGATIVE
LYMPHOCYTES # BLD AUTO: 1.19 10*3/UL (ref 1–5)
MCH RBC QN AUTO: 27.8 PG (ref 27–31)
MCHC RBC AUTO-ENTMCNC: 31.6 G/DL (ref 33–37)
MCV RBC AUTO: 88.2 FL (ref 81–99)
METAMYELOCYTES NFR BLD MANUAL: 1 %
MICROCYTES BLD QL: SLIGHT
MONOCYTES # BLD AUTO: 0.57 10*3/UL (ref 0.2–1.2)
MONOCYTES NFR BLD AUTO: 13.6 % (ref 3–10)
MONOCYTES NFR BLD MANUAL: 10 % (ref 3–10)
NEUTROPHILS # BLD AUTO: 1.91 10*3/UL (ref 2–8)
NEUTROPHILS NFR BLD AUTO: 45.6 % (ref 30–85)
NITRITE UR QL STRIP: NEGATIVE
NRBC BLD AUTO-RTO: PRESENT %
NRBC CBCN: 0.5 % (ref 0–0.7)
NUC CELL # PRT MANUAL: 2 /100{WBCS}
OSMOLALITY SERPL CALC.SUM OF ELEC: 296 MOSM/KG (ref 273–304)
PH UR STRIP.AUTO: 5 [PH] (ref 5–8)
PLAT MORPH BLD: NORMAL
PLATELET # BLD AUTO: 177 10*3/UL (ref 130–400)
PMV BLD AUTO: 10 FL (ref 9.4–12.3)
POLYCHROMASIA BLD QL SMEAR: SLIGHT
POTASSIUM SERPL-SCNC: 4 MMOL/L (ref 3.5–5.3)
PROT UR QL: NEGATIVE MG/DL
RBC # BLD AUTO: 4.06 10*6/UL (ref 4.2–5.4)
SMALL PLATELETS BLD QL SMEAR: ADEQUATE
SODIUM SERPL-SCNC: 143 MMOL/L (ref 135–147)
SP GR UR: 1.01 (ref 1–1.03)
VARIANT LYMPHS NFR BLD MANUAL: 28.5 % (ref 20–45)
VARIANT LYMPHS NFR BLD MANUAL: 31 % (ref 20–45)
WBC # BLD AUTO: 4.18 10*3/UL (ref 4.8–10.8)

## 2019-06-10 ENCOUNTER — HOSPITAL ENCOUNTER (OUTPATIENT)
Dept: OTHER | Facility: HOSPITAL | Age: 64
Discharge: HOME | End: 2019-06-10

## 2019-06-10 LAB
BASOPHILS # BLD AUTO: 0.04 10*3/UL (ref 0–0.2)
BASOPHILS NFR BLD AUTO: 0.8 % (ref 0–3)
CONV ABS IMM GRAN: 0.14 10*3/UL (ref 0–0.2)
CONV IMMATURE GRAN: 2.9 % (ref 0–1.8)
DEPRECATED RDW RBC AUTO: 45.6 FL (ref 36.4–46.3)
EOSINOPHIL # BLD AUTO: 0.05 10*3/UL (ref 0–0.7)
EOSINOPHIL # BLD AUTO: 1 % (ref 0–7)
ERYTHROCYTE [DISTWIDTH] IN BLOOD BY AUTOMATED COUNT: 14.1 % (ref 11.7–14.4)
HBA1C MFR BLD: 11.1 G/DL (ref 12–16)
HCT VFR BLD AUTO: 35.9 % (ref 37–47)
LYMPHOCYTES # BLD AUTO: 1.61 10*3/UL (ref 1–5)
MCH RBC QN AUTO: 27.4 PG (ref 27–31)
MCHC RBC AUTO-ENTMCNC: 30.9 G/DL (ref 33–37)
MCV RBC AUTO: 88.6 FL (ref 81–99)
MONOCYTES # BLD AUTO: 0.41 10*3/UL (ref 0.2–1.2)
MONOCYTES NFR BLD AUTO: 8.5 % (ref 3–10)
NEUTROPHILS # BLD AUTO: 2.59 10*3/UL (ref 2–8)
NEUTROPHILS NFR BLD AUTO: 53.5 % (ref 30–85)
NRBC CBCN: 0.6 % (ref 0–0.7)
PLATELET # BLD AUTO: 315 10*3/UL (ref 130–400)
PMV BLD AUTO: 9.8 FL (ref 9.4–12.3)
RBC # BLD AUTO: 4.05 10*6/UL (ref 4.2–5.4)
VARIANT LYMPHS NFR BLD MANUAL: 33.3 % (ref 20–45)
WBC # BLD AUTO: 4.84 10*3/UL (ref 4.8–10.8)

## 2019-06-13 ENCOUNTER — HOSPITAL ENCOUNTER (OUTPATIENT)
Dept: OTHER | Facility: HOSPITAL | Age: 64
Discharge: HOME | End: 2019-06-13

## 2019-06-20 ENCOUNTER — HOSPITAL ENCOUNTER (OUTPATIENT)
Dept: OTHER | Facility: HOSPITAL | Age: 64
Discharge: HOME | End: 2019-06-20
Attending: INTERNAL MEDICINE

## 2019-06-20 LAB
ANION GAP SERPL CALC-SCNC: 14 MMOL/L (ref 8–19)
BASOPHILS # BLD AUTO: 0.03 10*3/UL (ref 0–0.2)
BASOPHILS NFR BLD AUTO: 0.8 % (ref 0–3)
BNP SERPL-MCNC: 187 PG/ML (ref 0–900)
BUN SERPL-MCNC: 8 MG/DL (ref 5–25)
BUN/CREAT SERPL: 19 {RATIO} (ref 6–20)
CALCIUM SERPL-MCNC: 9.3 MG/DL (ref 8.7–10.4)
CHLORIDE SERPL-SCNC: 102 MMOL/L (ref 99–111)
CONV ABS IMM GRAN: 0.01 10*3/UL (ref 0–0.2)
CONV CO2: 28 MMOL/L (ref 22–32)
CONV IMMATURE GRAN: 0.3 % (ref 0–1.8)
CREAT UR-MCNC: 0.42 MG/DL (ref 0.5–0.9)
D DIMER PPP FEU-MCNC: 0.49 MG/L (ref 0–0.59)
DEPRECATED RDW RBC AUTO: 46.5 FL (ref 36.4–46.3)
EOSINOPHIL # BLD AUTO: 0.06 10*3/UL (ref 0–0.7)
EOSINOPHIL # BLD AUTO: 1.6 % (ref 0–7)
ERYTHROCYTE [DISTWIDTH] IN BLOOD BY AUTOMATED COUNT: 14.3 % (ref 11.7–14.4)
GFR SERPLBLD BASED ON 1.73 SQ M-ARVRAT: >60 ML/MIN/{1.73_M2}
GLUCOSE SERPL-MCNC: 106 MG/DL (ref 65–99)
HBA1C MFR BLD: 12.6 G/DL (ref 12–16)
HCT VFR BLD AUTO: 40.7 % (ref 37–47)
LYMPHOCYTES # BLD AUTO: 1.27 10*3/UL (ref 1–5)
MCH RBC QN AUTO: 27.7 PG (ref 27–31)
MCHC RBC AUTO-ENTMCNC: 31 G/DL (ref 33–37)
MCV RBC AUTO: 89.5 FL (ref 81–99)
MONOCYTES # BLD AUTO: 0.3 10*3/UL (ref 0.2–1.2)
MONOCYTES NFR BLD AUTO: 8.2 % (ref 3–10)
NEUTROPHILS # BLD AUTO: 1.97 10*3/UL (ref 2–8)
NEUTROPHILS NFR BLD AUTO: 54.2 % (ref 30–85)
NRBC CBCN: 0 % (ref 0–0.7)
OSMOLALITY SERPL CALC.SUM OF ELEC: 291 MOSM/KG (ref 273–304)
PLATELET # BLD AUTO: 223 10*3/UL (ref 130–400)
PMV BLD AUTO: 9.2 FL (ref 9.4–12.3)
POTASSIUM SERPL-SCNC: 3.4 MMOL/L (ref 3.5–5.3)
RBC # BLD AUTO: 4.55 10*6/UL (ref 4.2–5.4)
SODIUM SERPL-SCNC: 141 MMOL/L (ref 135–147)
VARIANT LYMPHS NFR BLD MANUAL: 34.9 % (ref 20–45)
WBC # BLD AUTO: 3.64 10*3/UL (ref 4.8–10.8)

## 2019-06-21 ENCOUNTER — HOSPITAL ENCOUNTER (OUTPATIENT)
Dept: GENERAL RADIOLOGY | Facility: HOSPITAL | Age: 64
Discharge: HOME | End: 2019-06-21

## 2019-08-13 ENCOUNTER — OFFICE VISIT CONVERTED (OUTPATIENT)
Dept: GASTROENTEROLOGY | Facility: CLINIC | Age: 64
End: 2019-08-13
Attending: NURSE PRACTITIONER

## 2019-08-19 ENCOUNTER — HOSPITAL ENCOUNTER (OUTPATIENT)
Dept: OTHER | Facility: HOSPITAL | Age: 64
Discharge: HOME | End: 2019-08-19
Attending: NURSE PRACTITIONER

## 2019-08-20 LAB
CONV HEPATITIS B SURFACE AG W CONFIRMATION RE: NEGATIVE
HAV IGM SERPL QL IA: NEGATIVE
HBV CORE IGM SERPL QL IA: NEGATIVE
HCV AB SER DONR QL: <0.1 S/CO RATIO (ref 0–0.9)

## 2019-08-21 LAB — CONV NASH FIBROSURE: NORMAL

## 2019-09-25 ENCOUNTER — LAB (OUTPATIENT)
Dept: LAB | Facility: HOSPITAL | Age: 64
End: 2019-09-25

## 2019-09-25 ENCOUNTER — OFFICE VISIT (OUTPATIENT)
Dept: BARIATRICS/WEIGHT MGMT | Facility: CLINIC | Age: 64
End: 2019-09-25

## 2019-09-25 VITALS
WEIGHT: 177 LBS | DIASTOLIC BLOOD PRESSURE: 69 MMHG | HEIGHT: 63 IN | BODY MASS INDEX: 31.36 KG/M2 | RESPIRATION RATE: 18 BRPM | TEMPERATURE: 97.3 F | SYSTOLIC BLOOD PRESSURE: 124 MMHG | HEART RATE: 75 BPM

## 2019-09-25 DIAGNOSIS — E11.69 DIABETES MELLITUS TYPE 2 IN OBESE (HCC): ICD-10-CM

## 2019-09-25 DIAGNOSIS — R53.82 CHRONIC FATIGUE: ICD-10-CM

## 2019-09-25 DIAGNOSIS — E66.9 DIABETES MELLITUS TYPE 2 IN OBESE (HCC): ICD-10-CM

## 2019-09-25 DIAGNOSIS — Z98.84 S/P LAPAROSCOPIC SLEEVE GASTRECTOMY: ICD-10-CM

## 2019-09-25 DIAGNOSIS — E66.9 OBESITY, CLASS I, BMI 30-34.9: ICD-10-CM

## 2019-09-25 DIAGNOSIS — M25.50 MULTIPLE JOINT PAIN: ICD-10-CM

## 2019-09-25 DIAGNOSIS — E78.49 OTHER HYPERLIPIDEMIA: ICD-10-CM

## 2019-09-25 DIAGNOSIS — E66.9 OBESITY, CLASS I, BMI 30-34.9: Primary | ICD-10-CM

## 2019-09-25 DIAGNOSIS — R60.9 EDEMA, UNSPECIFIED TYPE: ICD-10-CM

## 2019-09-25 DIAGNOSIS — I10 ESSENTIAL HYPERTENSION: ICD-10-CM

## 2019-09-25 LAB
25(OH)D3 SERPL-MCNC: 38.6 NG/ML (ref 30–100)
ALBUMIN SERPL-MCNC: 4.2 G/DL (ref 3.5–5.2)
ALBUMIN/GLOB SERPL: 1.6 G/DL
ALP SERPL-CCNC: 81 U/L (ref 39–117)
ALT SERPL W P-5'-P-CCNC: 27 U/L (ref 1–33)
ANION GAP SERPL CALCULATED.3IONS-SCNC: 14.1 MMOL/L (ref 5–15)
AST SERPL-CCNC: 32 U/L (ref 1–32)
BASOPHILS # BLD AUTO: 0.03 10*3/MM3 (ref 0–0.2)
BASOPHILS NFR BLD AUTO: 0.6 % (ref 0–1.5)
BILIRUB SERPL-MCNC: 0.3 MG/DL (ref 0.2–1.2)
BUN BLD-MCNC: 19 MG/DL (ref 8–23)
BUN/CREAT SERPL: 31.1 (ref 7–25)
CALCIUM SPEC-SCNC: 9.1 MG/DL (ref 8.6–10.5)
CHLORIDE SERPL-SCNC: 100 MMOL/L (ref 98–107)
CO2 SERPL-SCNC: 24.9 MMOL/L (ref 22–29)
CREAT BLD-MCNC: 0.61 MG/DL (ref 0.57–1)
DEPRECATED RDW RBC AUTO: 42.8 FL (ref 37–54)
EOSINOPHIL # BLD AUTO: 0.07 10*3/MM3 (ref 0–0.4)
EOSINOPHIL NFR BLD AUTO: 1.3 % (ref 0.3–6.2)
ERYTHROCYTE [DISTWIDTH] IN BLOOD BY AUTOMATED COUNT: 14.1 % (ref 12.3–15.4)
FERRITIN SERPL-MCNC: 30.4 NG/ML (ref 13–150)
FOLATE SERPL-MCNC: 18.1 NG/ML (ref 4.78–24.2)
GFR SERPL CREATININE-BSD FRML MDRD: 99 ML/MIN/1.73
GLOBULIN UR ELPH-MCNC: 2.6 GM/DL
GLUCOSE BLD-MCNC: 203 MG/DL (ref 65–99)
HCT VFR BLD AUTO: 41.5 % (ref 34–46.6)
HGB BLD-MCNC: 13.2 G/DL (ref 12–15.9)
IMM GRANULOCYTES # BLD AUTO: 0.02 10*3/MM3 (ref 0–0.05)
IMM GRANULOCYTES NFR BLD AUTO: 0.4 % (ref 0–0.5)
IRON 24H UR-MRATE: 79 MCG/DL (ref 37–145)
LYMPHOCYTES # BLD AUTO: 1.31 10*3/MM3 (ref 0.7–3.1)
LYMPHOCYTES NFR BLD AUTO: 25.2 % (ref 19.6–45.3)
MCH RBC QN AUTO: 26.6 PG (ref 26.6–33)
MCHC RBC AUTO-ENTMCNC: 31.8 G/DL (ref 31.5–35.7)
MCV RBC AUTO: 83.5 FL (ref 79–97)
MONOCYTES # BLD AUTO: 0.41 10*3/MM3 (ref 0.1–0.9)
MONOCYTES NFR BLD AUTO: 7.9 % (ref 5–12)
NEUTROPHILS # BLD AUTO: 3.36 10*3/MM3 (ref 1.7–7)
NEUTROPHILS NFR BLD AUTO: 64.6 % (ref 42.7–76)
NRBC BLD AUTO-RTO: 0 /100 WBC (ref 0–0.2)
PLATELET # BLD AUTO: 166 10*3/MM3 (ref 140–450)
PMV BLD AUTO: 10 FL (ref 6–12)
POTASSIUM BLD-SCNC: 3.8 MMOL/L (ref 3.5–5.2)
PREALB SERPL-MCNC: 20.8 MG/DL (ref 20–40)
PROT SERPL-MCNC: 6.8 G/DL (ref 6–8.5)
RBC # BLD AUTO: 4.97 10*6/MM3 (ref 3.77–5.28)
SODIUM BLD-SCNC: 139 MMOL/L (ref 136–145)
TSH SERPL DL<=0.05 MIU/L-ACNC: 2.34 UIU/ML (ref 0.27–4.2)
WBC NRBC COR # BLD: 5.2 10*3/MM3 (ref 3.4–10.8)

## 2019-09-25 PROCEDURE — 83921 ORGANIC ACID SINGLE QUANT: CPT

## 2019-09-25 PROCEDURE — 99213 OFFICE O/P EST LOW 20 MIN: CPT | Performed by: NURSE PRACTITIONER

## 2019-09-25 PROCEDURE — 85025 COMPLETE CBC W/AUTO DIFF WBC: CPT

## 2019-09-25 PROCEDURE — 83540 ASSAY OF IRON: CPT

## 2019-09-25 PROCEDURE — 82306 VITAMIN D 25 HYDROXY: CPT

## 2019-09-25 PROCEDURE — 80053 COMPREHEN METABOLIC PANEL: CPT

## 2019-09-25 PROCEDURE — 84134 ASSAY OF PREALBUMIN: CPT

## 2019-09-25 PROCEDURE — 36415 COLL VENOUS BLD VENIPUNCTURE: CPT

## 2019-09-25 PROCEDURE — 82746 ASSAY OF FOLIC ACID SERUM: CPT

## 2019-09-25 PROCEDURE — 84425 ASSAY OF VITAMIN B-1: CPT

## 2019-09-25 PROCEDURE — 82728 ASSAY OF FERRITIN: CPT

## 2019-09-25 PROCEDURE — 84443 ASSAY THYROID STIM HORMONE: CPT

## 2019-09-25 RX ORDER — LEVOTHYROXINE SODIUM 0.03 MG/1
25 TABLET ORAL DAILY
COMMUNITY
End: 2021-10-08

## 2019-09-25 NOTE — PROGRESS NOTES
[facility] BAR[facility]ATR[facility] BARIATRIC SURGERY  [address]  [facility] [address]  [phone]  [address] [address]  [phone]  Dept: [phone]  9/25/2019      Vivian Kauffman.  [id number]  [id number]  1955  female      Chief Complaint   Patient presents with   • Follow-up     2 year sleeve       BH Post-Op Bariatric Surgery:   Vivian Kauffman is status post Laparoscopic SleeveHH, revision from lapband procedure, performed on 10/2/17     HPI:   Today's weight is 80.3 kg (177 lb) pounds, today's BMI is Body mass index is 31.36 kg/m²., she has a  loss of 3 pounds since the last visit and her weight loss since surgery is 33 pounds. The patient reports a decreased portion size and loss of appetite.      Vivian Kauffman denies dysphasia reflux nausea or vomiting     Diet and Exercise: Diet history reviewed and discussed with the patient. Weight loss/gains to date discussed with the patient. The patient states they are eating 30-60 grams of protein per day. She reports eating 3 meals per day, a typical portion size of 1/2 cup, eating 1-2 snacks per day, drinking 5-6 or more 8-oz. glasses of water per day, no carbonated beverage consumption and exercising regularly-going to a fitness center 3 to 5 days/week.  Does report mild hair loss and ongoing fatigue.    She reports that she was down to 156 pounds in May but was then diagnosed with laryngitis.  She reports that she lost consciousness in the shower and was able to get to a phone after waking up.  She was airlifted to the hospital ended up having a bad pneumonia, sepsis, and ketoacidosis exacerbated by her illness.  She also was diagnosed with hypothyroidism and was put on the Synthroid replacement as she has noticed more hair thinning.  She reports that despite getting enough protein and sticking to her dietary regimen and getting plenty of exercise she has gained weight since that time.   We discussed how insulin affects weight gain.  After being in ketoacidosis her endocrinologist did discontinue all of her oral diabetic medications and put her back on regular insulin.    Diet: doing a premier protein shake for breakfast, egg bowl for supper, tuna pouch for lunch. May snack on wheat crackers and maybe peanut butter.     Supplements: none.     Review of Systems   Constitutional: Positive for appetite change and fatigue. Negative for unexpected weight change.        Alopecia   HENT: Negative.    Eyes: Negative.    Respiratory: Negative.    Cardiovascular: Negative.  Negative for leg swelling.   Gastrointestinal: Negative for abdominal distention, abdominal pain, constipation, diarrhea, nausea and vomiting.   Genitourinary: Negative for difficulty urinating, frequency and urgency.   Musculoskeletal: Negative for back pain.   Skin: Negative.    Psychiatric/Behavioral: Negative.    All other systems reviewed and are negative.      Patient Active Problem List   Diagnosis   • Essential hypertension   • Hyperlipidemia   • Fatigue   • Alopecia   • Dietary counseling   • Abnormal electrocardiogram   • Anxiety   • Asthma   • Carpal tunnel syndrome   • Non-obstructive coronary artery disease   • Depression   • Exercise counseling   • Hx MRSA infection   • Diabetes mellitus type 2 in obese (CMS/HCC)   • Edema   • Fatty liver   • Multiple joint pain   • Seizure disorder (CMS/HCC)   • Obesity, Class I, BMI 30-34.9   • History of removal of laparoscopic gastric banding device   • S/P laparoscopic sleeve gastrectomy   • Noncompliance with dietary regimen required status post bariatric surgery       Past Medical History:   Diagnosis Date   • Anxiety    • Asthma    • Chronic lower back pain    • Coronary arteriosclerosis    • Depression    • Diabetes mellitus (CMS/HCC)     TYPE 2   • Diarrhea    • Fatigue    • Fatty liver    • Fibromyalgia    • Fibromyositis    • GERD (gastroesophageal reflux disease)    • Heartburn     • Hyperlipidemia    • Hypertension    • Insomnia    • Kidney stone    • Morbid obesity (CMS/HCC)    • Muscle spasm    • Neck pain    • Obesity    • Pain of both hip joints    • Polyphagia(783.6)    • PONV (postoperative nausea and vomiting)    • Poor vision    • Problems with hearing    • Seizures (CMS/HCC)    • Shortness of breath on exertion    • Sinus drainage    • Stroke syndrome    • Tremor, essential     IN NECK  AND HANDS   • Vertigo    • Weight gain        The following portions of the patient's history were reviewed and updated as appropriate: allergies, current medications, past family history, past medical history, past social history, past surgical history and problem list.    Vitals:    09/25/19 0912   BP: 124/69   Pulse: 75   Resp: 18   Temp: 97.3 °F (36.3 °C)       Physical Exam   Constitutional: She appears well-developed and well-nourished.   Neck: No thyromegaly present.   Cardiovascular: Normal rate, regular rhythm and normal heart sounds.   Pulmonary/Chest: Effort normal and breath sounds normal. No respiratory distress. She has no wheezes.   Abdominal: Soft. Bowel sounds are normal. She exhibits no distension. There is no tenderness. There is no guarding. No hernia.   Musculoskeletal: She exhibits no edema or tenderness.   Neurological: She is alert.   Skin: Skin is warm and dry. No rash noted. No erythema.   Psychiatric: She has a normal mood and affect. Her behavior is normal.   Nursing note and vitals reviewed.      Assessment:   Post-op, the patient is doing well.     Encounter Diagnoses   Name Primary?   • Obesity, Class I, BMI 30-34.9 Yes   • S/P laparoscopic sleeve gastrectomy    • Essential hypertension    • Other hyperlipidemia    • Diabetes mellitus type 2 in obese (CMS/HCC)    • Multiple joint pain    • Edema, unspecified type    • Chronic fatigue        Plan:   Encouraged to keep up the great work with her dietary intake and exercise as well as fluid intake.  We discussed  potentially having a discussion with her endocrinologist to tentatively cut back on her insulin and use more of the oral medications as likely factor in her inability to lose weight from this point. We will add on a TSH to her annual lab work today and report results back to her primary care provider as patient reports that her thyroid stability hormone has not been checked since starting her Synthroid he is still noticing a fair amount of hair thinning and brittle nails despite getting plenty of protein,    Encouraged patient to be sure to get plenty of lean protein per day through small frequent meals all with a protein source.   Activity restrictions: none.   Recommended patient be sure to get at least 70 grams of protein per day by eating small, frequent meals all with high lean protein choices. Be sure to limit/cut back on daily carbohydrate intake. Discussed with the patient the recommended amount of water per day to intake- half of body weight in ounces. Reviewed vitamin requirements. Be sure to do routine exercise, 150 minutes per week minimum, including both cardio and strength training.     Instructions / Recommendations: dietary counseling recommended, recommended a daily protein intake of  grams, vitamin supplement(s) recommended, recommended exercising at least 150 minutes per week, behavior modifications recommended and instructed to call the office for concerns, questions, or problems.     The patient was instructed to follow up  1 year .    Total time spent face to face was 20 minutes and 15 minutes was spent counseling.

## 2019-09-27 LAB — VIT B1 BLD-SCNC: 163.3 NMOL/L (ref 66.5–200)

## 2019-09-30 LAB
Lab: NORMAL
METHYLMALONATE SERPL-SCNC: 274 NMOL/L (ref 0–378)

## 2019-10-29 ENCOUNTER — HOSPITAL ENCOUNTER (OUTPATIENT)
Dept: OTHER | Facility: HOSPITAL | Age: 64
Discharge: HOME | End: 2019-10-29
Attending: INTERNAL MEDICINE

## 2019-10-29 LAB
25(OH)D3 SERPL-MCNC: 33.6 NG/ML (ref 30–100)
ALBUMIN SERPL-MCNC: 4.4 G/DL (ref 3.5–5)
ALBUMIN/GLOB SERPL: 1.7 {RATIO} (ref 1.4–2.6)
ALP SERPL-CCNC: 86 U/L (ref 43–160)
ALT SERPL-CCNC: 19 U/L (ref 10–40)
ANION GAP SERPL CALC-SCNC: 15 MMOL/L (ref 8–19)
AST SERPL-CCNC: 22 U/L (ref 15–50)
BASOPHILS # BLD AUTO: 0.02 10*3/UL (ref 0–0.2)
BASOPHILS NFR BLD AUTO: 0.5 % (ref 0–3)
BILIRUB SERPL-MCNC: 0.46 MG/DL (ref 0.2–1.3)
BUN SERPL-MCNC: 10 MG/DL (ref 5–25)
BUN/CREAT SERPL: 18 {RATIO} (ref 6–20)
CALCIUM SERPL-MCNC: 9.5 MG/DL (ref 8.7–10.4)
CHLORIDE SERPL-SCNC: 102 MMOL/L (ref 99–111)
CHOLEST SERPL-MCNC: 118 MG/DL (ref 107–200)
CHOLEST/HDLC SERPL: 2.1 {RATIO} (ref 3–6)
CONV ABS IMM GRAN: 0.01 10*3/UL (ref 0–0.2)
CONV CO2: 28 MMOL/L (ref 22–32)
CONV IMMATURE GRAN: 0.3 % (ref 0–1.8)
CONV TOTAL PROTEIN: 7 G/DL (ref 6.3–8.2)
CREAT UR-MCNC: 0.55 MG/DL (ref 0.5–0.9)
DEPRECATED RDW RBC AUTO: 46 FL (ref 36.4–46.3)
EOSINOPHIL # BLD AUTO: 0.07 10*3/UL (ref 0–0.7)
EOSINOPHIL # BLD AUTO: 1.8 % (ref 0–7)
ERYTHROCYTE [DISTWIDTH] IN BLOOD BY AUTOMATED COUNT: 15.1 % (ref 11.7–14.4)
GFR SERPLBLD BASED ON 1.73 SQ M-ARVRAT: >60 ML/MIN/{1.73_M2}
GLOBULIN UR ELPH-MCNC: 2.6 G/DL (ref 2–3.5)
GLUCOSE SERPL-MCNC: 165 MG/DL (ref 65–99)
HCT VFR BLD AUTO: 43.6 % (ref 37–47)
HDLC SERPL-MCNC: 57 MG/DL (ref 40–60)
HGB BLD-MCNC: 13.7 G/DL (ref 12–16)
LDLC SERPL CALC-MCNC: 48 MG/DL (ref 70–100)
LYMPHOCYTES # BLD AUTO: 1.13 10*3/UL (ref 1–5)
LYMPHOCYTES NFR BLD AUTO: 28.5 % (ref 20–45)
MCH RBC QN AUTO: 26.2 PG (ref 27–31)
MCHC RBC AUTO-ENTMCNC: 31.4 G/DL (ref 33–37)
MCV RBC AUTO: 83.5 FL (ref 81–99)
MONOCYTES # BLD AUTO: 0.32 10*3/UL (ref 0.2–1.2)
MONOCYTES NFR BLD AUTO: 8.1 % (ref 3–10)
NEUTROPHILS # BLD AUTO: 2.42 10*3/UL (ref 2–8)
NEUTROPHILS NFR BLD AUTO: 60.8 % (ref 30–85)
NRBC CBCN: 0 % (ref 0–0.7)
OSMOLALITY SERPL CALC.SUM OF ELEC: 295 MOSM/KG (ref 273–304)
PLATELET # BLD AUTO: 160 10*3/UL (ref 130–400)
PMV BLD AUTO: 9.9 FL (ref 9.4–12.3)
POTASSIUM SERPL-SCNC: 4.3 MMOL/L (ref 3.5–5.3)
RBC # BLD AUTO: 5.22 10*6/UL (ref 4.2–5.4)
SODIUM SERPL-SCNC: 141 MMOL/L (ref 135–147)
TRIGL SERPL-MCNC: 63 MG/DL (ref 40–150)
TSH SERPL-ACNC: 1.22 M[IU]/L (ref 0.27–4.2)
VLDLC SERPL-MCNC: 13 MG/DL (ref 5–37)
WBC # BLD AUTO: 3.97 10*3/UL (ref 4.8–10.8)

## 2019-11-05 ENCOUNTER — HOSPITAL ENCOUNTER (OUTPATIENT)
Dept: GENERAL RADIOLOGY | Facility: HOSPITAL | Age: 64
Discharge: HOME | End: 2019-11-05
Attending: INTERNAL MEDICINE

## 2019-12-04 ENCOUNTER — HOSPITAL ENCOUNTER (OUTPATIENT)
Dept: OTHER | Facility: HOSPITAL | Age: 64
Discharge: HOME | End: 2019-12-04

## 2019-12-04 LAB
ALBUMIN SERPL-MCNC: 4.2 G/DL (ref 3.5–5)
ALBUMIN/GLOB SERPL: 1.3 {RATIO} (ref 1.4–2.6)
ALP SERPL-CCNC: 99 U/L (ref 43–160)
ALT SERPL-CCNC: 37 U/L (ref 10–40)
ANION GAP SERPL CALC-SCNC: 20 MMOL/L (ref 8–19)
AST SERPL-CCNC: 30 U/L (ref 15–50)
BILIRUB SERPL-MCNC: 0.27 MG/DL (ref 0.2–1.3)
BUN SERPL-MCNC: 15 MG/DL (ref 5–25)
BUN/CREAT SERPL: 23 {RATIO} (ref 6–20)
CALCIUM SERPL-MCNC: 9.9 MG/DL (ref 8.7–10.4)
CHLORIDE SERPL-SCNC: 102 MMOL/L (ref 99–111)
CHOLEST SERPL-MCNC: 134 MG/DL (ref 107–200)
CHOLEST/HDLC SERPL: 2.6 {RATIO} (ref 3–6)
CONV CO2: 26 MMOL/L (ref 22–32)
CONV TOTAL PROTEIN: 7.4 G/DL (ref 6.3–8.2)
CREAT UR-MCNC: 0.64 MG/DL (ref 0.5–0.9)
GFR SERPLBLD BASED ON 1.73 SQ M-ARVRAT: >60 ML/MIN/{1.73_M2}
GLOBULIN UR ELPH-MCNC: 3.2 G/DL (ref 2–3.5)
GLUCOSE SERPL-MCNC: 69 MG/DL (ref 65–99)
HDLC SERPL-MCNC: 51 MG/DL (ref 40–60)
LDLC SERPL CALC-MCNC: 68 MG/DL (ref 70–100)
OSMOLALITY SERPL CALC.SUM OF ELEC: 297 MOSM/KG (ref 273–304)
POTASSIUM SERPL-SCNC: 4.4 MMOL/L (ref 3.5–5.3)
SODIUM SERPL-SCNC: 144 MMOL/L (ref 135–147)
TRIGL SERPL-MCNC: 76 MG/DL (ref 40–150)
VLDLC SERPL-MCNC: 15 MG/DL (ref 5–37)

## 2019-12-17 ENCOUNTER — HOSPITAL ENCOUNTER (OUTPATIENT)
Dept: GENERAL RADIOLOGY | Facility: HOSPITAL | Age: 64
Discharge: HOME | End: 2019-12-17
Attending: INTERNAL MEDICINE

## 2020-01-13 ENCOUNTER — CONVERSION ENCOUNTER (OUTPATIENT)
Dept: SURGERY | Facility: CLINIC | Age: 65
End: 2020-01-13

## 2020-01-13 ENCOUNTER — OFFICE VISIT CONVERTED (OUTPATIENT)
Dept: UROLOGY | Facility: CLINIC | Age: 65
End: 2020-01-13
Attending: UROLOGY

## 2020-02-17 ENCOUNTER — OFFICE VISIT CONVERTED (OUTPATIENT)
Dept: UROLOGY | Facility: CLINIC | Age: 65
End: 2020-02-17
Attending: UROLOGY

## 2020-02-17 ENCOUNTER — CONVERSION ENCOUNTER (OUTPATIENT)
Dept: SURGERY | Facility: CLINIC | Age: 65
End: 2020-02-17

## 2020-02-21 ENCOUNTER — HOSPITAL ENCOUNTER (OUTPATIENT)
Dept: GENERAL RADIOLOGY | Facility: HOSPITAL | Age: 65
Discharge: HOME | End: 2020-02-21
Attending: INTERNAL MEDICINE

## 2020-02-21 LAB
ANION GAP SERPL CALC-SCNC: 17 MMOL/L (ref 8–19)
BASOPHILS # BLD AUTO: 0.02 10*3/UL (ref 0–0.2)
BASOPHILS NFR BLD AUTO: 0.4 % (ref 0–3)
BUN SERPL-MCNC: 12 MG/DL (ref 5–25)
BUN/CREAT SERPL: 21 {RATIO} (ref 6–20)
CALCIUM SERPL-MCNC: 9.4 MG/DL (ref 8.7–10.4)
CHLORIDE SERPL-SCNC: 100 MMOL/L (ref 99–111)
CONV ABS IMM GRAN: 0.03 10*3/UL (ref 0–0.2)
CONV CO2: 28 MMOL/L (ref 22–32)
CONV IMMATURE GRAN: 0.6 % (ref 0–1.8)
CREAT UR-MCNC: 0.57 MG/DL (ref 0.5–0.9)
D-LACTATE SERPL-SCNC: 1 MMOL/L (ref 0.7–2.1)
DEPRECATED RDW RBC AUTO: 42.1 FL (ref 36.4–46.3)
EOSINOPHIL # BLD AUTO: 0.11 10*3/UL (ref 0–0.7)
EOSINOPHIL # BLD AUTO: 2.3 % (ref 0–7)
ERYTHROCYTE [DISTWIDTH] IN BLOOD BY AUTOMATED COUNT: 13.6 % (ref 11.7–14.4)
GFR SERPLBLD BASED ON 1.73 SQ M-ARVRAT: >60 ML/MIN/{1.73_M2}
GLUCOSE SERPL-MCNC: 132 MG/DL (ref 65–99)
HCT VFR BLD AUTO: 43.1 % (ref 37–47)
HGB BLD-MCNC: 13.9 G/DL (ref 12–16)
LYMPHOCYTES # BLD AUTO: 1.39 10*3/UL (ref 1–5)
LYMPHOCYTES NFR BLD AUTO: 29 % (ref 20–45)
M PNEUMO IGM SER QL: NEGATIVE
MCH RBC QN AUTO: 27.4 PG (ref 27–31)
MCHC RBC AUTO-ENTMCNC: 32.3 G/DL (ref 33–37)
MCV RBC AUTO: 84.8 FL (ref 81–99)
MONOCYTES # BLD AUTO: 0.4 10*3/UL (ref 0.2–1.2)
MONOCYTES NFR BLD AUTO: 8.3 % (ref 3–10)
NEUTROPHILS # BLD AUTO: 2.85 10*3/UL (ref 2–8)
NEUTROPHILS NFR BLD AUTO: 59.4 % (ref 30–85)
NRBC CBCN: 0 % (ref 0–0.7)
OSMOLALITY SERPL CALC.SUM OF ELEC: 294 MOSM/KG (ref 273–304)
PLATELET # BLD AUTO: 186 10*3/UL (ref 130–400)
PMV BLD AUTO: 9.3 FL (ref 9.4–12.3)
POTASSIUM SERPL-SCNC: 4.2 MMOL/L (ref 3.5–5.3)
PROLACTIN SERPL-MCNC: 9.88 NG/ML
RBC # BLD AUTO: 5.08 10*6/UL (ref 4.2–5.4)
SODIUM SERPL-SCNC: 141 MMOL/L (ref 135–147)
WBC # BLD AUTO: 4.8 10*3/UL (ref 4.8–10.8)

## 2020-02-24 LAB
BACTERIA SPEC AEROBE CULT: ABNORMAL
BACTERIA SPEC AEROBE CULT: ABNORMAL

## 2020-03-18 ENCOUNTER — TELEPHONE (OUTPATIENT)
Dept: CARDIOLOGY | Facility: CLINIC | Age: 65
End: 2020-03-18

## 2020-03-18 NOTE — TELEPHONE ENCOUNTER
Patient's phone # is not accepting calls at this time, so I reached out to her Emergency Contact, her daughter Lane.  I left a voicemail for her about her mother's appt with Dr. Waters tomorrow.  I asked that she call the office to let us know whether or not she plans on coming in.

## 2020-03-31 ENCOUNTER — HOSPITAL ENCOUNTER (OUTPATIENT)
Dept: LAB | Facility: HOSPITAL | Age: 65
Discharge: HOME | End: 2020-03-31

## 2020-03-31 LAB
ALBUMIN SERPL-MCNC: 4.5 G/DL (ref 3.5–5)
ALBUMIN/GLOB SERPL: 1.5 {RATIO} (ref 1.4–2.6)
ALP SERPL-CCNC: 104 U/L (ref 43–160)
ALT SERPL-CCNC: 31 U/L (ref 10–40)
ANION GAP SERPL CALC-SCNC: 21 MMOL/L (ref 8–19)
AST SERPL-CCNC: 32 U/L (ref 15–50)
BILIRUB SERPL-MCNC: 0.52 MG/DL (ref 0.2–1.3)
BUN SERPL-MCNC: 17 MG/DL (ref 5–25)
BUN/CREAT SERPL: 30 {RATIO} (ref 6–20)
CALCIUM SERPL-MCNC: 9.6 MG/DL (ref 8.7–10.4)
CHLORIDE SERPL-SCNC: 95 MMOL/L (ref 99–111)
CHOLEST SERPL-MCNC: 142 MG/DL (ref 107–200)
CHOLEST/HDLC SERPL: 2.8 {RATIO} (ref 3–6)
CONV CO2: 25 MMOL/L (ref 22–32)
CONV TOTAL PROTEIN: 7.6 G/DL (ref 6.3–8.2)
CREAT UR-MCNC: 0.57 MG/DL (ref 0.5–0.9)
EST. AVERAGE GLUCOSE BLD GHB EST-MCNC: 223 MG/DL
GFR SERPLBLD BASED ON 1.73 SQ M-ARVRAT: >60 ML/MIN/{1.73_M2}
GLOBULIN UR ELPH-MCNC: 3.1 G/DL (ref 2–3.5)
GLUCOSE SERPL-MCNC: 182 MG/DL (ref 65–99)
HBA1C MFR BLD: 9.4 % (ref 3.5–5.7)
HDLC SERPL-MCNC: 51 MG/DL (ref 40–60)
LDLC SERPL CALC-MCNC: 70 MG/DL (ref 70–100)
OSMOLALITY SERPL CALC.SUM OF ELEC: 290 MOSM/KG (ref 273–304)
POTASSIUM SERPL-SCNC: 4 MMOL/L (ref 3.5–5.3)
SODIUM SERPL-SCNC: 137 MMOL/L (ref 135–147)
T4 FREE SERPL-MCNC: 1.3 NG/DL (ref 0.9–1.8)
TRIGL SERPL-MCNC: 105 MG/DL (ref 40–150)
TSH SERPL-ACNC: 2.07 M[IU]/L (ref 0.27–4.2)
VLDLC SERPL-MCNC: 21 MG/DL (ref 5–37)

## 2020-04-14 ENCOUNTER — HOSPITAL ENCOUNTER (OUTPATIENT)
Dept: LAB | Facility: HOSPITAL | Age: 65
Discharge: HOME | End: 2020-04-14
Attending: INTERNAL MEDICINE

## 2020-04-14 LAB
BASOPHILS # BLD AUTO: 0.04 10*3/UL (ref 0–0.2)
BASOPHILS NFR BLD AUTO: 0.8 % (ref 0–3)
CONV ABS IMM GRAN: 0.01 10*3/UL (ref 0–0.2)
CONV IMMATURE GRAN: 0.2 % (ref 0–1.8)
DEPRECATED RDW RBC AUTO: 44.3 FL (ref 36.4–46.3)
EOSINOPHIL # BLD AUTO: 0.1 10*3/UL (ref 0–0.7)
EOSINOPHIL # BLD AUTO: 2 % (ref 0–7)
ERYTHROCYTE [DISTWIDTH] IN BLOOD BY AUTOMATED COUNT: 14.1 % (ref 11.7–14.4)
HCT VFR BLD AUTO: 44.5 % (ref 37–47)
HGB BLD-MCNC: 14.2 G/DL (ref 12–16)
LYMPHOCYTES # BLD AUTO: 1.79 10*3/UL (ref 1–5)
LYMPHOCYTES NFR BLD AUTO: 35.7 % (ref 20–45)
MCH RBC QN AUTO: 27.5 PG (ref 27–31)
MCHC RBC AUTO-ENTMCNC: 31.9 G/DL (ref 33–37)
MCV RBC AUTO: 86.2 FL (ref 81–99)
MONOCYTES # BLD AUTO: 0.43 10*3/UL (ref 0.2–1.2)
MONOCYTES NFR BLD AUTO: 8.6 % (ref 3–10)
NEUTROPHILS # BLD AUTO: 2.64 10*3/UL (ref 2–8)
NEUTROPHILS NFR BLD AUTO: 52.7 % (ref 30–85)
NRBC CBCN: 0 % (ref 0–0.7)
PLATELET # BLD AUTO: 172 10*3/UL (ref 130–400)
PMV BLD AUTO: 10.2 FL (ref 9.4–12.3)
RBC # BLD AUTO: 5.16 10*6/UL (ref 4.2–5.4)
WBC # BLD AUTO: 5.01 10*3/UL (ref 4.8–10.8)

## 2020-05-11 ENCOUNTER — TELEMEDICINE CONVERTED (OUTPATIENT)
Dept: UROLOGY | Facility: CLINIC | Age: 65
End: 2020-05-11
Attending: UROLOGY

## 2020-06-25 ENCOUNTER — OFFICE VISIT (OUTPATIENT)
Dept: CARDIOLOGY | Facility: CLINIC | Age: 65
End: 2020-06-25

## 2020-06-25 ENCOUNTER — TELEPHONE (OUTPATIENT)
Dept: CARDIOLOGY | Facility: CLINIC | Age: 65
End: 2020-06-25

## 2020-06-25 VITALS
HEART RATE: 74 BPM | HEIGHT: 63 IN | DIASTOLIC BLOOD PRESSURE: 70 MMHG | WEIGHT: 195.4 LBS | BODY MASS INDEX: 34.62 KG/M2 | SYSTOLIC BLOOD PRESSURE: 120 MMHG

## 2020-06-25 DIAGNOSIS — I10 ESSENTIAL HYPERTENSION: ICD-10-CM

## 2020-06-25 DIAGNOSIS — I25.10 CHRONIC CORONARY ARTERY DISEASE: Primary | ICD-10-CM

## 2020-06-25 DIAGNOSIS — E78.49 OTHER HYPERLIPIDEMIA: ICD-10-CM

## 2020-06-25 PROCEDURE — 99214 OFFICE O/P EST MOD 30 MIN: CPT | Performed by: INTERNAL MEDICINE

## 2020-06-25 PROCEDURE — 93000 ELECTROCARDIOGRAM COMPLETE: CPT | Performed by: INTERNAL MEDICINE

## 2020-06-25 RX ORDER — INSULIN GLARGINE 100 [IU]/ML
12 INJECTION, SOLUTION SUBCUTANEOUS 2 TIMES DAILY
COMMUNITY
Start: 2020-06-06

## 2020-06-25 RX ORDER — ALBUTEROL SULFATE 90 UG/1
2 AEROSOL, METERED RESPIRATORY (INHALATION) EVERY 6 HOURS PRN
COMMUNITY
End: 2020-07-17

## 2020-06-25 RX ORDER — OXYBUTYNIN CHLORIDE 5 MG/1
5 TABLET, EXTENDED RELEASE ORAL DAILY
COMMUNITY
Start: 2020-05-28 | End: 2022-02-07

## 2020-06-25 RX ORDER — CETIRIZINE HYDROCHLORIDE 10 MG/1
10 TABLET ORAL DAILY
COMMUNITY
Start: 2020-05-07 | End: 2022-06-15

## 2020-06-25 RX ORDER — INSULIN LISPRO 200 [IU]/ML
9 INJECTION, SOLUTION SUBCUTANEOUS
COMMUNITY
Start: 2020-04-09 | End: 2021-06-23

## 2020-06-25 NOTE — PROGRESS NOTES
Subjective:     Encounter Date:06/25/2020      Patient ID: Vivian Kauffman is a 65 y.o. female.    Chief Complaint:  History of Present Illness    The patient is a 65-year-old female with history of nonobstructive coronary artery disease, hypertension, dyslipidemia, diabetes mellitus type 2, prior stroke, seizure disorder, obesity status post lap band, and subsequent removal of the lap band, who presents for followup.      She presents today for annual follow-up.  When I had seen her last year she had lost weight and was doing well.  She reports since that office visit she has had 2 bouts of pneumonia.  This set her back as far as her weight loss and she is actually gained weight over the last year.  She is now seeing an allergist and is receiving allergy shots.  She denies any chest pain, shortness of breath, palpitations, orthopnea, near-syncope or syncope, or lower extremity edema.  She denies any significant changes in her medications.    Prior History:  I saw the patient initially for a preoperative evaluation in 8/2015.   She was getting ready to get her lap band removed and was noted to have an abnormal EKG at Dr. Ackerman's office.   She was previously followed by a cardiologist in [place], but was unable to get in with them at the time. Her prior cardiac workup included an evaluation for an abnormal EKG back in 11/2013 at which time she saw Dr. Duarte.  She underwent a stress test that showed anterior ischemia and subsequently underwent a cardiac catheterization that showed 50% stenosis of her proximal LAD, luminal irregularities of her left circumflex, and 50% stenosis of her right coronary artery.  Her echocardiogram showed pulmonary artery pressure of 40 mmHg, moderate TR, but otherwise unremarkable.   When I saw the patient initially, I compared these findings with her prior EKGs and it showed no change.  She also reported no new symptoms at the time and she was subsequently cleared for  surgery.      In 3/2017 she had successfully undergone removal of her lap band.  She denied any symptoms but wanted to proceed with repeat workup to ensure that her nonobstructive coronary artery disease has not progressed.  Proceeded with an echocardiogram that showed normal left ventricular systolic function and wall motion with an estimated ejection fraction of 65-70% and grade 1 diastolic dysfunction.  We attempted to proceed with a treadmill stress test but her baseline EKG abnormalities precluded a diagnostic tests so we proceeded with a perfusion stress test.  This was performed on 3/30/2017 and was negative for ischemia.       Review of Systems   Constitution: Positive for malaise/fatigue.   HENT: Negative for hearing loss, hoarse voice, nosebleeds and sore throat.    Eyes: Negative for pain.   Cardiovascular: Negative for chest pain, claudication, cyanosis, dyspnea on exertion, irregular heartbeat, leg swelling, near-syncope, orthopnea, palpitations, paroxysmal nocturnal dyspnea and syncope.   Respiratory: Negative for shortness of breath and snoring.    Endocrine: Negative for cold intolerance, heat intolerance, polydipsia, polyphagia and polyuria.   Skin: Negative for itching and rash.   Musculoskeletal: Negative for arthritis, falls, joint pain, joint swelling, muscle cramps, muscle weakness and myalgias.   Gastrointestinal: Negative for constipation, diarrhea, dysphagia, heartburn, hematemesis, hematochezia, melena, nausea and vomiting.   Genitourinary: Negative for frequency, hematuria and hesitancy.   Neurological: Negative for excessive daytime sleepiness, dizziness, headaches, light-headedness, numbness and weakness.   Psychiatric/Behavioral: Positive for depression. The patient is nervous/anxious.          Current Outpatient Medications:   •  albuterol sulfate  (90 Base) MCG/ACT inhaler, Inhale 2 puffs Every 6 (Six) Hours As Needed., Disp: , Rfl:   •  atorvastatin (Lipitor) 40 MG tablet,  Take 40 mg by mouth Every Night., Disp: , Rfl:   •  Biotin 5000 MCG capsule, Take 5,000 mcg by mouth Every Evening., Disp: , Rfl:   •  cetirizine (zyrTEC) 10 MG tablet, Daily., Disp: , Rfl:   •  clopidogrel (PLAVIX) 75 MG tablet, Take 75 mg by mouth Every Evening. HOLDING FOR SX, Disp: , Rfl:   •  FLUoxetine (PROzac) 20 MG tablet, Take 80 mg by mouth Every Morning., Disp: , Rfl:   •  gabapentin (NEURONTIN) 300 MG capsule, Take 300 mg by mouth Every Morning. And 600mg po in the pm, Disp: , Rfl:   •  HUMALOG KWIKPEN 200 UNIT/ML solution pen-injector, Take As Directed., Disp: , Rfl:   •  LANTUS SOLOSTAR 100 UNIT/ML injection pen, 2 (Two) Times a Day., Disp: , Rfl:   •  levETIRAcetam XR (KEPPRA XR) 500 MG 24 hr tablet, Take 1000mg po in the am and 2000mg in the pm, Disp: , Rfl:   •  levothyroxine (SYNTHROID, LEVOTHROID) 25 MCG tablet, Take 25 mcg by mouth Daily., Disp: , Rfl:   •  montelukast (SINGULAIR) 10 MG tablet, Take 10 mg by mouth Every Night., Disp: , Rfl:   •  oxybutynin XL (DITROPAN-XL) 5 MG 24 hr tablet, Take 5 mg by mouth Daily. FOR 30 DAYS, Disp: , Rfl:   •  primidone (MYSOLINE) 50 MG tablet, Take 25 mg by mouth Daily., Disp: , Rfl:   •  spironolactone (ALDACTONE) 25 MG tablet, Take 25 mg by mouth Every Morning., Disp: , Rfl:     Past Medical History:   Diagnosis Date   • Anxiety    • Asthma    • Chronic lower back pain    • Coronary arteriosclerosis    • Depression    • Diabetes mellitus (CMS/HCC)     TYPE 2   • Diarrhea    • Fatigue    • Fatty liver    • Fibromyalgia    • Fibromyositis    • GERD (gastroesophageal reflux disease)    • Heartburn    • Hyperlipidemia    • Hypertension    • Insomnia    • Kidney stone    • Morbid obesity (CMS/HCC)    • Muscle spasm    • Neck pain    • Obesity    • Pain of both hip joints    • Polyphagia(783.6)    • PONV (postoperative nausea and vomiting)    • Poor vision    • Problems with hearing    • Seizures (CMS/HCC)    • Shortness of breath on exertion    • Sinus drainage     • Stroke syndrome    • Tremor, essential     IN NECK  AND HANDS   • Vertigo    • Weight gain        Past Surgical History:   Procedure Laterality Date   • BREAST BIOPSY Right    • CARDIAC CATHETERIZATION      no stents 2013 at [facility]   • CARPAL TUNNEL RELEASE Bilateral    • COLONOSCOPY     • ENDOSCOPY N/A 2016    Procedure: ESOPHAGOGASTRODUODENOSCOPY WITH BIOPSY;  Surgeon: Chris Ackerman Jr., MD;  Location: [facility] ENDOSCOPY;  Service:    • EXTRACORPOREAL SHOCKWAVE LITHOTRIPSY (ESWL), STENT INSERTION/REMOVAL  2013   • FOOT SURGERY      TOENAILS REMOVED   • GASTRIC BANDING REMOVAL N/A 2016    Procedure: LAPAROSCOPIC GASTRIC BANDING AND PORT REMOVAL ;  Surgeon: Chris Ackerman Jr., MD;  [place] [facility] [place] [facility] [place]  Service:    • GASTRIC SLEEVE LAPAROSCOPIC N/A 10/2/2017    Procedure: GASTRIC SLEEVE LAPAROSCOPIC revision WITH LYSIS OF ADHESIONS AND HITAL HERNIA REPAIR ;  Surgeon: Chris Ackerman Jr., MD;  [place] [facility] [place] [facility] [place]  Service:    • LAPAROSCOPIC CHOLECYSTECTOMY     • LAPAROSCOPIC GASTRIC BANDING     • MASS EXCISION Right     RT FOOT    • NOSE SURGERY     • TONSILLECTOMY         Family History   Problem Relation Age of Onset   • Heart attack Mother    • Diabetes Mother    • Hypertension Mother    • Stroke Mother    • Heart attack Father    • Diabetes Father    • Hypertension Father    • Obesity Father         Morbid Obesity   • Stroke Father    • Heart attack Brother    • Cancer Brother         Bladder   • Heart attack Maternal Grandmother    • Heart attack Maternal Grandfather    • Stroke Paternal Grandmother    • Heart attack Paternal Grandfather    • Malig Hyperthermia Neg Hx        Social History     Tobacco Use   • Smoking status: Former Smoker     Packs/day: 2.00     Years: 30.00     Pack years: 60.00     Types: Cigarettes     Last attempt to quit:      Years since quittin.4   • Smokeless tobacco: Never Used   • Tobacco comment: caffeine use  "  Substance Use Topics   • Alcohol use: Yes     Comment: OCCAS   • Drug use: No         ECG 12 Lead  Date/Time: 6/25/2020 5:01 PM  Performed by: Kia Waters MD  Authorized by: Kia Waters MD   Comparison: compared with previous ECG   Similar to previous ECG  Rhythm: sinus rhythm               Objective:     Visit Vitals  /70   Pulse 74   Ht 160 cm (63\")   Wt 88.6 kg (195 lb 6.4 oz)   BMI 34.61 kg/m²         Physical Exam   Constitutional: She is oriented to person, place, and time. She appears well-developed and well-nourished.   HENT:   Head: Normocephalic and atraumatic.   Eyes: Pupils are equal, round, and reactive to light. Conjunctivae, EOM and lids are normal.   Neck: Normal range of motion and full passive range of motion without pain. Neck supple. No JVD present. Carotid bruit is not present.   Cardiovascular: Normal rate, regular rhythm, S1 normal and S2 normal. Exam reveals no gallop.   No murmur heard.  Pulses:       Radial pulses are 2+ on the right side, and 2+ on the left side.   Pulmonary/Chest: Effort normal and breath sounds normal.   Abdominal: Soft. Normal appearance.   Musculoskeletal: She exhibits no edema.   Lymphadenopathy:     She has no cervical adenopathy.   Neurological: She is alert and oriented to person, place, and time.   Skin: Skin is warm, dry and intact.   Psychiatric: She has a normal mood and affect.           Assessment:          Diagnosis Plan   1. Non-obstructive coronary artery disease     2. Essential hypertension     3. Other hyperlipidemia            Plan:       1.  Nonobstructive coronary artery disease.  Ischemic work-up in 2017 was unremarkable.  Not having any anginal symptoms at this time.  Continue medical management.  2.  Hypertension.  Well-controlled on her current medications.  We will continue the same.  3.  Hyperlipidemia.  On atorvastatin which is managed by Dr. Acosta.  4.  Diabetes mellitus type 2  5.  History of stroke.  On chronic dual " antiplatelet therapy.    Recommend a one-year office follow-up.

## 2020-07-17 ENCOUNTER — LAB (OUTPATIENT)
Dept: LAB | Facility: HOSPITAL | Age: 65
End: 2020-07-17

## 2020-07-17 ENCOUNTER — OFFICE VISIT (OUTPATIENT)
Dept: BARIATRICS/WEIGHT MGMT | Facility: CLINIC | Age: 65
End: 2020-07-17

## 2020-07-17 VITALS
WEIGHT: 197 LBS | BODY MASS INDEX: 34.91 KG/M2 | HEART RATE: 77 BPM | TEMPERATURE: 97.1 F | HEIGHT: 63 IN | SYSTOLIC BLOOD PRESSURE: 125 MMHG | DIASTOLIC BLOOD PRESSURE: 75 MMHG | RESPIRATION RATE: 18 BRPM

## 2020-07-17 DIAGNOSIS — E78.49 OTHER HYPERLIPIDEMIA: ICD-10-CM

## 2020-07-17 DIAGNOSIS — Z98.84 S/P LAPAROSCOPIC SLEEVE GASTRECTOMY: ICD-10-CM

## 2020-07-17 DIAGNOSIS — M25.50 MULTIPLE JOINT PAIN: ICD-10-CM

## 2020-07-17 DIAGNOSIS — F32.A DEPRESSION, UNSPECIFIED DEPRESSION TYPE: ICD-10-CM

## 2020-07-17 DIAGNOSIS — Z71.3 DIETARY COUNSELING: ICD-10-CM

## 2020-07-17 DIAGNOSIS — E66.9 OBESITY, CLASS I, BMI 30-34.9: ICD-10-CM

## 2020-07-17 DIAGNOSIS — I10 ESSENTIAL HYPERTENSION: ICD-10-CM

## 2020-07-17 DIAGNOSIS — E66.9 DIABETES MELLITUS TYPE 2 IN OBESE (HCC): ICD-10-CM

## 2020-07-17 DIAGNOSIS — E11.69 DIABETES MELLITUS TYPE 2 IN OBESE (HCC): ICD-10-CM

## 2020-07-17 DIAGNOSIS — E66.9 OBESITY, CLASS I, BMI 30-34.9: Primary | ICD-10-CM

## 2020-07-17 PROBLEM — M79.7 FIBROMYOSITIS: Status: ACTIVE | Noted: 2020-07-17

## 2020-07-17 PROBLEM — E11.9 DIABETES MELLITUS (HCC): Status: ACTIVE | Noted: 2020-07-17

## 2020-07-17 PROBLEM — R41.3 AMNESIA: Status: ACTIVE | Noted: 2020-07-17

## 2020-07-17 PROBLEM — E03.4 ACQUIRED ATROPHY OF THYROID: Status: ACTIVE | Noted: 2020-07-17

## 2020-07-17 LAB
25(OH)D3 SERPL-MCNC: 41.5 NG/ML (ref 30–100)
ALBUMIN SERPL-MCNC: 4.6 G/DL (ref 3.5–5.2)
ALBUMIN/GLOB SERPL: 1.6 G/DL
ALP SERPL-CCNC: 113 U/L (ref 39–117)
ALT SERPL W P-5'-P-CCNC: 33 U/L (ref 1–33)
ANION GAP SERPL CALCULATED.3IONS-SCNC: 12.6 MMOL/L (ref 5–15)
AST SERPL-CCNC: 25 U/L (ref 1–32)
BASOPHILS # BLD AUTO: 0.02 10*3/MM3 (ref 0–0.2)
BASOPHILS NFR BLD AUTO: 0.4 % (ref 0–1.5)
BILIRUB SERPL-MCNC: 0.5 MG/DL (ref 0–1.2)
BUN SERPL-MCNC: 15 MG/DL (ref 8–23)
BUN/CREAT SERPL: 25.9 (ref 7–25)
CALCIUM SPEC-SCNC: 9.3 MG/DL (ref 8.6–10.5)
CHLORIDE SERPL-SCNC: 101 MMOL/L (ref 98–107)
CO2 SERPL-SCNC: 23.4 MMOL/L (ref 22–29)
CREAT SERPL-MCNC: 0.58 MG/DL (ref 0.57–1)
DEPRECATED RDW RBC AUTO: 42.2 FL (ref 37–54)
EOSINOPHIL # BLD AUTO: 0.01 10*3/MM3 (ref 0–0.4)
EOSINOPHIL NFR BLD AUTO: 0.2 % (ref 0.3–6.2)
ERYTHROCYTE [DISTWIDTH] IN BLOOD BY AUTOMATED COUNT: 13.8 % (ref 12.3–15.4)
FERRITIN SERPL-MCNC: 38.2 NG/ML (ref 13–150)
FOLATE SERPL-MCNC: >20 NG/ML (ref 4.78–24.2)
GFR SERPL CREATININE-BSD FRML MDRD: 104 ML/MIN/1.73
GLOBULIN UR ELPH-MCNC: 2.9 GM/DL
GLUCOSE SERPL-MCNC: 257 MG/DL (ref 65–99)
HCT VFR BLD AUTO: 42.6 % (ref 34–46.6)
HGB BLD-MCNC: 13.8 G/DL (ref 12–15.9)
IMM GRANULOCYTES # BLD AUTO: 0.02 10*3/MM3 (ref 0–0.05)
IMM GRANULOCYTES NFR BLD AUTO: 0.4 % (ref 0–0.5)
IRON 24H UR-MRATE: 65 MCG/DL (ref 37–145)
LYMPHOCYTES # BLD AUTO: 0.78 10*3/MM3 (ref 0.7–3.1)
LYMPHOCYTES NFR BLD AUTO: 15.1 % (ref 19.6–45.3)
MAGNESIUM SERPL-MCNC: 1.7 MG/DL (ref 1.6–2.4)
MCH RBC QN AUTO: 27.1 PG (ref 26.6–33)
MCHC RBC AUTO-ENTMCNC: 32.4 G/DL (ref 31.5–35.7)
MCV RBC AUTO: 83.7 FL (ref 79–97)
MONOCYTES # BLD AUTO: 0.14 10*3/MM3 (ref 0.1–0.9)
MONOCYTES NFR BLD AUTO: 2.7 % (ref 5–12)
NEUTROPHILS NFR BLD AUTO: 4.19 10*3/MM3 (ref 1.7–7)
NEUTROPHILS NFR BLD AUTO: 81.2 % (ref 42.7–76)
NRBC BLD AUTO-RTO: 0 /100 WBC (ref 0–0.2)
PHOSPHATE SERPL-MCNC: 2.8 MG/DL (ref 2.5–4.5)
PLATELET # BLD AUTO: 161 10*3/MM3 (ref 140–450)
PMV BLD AUTO: 9.5 FL (ref 6–12)
POTASSIUM SERPL-SCNC: 4.2 MMOL/L (ref 3.5–5.2)
PREALB SERPL-MCNC: 23.8 MG/DL (ref 20–40)
PROT SERPL-MCNC: 7.5 G/DL (ref 6–8.5)
PTH-INTACT SERPL-MCNC: 67.7 PG/ML (ref 15–65)
RBC # BLD AUTO: 5.09 10*6/MM3 (ref 3.77–5.28)
SODIUM SERPL-SCNC: 137 MMOL/L (ref 136–145)
WBC # BLD AUTO: 5.16 10*3/MM3 (ref 3.4–10.8)

## 2020-07-17 PROCEDURE — 36415 COLL VENOUS BLD VENIPUNCTURE: CPT

## 2020-07-17 PROCEDURE — 80053 COMPREHEN METABOLIC PANEL: CPT

## 2020-07-17 PROCEDURE — 99214 OFFICE O/P EST MOD 30 MIN: CPT | Performed by: NURSE PRACTITIONER

## 2020-07-17 PROCEDURE — 83970 ASSAY OF PARATHORMONE: CPT

## 2020-07-17 PROCEDURE — 84134 ASSAY OF PREALBUMIN: CPT

## 2020-07-17 PROCEDURE — 82306 VITAMIN D 25 HYDROXY: CPT

## 2020-07-17 PROCEDURE — 83540 ASSAY OF IRON: CPT

## 2020-07-17 PROCEDURE — 84446 ASSAY OF VITAMIN E: CPT

## 2020-07-17 PROCEDURE — 85025 COMPLETE CBC W/AUTO DIFF WBC: CPT

## 2020-07-17 PROCEDURE — 83735 ASSAY OF MAGNESIUM: CPT

## 2020-07-17 PROCEDURE — 82746 ASSAY OF FOLIC ACID SERUM: CPT

## 2020-07-17 PROCEDURE — 84425 ASSAY OF VITAMIN B-1: CPT

## 2020-07-17 PROCEDURE — 84590 ASSAY OF VITAMIN A: CPT

## 2020-07-17 PROCEDURE — 84597 ASSAY OF VITAMIN K: CPT

## 2020-07-17 PROCEDURE — 84100 ASSAY OF PHOSPHORUS: CPT

## 2020-07-17 PROCEDURE — 82728 ASSAY OF FERRITIN: CPT

## 2020-07-17 PROCEDURE — 83921 ORGANIC ACID SINGLE QUANT: CPT

## 2020-07-17 RX ORDER — PROPRANOLOL HYDROCHLORIDE 20 MG/1
20 TABLET ORAL DAILY PRN
COMMUNITY
Start: 2020-07-14 | End: 2021-12-13

## 2020-07-17 RX ORDER — ATORVASTATIN CALCIUM 40 MG/1
1 TABLET, FILM COATED ORAL DAILY
COMMUNITY
End: 2020-07-17

## 2020-07-17 RX ORDER — PRAZOSIN HYDROCHLORIDE 1 MG/1
1 CAPSULE ORAL
COMMUNITY
Start: 2020-07-09 | End: 2020-07-17

## 2020-07-17 RX ORDER — LEVETIRACETAM 1000 MG/1
1000 TABLET ORAL 2 TIMES DAILY
COMMUNITY
Start: 2020-07-12 | End: 2021-06-23

## 2020-07-17 NOTE — PROGRESS NOTES
[facility]IATR[facility] BARIATRIC SURGERY  [address] [facility] [address]  [facility] [address]  [phone]  [address] [facility] [address]  [facility] [address]  [phone]  Dept: [phone]  7/17/2020      Vivian Kauffman.  [id number]  [id number]  1955  female      Chief Complaint   Patient presents with   • Follow-up     3 year sleeve follow up        Post-Op Bariatric Surgery:   Vivian Kauffman is status post Laparoscopic Sleeve/HH procedure, performed on 10/2/17     HPI:   Today's weight is 89.4 kg (197 lb) pounds, today's BMI is Body mass index is 34.91 kg/m²., she has a  gain of 20 pounds since the last visit and her weight loss since surgery is 13 pounds. The patient reports a decreased portion size and loss of appetite.       Diet and Exercise: Diet history reviewed and discussed with the patient. Weight loss/gains to date discussed with the patient. The patient states they are eating 60 grams of protein per day. She reports eating 3 meals per day, a typical portion size of 1/2 cup, eating 0 snacks per day, drinking 5-6 or more 8-oz. glasses of water per day, no carbonated beverage consumption and exercising regularly- walking 3-5 days per week. She does drink unsweet tea.    She usually does a protein shake in the morning. 3 pieces of cheese and 8 crackers for lunch. Usually does a soup or salad for dinner.    She is on prednisone for allergy shots. She is only taking one 20 mg pill in a week. She reports that her HBa1C was elevated yesterday she does report that her insulin has been increased or adjusted several times over the past 6 months.    She usually eats breakfast around 8, will have a snack around lunch, usually has dinner around 6-7 pm. She usually goes to bed around 1 am.    Supplements: b12     Review of Systems   Constitutional: Positive for appetite change. Negative for fatigue and unexpected weight change.   HENT: Negative.    Eyes: Negative.     Respiratory: Negative.    Cardiovascular: Negative.  Negative for leg swelling.   Gastrointestinal: Negative for abdominal distention, abdominal pain, constipation, diarrhea, nausea and vomiting.   Genitourinary: Negative for difficulty urinating, frequency and urgency.   Musculoskeletal: Negative for back pain.   Skin: Negative.    Psychiatric/Behavioral: Negative.    All other systems reviewed and are negative.      Patient Active Problem List   Diagnosis   • Essential hypertension   • Hyperlipidemia   • Fatigue   • Alopecia   • Dietary counseling   • Abnormal electrocardiogram   • Anxiety   • Asthma   • Carpal tunnel syndrome   • Non-obstructive coronary artery disease   • Depression   • Exercise counseling   • Hx MRSA infection   • Diabetes mellitus type 2 in obese (CMS/HCC)   • Edema   • Fatty liver   • Multiple joint pain   • Seizure disorder (CMS/HCC)   • Obesity, Class I, BMI 30-34.9   • History of removal of laparoscopic gastric banding device   • S/P laparoscopic sleeve gastrectomy   • Noncompliance with dietary regimen required status post bariatric surgery   • Acquired atrophy of thyroid   • Amnesia   • Diabetes mellitus (CMS/HCC)   • Fibromyositis   • Hyperglycemia due to type 2 diabetes mellitus (CMS/HCC)       Past Medical History:   Diagnosis Date   • Anxiety    • Asthma    • Chronic lower back pain    • Coronary arteriosclerosis    • Depression    • Diabetes mellitus (CMS/HCC)     TYPE 2   • Diarrhea    • Fatigue    • Fatty liver    • Fibromyalgia    • Fibromyositis    • GERD (gastroesophageal reflux disease)    • Heartburn    • Hyperlipidemia    • Hypertension    • Insomnia    • Kidney stone    • Morbid obesity (CMS/HCC)    • Muscle spasm    • Neck pain    • Obesity    • Pain of both hip joints    • Polyphagia(783.6)    • PONV (postoperative nausea and vomiting)    • Poor vision    • Problems with hearing    • Seizures (CMS/HCC)    • Shortness of breath on exertion    • Sinus drainage    • Stroke  syndrome    • Tremor, essential     IN NECK  AND HANDS   • Vertigo    • Weight gain        The following portions of the patient's history were reviewed and updated as appropriate: allergies, current medications, past family history, past medical history, past social history, past surgical history and problem list.    Vitals:    07/17/20 1339   BP: 125/75   Pulse: 77   Resp: 18   Temp: 97.1 °F (36.2 °C)       Physical Exam   Constitutional: She appears well-developed and well-nourished.   Neck: No thyromegaly present.   Cardiovascular: Normal rate, regular rhythm and normal heart sounds.   Pulmonary/Chest: Effort normal and breath sounds normal. No respiratory distress. She has no wheezes.   Abdominal: Soft. Bowel sounds are normal. She exhibits no distension. There is no tenderness. There is no guarding. No hernia.   Musculoskeletal: She exhibits no edema or tenderness.   Neurological: She is alert.   Skin: Skin is warm and dry. No rash noted. No erythema.   Psychiatric: She has a normal mood and affect. Her behavior is normal.   Nursing note and vitals reviewed.      Assessment:   Post-op, the patient is struggling with weight regain.     Encounter Diagnoses   Name Primary?   • Obesity, Class I, BMI 30-34.9 Yes   • S/P laparoscopic sleeve gastrectomy    • Diabetes mellitus type 2 in obese (CMS/HCC)    • Essential hypertension    • Other hyperlipidemia    • Multiple joint pain    • Depression, unspecified depression type    • Dietary counseling        Plan:   Despite very minimal changes to her everyday routine and diet over the past year patient has experienced a significant increase in overall weight.  She has been on 20 mg prednisone weekly for the past several weeks and does report that she has a couple of weeks left.  She does report market elevations in her HbA1c and as they have increased her insulin her weight has increased with that.  We did discuss intermittent fasting with a 16 and 8-hour schedule to  help with her insulin insensitivity.  We will check her annual lab work today to rule out any vitamin deficiencies.  She was encouraged to follow-up with her endocrinologist on her blood sugar control and to make sure to mention her steroid dosing as they are monitoring her blood sugars.  He was encouraged to avoid simple carbohydrates such as crackers and focus on a hard-boiled egg or more solid less processed protein sources in her diet  Encouraged patient to be sure to get plenty of lean protein per day through small frequent meals all with a protein source.   Activity restrictions: none.   Recommended patient be sure to get at least 70 grams of protein per day by eating small, frequent meals all with high lean protein choices. Be sure to limit/cut back on daily carbohydrate intake. Discussed with the patient the recommended amount of water per day to intake- half of body weight in ounces. Reviewed vitamin requirements. Be sure to do routine exercise, 150 minutes per week minimum, including both cardio and strength training.     Instructions / Recommendations: dietary counseling recommended, recommended a daily protein intake of  grams, vitamin supplement(s) recommended, recommended exercising at least 150 minutes per week, behavior modifications recommended and instructed to call the office for concerns, questions, or problems.     The patient was instructed to follow up in 6 months .     . Total time spent face to face was 35 minutes and 25 minutes was spent counseling.

## 2020-07-20 LAB — VIT B1 BLD-SCNC: 195.4 NMOL/L (ref 66.5–200)

## 2020-07-21 LAB
Lab: NORMAL
METHYLMALONATE SERPL-SCNC: 101 NMOL/L (ref 0–378)

## 2020-07-22 LAB
A-TOCOPHEROL VIT E SERPL-MCNC: 11.5 MG/L (ref 9–29)
GAMMA TOCOPHEROL SERPL-MCNC: 0.8 MG/L (ref 0.5–4.9)
PHYTONADIONE SERPL-MCNC: 0.34 NG/ML (ref 0.13–1.88)
VIT A SERPL-MCNC: 42.3 UG/DL (ref 22–69.5)

## 2020-07-27 ENCOUNTER — TELEPHONE (OUTPATIENT)
Dept: BARIATRICS/WEIGHT MGMT | Facility: CLINIC | Age: 65
End: 2020-07-27

## 2020-07-27 NOTE — TELEPHONE ENCOUNTER
Spoke to pt regarding lab results. Instructed pt to add a daily dose of Vitamin D 1000 IU. Pt gave a verbal understanding.         ----- Message from JIM Gamble sent at 7/27/2020  9:04 AM EDT -----  PTH is mildly elevated. Vitamin D is in normal range but is low normal. I would advise her to add a daily 1000IU dose to her regimen. Otherwise, regarding vitamin levels these look perfect.

## 2020-10-23 ENCOUNTER — HOSPITAL ENCOUNTER (OUTPATIENT)
Dept: OTHER | Facility: HOSPITAL | Age: 65
Discharge: HOME | End: 2020-10-23
Attending: PHYSICIAN ASSISTANT

## 2020-11-05 ENCOUNTER — HOSPITAL ENCOUNTER (OUTPATIENT)
Dept: OTHER | Facility: HOSPITAL | Age: 65
Discharge: HOME | End: 2020-11-05
Attending: INTERNAL MEDICINE

## 2020-11-05 LAB
ANION GAP SERPL CALC-SCNC: 17 MMOL/L (ref 8–19)
APTT BLD: 22 S (ref 22.2–34.2)
BASOPHILS # BLD AUTO: 0.05 10*3/UL (ref 0–0.2)
BASOPHILS NFR BLD AUTO: 0.8 % (ref 0–3)
BUN SERPL-MCNC: 11 MG/DL (ref 5–25)
BUN/CREAT SERPL: 17 {RATIO} (ref 6–20)
CALCIUM SERPL-MCNC: 9.7 MG/DL (ref 8.7–10.4)
CHLORIDE SERPL-SCNC: 94 MMOL/L (ref 99–111)
CONV ABS IMM GRAN: 0.02 10*3/UL (ref 0–0.2)
CONV CO2: 26 MMOL/L (ref 22–32)
CONV IMMATURE GRAN: 0.3 % (ref 0–1.8)
CREAT UR-MCNC: 0.66 MG/DL (ref 0.5–0.9)
DEPRECATED RDW RBC AUTO: 42.2 FL (ref 36.4–46.3)
EOSINOPHIL # BLD AUTO: 0.12 10*3/UL (ref 0–0.7)
EOSINOPHIL # BLD AUTO: 1.8 % (ref 0–7)
ERYTHROCYTE [DISTWIDTH] IN BLOOD BY AUTOMATED COUNT: 14.3 % (ref 11.7–14.4)
EST. AVERAGE GLUCOSE BLD GHB EST-MCNC: 275 MG/DL
GFR SERPLBLD BASED ON 1.73 SQ M-ARVRAT: >60 ML/MIN/{1.73_M2}
GLUCOSE SERPL-MCNC: 157 MG/DL (ref 65–99)
HBA1C MFR BLD: 11.2 % (ref 3.5–5.7)
HCT VFR BLD AUTO: 45.4 % (ref 37–47)
HGB BLD-MCNC: 15.2 G/DL (ref 12–16)
INR PPP: 1 (ref 2–3)
LYMPHOCYTES # BLD AUTO: 2.29 10*3/UL (ref 1–5)
LYMPHOCYTES NFR BLD AUTO: 34.4 % (ref 20–45)
MCH RBC QN AUTO: 27.4 PG (ref 27–31)
MCHC RBC AUTO-ENTMCNC: 33.5 G/DL (ref 33–37)
MCV RBC AUTO: 81.8 FL (ref 81–99)
MONOCYTES # BLD AUTO: 0.53 10*3/UL (ref 0.2–1.2)
MONOCYTES NFR BLD AUTO: 8 % (ref 3–10)
NEUTROPHILS # BLD AUTO: 3.64 10*3/UL (ref 2–8)
NEUTROPHILS NFR BLD AUTO: 54.7 % (ref 30–85)
NRBC CBCN: 0 % (ref 0–0.7)
OSMOLALITY SERPL CALC.SUM OF ELEC: 279 MOSM/KG (ref 273–304)
PLATELET # BLD AUTO: 172 10*3/UL (ref 130–400)
PMV BLD AUTO: 9.8 FL (ref 9.4–12.3)
POTASSIUM SERPL-SCNC: 3.7 MMOL/L (ref 3.5–5.3)
PROTHROMBIN TIME: 10.8 S (ref 9.4–12)
RBC # BLD AUTO: 5.55 10*6/UL (ref 4.2–5.4)
SODIUM SERPL-SCNC: 133 MMOL/L (ref 135–147)
TSH SERPL-ACNC: 1.08 M[IU]/L (ref 0.27–4.2)
WBC # BLD AUTO: 6.65 10*3/UL (ref 4.8–10.8)

## 2020-11-18 ENCOUNTER — HOSPITAL ENCOUNTER (OUTPATIENT)
Dept: ORTHOPEDIC SURGERY | Facility: CLINIC | Age: 65
Setting detail: RECURRING SERIES
Discharge: HOME | End: 2021-04-14
Attending: PLASTIC SURGERY

## 2020-12-10 ENCOUNTER — HOSPITAL ENCOUNTER (OUTPATIENT)
Dept: OTHER | Facility: HOSPITAL | Age: 65
Discharge: HOME | End: 2020-12-10
Attending: INTERNAL MEDICINE

## 2020-12-10 LAB
ALBUMIN SERPL-MCNC: 4.1 G/DL (ref 3.5–5)
ALBUMIN/GLOB SERPL: 1.2 {RATIO} (ref 1.4–2.6)
ALP SERPL-CCNC: 100 U/L (ref 43–160)
ALT SERPL-CCNC: 70 U/L (ref 10–40)
ANION GAP SERPL CALC-SCNC: 17 MMOL/L (ref 8–19)
AST SERPL-CCNC: 57 U/L (ref 15–50)
BASOPHILS # BLD AUTO: 0.05 10*3/UL (ref 0–0.2)
BASOPHILS NFR BLD AUTO: 0.6 % (ref 0–3)
BILIRUB SERPL-MCNC: 0.63 MG/DL (ref 0.2–1.3)
BUN SERPL-MCNC: 12 MG/DL (ref 5–25)
BUN/CREAT SERPL: 20 {RATIO} (ref 6–20)
CALCIUM SERPL-MCNC: 9.6 MG/DL (ref 8.7–10.4)
CHLORIDE SERPL-SCNC: 100 MMOL/L (ref 99–111)
CONV ABS IMM GRAN: 0.04 10*3/UL (ref 0–0.2)
CONV CO2: 24 MMOL/L (ref 22–32)
CONV IMMATURE GRAN: 0.5 % (ref 0–1.8)
CONV TOTAL PROTEIN: 7.5 G/DL (ref 6.3–8.2)
CREAT UR-MCNC: 0.61 MG/DL (ref 0.5–0.9)
DEPRECATED RDW RBC AUTO: 43.1 FL (ref 36.4–46.3)
EOSINOPHIL # BLD AUTO: 0.1 10*3/UL (ref 0–0.7)
EOSINOPHIL # BLD AUTO: 1.3 % (ref 0–7)
ERYTHROCYTE [DISTWIDTH] IN BLOOD BY AUTOMATED COUNT: 14.6 % (ref 11.7–14.4)
GFR SERPLBLD BASED ON 1.73 SQ M-ARVRAT: >60 ML/MIN/{1.73_M2}
GLOBULIN UR ELPH-MCNC: 3.4 G/DL (ref 2–3.5)
GLUCOSE SERPL-MCNC: 192 MG/DL (ref 65–99)
HCT VFR BLD AUTO: 43.9 % (ref 37–47)
HGB BLD-MCNC: 14.1 G/DL (ref 12–16)
LYMPHOCYTES # BLD AUTO: 1.58 10*3/UL (ref 1–5)
LYMPHOCYTES NFR BLD AUTO: 20.5 % (ref 20–45)
MCH RBC QN AUTO: 26.7 PG (ref 27–31)
MCHC RBC AUTO-ENTMCNC: 32.1 G/DL (ref 33–37)
MCV RBC AUTO: 83.1 FL (ref 81–99)
MONOCYTES # BLD AUTO: 0.53 10*3/UL (ref 0.2–1.2)
MONOCYTES NFR BLD AUTO: 6.9 % (ref 3–10)
NEUTROPHILS # BLD AUTO: 5.42 10*3/UL (ref 2–8)
NEUTROPHILS NFR BLD AUTO: 70.2 % (ref 30–85)
NRBC CBCN: 0 % (ref 0–0.7)
OSMOLALITY SERPL CALC.SUM OF ELEC: 289 MOSM/KG (ref 273–304)
PLATELET # BLD AUTO: 274 10*3/UL (ref 130–400)
PMV BLD AUTO: 9.8 FL (ref 9.4–12.3)
POTASSIUM SERPL-SCNC: 4 MMOL/L (ref 3.5–5.3)
PROCALCITONIN SERPL-MCNC: 0.06 NG/ML (ref 0–4)
RBC # BLD AUTO: 5.28 10*6/UL (ref 4.2–5.4)
SODIUM SERPL-SCNC: 137 MMOL/L (ref 135–147)
WBC # BLD AUTO: 7.72 10*3/UL (ref 4.8–10.8)

## 2021-01-12 ENCOUNTER — HOSPITAL ENCOUNTER (OUTPATIENT)
Dept: OTHER | Facility: HOSPITAL | Age: 66
Discharge: HOME | End: 2021-01-12
Attending: INTERNAL MEDICINE

## 2021-01-12 LAB
25(OH)D3 SERPL-MCNC: 32 NG/ML (ref 30–100)
ALBUMIN SERPL-MCNC: 4.2 G/DL (ref 3.5–5)
ALBUMIN/GLOB SERPL: 1.4 {RATIO} (ref 1.4–2.6)
ALP SERPL-CCNC: 118 U/L (ref 43–160)
ALT SERPL-CCNC: 36 U/L (ref 10–40)
ANION GAP SERPL CALC-SCNC: 16 MMOL/L (ref 8–19)
AST SERPL-CCNC: 32 U/L (ref 15–50)
BASOPHILS # BLD AUTO: 0.04 10*3/UL (ref 0–0.2)
BASOPHILS NFR BLD AUTO: 0.7 % (ref 0–3)
BILIRUB SERPL-MCNC: 0.37 MG/DL (ref 0.2–1.3)
BUN SERPL-MCNC: 11 MG/DL (ref 5–25)
BUN/CREAT SERPL: 19 {RATIO} (ref 6–20)
CALCIUM SERPL-MCNC: 9.2 MG/DL (ref 8.7–10.4)
CHLORIDE SERPL-SCNC: 101 MMOL/L (ref 99–111)
CHOLEST SERPL-MCNC: 145 MG/DL (ref 107–200)
CHOLEST/HDLC SERPL: 2.5 {RATIO} (ref 3–6)
CONV ABS IMM GRAN: 0.01 10*3/UL (ref 0–0.2)
CONV CO2: 26 MMOL/L (ref 22–32)
CONV IMMATURE GRAN: 0.2 % (ref 0–1.8)
CONV TOTAL PROTEIN: 7.2 G/DL (ref 6.3–8.2)
CREAT UR-MCNC: 0.57 MG/DL (ref 0.5–0.9)
DEPRECATED RDW RBC AUTO: 43.1 FL (ref 36.4–46.3)
EOSINOPHIL # BLD AUTO: 0.19 10*3/UL (ref 0–0.7)
EOSINOPHIL # BLD AUTO: 3.4 % (ref 0–7)
ERYTHROCYTE [DISTWIDTH] IN BLOOD BY AUTOMATED COUNT: 14.4 % (ref 11.7–14.4)
GFR SERPLBLD BASED ON 1.73 SQ M-ARVRAT: >60 ML/MIN/{1.73_M2}
GLOBULIN UR ELPH-MCNC: 3 G/DL (ref 2–3.5)
GLUCOSE SERPL-MCNC: 144 MG/DL (ref 65–99)
HCT VFR BLD AUTO: 45.2 % (ref 37–47)
HDLC SERPL-MCNC: 59 MG/DL (ref 40–60)
HGB BLD-MCNC: 14.7 G/DL (ref 12–16)
LDLC SERPL CALC-MCNC: 60 MG/DL (ref 70–100)
LYMPHOCYTES # BLD AUTO: 1.67 10*3/UL (ref 1–5)
LYMPHOCYTES NFR BLD AUTO: 29.9 % (ref 20–45)
MCH RBC QN AUTO: 26.9 PG (ref 27–31)
MCHC RBC AUTO-ENTMCNC: 32.5 G/DL (ref 33–37)
MCV RBC AUTO: 82.8 FL (ref 81–99)
MONOCYTES # BLD AUTO: 0.41 10*3/UL (ref 0.2–1.2)
MONOCYTES NFR BLD AUTO: 7.3 % (ref 3–10)
NEUTROPHILS # BLD AUTO: 3.27 10*3/UL (ref 2–8)
NEUTROPHILS NFR BLD AUTO: 58.5 % (ref 30–85)
NRBC CBCN: 0 % (ref 0–0.7)
OSMOLALITY SERPL CALC.SUM OF ELEC: 290 MOSM/KG (ref 273–304)
PLATELET # BLD AUTO: 200 10*3/UL (ref 130–400)
PMV BLD AUTO: 9.5 FL (ref 9.4–12.3)
POTASSIUM SERPL-SCNC: 4.2 MMOL/L (ref 3.5–5.3)
RBC # BLD AUTO: 5.46 10*6/UL (ref 4.2–5.4)
SODIUM SERPL-SCNC: 139 MMOL/L (ref 135–147)
TRIGL SERPL-MCNC: 132 MG/DL (ref 40–150)
VLDLC SERPL-MCNC: 26 MG/DL (ref 5–37)
WBC # BLD AUTO: 5.59 10*3/UL (ref 4.8–10.8)

## 2021-02-01 ENCOUNTER — HOSPITAL ENCOUNTER (OUTPATIENT)
Dept: CARDIOLOGY | Facility: HOSPITAL | Age: 66
Discharge: HOME | End: 2021-02-01
Attending: NURSE PRACTITIONER

## 2021-03-04 ENCOUNTER — HOSPITAL ENCOUNTER (OUTPATIENT)
Dept: PHYSICAL THERAPY | Facility: CLINIC | Age: 66
Setting detail: RECURRING SERIES
Discharge: STILL ADMITTED OR ADMINISTRATIVE | End: 2021-03-11
Attending: EMERGENCY MEDICINE

## 2021-04-05 ENCOUNTER — LAB (OUTPATIENT)
Dept: LAB | Facility: HOSPITAL | Age: 66
End: 2021-04-05

## 2021-04-05 ENCOUNTER — OFFICE VISIT (OUTPATIENT)
Dept: BARIATRICS/WEIGHT MGMT | Facility: CLINIC | Age: 66
End: 2021-04-05

## 2021-04-05 VITALS
TEMPERATURE: 97.8 F | BODY MASS INDEX: 36.5 KG/M2 | HEIGHT: 63 IN | HEART RATE: 77 BPM | DIASTOLIC BLOOD PRESSURE: 71 MMHG | RESPIRATION RATE: 18 BRPM | SYSTOLIC BLOOD PRESSURE: 126 MMHG | WEIGHT: 206 LBS

## 2021-04-05 DIAGNOSIS — Z98.84 S/P LAPAROSCOPIC SLEEVE GASTRECTOMY: ICD-10-CM

## 2021-04-05 DIAGNOSIS — E11.69 DIABETES MELLITUS TYPE 2 IN OBESE (HCC): ICD-10-CM

## 2021-04-05 DIAGNOSIS — I10 ESSENTIAL HYPERTENSION: ICD-10-CM

## 2021-04-05 DIAGNOSIS — E66.9 DIABETES MELLITUS TYPE 2 IN OBESE (HCC): ICD-10-CM

## 2021-04-05 DIAGNOSIS — E66.9 OBESITY, CLASS I, BMI 30-34.9: Primary | ICD-10-CM

## 2021-04-05 DIAGNOSIS — Z71.3 DIETARY COUNSELING: ICD-10-CM

## 2021-04-05 DIAGNOSIS — F32.A DEPRESSION, UNSPECIFIED DEPRESSION TYPE: ICD-10-CM

## 2021-04-05 DIAGNOSIS — E55.9 VITAMIN D DEFICIENCY: ICD-10-CM

## 2021-04-05 DIAGNOSIS — E66.9 OBESITY, CLASS I, BMI 30-34.9: ICD-10-CM

## 2021-04-05 LAB
25(OH)D3 SERPL-MCNC: 37.2 NG/ML (ref 30–100)
ALBUMIN SERPL-MCNC: 4.6 G/DL (ref 3.5–5.2)
ALBUMIN/GLOB SERPL: 1.6 G/DL
ALP SERPL-CCNC: 110 U/L (ref 39–117)
ALT SERPL W P-5'-P-CCNC: 39 U/L (ref 1–33)
ANION GAP SERPL CALCULATED.3IONS-SCNC: 12.3 MMOL/L (ref 5–15)
AST SERPL-CCNC: 30 U/L (ref 1–32)
BASOPHILS # BLD AUTO: 0.04 10*3/MM3 (ref 0–0.2)
BASOPHILS NFR BLD AUTO: 0.7 % (ref 0–1.5)
BILIRUB SERPL-MCNC: 0.3 MG/DL (ref 0–1.2)
BUN SERPL-MCNC: 11 MG/DL (ref 8–23)
BUN/CREAT SERPL: 17.7 (ref 7–25)
CALCIUM SPEC-SCNC: 9.4 MG/DL (ref 8.6–10.5)
CHLORIDE SERPL-SCNC: 102 MMOL/L (ref 98–107)
CO2 SERPL-SCNC: 26.7 MMOL/L (ref 22–29)
CREAT SERPL-MCNC: 0.62 MG/DL (ref 0.57–1)
DEPRECATED RDW RBC AUTO: 42.4 FL (ref 37–54)
EOSINOPHIL # BLD AUTO: 0.14 10*3/MM3 (ref 0–0.4)
EOSINOPHIL NFR BLD AUTO: 2.6 % (ref 0.3–6.2)
ERYTHROCYTE [DISTWIDTH] IN BLOOD BY AUTOMATED COUNT: 14.3 % (ref 12.3–15.4)
FERRITIN SERPL-MCNC: 40.1 NG/ML (ref 13–150)
FOLATE SERPL-MCNC: >20 NG/ML (ref 4.78–24.2)
GFR SERPL CREATININE-BSD FRML MDRD: 96 ML/MIN/1.73
GLOBULIN UR ELPH-MCNC: 2.8 GM/DL
GLUCOSE SERPL-MCNC: 127 MG/DL (ref 65–99)
HCT VFR BLD AUTO: 42.9 % (ref 34–46.6)
HGB BLD-MCNC: 14.2 G/DL (ref 12–15.9)
IMM GRANULOCYTES # BLD AUTO: 0.02 10*3/MM3 (ref 0–0.05)
IMM GRANULOCYTES NFR BLD AUTO: 0.4 % (ref 0–0.5)
IRON 24H UR-MRATE: 63 MCG/DL (ref 37–145)
LYMPHOCYTES # BLD AUTO: 1.59 10*3/MM3 (ref 0.7–3.1)
LYMPHOCYTES NFR BLD AUTO: 29.5 % (ref 19.6–45.3)
MCH RBC QN AUTO: 27.2 PG (ref 26.6–33)
MCHC RBC AUTO-ENTMCNC: 33.1 G/DL (ref 31.5–35.7)
MCV RBC AUTO: 82 FL (ref 79–97)
MONOCYTES # BLD AUTO: 0.39 10*3/MM3 (ref 0.1–0.9)
MONOCYTES NFR BLD AUTO: 7.2 % (ref 5–12)
NEUTROPHILS NFR BLD AUTO: 3.21 10*3/MM3 (ref 1.7–7)
NEUTROPHILS NFR BLD AUTO: 59.6 % (ref 42.7–76)
NRBC BLD AUTO-RTO: 0 /100 WBC (ref 0–0.2)
PLATELET # BLD AUTO: 172 10*3/MM3 (ref 140–450)
PMV BLD AUTO: 9.5 FL (ref 6–12)
POTASSIUM SERPL-SCNC: 4.2 MMOL/L (ref 3.5–5.2)
PREALB SERPL-MCNC: 21.6 MG/DL (ref 20–40)
PROT SERPL-MCNC: 7.4 G/DL (ref 6–8.5)
RBC # BLD AUTO: 5.23 10*6/MM3 (ref 3.77–5.28)
SODIUM SERPL-SCNC: 141 MMOL/L (ref 136–145)
T-UPTAKE NFR SERPL: 1.25 TBI (ref 0.8–1.3)
T4 SERPL-MCNC: 8.33 MCG/DL (ref 4.5–11.7)
TSH SERPL DL<=0.05 MIU/L-ACNC: 2.07 UIU/ML (ref 0.27–4.2)
WBC # BLD AUTO: 5.39 10*3/MM3 (ref 3.4–10.8)

## 2021-04-05 PROCEDURE — 84436 ASSAY OF TOTAL THYROXINE: CPT

## 2021-04-05 PROCEDURE — 36415 COLL VENOUS BLD VENIPUNCTURE: CPT

## 2021-04-05 PROCEDURE — 85025 COMPLETE CBC W/AUTO DIFF WBC: CPT

## 2021-04-05 PROCEDURE — 80053 COMPREHEN METABOLIC PANEL: CPT

## 2021-04-05 PROCEDURE — 82746 ASSAY OF FOLIC ACID SERUM: CPT

## 2021-04-05 PROCEDURE — 99214 OFFICE O/P EST MOD 30 MIN: CPT | Performed by: NURSE PRACTITIONER

## 2021-04-05 PROCEDURE — 82306 VITAMIN D 25 HYDROXY: CPT

## 2021-04-05 PROCEDURE — 84443 ASSAY THYROID STIM HORMONE: CPT

## 2021-04-05 PROCEDURE — 83540 ASSAY OF IRON: CPT

## 2021-04-05 PROCEDURE — 84479 ASSAY OF THYROID (T3 OR T4): CPT

## 2021-04-05 PROCEDURE — 84425 ASSAY OF VITAMIN B-1: CPT

## 2021-04-05 PROCEDURE — 84134 ASSAY OF PREALBUMIN: CPT

## 2021-04-05 PROCEDURE — 83921 ORGANIC ACID SINGLE QUANT: CPT

## 2021-04-05 PROCEDURE — 82728 ASSAY OF FERRITIN: CPT

## 2021-04-05 RX ORDER — INSULIN LISPRO 100 [IU]/ML
7 INJECTION, SOLUTION INTRAVENOUS; SUBCUTANEOUS
COMMUNITY
End: 2022-12-16

## 2021-04-05 RX ORDER — CYCLOBENZAPRINE HCL 5 MG
1 TABLET ORAL EVERY 8 HOURS PRN
COMMUNITY
Start: 2020-12-18 | End: 2021-06-23

## 2021-04-05 RX ORDER — AMLODIPINE BESYLATE 2.5 MG/1
1 TABLET ORAL NIGHTLY
COMMUNITY
End: 2021-07-30

## 2021-04-05 RX ORDER — LEVETIRACETAM 500 MG/1
2 TABLET ORAL
COMMUNITY
Start: 2021-03-31 | End: 2021-04-05

## 2021-04-05 NOTE — PROGRESS NOTES
[facility] BARIATRIC SURGERY  [address] [facility] [address]  [facility] [address]  [phone]  [address] [facility] [address]  [facility] [address]  [phone]  Dept: [phone]  4/5/2021      Vivian Kauffman.  [id number]  [id number]  1955  female      Chief Complaint   Patient presents with   • Follow-up     3.5 yr sleeve follow up       [facility] Post-Op Bariatric Surgery:   Vivian Kauffman is status post Laparoscopic Sleeve/HH procedure, performed on 10/2/17     HPI:   Today's weight is 93.4 kg (206 lb) pounds, today's BMI is Body mass index is 37.05 kg/m².,@ has a  gain of 9 pounds since the last visit and@ weight loss since surgery is 4 pounds. The patient reports a decreased portion size and loss of appetite.      Vivian Kauffman denies nausea, vomiting, reflux and reports ongoing chronic fatigue, getting one protein shake in the morning, no lunch, sandwich for supper.      Diet and Exercise: Diet history reviewed and discussed with the patient. Weight loss/gains to date discussed with the patient. The patient states they are eating 60 grams of protein per day. She reports eating 2 meals per day, a typical portion size of 1 cup, eating 1 snacks per day, drinking 4-5 or more 8-oz. glasses of water per day, no carbonated beverage consumption. Denies regular exercise.    She reports that she's been experiencing more fatigue, and hasn't felt herself since. She fell and broke her hand just before that.     She reports intermittent nausea and vomiting.     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     Supplements: OTC MTV with iron .     Review of Systems   Constitutional: Positive for appetite change and fatigue. Negative for unexpected weight change.   HENT: Negative.    Eyes: Negative.    Respiratory: Negative.    Cardiovascular: Negative.  Negative for leg swelling.   Gastrointestinal: Negative for abdominal distention, abdominal pain,  constipation, diarrhea, nausea and vomiting.   Genitourinary: Negative for difficulty urinating, frequency and urgency.   Musculoskeletal: Negative for back pain.   Skin: Negative.    Psychiatric/Behavioral: Negative.    All other systems reviewed and are negative.      Patient Active Problem List   Diagnosis   • Essential hypertension   • Hyperlipidemia   • Fatigue   • Alopecia   • Dietary counseling   • Abnormal electrocardiogram   • Anxiety   • Asthma   • Carpal tunnel syndrome   • Non-obstructive coronary artery disease   • Depression   • Exercise counseling   • Hx MRSA infection   • Diabetes mellitus type 2 in obese (CMS/HCC)   • Edema   • Fatty liver   • Multiple joint pain   • Seizure disorder (CMS/HCC)   • Obesity, Class I, BMI 30-34.9   • History of removal of laparoscopic gastric banding device   • S/P laparoscopic sleeve gastrectomy   • Noncompliance with dietary regimen required status post bariatric surgery   • Acquired atrophy of thyroid   • Amnesia   • Diabetes mellitus (CMS/HCC)   • Fibromyositis   • Hyperglycemia due to type 2 diabetes mellitus (CMS/HCC)   • Vitamin D deficiency       Past Medical History:   Diagnosis Date   • Anxiety    • Asthma    • Chronic lower back pain    • Coronary arteriosclerosis    • Depression    • Diabetes mellitus (CMS/HCC)     TYPE 2   • Diarrhea    • Fatigue    • Fatty liver    • Fibromyalgia    • Fibromyositis    • GERD (gastroesophageal reflux disease)    • Heartburn    • Hyperlipidemia    • Hypertension    • Insomnia    • Kidney stone    • Morbid obesity (CMS/HCC)    • Muscle spasm    • Neck pain    • Obesity    • Pain of both hip joints    • Polyphagia(783.6)    • PONV (postoperative nausea and vomiting)    • Poor vision    • Problems with hearing    • Seizures (CMS/HCC)    • Shortness of breath on exertion    • Sinus drainage    • Stroke syndrome    • Tremor, essential     IN NECK  AND HANDS   • Vertigo    • Weight gain        The following portions of the  patient's history were reviewed and updated as appropriate: allergies, current medications, past family history, past medical history, past social history, past surgical history and problem list.    Vitals:    04/05/21 0924   BP: 126/71   Pulse: 77   Resp: 18   Temp: 97.8 °F (36.6 °C)       Physical Exam  Vitals and nursing note reviewed.   Constitutional:       Appearance: She is well-developed.   Neck:      Thyroid: No thyromegaly.   Cardiovascular:      Rate and Rhythm: Normal rate and regular rhythm.      Heart sounds: Normal heart sounds.   Pulmonary:      Effort: Pulmonary effort is normal. No respiratory distress.      Breath sounds: Normal breath sounds. No wheezing.   Abdominal:      General: Bowel sounds are normal. There is no distension.      Palpations: Abdomen is soft.      Tenderness: There is no abdominal tenderness. There is no guarding.      Hernia: No hernia is present.   Musculoskeletal:         General: No tenderness.   Skin:     General: Skin is warm and dry.      Findings: No erythema or rash.   Neurological:      Mental Status: She is alert.   Psychiatric:         Behavior: Behavior normal.         Assessment:   Post-op, the patient is doing well.     Encounter Diagnoses   Name Primary?   • Obesity, Class I, BMI 30-34.9 Yes   • S/P laparoscopic sleeve gastrectomy    • Dietary counseling    • Diabetes mellitus type 2 in obese (CMS/HCC)    • Essential hypertension    • Depression, unspecified depression type        Plan:   We will recheck vitamin levels, trend vitamin D as she had a mild deficiency 9 months ago. She was encouraged to get a whole food source of protein at her kids snack time, around 4pm, cut out moutain dew, increase fluid intake, use mirilax as needed to prevent constipation.   Encouraged patient to be sure to get plenty of lean protein per day through small frequent meals all with a protein source.   Activity restrictions: none.   Recommended patient be sure to get at least 70  grams of protein per day by eating small, frequent meals all with high lean protein choices. Be sure to limit/cut back on daily carbohydrate intake. Discussed with the patient the recommended amount of water per day to intake- half of body weight in ounces. Reviewed vitamin requirements. Be sure to do routine exercise, 150 minutes per week minimum, including both cardio and strength training.     Instructions / Recommendations: dietary counseling recommended, recommended a daily protein intake of  grams, vitamin supplement(s) recommended, recommended exercising at least 150 minutes per week, behavior modifications recommended and instructed to call the office for concerns, questions, or problems.     The patient was instructed to follow up in 6 months .      Total time spent during this encounter today was 25 minutes

## 2021-04-10 LAB — VIT B1 BLD-SCNC: 180.6 NMOL/L (ref 66.5–200)

## 2021-04-15 ENCOUNTER — TELEPHONE (OUTPATIENT)
Dept: BARIATRICS/WEIGHT MGMT | Facility: CLINIC | Age: 66
End: 2021-04-15

## 2021-04-15 LAB
Lab: NORMAL
METHYLMALONATE SERPL-SCNC: 96 NMOL/L (ref 0–378)

## 2021-04-15 NOTE — TELEPHONE ENCOUNTER
Spoke to patient regarding lab results. Patient had no questions but did request labs to be mailed to her. Labs sent by mail per her request.       ----- Message from JIM Gamble sent at 4/15/2021  2:23 PM EDT -----  These look good!

## 2021-05-13 NOTE — PROGRESS NOTES
Quick Note      Patient Name: Vivian Kauffman   Patient ID: [id number]   Sex: Female   YOB: 1955    Primary Care Provider: Shahbaz Acosta MD   Referring Provider: Susan FERNANDEZ    Visit Date: May 11, 2020    Provider: Mandie Hamilton MD   Location[facility]   Location Address: [address] [place] [address]  [id number]   Location Phone: [phone]          History Of Present Illness  The patient is a 65 year old /White female, who is a consultation from Susan FERNANDEZ, for the evaluation of an episode of gross hematuria. She noticed blood in her urine months ago. The hematuria was constant throughout the stream. It was not associated with any specific activity.     She states the color of her urine has been grossly clear. The patient also reports frequency, urgency, and urinary incontinence. She denies dysuria, back pain, fever, chills, nausea, vomiting, and weight loss.     She states that there is no history of recent abdominal or flank trauma. The patient's past medical history is notable for kidney stones.     The patient was evaluated for the first time in 2019 and her workup was negative.      At the time she was diagnosed with a UTI.  It was ecoli.  She has had a CT scan which was normal.  She had a normal cystoscopy as well.  Her workup for hematuria was normal.      She has been on oxybutynin ER 5mg once a day for the last few months.  She states it helps with her urgency and urge incontinence but not with her leaking with coughing and sneezing.   She states she is 75% better.   TELEHEALTH TELEPHONE VISIT  Vivian Kauffman is a 65 year old /White female who is presenting for evaluation via telehealth telephone visit. Verbal consent obtained before beginning visit.   Provider spent 5 minutes with the patient during telehealth visit.   The following staff were present during this visit: Jalyn Mcclure and Mandie Hamilton MD            Assessment  · Gross hematuria     599.71/R31.0  · Overactive bladder     596.51/N32.81      Plan  · Orders  o Physican Telephone evaluation, 5-10 min (34383) - 599.71/R31.0, 596.51/N32.81 - 05/11/2020  · Medications  o Medications have been Reconciled  o Transition of Care or Provider Policy  · Instructions  o The workup for the etiology of the hematuria is negative and no further evaluation is needed at present. If gross hematuria occurs or urinary symptoms develop, further investigation may be needed.  o I started her on oxybutynin ER 5mg. She will continue that. She doesn't want to use the estradiol cream which I explained is fine. She will call if her symptoms worsen.   o FOLLOW-UP:  o In 12 months  o Plan Of Care:             Electronically Signed by: Mandie Hamilton MD -Author on May 11, 2020 09:59:22 AM

## 2021-05-15 VITALS
SYSTOLIC BLOOD PRESSURE: 120 MMHG | WEIGHT: 183 LBS | BODY MASS INDEX: 32.43 KG/M2 | DIASTOLIC BLOOD PRESSURE: 45 MMHG | HEIGHT: 63 IN

## 2021-05-15 VITALS
DIASTOLIC BLOOD PRESSURE: 54 MMHG | BODY MASS INDEX: 31.01 KG/M2 | HEART RATE: 80 BPM | WEIGHT: 175 LBS | SYSTOLIC BLOOD PRESSURE: 135 MMHG | HEIGHT: 63 IN

## 2021-05-15 VITALS
DIASTOLIC BLOOD PRESSURE: 43 MMHG | WEIGHT: 184 LBS | SYSTOLIC BLOOD PRESSURE: 138 MMHG | BODY MASS INDEX: 32.6 KG/M2 | HEIGHT: 63 IN

## 2021-05-16 VITALS
HEART RATE: 72 BPM | HEIGHT: 63 IN | SYSTOLIC BLOOD PRESSURE: 111 MMHG | DIASTOLIC BLOOD PRESSURE: 67 MMHG | WEIGHT: 181 LBS | TEMPERATURE: 98 F | BODY MASS INDEX: 32.07 KG/M2

## 2021-05-16 VITALS
TEMPERATURE: 98 F | HEIGHT: 63 IN | SYSTOLIC BLOOD PRESSURE: 121 MMHG | DIASTOLIC BLOOD PRESSURE: 40 MMHG | BODY MASS INDEX: 30.12 KG/M2 | WEIGHT: 170 LBS | HEART RATE: 68 BPM

## 2021-05-16 VITALS
SYSTOLIC BLOOD PRESSURE: 132 MMHG | BODY MASS INDEX: 31.21 KG/M2 | WEIGHT: 176.12 LBS | HEIGHT: 63 IN | DIASTOLIC BLOOD PRESSURE: 70 MMHG

## 2021-05-21 ENCOUNTER — HOSPITAL ENCOUNTER (OUTPATIENT)
Dept: GENERAL RADIOLOGY | Facility: HOSPITAL | Age: 66
Discharge: HOME | End: 2021-05-21
Attending: INTERNAL MEDICINE

## 2021-06-15 ENCOUNTER — TELEPHONE (OUTPATIENT)
Dept: UROLOGY | Facility: CLINIC | Age: 66
End: 2021-06-15

## 2021-06-15 ENCOUNTER — LAB (OUTPATIENT)
Dept: LAB | Facility: HOSPITAL | Age: 66
End: 2021-06-15

## 2021-06-15 DIAGNOSIS — R30.0 BURNING WITH URINATION: Primary | ICD-10-CM

## 2021-06-15 DIAGNOSIS — R30.0 BURNING WITH URINATION: ICD-10-CM

## 2021-06-15 DIAGNOSIS — N30.90 CYSTITIS: ICD-10-CM

## 2021-06-15 PROCEDURE — 87086 URINE CULTURE/COLONY COUNT: CPT

## 2021-06-15 PROCEDURE — 87077 CULTURE AEROBIC IDENTIFY: CPT

## 2021-06-15 PROCEDURE — 87186 SC STD MICRODIL/AGAR DIL: CPT

## 2021-06-15 NOTE — TELEPHONE ENCOUNTER
Spoke to patient and let her know I put in urine culture order for her to do today. She is aware we will call IF culture is positive/she needs antibiotics.  She will keep 6/21 appointment as scheduled.

## 2021-06-15 NOTE — TELEPHONE ENCOUNTER
Patient called and said she is still having burning after urination, and her urine is cloudy, frequency, fatigue, asking if she needs to do another UC before her appt on Monday?

## 2021-06-17 LAB — BACTERIA SPEC AEROBE CULT: ABNORMAL

## 2021-06-17 RX ORDER — LEVOFLOXACIN 500 MG/1
500 TABLET, FILM COATED ORAL DAILY
Qty: 7 TABLET | Refills: 0 | Status: SHIPPED | OUTPATIENT
Start: 2021-06-17 | End: 2021-06-23

## 2021-06-18 RX ORDER — FEXOFENADINE HCL 180 MG/1
180 TABLET ORAL DAILY
COMMUNITY
Start: 2021-04-07 | End: 2021-06-23

## 2021-06-18 RX ORDER — EMPAGLIFLOZIN 25 MG/1
TABLET, FILM COATED ORAL
COMMUNITY
End: 2021-06-23

## 2021-06-18 RX ORDER — NORTRIPTYLINE HYDROCHLORIDE 10 MG/1
CAPSULE ORAL
COMMUNITY
End: 2021-06-23

## 2021-06-18 RX ORDER — METOPROLOL SUCCINATE 50 MG/1
TABLET, EXTENDED RELEASE ORAL
COMMUNITY
End: 2021-06-23

## 2021-06-18 RX ORDER — HUMAN INSULIN 100 [USP'U]/ML
INJECTION, SUSPENSION SUBCUTANEOUS
COMMUNITY
End: 2021-06-23

## 2021-06-18 RX ORDER — OLOPATADINE HYDROCHLORIDE 1 MG/ML
SOLUTION/ DROPS OPHTHALMIC
COMMUNITY
Start: 2021-04-09 | End: 2021-06-23

## 2021-06-18 RX ORDER — LEVETIRACETAM 500 MG/1
TABLET ORAL
COMMUNITY
Start: 2021-06-09

## 2021-06-18 RX ORDER — PHENAZOPYRIDINE HYDROCHLORIDE 200 MG/1
200 TABLET, FILM COATED ORAL EVERY 8 HOURS PRN
COMMUNITY
Start: 2021-05-14 | End: 2021-06-23

## 2021-06-18 RX ORDER — ALBUTEROL SULFATE 90 UG/1
1 AEROSOL, METERED RESPIRATORY (INHALATION) AS NEEDED
COMMUNITY

## 2021-06-18 RX ORDER — CEPHALEXIN 500 MG/1
500 CAPSULE ORAL 2 TIMES DAILY
COMMUNITY
Start: 2021-05-15 | End: 2021-06-23

## 2021-06-21 ENCOUNTER — PREP FOR SURGERY (OUTPATIENT)
Dept: OTHER | Facility: HOSPITAL | Age: 66
End: 2021-06-21

## 2021-06-21 ENCOUNTER — DOCUMENTATION (OUTPATIENT)
Dept: UROLOGY | Facility: CLINIC | Age: 66
End: 2021-06-21

## 2021-06-21 ENCOUNTER — OFFICE VISIT (OUTPATIENT)
Dept: UROLOGY | Facility: CLINIC | Age: 66
End: 2021-06-21

## 2021-06-21 VITALS
DIASTOLIC BLOOD PRESSURE: 54 MMHG | BODY MASS INDEX: 37.21 KG/M2 | WEIGHT: 210 LBS | SYSTOLIC BLOOD PRESSURE: 125 MMHG | HEIGHT: 63 IN

## 2021-06-21 DIAGNOSIS — N13.30 HYDRONEPHROSIS OF LEFT KIDNEY: ICD-10-CM

## 2021-06-21 DIAGNOSIS — N28.9 LESION OF LEFT NATIVE URETER: Primary | ICD-10-CM

## 2021-06-21 DIAGNOSIS — R31.29 MICROSCOPIC HEMATURIA: Primary | ICD-10-CM

## 2021-06-21 DIAGNOSIS — N28.9 LESION OF LEFT NATIVE URETER: ICD-10-CM

## 2021-06-21 PROBLEM — N39.41 URGE INCONTINENCE: Status: ACTIVE | Noted: 2021-06-21

## 2021-06-21 LAB
BILIRUB BLD-MCNC: NEGATIVE MG/DL
CLARITY, POC: CLEAR
COLOR UR: YELLOW
GLUCOSE UR STRIP-MCNC: ABNORMAL MG/DL
KETONES UR QL: NEGATIVE
LEUKOCYTE EST, POC: NEGATIVE
NITRITE UR-MCNC: NEGATIVE MG/ML
PH UR: 5.5 [PH] (ref 5–8)
PROT UR STRIP-MCNC: NEGATIVE MG/DL
RBC # UR STRIP: ABNORMAL /UL
SP GR UR: 1.02 (ref 1–1.03)
UROBILINOGEN UR QL: NORMAL

## 2021-06-21 PROCEDURE — 81003 URINALYSIS AUTO W/O SCOPE: CPT | Performed by: UROLOGY

## 2021-06-21 PROCEDURE — 99212 OFFICE O/P EST SF 10 MIN: CPT | Performed by: UROLOGY

## 2021-06-21 RX ORDER — SODIUM CHLORIDE 0.9 % (FLUSH) 0.9 %
10 SYRINGE (ML) INJECTION AS NEEDED
Status: CANCELLED | OUTPATIENT
Start: 2021-06-21

## 2021-06-21 RX ORDER — SODIUM CHLORIDE 9 MG/ML
100 INJECTION, SOLUTION INTRAVENOUS CONTINUOUS
Status: CANCELLED | OUTPATIENT
Start: 2021-06-21

## 2021-06-21 RX ORDER — LEVOFLOXACIN 5 MG/ML
500 INJECTION, SOLUTION INTRAVENOUS ONCE
Status: CANCELLED | OUTPATIENT
Start: 2021-06-21 | End: 2021-06-21

## 2021-06-21 RX ORDER — SODIUM CHLORIDE 0.9 % (FLUSH) 0.9 %
3 SYRINGE (ML) INJECTION EVERY 12 HOURS SCHEDULED
Status: CANCELLED | OUTPATIENT
Start: 2021-06-21

## 2021-06-21 NOTE — PROGRESS NOTES
Chief Complaint  No chief complaint on file.    Subjective          Vivian Kauffman presents to [facility] UROLOGY  She has had two recent infections, both of which were Klebsiella.  She also had a CT scan which is documented below:    CONCLUSION:     1. Inflammatory stranding is present about the in the left kidney and proximal left ureter with a     transition zone involving the proximal left ureter without an obstructing ureteral calculus     appreciated.  A proximal or superior left ureteral stricture is possible.  Associated     age-indeterminate left-sided pyelonephritis, pyelitis, and/or ureteritis cannot be excluded.  No     gas is seen in the lumen of the pelvicaliceal system on the left or the left ureter.  No definite     nonenhanced CT evidence of an intraparenchymal abscess on the left.  No sizable (drainable)     perinephric fluid collection is identified on the left.      2. No right hydronephrosis.  No nonobstructing nephrolithiasis is seen bilaterally.  No urinary     bladder calculi.  No definite urinary bladder wall thickening is seen.      3. There may be age-indeterminate mesenteritis as discussed.  Small scattered mesenteric lymph     nodes are.        Objective   Vital Signs:   There were no vitals taken for this visit.    Physical Exam  Vitals and nursing note reviewed.   Constitutional:       Appearance: Normal appearance. She is well-developed.   Pulmonary:      Effort: Pulmonary effort is normal.      Breath sounds: Normal air entry.   Neurological:      Mental Status: She is alert and oriented to person, place, and time.      Motor: Motor function is intact.   Psychiatric:         Mood and Affect: Mood normal.         Behavior: Behavior normal.        Result Review :                  Results for orders placed or performed in visit on 06/21/21   POC Urinalysis Dipstick, Automated    Specimen: Urine   Result Value Ref Range    Color Yellow Yellow, Straw, Dark Yellow,  Adriana    Clarity, UA Clear Clear    Specific Gravity  1.020 1.005 - 1.030    pH, Urine 5.5 5.0 - 8.0    Leukocytes Negative Negative    Nitrite, UA Negative Negative    Protein, POC Negative Negative mg/dL    Glucose,  mg/dL (A) Negative, 1000 mg/dL (3+) mg/dL    Ketones, UA Negative Negative    Urobilinogen, UA Normal Normal    Bilirubin Negative Negative    Blood, UA Trace (A) Negative       Assessment and Plan    Diagnoses and all orders for this visit:    1. Hydronephrosis of left kidney (Primary)  Assessment & Plan:  She needs left ureteroscopy, with possible stent placement.  She understands this.  She will need this while on antibiotics.          2. Lesion of left native ureter  Assessment & Plan:  She needs left ureteroscopy, with possible stent placement.  She understands this.  She will need this while on antibiotics.          Follow Up   No follow-ups on file.  Patient was given instructions and counseling regarding her condition or for health maintenance advice. Please see specific information pulled into the AVS if appropriate.

## 2021-06-21 NOTE — ASSESSMENT & PLAN NOTE
She needs left ureteroscopy, with possible stent placement.  She understands this.  She will need this while on antibiotics.

## 2021-06-21 NOTE — H&P
[facility]   Urology HISTORY AND PHYSICAL    Patient Name: Vivian Kauffman  : 1955  MRN: [id number]  Primary Care Physician:  Shahbaz Acosta MD  Date of admission: (Not on file)    Subjective   Subjective     Chief Complaint: Left hydronephrosis, possible left ureteral stricture    Patient has peristent UTI and questionable lesion of left ureter and left hydronephrosis.       Personal History     Past Medical History:   Diagnosis Date   • Anxiety    • Asthma    • Chronic lower back pain    • Coronary arteriosclerosis    • Depression    • Diabetes mellitus (CMS/HCC)     TYPE 2   • Diarrhea    • Fatigue    • Fatty liver    • Fibromyalgia    • Fibromyositis    • GERD (gastroesophageal reflux disease)    • Heartburn    • Hyperlipidemia    • Hypertension    • Insomnia    • Kidney stone    • Morbid obesity (CMS/HCC)    • Muscle spasm    • Neck pain    • Obesity    • Pain of both hip joints    • Polyphagia(783.6)    • PONV (postoperative nausea and vomiting)    • Poor vision    • Problems with hearing    • Seizures (CMS/HCC)    • Shortness of breath on exertion    • Sinus drainage    • Stroke syndrome    • Tremor, essential     IN NECK  AND HANDS   • Vertigo    • Weight gain        Past Surgical History:   Procedure Laterality Date   • BREAST BIOPSY Right    • CARDIAC CATHETERIZATION      no stents 2013 at [facility]   • CARPAL TUNNEL RELEASE Bilateral    • COLONOSCOPY     • ENDOSCOPY N/A 2016    Procedure: ESOPHAGOGASTRODUODENOSCOPY WITH BIOPSY;  Surgeon: Chris Ackerman Jr., MD;  Location: [facility] ENDOSCOPY;  Service:    • EXTRACORPOREAL SHOCKWAVE LITHOTRIPSY (ESWL), STENT INSERTION/REMOVAL  2013   • FOOT SURGERY      TOENAILS REMOVED   • GASTRIC BANDING REMOVAL N/A 2016    Procedure: LAPAROSCOPIC GASTRIC BANDING AND PORT REMOVAL ;  Surgeon: Chris Ackerman Jr., MD;  Location: [facility] OR [facility];  Service:    • GASTRIC SLEEVE LAPAROSCOPIC N/A 10/2/2017    Procedure: GASTRIC  SLEEVE LAPAROSCOPIC revision WITH LYSIS OF ADHESIONS AND HITAL HERNIA REPAIR ;  Surgeon: Chris Ackerman Jr., MD;  [facility] [facility] [place] [facility];  Service:    • LAPAROSCOPIC CHOLECYSTECTOMY     • LAPAROSCOPIC GASTRIC BANDING     • MASS EXCISION Right     RT FOOT    • NOSE SURGERY     • TONSILLECTOMY         Family History: family history includes Cancer in her brother; Diabetes in her father and mother; Heart attack in her brother, father, maternal grandfather, maternal grandmother, mother, and paternal grandfather; Hypertension in her father and mother; Obesity in her father; Stroke in her father, mother, and paternal grandmother. Otherwise pertinent FHx was reviewed and not pertinent to current issue.    Social History:  reports that she quit smoking about 26 years ago. Her smoking use included cigarettes. She has a 60.00 pack-year smoking history. She has never used smokeless tobacco. She reports current alcohol use. She reports that she does not use drugs.    Home Medications:  Amitriptyline HCl, Biotin, Cholecalciferol, Empagliflozin, FLUoxetine, Fluticasone-Salmeterol, Insulin Glargine, Insulin Lispro, Ramipril, albuterol sulfate HFA, amLODIPine, atorvastatin, cephalexin, cetirizine, clopidogrel, cyclobenzaprine, fexofenadine, gabapentin, glipiZIDE, hydrocortisone, insulin NPH-insulin regular, levETIRAcetam, levoFLOXacin, levothyroxine, metFORMIN HCl, metoprolol succinate XL, montelukast, nortriptyline, nystatin, olopatadine, oxybutynin XL, phenazopyridine, primidone, propranolol, psyllium, and spironolactone    Allergies:  Allergies   Allergen Reactions   • Latex Other (See Comments)     SKIN BLISTERS   • Peanut Oil      Peanuts w/ red shell   • Sulfa Antibiotics Other (See Comments)     unknnown       Objective    Objective     Vitals:        Physical Exam  Constitutional:       Appearance: Normal appearance.   Cardiovascular:      Rate and Rhythm: Normal rate and regular rhythm.   Pulmonary:      Effort:  Pulmonary effort is normal.      Breath sounds: Normal breath sounds.   Neurological:      Mental Status: She is alert. Mental status is at baseline.   Psychiatric:         Mood and Affect: Mood and affect normal.         Speech: Speech normal.         Judgment: Judgment normal.         Result Review    Result Review:  I have personally reviewed the results from the time of this admission to 6/21/2021 00:58 EDT and agree with these findings:  []  Laboratory  [x]  Microbiology  [x]  Radiology  []  EKG/Telemetry   []  Cardiology/Vascular   []  Pathology  []  Old records  []  Other:      Assessment/Plan   Assessment / Plan       Active Hospital Problems:  Left hydronephrosis, left ureteral lesion    Plan: Left ureteroscopy, left ureteral stent placement.   Risks and benefits discussed with patient and they are agreeable to proceed.    DVT prophylaxis:  No DVT prophylaxis order currently exists.    CODE STATUS:     Full code      Electronically signed by Mandie Hamilton MD, 06/21/21, 12:57 AM EDT.

## 2021-06-23 RX ORDER — INSULIN GLARGINE 100 [IU]/ML
24 INJECTION, SOLUTION SUBCUTANEOUS NIGHTLY
COMMUNITY
End: 2021-07-14 | Stop reason: SDUPTHER

## 2021-06-24 ENCOUNTER — ANESTHESIA EVENT (OUTPATIENT)
Dept: PERIOP | Facility: HOSPITAL | Age: 66
End: 2021-06-24

## 2021-06-25 ENCOUNTER — OFFICE VISIT (OUTPATIENT)
Dept: CARDIOLOGY | Facility: CLINIC | Age: 66
End: 2021-06-25

## 2021-06-25 ENCOUNTER — TELEPHONE (OUTPATIENT)
Dept: UROLOGY | Facility: CLINIC | Age: 66
End: 2021-06-25

## 2021-06-25 VITALS
DIASTOLIC BLOOD PRESSURE: 68 MMHG | SYSTOLIC BLOOD PRESSURE: 108 MMHG | HEIGHT: 63 IN | BODY MASS INDEX: 36.94 KG/M2 | WEIGHT: 208.5 LBS | HEART RATE: 80 BPM | OXYGEN SATURATION: 96 %

## 2021-06-25 DIAGNOSIS — I25.10 CHRONIC CORONARY ARTERY DISEASE: Primary | ICD-10-CM

## 2021-06-25 DIAGNOSIS — Z79.4 TYPE 2 DIABETES MELLITUS WITH OTHER SPECIFIED COMPLICATION, WITH LONG-TERM CURRENT USE OF INSULIN (HCC): ICD-10-CM

## 2021-06-25 DIAGNOSIS — E78.49 OTHER HYPERLIPIDEMIA: ICD-10-CM

## 2021-06-25 DIAGNOSIS — I10 ESSENTIAL HYPERTENSION: ICD-10-CM

## 2021-06-25 DIAGNOSIS — E11.69 TYPE 2 DIABETES MELLITUS WITH OTHER SPECIFIED COMPLICATION, WITH LONG-TERM CURRENT USE OF INSULIN (HCC): ICD-10-CM

## 2021-06-25 PROCEDURE — 93000 ELECTROCARDIOGRAM COMPLETE: CPT | Performed by: INTERNAL MEDICINE

## 2021-06-25 PROCEDURE — 99214 OFFICE O/P EST MOD 30 MIN: CPT | Performed by: INTERNAL MEDICINE

## 2021-06-25 NOTE — TELEPHONE ENCOUNTER
Spoke to Lynda and let her know there is a note from Dr. Waters from today giving Ms. Kauffman permission to stop her blood thinner x 3 days prior to procedure.

## 2021-06-25 NOTE — PROGRESS NOTES
Subjective:     Encounter Date:06/25/2021      Patient ID: Vivian Kauffman is a 66 y.o. female.    Chief Complaint:  History of Present Illness    The patient is a 66-year-old female with history of nonobstructive coronary artery disease, hypertension, dyslipidemia, diabetes mellitus type 2, prior stroke, seizure disorder, obesity status post lap band, and subsequent removal of the lap band, who presents for followup.      She presents today for annual follow-up.  She reports that she broke her wrist last fall and ended up needing surgery.  After that she contracted COVID-19 and suffered with this for about a month.  She reports that she has not returned to her normal self yet and still feels foggy headed.  She is to undergo an MRI per her neurologist to ensure there has not been any recurrent issues with that she has a history of prior strokes.  She has a left-sided kidney stone and is scheduled to undergo lithotripsy next week.    She denies any chest pain, shortness of breath out of the ordinary, palpitations, orthopnea, near-syncope or syncope, or lower extremity edema.  She has intermittent lightheadedness with position changes which is chronic and unchanged.  She continues to exercise by walking 3-4 times a week and has not had any change in her exercise tolerance.       Prior History:  I saw the patient initially for a preoperative evaluation in 8/2015.   She was getting ready to get her lap band removed and was noted to have an abnormal EKG at Dr. Ackerman's office.   She was previously followed by a cardiologist in [place], but was unable to get in with them at the time. Her prior cardiac workup included an evaluation for an abnormal EKG back in 11/2013 at which time she saw Dr. Duarte.  She underwent a stress test that showed anterior ischemia and subsequently underwent a cardiac catheterization that showed 50% stenosis of her proximal LAD, luminal irregularities of her left circumflex, and  50% stenosis of her right coronary artery.  Her echocardiogram showed pulmonary artery pressure of 40 mmHg, moderate TR, but otherwise unremarkable.   When I saw the patient initially, I compared these findings with her prior EKGs and it showed no change.  She also reported no new symptoms at the time and she was subsequently cleared for surgery.       In 3/2017 she had successfully undergone removal of her lap band.  She denied any symptoms but wanted to proceed with repeat workup to ensure that her nonobstructive coronary artery disease has not progressed.  Proceeded with an echocardiogram that showed normal left ventricular systolic function and wall motion with an estimated ejection fraction of 65-70% and grade 1 diastolic dysfunction.  We attempted to proceed with a treadmill stress test but her baseline EKG abnormalities precluded a diagnostic tests so we proceeded with a perfusion stress test.  This was performed on 3/30/2017 and was negative for ischemia.       Review of Systems   Constitutional: Negative for malaise/fatigue.   HENT: Negative for hearing loss, hoarse voice, nosebleeds and sore throat.    Eyes: Negative for pain.   Cardiovascular: Negative for chest pain, claudication, cyanosis, dyspnea on exertion, irregular heartbeat, leg swelling, near-syncope, orthopnea, palpitations, paroxysmal nocturnal dyspnea and syncope.   Respiratory: Negative for shortness of breath and snoring.    Endocrine: Negative for cold intolerance, heat intolerance, polydipsia, polyphagia and polyuria.   Skin: Negative for itching and rash.   Musculoskeletal: Negative for arthritis, falls, joint pain, joint swelling, muscle cramps, muscle weakness and myalgias.   Gastrointestinal: Negative for constipation, diarrhea, dysphagia, heartburn, hematemesis, hematochezia, melena, nausea and vomiting.   Genitourinary: Negative for frequency, hematuria and hesitancy.   Neurological: Positive for light-headedness. Negative for  excessive daytime sleepiness, dizziness, headaches, numbness and weakness.   Psychiatric/Behavioral: Negative for depression. The patient is not nervous/anxious.          Current Outpatient Medications:   •  albuterol sulfate  (90 Base) MCG/ACT inhaler, albuterol sulfate 90 mcg/actuation inhalation HFA aerosol inhaler inhale 1 - 2 puffs (90 - 180 mcg) by inhalation route every 4-6 hours as needed   Active, Disp: , Rfl:   •  amLODIPine (NORVASC) 2.5 MG tablet, Take 1 tablet by mouth Every Night., Disp: , Rfl:   •  atorvastatin (Lipitor) 40 MG tablet, Take 40 mg by mouth Every Night., Disp: , Rfl:   •  cetirizine (zyrTEC) 10 MG tablet, Take 10 mg by mouth Daily., Disp: , Rfl:   •  clopidogrel (PLAVIX) 75 MG tablet, Take 75 mg by mouth Every Night., Disp: , Rfl:   •  FLUoxetine (PROzac) 20 MG tablet, Take 80 mg by mouth Every Morning., Disp: , Rfl:   •  insulin glargine (LANTUS, SEMGLEE) 100 UNIT/ML injection, Inject 24 Units under the skin into the appropriate area as directed Every Night., Disp: , Rfl:   •  Insulin Lispro (HumaLOG) 100 UNIT/ML solution cartridge, Inject 9 Units under the skin into the appropriate area as directed 3 (Three) Times a Day With Meals., Disp: , Rfl:   •  LANTUS SOLOSTAR 100 UNIT/ML injection pen, 21 Units Every Morning., Disp: , Rfl:   •  levETIRAcetam (KEPPRA) 500 MG tablet, TAKE 2 TABLETS BY MOUTH IN THE MORNING AND 4 TABLETS AT BEDTIME, Disp: , Rfl:   •  levothyroxine (SYNTHROID, LEVOTHROID) 25 MCG tablet, Take 25 mcg by mouth Daily., Disp: , Rfl:   •  montelukast (SINGULAIR) 10 MG tablet, Take 10 mg by mouth Every Night., Disp: , Rfl:   •  oxybutynin XL (DITROPAN-XL) 5 MG 24 hr tablet, Take 5 mg by mouth Daily. FOR 30 DAYS, Disp: , Rfl:   •  primidone (MYSOLINE) 50 MG tablet, Take 25 mg by mouth Every Night., Disp: , Rfl:   •  propranolol (INDERAL) 20 MG tablet, Take 20 mg by mouth Daily As Needed (migraines)., Disp: , Rfl:   •  spironolactone (ALDACTONE) 25 MG tablet, Take 25  "mg by mouth Every Morning., Disp: , Rfl:     Past Medical History:   Diagnosis Date   • Anxiety    • Asthma    • Chronic lower back pain    • Coronary arteriosclerosis    • Depression    • Diabetes mellitus (CMS/HCC)     TYPE 2 - BLOOD GLUCOSE AROUND 100-300   • Disease of thyroid gland    • Fatigue    • Fatty liver    • Fibromyalgia    • Fibromyositis    • GERD (gastroesophageal reflux disease)    • Heartburn    • [place] (hard of hearing)    • Hyperlipidemia    • Hypertension    • Insomnia    • Kidney stone    • Morbid obesity (CMS/HCC)    • Muscle spasm    • Neck pain    • Obesity    • Pain of both hip joints    • Polyphagia(783.6)    • PONV (postoperative nausea and vomiting)    • Poor vision    • Problems with hearing    • Seizures (CMS/HCC)    • Shortness of breath on exertion    • Sinus drainage    • Stroke syndrome     \"MINI STROKE\"  left side weakness    • Tremor, essential     IN NECK  AND HANDS   • Vertigo        Past Surgical History:   Procedure Laterality Date   • BREAST BIOPSY Right    • CARDIAC CATHETERIZ[facility]      [facility]   • CARPAL TUNNEL RELEASE Bilateral 2009   • COLONOSCOPY     • ENDOSCOPY N/A 9/9/2016    Procedure: ESOPHAGOGASTRODUODENOSCOPY WITH BIOPSY;  Surgeon: Chris Ackerman Jr., MD;  Location: [facility] ENDOSCOPY;  Service:    • EXTRACORPOREAL SHOCKWAVE LITHOTRIPSY (ESWL), STENT INSERTION/REMOVAL  09/2013   • FOOT SURGERY Left    • GASTRIC BANDING REMOVAL N/A 11/30/2016    Procedure: LAPAROSCOPIC GASTRIC BANDING AND PORT REMOVAL ;  Surgeon: Chris Ackerman Jr., MD;  Location: [facility] OR [place];  Service:    • GASTRIC SLEEVE LAPAROSCOPIC N/A 10/2/2017    Procedure: GASTRIC SLEEVE LAPAROSCOPIC revision WITH LYSIS OF ADHESIONS AND HITAL HERNIA REPAIR ;  Surgeon: Chris Ackerman Jr., MD;  Location: [facility] OR [place];  Service:    • GASTRIC SLEEVE LAPAROSCOPIC     • HAND SURGERY Left    • LAPAROSCOPIC CHOLECYSTECTOMY     • MASS EXCISION Right     RT FOOT    • " "NOSE SURGERY     • TONSILLECTOMY         Family History   Problem Relation Age of Onset   • Heart attack Mother    • Diabetes Mother    • Hypertension Mother    • Stroke Mother    • Heart attack Father    • Diabetes Father    • Hypertension Father    • Obesity Father         Morbid Obesity   • Stroke Father    • Heart attack Brother    • Cancer Brother         Bladder   • Heart attack Maternal Grandmother    • Heart attack Maternal Grandfather    • Stroke Paternal Grandmother    • Heart attack Paternal Grandfather    • Malig Hyperthermia Neg Hx        Social History     Tobacco Use   • Smoking status: Former Smoker     Packs/day: 2.00     Years: 30.00     Pack years: 60.00     Types: Cigarettes     Quit date:      Years since quittin.4   • Smokeless tobacco: Never Used   Vaping Use   • Vaping Use: Never used   Substance Use Topics   • Alcohol use: Not Currently   • Drug use: No         ECG 12 Lead    Date/Time: 2021 5:17 PM  Performed by: Kia Waters MD  Authorized by: Kia Waters MD   Comparison: compared with previous ECG   Similar to previous ECG  Rhythm: sinus rhythm               Objective:     Visit Vitals  /68 (BP Location: Right arm, Patient Position: Sitting, Cuff Size: Adult)   Pulse 80   Ht 160 cm (63\")   Wt 94.6 kg (208 lb 8 oz)   SpO2 96%   BMI 36.93 kg/m²         Constitutional:       Appearance: Normal appearance. Well-developed.   Eyes:      General: Lids are normal.      Conjunctiva/sclera: Conjunctivae normal.      Pupils: Pupils are equal, round, and reactive to light.   HENT:      Head: Normocephalic and atraumatic.   Neck:      Vascular: No carotid bruit or JVD.      Lymphadenopathy: No cervical adenopathy.   Pulmonary:      Effort: Pulmonary effort is normal.      Breath sounds: Normal breath sounds.   Cardiovascular:      Normal rate. Regular rhythm.      No gallop.   Pulses:     Radial: 2+ bilaterally.  Edema:     Peripheral edema absent.   Abdominal:      " Palpations: Abdomen is soft.   Musculoskeletal:      Cervical back: Full passive range of motion without pain, normal range of motion and neck supple. Skin:     General: Skin is warm and dry.   Neurological:      Mental Status: Alert and oriented to person, place, and time.              Assessment:          Diagnosis Plan   1. Non-obstructive coronary artery disease     2. Essential hypertension     3. Other hyperlipidemia     4. Type 2 diabetes mellitus with other specified complication, with long-term current use of insulin (CMS/[facility])            Plan:       1.  Preoperative evaluation.  Her EKG is stable and unchanged.  She does not appear to have any concerning cardiac symptoms.  From cardiac standpoint she appears to be okay to proceed with upcoming low risk procedure.  She is okay to hold her clopidogrel 3 days before the procedure and I have instructed her to go ahead and do so.  2.  Moderate nonobstructive coronary artery disease.  Noted on a prior cardiac catheterization.  Since then she has had a stress test in 2017 that showed no evidence of ischemia.  She is not having any symptoms concerning for angina at this time.  Continue current medical management.  3.  Hypertension.  Well-controlled on her current regimen medications.  Continue the same.  4.  Hyperlipidemia.  On atorvastatin which is managed by Dr. Acosta.  Goal LDL of 70 or below.  5.  Diabetes mellitus type 2  6.  History of strokes    I will plan on seeing the patient back again in 1 year or sooner if further issues arise.

## 2021-06-25 NOTE — PROGRESS NOTES
The patient is okay to proceed with procedure and start holding her Plavix 3 days before.  I have already given the patient instructions to do so.

## 2021-06-29 ENCOUNTER — APPOINTMENT (OUTPATIENT)
Dept: GENERAL RADIOLOGY | Facility: HOSPITAL | Age: 66
End: 2021-06-29

## 2021-06-29 ENCOUNTER — ANESTHESIA (OUTPATIENT)
Dept: PERIOP | Facility: HOSPITAL | Age: 66
End: 2021-06-29

## 2021-06-29 ENCOUNTER — HOSPITAL ENCOUNTER (OUTPATIENT)
Facility: HOSPITAL | Age: 66
Setting detail: HOSPITAL OUTPATIENT SURGERY
Discharge: HOME | End: 2021-06-29
Attending: UROLOGY | Admitting: UROLOGY

## 2021-06-29 VITALS
HEIGHT: 63 IN | RESPIRATION RATE: 16 BRPM | BODY MASS INDEX: 36.8 KG/M2 | DIASTOLIC BLOOD PRESSURE: 61 MMHG | OXYGEN SATURATION: 98 % | SYSTOLIC BLOOD PRESSURE: 133 MMHG | HEART RATE: 81 BPM | WEIGHT: 207.67 LBS | TEMPERATURE: 97.7 F

## 2021-06-29 DIAGNOSIS — N28.9 LESION OF LEFT NATIVE URETER: ICD-10-CM

## 2021-06-29 DIAGNOSIS — N13.30 HYDRONEPHROSIS OF LEFT KIDNEY: ICD-10-CM

## 2021-06-29 DIAGNOSIS — Q62.11 HYDRONEPHROSIS WITH URETEROPELVIC JUNCTION (UPJ) OBSTRUCTION: Primary | ICD-10-CM

## 2021-06-29 LAB
ANION GAP SERPL CALCULATED.3IONS-SCNC: 10.6 MMOL/L (ref 5–15)
BUN SERPL-MCNC: 13 MG/DL (ref 8–23)
BUN/CREAT SERPL: 22 (ref 7–25)
CALCIUM SPEC-SCNC: 9.1 MG/DL (ref 8.6–10.5)
CHLORIDE SERPL-SCNC: 99 MMOL/L (ref 98–107)
CO2 SERPL-SCNC: 26.4 MMOL/L (ref 22–29)
CREAT SERPL-MCNC: 0.59 MG/DL (ref 0.57–1)
GFR SERPL CREATININE-BSD FRML MDRD: 102 ML/MIN/1.73
GLUCOSE SERPL-MCNC: 272 MG/DL (ref 65–99)
POTASSIUM SERPL-SCNC: 4.3 MMOL/L (ref 3.5–5.2)
SODIUM SERPL-SCNC: 136 MMOL/L (ref 136–145)

## 2021-06-29 PROCEDURE — 25010000002 PROPOFOL 10 MG/ML EMULSION: Performed by: NURSE ANESTHETIST, CERTIFIED REGISTERED

## 2021-06-29 PROCEDURE — C2617 STENT, NON-COR, TEM W/O DEL: HCPCS | Performed by: UROLOGY

## 2021-06-29 PROCEDURE — C1769 GUIDE WIRE: HCPCS | Performed by: UROLOGY

## 2021-06-29 PROCEDURE — 25010000002 MIDAZOLAM PER 1MG: Performed by: ANESTHESIOLOGY

## 2021-06-29 PROCEDURE — C1894 INTRO/SHEATH, NON-LASER: HCPCS | Performed by: UROLOGY

## 2021-06-29 PROCEDURE — 74018 RADEX ABDOMEN 1 VIEW: CPT

## 2021-06-29 PROCEDURE — 25010000002 LEVOFLOXACIN PER 250 MG: Performed by: UROLOGY

## 2021-06-29 PROCEDURE — 52351 CYSTOURETERO & OR PYELOSCOPE: CPT | Performed by: UROLOGY

## 2021-06-29 PROCEDURE — 80048 BASIC METABOLIC PNL TOTAL CA: CPT | Performed by: UROLOGY

## 2021-06-29 PROCEDURE — 76000 FLUOROSCOPY <1 HR PHYS/QHP: CPT

## 2021-06-29 PROCEDURE — 25010000002 FENTANYL CITRATE (PF) 50 MCG/ML SOLUTION: Performed by: NURSE ANESTHETIST, CERTIFIED REGISTERED

## 2021-06-29 PROCEDURE — 25010000002 DEXAMETHASONE PER 1 MG: Performed by: NURSE ANESTHETIST, CERTIFIED REGISTERED

## 2021-06-29 PROCEDURE — 25010000002 ONDANSETRON PER 1 MG: Performed by: NURSE ANESTHETIST, CERTIFIED REGISTERED

## 2021-06-29 PROCEDURE — 52332 CYSTOSCOPY AND TREATMENT: CPT | Performed by: UROLOGY

## 2021-06-29 DEVICE — STNT URETRL CLASSC DBL PIG 6F 24CM: Type: IMPLANTABLE DEVICE | Site: URETER | Status: FUNCTIONAL

## 2021-06-29 RX ORDER — ONDANSETRON 4 MG/1
4 TABLET, FILM COATED ORAL ONCE AS NEEDED
Status: DISCONTINUED | OUTPATIENT
Start: 2021-06-29 | End: 2021-06-29 | Stop reason: HOSPADM

## 2021-06-29 RX ORDER — ONDANSETRON 2 MG/ML
4 INJECTION INTRAMUSCULAR; INTRAVENOUS ONCE AS NEEDED
Status: DISCONTINUED | OUTPATIENT
Start: 2021-06-29 | End: 2021-06-29 | Stop reason: HOSPADM

## 2021-06-29 RX ORDER — IBUPROFEN 600 MG/1
600 TABLET ORAL EVERY 6 HOURS PRN
Status: DISCONTINUED | OUTPATIENT
Start: 2021-06-29 | End: 2021-06-29 | Stop reason: HOSPADM

## 2021-06-29 RX ORDER — MEPERIDINE HYDROCHLORIDE 25 MG/ML
12.5 INJECTION INTRAMUSCULAR; INTRAVENOUS; SUBCUTANEOUS
Status: DISCONTINUED | OUTPATIENT
Start: 2021-06-29 | End: 2021-06-29 | Stop reason: HOSPADM

## 2021-06-29 RX ORDER — PROMETHAZINE HYDROCHLORIDE 12.5 MG/1
25 TABLET ORAL ONCE AS NEEDED
Status: DISCONTINUED | OUTPATIENT
Start: 2021-06-29 | End: 2021-06-29 | Stop reason: HOSPADM

## 2021-06-29 RX ORDER — ACETAMINOPHEN 500 MG
1000 TABLET ORAL ONCE
Status: COMPLETED | OUTPATIENT
Start: 2021-06-29 | End: 2021-06-29

## 2021-06-29 RX ORDER — DEXAMETHASONE SODIUM PHOSPHATE 4 MG/ML
INJECTION, SOLUTION INTRA-ARTICULAR; INTRALESIONAL; INTRAMUSCULAR; INTRAVENOUS; SOFT TISSUE AS NEEDED
Status: DISCONTINUED | OUTPATIENT
Start: 2021-06-29 | End: 2021-06-29 | Stop reason: SURG

## 2021-06-29 RX ORDER — ROCURONIUM BROMIDE 10 MG/ML
INJECTION, SOLUTION INTRAVENOUS AS NEEDED
Status: DISCONTINUED | OUTPATIENT
Start: 2021-06-29 | End: 2021-06-29 | Stop reason: SURG

## 2021-06-29 RX ORDER — PROMETHAZINE HYDROCHLORIDE 12.5 MG/1
12.5 TABLET ORAL ONCE AS NEEDED
Status: DISCONTINUED | OUTPATIENT
Start: 2021-06-29 | End: 2021-06-29 | Stop reason: HOSPADM

## 2021-06-29 RX ORDER — SODIUM CHLORIDE 0.9 % (FLUSH) 0.9 %
3 SYRINGE (ML) INJECTION EVERY 12 HOURS SCHEDULED
Status: DISCONTINUED | OUTPATIENT
Start: 2021-06-29 | End: 2021-06-29 | Stop reason: HOSPADM

## 2021-06-29 RX ORDER — OXYCODONE HYDROCHLORIDE AND ACETAMINOPHEN 5; 325 MG/1; MG/1
1-2 TABLET ORAL EVERY 4 HOURS PRN
Qty: 20 TABLET | Refills: 0 | Status: SHIPPED | OUTPATIENT
Start: 2021-06-29 | End: 2021-07-14 | Stop reason: SDUPTHER

## 2021-06-29 RX ORDER — SODIUM CHLORIDE, SODIUM LACTATE, POTASSIUM CHLORIDE, CALCIUM CHLORIDE 600; 310; 30; 20 MG/100ML; MG/100ML; MG/100ML; MG/100ML
9 INJECTION, SOLUTION INTRAVENOUS CONTINUOUS PRN
Status: DISCONTINUED | OUTPATIENT
Start: 2021-06-29 | End: 2021-06-29 | Stop reason: HOSPADM

## 2021-06-29 RX ORDER — MAGNESIUM HYDROXIDE 1200 MG/15ML
LIQUID ORAL AS NEEDED
Status: DISCONTINUED | OUTPATIENT
Start: 2021-06-29 | End: 2021-06-29 | Stop reason: HOSPADM

## 2021-06-29 RX ORDER — ACETAMINOPHEN 325 MG/1
650 TABLET ORAL ONCE
Status: DISCONTINUED | OUTPATIENT
Start: 2021-06-29 | End: 2021-06-29 | Stop reason: HOSPADM

## 2021-06-29 RX ORDER — FENTANYL CITRATE 50 UG/ML
INJECTION, SOLUTION INTRAMUSCULAR; INTRAVENOUS AS NEEDED
Status: DISCONTINUED | OUTPATIENT
Start: 2021-06-29 | End: 2021-06-29 | Stop reason: SURG

## 2021-06-29 RX ORDER — GLYCOPYRROLATE 0.2 MG/ML
0.2 INJECTION INTRAMUSCULAR; INTRAVENOUS
Status: COMPLETED | OUTPATIENT
Start: 2021-06-29 | End: 2021-06-29

## 2021-06-29 RX ORDER — SUCCINYLCHOLINE/SOD CL,ISO/PF 100 MG/5ML
SYRINGE (ML) INTRAVENOUS AS NEEDED
Status: DISCONTINUED | OUTPATIENT
Start: 2021-06-29 | End: 2021-06-29 | Stop reason: SURG

## 2021-06-29 RX ORDER — SODIUM CHLORIDE 9 MG/ML
100 INJECTION, SOLUTION INTRAVENOUS CONTINUOUS
Status: DISCONTINUED | OUTPATIENT
Start: 2021-06-29 | End: 2021-06-29 | Stop reason: HOSPADM

## 2021-06-29 RX ORDER — ONDANSETRON 2 MG/ML
INJECTION INTRAMUSCULAR; INTRAVENOUS AS NEEDED
Status: DISCONTINUED | OUTPATIENT
Start: 2021-06-29 | End: 2021-06-29 | Stop reason: SURG

## 2021-06-29 RX ORDER — PROMETHAZINE HYDROCHLORIDE 25 MG/1
25 SUPPOSITORY RECTAL ONCE AS NEEDED
Status: DISCONTINUED | OUTPATIENT
Start: 2021-06-29 | End: 2021-06-29 | Stop reason: HOSPADM

## 2021-06-29 RX ORDER — LEVOFLOXACIN 5 MG/ML
500 INJECTION, SOLUTION INTRAVENOUS ONCE
Status: COMPLETED | OUTPATIENT
Start: 2021-06-29 | End: 2021-06-29

## 2021-06-29 RX ORDER — OXYCODONE HYDROCHLORIDE 5 MG/1
5 TABLET ORAL
Status: DISCONTINUED | OUTPATIENT
Start: 2021-06-29 | End: 2021-06-29 | Stop reason: HOSPADM

## 2021-06-29 RX ORDER — MIDAZOLAM HYDROCHLORIDE 1 MG/ML
2 INJECTION INTRAMUSCULAR; INTRAVENOUS ONCE
Status: COMPLETED | OUTPATIENT
Start: 2021-06-29 | End: 2021-06-29

## 2021-06-29 RX ORDER — PROPOFOL 10 MG/ML
VIAL (ML) INTRAVENOUS AS NEEDED
Status: DISCONTINUED | OUTPATIENT
Start: 2021-06-29 | End: 2021-06-29 | Stop reason: SURG

## 2021-06-29 RX ORDER — SODIUM CHLORIDE 0.9 % (FLUSH) 0.9 %
10 SYRINGE (ML) INJECTION AS NEEDED
Status: DISCONTINUED | OUTPATIENT
Start: 2021-06-29 | End: 2021-06-29 | Stop reason: HOSPADM

## 2021-06-29 RX ADMIN — LEVOFLOXACIN 500 MG: 5 INJECTION, SOLUTION INTRAVENOUS at 14:58

## 2021-06-29 RX ADMIN — ONDANSETRON 4 MG: 2 INJECTION INTRAMUSCULAR; INTRAVENOUS at 15:31

## 2021-06-29 RX ADMIN — GLYCOPYRROLATE 0.2 MG: 0.2 INJECTION INTRAMUSCULAR; INTRAVENOUS at 14:41

## 2021-06-29 RX ADMIN — MIDAZOLAM HYDROCHLORIDE 2 MG: 1 INJECTION, SOLUTION INTRAMUSCULAR; INTRAVENOUS at 14:41

## 2021-06-29 RX ADMIN — DEXAMETHASONE SODIUM PHOSPHATE 4 MG: 4 INJECTION INTRA-ARTICULAR; INTRALESIONAL; INTRAMUSCULAR; INTRAVENOUS; SOFT TISSUE at 14:58

## 2021-06-29 RX ADMIN — ROCURONIUM BROMIDE 45 MG: 10 INJECTION INTRAVENOUS at 15:11

## 2021-06-29 RX ADMIN — Medication 200 MG: at 15:31

## 2021-06-29 RX ADMIN — SODIUM CHLORIDE, POTASSIUM CHLORIDE, SODIUM LACTATE AND CALCIUM CHLORIDE 9 ML/HR: 600; 310; 30; 20 INJECTION, SOLUTION INTRAVENOUS at 14:14

## 2021-06-29 RX ADMIN — PROPOFOL 160 MG: 10 INJECTION, EMULSION INTRAVENOUS at 15:04

## 2021-06-29 RX ADMIN — FENTANYL CITRATE 50 MCG: 50 INJECTION INTRAMUSCULAR; INTRAVENOUS at 15:04

## 2021-06-29 RX ADMIN — FENTANYL CITRATE 50 MCG: 50 INJECTION INTRAMUSCULAR; INTRAVENOUS at 15:21

## 2021-06-29 RX ADMIN — ACETAMINOPHEN 1000 MG: 500 TABLET ORAL at 14:14

## 2021-06-29 RX ADMIN — Medication 80 MG: at 15:04

## 2021-06-29 RX ADMIN — ROCURONIUM BROMIDE 5 MG: 10 INJECTION INTRAVENOUS at 15:04

## 2021-06-29 NOTE — ANESTHESIA POSTPROCEDURE EVALUATION
Patient: Vivian Kauffman    Procedure Summary     Date: 06/29/21 Room / Location: [facility] OR 07 / [facility] MAIN OR    Anesthesia Start: 1458 Anesthesia Stop: 1543    Procedures:       CYSTOSCOPY NEPHROURETEROSCOPY (Left Ureter)      URETEROSCOPY STENT INSERTION (Left ) Diagnosis:       Lesion of left native ureter      Hydronephrosis of left kidney      (Lesion of left native ureter [N28.89])      (Hydronephrosis of left kidney [N13.30])    Surgeons: Mandie Hamilton MD Provider: Elijah Rivera MD    Anesthesia Type: general ASA Status: 3          Anesthesia Type: general    Vitals  Vitals Value Taken Time   /68 06/29/21 1618   Temp 36.7 °C (98 °F) 06/29/21 1542   Pulse 88 06/29/21 1622   Resp 15 06/29/21 1605   SpO2 94 % 06/29/21 1620   Vitals shown include unvalidated device data.        Post Anesthesia Care and Evaluation    Patient location during evaluation: bedside  Patient participation: complete - patient participated  Level of consciousness: awake  Pain score: 0  Pain management: adequate  Airway patency: patent  Anesthetic complications: No anesthetic complications  PONV Status: none  Cardiovascular status: acceptable and stable  Respiratory status: acceptable and room air  Hydration status: acceptable

## 2021-06-29 NOTE — ANESTHESIA PREPROCEDURE EVALUATION
Anesthesia Evaluation     Patient summary reviewed and Nursing notes reviewed   history of anesthetic complications: PONV  NPO Solid Status: > 6 hours  NPO Liquid Status: > 6 hours           Airway   Mallampati: II  TM distance: >3 FB  Neck ROM: full  No difficulty expected  Dental - normal exam   (+) upper dentures, lower dentures and edentulous    Pulmonary - normal exam   (+) a smoker Former, asthma,shortness of breath,   Cardiovascular - normal exam    PT is on anticoagulation therapy  Patient on routine beta blocker    (+) hypertension 2 medications or greater, CAD, hyperlipidemia,       Neuro/Psych  (+) seizures well controlled, CVA residual symptoms, dizziness/light headedness, psychiatric history Anxiety and Depression,     GI/Hepatic/Renal/Endo    (+) obesity,  GERD,  liver disease fatty liver disease, renal disease stones, diabetes mellitus type 2 using insulin,     Musculoskeletal     (+) myalgias, neck pain,   Abdominal  - normal exam    Bowel sounds: normal.   Substance History - negative use     OB/GYN negative ob/gyn ROS         Other                        Anesthesia Plan    ASA 3     general     intravenous induction     Anesthetic plan, all risks, benefits, and alternatives have been provided, discussed and informed consent has been obtained with: patient.

## 2021-07-13 ENCOUNTER — OFFICE VISIT (OUTPATIENT)
Dept: INTERNAL MEDICINE | Facility: CLINIC | Age: 66
End: 2021-07-13

## 2021-07-13 ENCOUNTER — TELEPHONE (OUTPATIENT)
Dept: INTERNAL MEDICINE | Facility: CLINIC | Age: 66
End: 2021-07-13

## 2021-07-13 VITALS
DIASTOLIC BLOOD PRESSURE: 81 MMHG | SYSTOLIC BLOOD PRESSURE: 131 MMHG | HEIGHT: 63 IN | HEART RATE: 79 BPM | OXYGEN SATURATION: 98 % | BODY MASS INDEX: 36.64 KG/M2 | TEMPERATURE: 97.7 F | WEIGHT: 206.8 LBS

## 2021-07-13 DIAGNOSIS — M54.41 CHRONIC RIGHT-SIDED LOW BACK PAIN WITH RIGHT-SIDED SCIATICA: ICD-10-CM

## 2021-07-13 DIAGNOSIS — E53.8 VITAMIN B12 DEFICIENCY: ICD-10-CM

## 2021-07-13 DIAGNOSIS — G25.0 BENIGN ESSENTIAL TREMOR: ICD-10-CM

## 2021-07-13 DIAGNOSIS — G89.29 CHRONIC RIGHT-SIDED LOW BACK PAIN WITH RIGHT-SIDED SCIATICA: ICD-10-CM

## 2021-07-13 DIAGNOSIS — E11.69 DIABETES MELLITUS TYPE 2 IN OBESE (HCC): ICD-10-CM

## 2021-07-13 DIAGNOSIS — E66.9 OBESITY, CLASS II, BMI 35-39.9: ICD-10-CM

## 2021-07-13 DIAGNOSIS — I10 ESSENTIAL HYPERTENSION: Primary | ICD-10-CM

## 2021-07-13 DIAGNOSIS — E03.4 ACQUIRED ATROPHY OF THYROID: ICD-10-CM

## 2021-07-13 DIAGNOSIS — E66.9 DIABETES MELLITUS TYPE 2 IN OBESE (HCC): ICD-10-CM

## 2021-07-13 DIAGNOSIS — E55.9 VITAMIN D DEFICIENCY: ICD-10-CM

## 2021-07-13 DIAGNOSIS — E66.9 OBESITY, CLASS I, BMI 30-34.9: ICD-10-CM

## 2021-07-13 DIAGNOSIS — E78.2 MIXED HYPERLIPIDEMIA: ICD-10-CM

## 2021-07-13 PROBLEM — E66.812 OBESITY, CLASS II, BMI 35-39.9: Status: ACTIVE | Noted: 2021-07-13

## 2021-07-13 PROCEDURE — 99214 OFFICE O/P EST MOD 30 MIN: CPT | Performed by: INTERNAL MEDICINE

## 2021-07-13 NOTE — TELEPHONE ENCOUNTER
Dr. Acosta pt wants you to look at her MRI from [facility] of the brain, she states that there were a couple spots that she wanted you to look at. Thanks. If you click the care everywhere you can see it.
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
## 2021-07-14 ENCOUNTER — PROCEDURE VISIT (OUTPATIENT)
Dept: UROLOGY | Facility: CLINIC | Age: 66
End: 2021-07-14

## 2021-07-14 VITALS
SYSTOLIC BLOOD PRESSURE: 141 MMHG | HEIGHT: 63 IN | WEIGHT: 207 LBS | BODY MASS INDEX: 36.68 KG/M2 | DIASTOLIC BLOOD PRESSURE: 65 MMHG

## 2021-07-14 DIAGNOSIS — N13.30 HYDRONEPHROSIS OF LEFT KIDNEY: ICD-10-CM

## 2021-07-14 DIAGNOSIS — N20.0 KIDNEY STONES: Primary | ICD-10-CM

## 2021-07-14 LAB
BILIRUB BLD-MCNC: NEGATIVE MG/DL
CLARITY, POC: CLEAR
COLOR UR: YELLOW
GLUCOSE UR STRIP-MCNC: NEGATIVE MG/DL
KETONES UR QL: NEGATIVE
LEUKOCYTE EST, POC: ABNORMAL
NITRITE UR-MCNC: NEGATIVE MG/ML
PH UR: 5.5 [PH] (ref 5–8)
PROT UR STRIP-MCNC: ABNORMAL MG/DL
RBC # UR STRIP: ABNORMAL /UL
SP GR UR: 1.02 (ref 1–1.03)
UROBILINOGEN UR QL: NORMAL

## 2021-07-14 PROCEDURE — 81003 URINALYSIS AUTO W/O SCOPE: CPT | Performed by: UROLOGY

## 2021-07-14 PROCEDURE — 52310 CYSTOSCOPY AND TREATMENT: CPT | Performed by: UROLOGY

## 2021-07-14 NOTE — TELEPHONE ENCOUNTER
I reviewed the MRI and the recommendations per her Neurology team:    Telephone Encounter - Ramya Cortes NP - 07/12/2021 9:45 AM EDT  I spoke with Ms Kauffman about the 7/7/21 brain MRI. She is receiving PT and is not taking nortriptyline. Discussed B/P and HLD management because of mildly progressed WM changes. Plan: Continue PT, she will consider cognitive rehabilitation for the memory impairment, at follow up review all medications and provide copy of MRI report. She was agreeable to the plan and appreciative for the call.      I agree with their assessment.  The changes noted are typical age related changes.  Treatment of BP and cholesterol can sometimes slow the changes as she mentions above.  However, her BP/lipids are well controlled.  So the major issue driving theses changes, other than age, would be her diabetes.  She needs much tighter control, and needs work harder with her Endocrinologist to improve her sugars.  She needs to get he Dexcom meter/etc that she mentioned and get a phone or other device that it will work with.

## 2021-07-14 NOTE — PROGRESS NOTES
Stent Removal    Date/Time: 7/14/2021 11:38 AM  Performed by: Mandie Hamilton MD  Authorized by: Mandie Hamilton MD   Preparation: Patient was prepped and draped in the usual sterile fashion.  Local anesthesia used: no    Anesthesia:  Local anesthesia used: no    Sedation:  Patient sedated: no    Patient tolerance: patient tolerated the procedure well with no immediate complications  Comments: After proper consent was obtained patient was placed into the dorsal lithotomy position.  Flexible cystoscope was passed into the bladder.  The left ureteral stent was grasped and removed.  The cystoscope was then removed.  Patient tolerated the procedure well.

## 2021-07-30 RX ORDER — AMLODIPINE BESYLATE 2.5 MG/1
TABLET ORAL
Qty: 30 TABLET | Refills: 2 | Status: SHIPPED | OUTPATIENT
Start: 2021-07-30 | End: 2021-11-06

## 2021-08-02 ENCOUNTER — TELEPHONE (OUTPATIENT)
Dept: INTERNAL MEDICINE | Facility: CLINIC | Age: 66
End: 2021-08-02

## 2021-08-02 DIAGNOSIS — H92.03 EAR PAIN, BILATERAL: Primary | ICD-10-CM

## 2021-08-02 NOTE — TELEPHONE ENCOUNTER
Patient called stating that she went to [facility] on Friday for ear pain, and was given ear drops for an infection. She states that she finished the antibiotic yesterday but she is still having pain. Says that it burns on the inside and is tender to touch. She is wanting to know does she need to come into the office or go to an ENT. Call back number [phone]

## 2021-08-03 ENCOUNTER — TELEPHONE (OUTPATIENT)
Dept: INTERNAL MEDICINE | Facility: CLINIC | Age: 66
End: 2021-08-03

## 2021-08-03 NOTE — TELEPHONE ENCOUNTER
Please let the patient know that in general, within a month is a very reasonable time to be getting in with a specialist.  I have no other recommendations for treatment at this time.  If she can find another ENT specialist to see in a shorter timeframe, and if needed, I would be happy to make a referral to them for her.

## 2021-08-05 ENCOUNTER — TREATMENT (OUTPATIENT)
Dept: PHYSICAL THERAPY | Facility: CLINIC | Age: 66
End: 2021-08-05

## 2021-08-05 DIAGNOSIS — S62.393D: Primary | ICD-10-CM

## 2021-08-05 DIAGNOSIS — S62.395D OTHER FRACTURE OF FOURTH METACARPAL BONE, LEFT HAND, SUBSEQUENT ENCOUNTER FOR FRACTURE WITH ROUTINE HEALING: ICD-10-CM

## 2021-08-05 PROCEDURE — 97112 NEUROMUSCULAR REEDUCATION: CPT | Performed by: OCCUPATIONAL THERAPIST

## 2021-08-05 PROCEDURE — 97022 WHIRLPOOL THERAPY: CPT | Performed by: OCCUPATIONAL THERAPIST

## 2021-08-05 PROCEDURE — 97166 OT EVAL MOD COMPLEX 45 MIN: CPT | Performed by: OCCUPATIONAL THERAPIST

## 2021-08-05 PROCEDURE — 97140 MANUAL THERAPY 1/> REGIONS: CPT | Performed by: OCCUPATIONAL THERAPIST
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
## 2021-08-06 ENCOUNTER — TRANSCRIBE ORDERS (OUTPATIENT)
Dept: PHYSICAL THERAPY | Facility: CLINIC | Age: 66
End: 2021-08-06

## 2021-08-06 DIAGNOSIS — S62.395D OTHER FRACTURE OF FOURTH METACARPAL BONE, LEFT HAND, SUBSEQUENT ENCOUNTER FOR FRACTURE WITH ROUTINE HEALING: ICD-10-CM

## 2021-08-06 DIAGNOSIS — S62.393D: Primary | ICD-10-CM

## 2021-08-10 RX ORDER — GABAPENTIN 300 MG/1
CAPSULE ORAL
Qty: 90 CAPSULE | Refills: 0 | Status: SHIPPED | OUTPATIENT
Start: 2021-08-10 | End: 2021-09-09

## 2021-08-13 ENCOUNTER — TELEPHONE (OUTPATIENT)
Dept: UROLOGY | Facility: CLINIC | Age: 66
End: 2021-08-13

## 2021-08-13 ENCOUNTER — LAB (OUTPATIENT)
Dept: LAB | Facility: HOSPITAL | Age: 66
End: 2021-08-13

## 2021-08-13 DIAGNOSIS — R35.0 URINARY FREQUENCY: Primary | ICD-10-CM

## 2021-08-13 DIAGNOSIS — R35.0 URINARY FREQUENCY: ICD-10-CM

## 2021-08-13 DIAGNOSIS — R82.90 CLOUDY URINE: ICD-10-CM

## 2021-08-13 DIAGNOSIS — N30.90 CYSTITIS: ICD-10-CM

## 2021-08-13 PROCEDURE — 87077 CULTURE AEROBIC IDENTIFY: CPT

## 2021-08-13 PROCEDURE — 87086 URINE CULTURE/COLONY COUNT: CPT

## 2021-08-13 PROCEDURE — 87186 SC STD MICRODIL/AGAR DIL: CPT

## 2021-08-13 NOTE — TELEPHONE ENCOUNTER
Patient with cloudy urine, stomach is hurting, and she has urinary frequency.  Asked for an order for urine culture.

## 2021-08-15 LAB — BACTERIA SPEC AEROBE CULT: ABNORMAL

## 2021-08-15 RX ORDER — CEPHALEXIN 500 MG/1
500 CAPSULE ORAL 3 TIMES DAILY
Qty: 30 CAPSULE | Refills: 0 | Status: SHIPPED | OUTPATIENT
Start: 2021-08-15 | End: 2021-08-25

## 2021-08-16 ENCOUNTER — TELEPHONE (OUTPATIENT)
Dept: UROLOGY | Facility: CLINIC | Age: 66
End: 2021-08-16

## 2021-08-16 NOTE — TELEPHONE ENCOUNTER
----- Message from Mandie Hamilton MD sent at 8/15/2021 11:11 AM EDT -----  Please call the patient regarding her abnormal result.  Antibiotics sent. Keflex sent in.

## 2021-08-24 ENCOUNTER — TREATMENT (OUTPATIENT)
Dept: PHYSICAL THERAPY | Facility: CLINIC | Age: 66
End: 2021-08-24

## 2021-08-24 DIAGNOSIS — S62.395D OTHER FRACTURE OF FOURTH METACARPAL BONE, LEFT HAND, SUBSEQUENT ENCOUNTER FOR FRACTURE WITH ROUTINE HEALING: ICD-10-CM

## 2021-08-24 DIAGNOSIS — S62.393D: Primary | ICD-10-CM

## 2021-08-24 DIAGNOSIS — G90.512 COMPLEX REGIONAL PAIN SYNDROME TYPE 1 OF LEFT UPPER EXTREMITY: ICD-10-CM

## 2021-08-24 PROCEDURE — 97112 NEUROMUSCULAR REEDUCATION: CPT | Performed by: OCCUPATIONAL THERAPIST

## 2021-08-24 PROCEDURE — 97110 THERAPEUTIC EXERCISES: CPT | Performed by: OCCUPATIONAL THERAPIST

## 2021-08-24 PROCEDURE — 97022 WHIRLPOOL THERAPY: CPT | Performed by: OCCUPATIONAL THERAPIST

## 2021-08-24 NOTE — PROGRESS NOTES
Occupational Therapy Daily Treatment Note      Patient: Vivian Kauffman   : 1955  Referring practitioner: Miguel García, *  Date of Initial Visit: Type: THERAPY  Noted: 2021  Today's Date: 2021  Patient seen for 2 sessions    ICD-10-CM ICD-9-CM   1. Other fracture of third metacarpal bone, left hand, subsequent encounter for fracture with routine healing  S62.393D V54.12   2. Other fracture of fourth metacarpal bone, left hand, subsequent encounter for fracture with routine healing  S62.395D V54.12   3. Complex regional pain syndrome type 1 of left upper extremity  G90.512 337.21          Vivian Kauffman reports I have not been doing my exercises because I didn't know if I was going to come back.     Objective   See Exercise, Manual, and Modality Logs for complete treatment.     Extension at MP  IF 0  MF 50  RF 15  SF 0    Assessment/Plan  Explained to patient consistency with HEP is the most important part of getting this program to work.       Cont per POC           Timed:  Manual Therapy:    5     mins  92021;  Therapeutic Exercise:    10    mins  39304;     Neuromuscular Tori:    15    mins  06678;    Ultrasound:     0     mins  78985;    Electrical Stimulation:    0     mins  81699;    Untimed:  Electrical Stimulation:    0     mins  94236 ( );  Fluidotherapy:        10    mins  38427    Timed Treatment:   30   mins   Total Treatment:     40   mins  DEBBY Tello, OTR/L,CHT  Occupational Therapist, Certified Hand Therapist

## 2021-08-26 ENCOUNTER — TREATMENT (OUTPATIENT)
Dept: PHYSICAL THERAPY | Facility: CLINIC | Age: 66
End: 2021-08-26

## 2021-08-26 DIAGNOSIS — G90.512 COMPLEX REGIONAL PAIN SYNDROME TYPE 1 OF LEFT UPPER EXTREMITY: ICD-10-CM

## 2021-08-26 DIAGNOSIS — S62.393D: Primary | ICD-10-CM

## 2021-08-26 DIAGNOSIS — S62.395D OTHER FRACTURE OF FOURTH METACARPAL BONE, LEFT HAND, SUBSEQUENT ENCOUNTER FOR FRACTURE WITH ROUTINE HEALING: ICD-10-CM

## 2021-08-26 PROCEDURE — 97140 MANUAL THERAPY 1/> REGIONS: CPT | Performed by: OCCUPATIONAL THERAPIST

## 2021-08-26 PROCEDURE — 97110 THERAPEUTIC EXERCISES: CPT | Performed by: OCCUPATIONAL THERAPIST

## 2021-08-26 NOTE — PROGRESS NOTES
Occupational Therapy Daily Treatment Note      Patient: Vivian Kauffman   : 1955  Referring practitioner: No ref. provider found  Date of Initial Visit: Type: THERAPY  Noted: 2021  Today's Date: 2021  Patient seen for 3 sessions    ICD-10-CM ICD-9-CM   1. Other fracture of third metacarpal bone, left hand, subsequent encounter for fracture with routine healing  S62.393D V54.12   2. Other fracture of fourth metacarpal bone, left hand, subsequent encounter for fracture with routine healing  S62.395D V54.12   3. Complex regional pain syndrome type 1 of left upper extremity  G90.512 337.21          Vivian Kauffman reports while the tape was on less pain     Objective   See Exercise, Manual, and Modality Logs for complete treatment.   MP extension  LF 37 (increased 13 degrees since last visit)    Assessment/Plan  Still having difficulty with opening jars  kinesiotape applied to dorsum of hand for support into extension and radial deviation at MP.  Cont per POC           Timed:  Manual Therapy:    10     mins  19754;  Therapeutic Exercise:    20     mins  82601;     Neuromuscular Tori:    0    mins  61831;    Ultrasound:     0     mins  78599;    Electrical Stimulation:    0     mins  35700;    Untimed:  Electrical Stimulation:    0     mins  37372 ( );  Fluidotherapy:        0    mins  06585    Timed Treatment:   30   mins   Total Treatment:     30   mins  DEBBY Tello, OTR/L,CHT  Occupational Therapist, Certified Hand Therapist

## 2021-08-31 ENCOUNTER — TREATMENT (OUTPATIENT)
Dept: PHYSICAL THERAPY | Facility: CLINIC | Age: 66
End: 2021-08-31

## 2021-08-31 DIAGNOSIS — G90.512 COMPLEX REGIONAL PAIN SYNDROME TYPE 1 OF LEFT UPPER EXTREMITY: ICD-10-CM

## 2021-08-31 DIAGNOSIS — S62.393D: Primary | ICD-10-CM

## 2021-08-31 DIAGNOSIS — S62.395D OTHER FRACTURE OF FOURTH METACARPAL BONE, LEFT HAND, SUBSEQUENT ENCOUNTER FOR FRACTURE WITH ROUTINE HEALING: ICD-10-CM

## 2021-08-31 PROCEDURE — 97140 MANUAL THERAPY 1/> REGIONS: CPT | Performed by: OCCUPATIONAL THERAPIST

## 2021-08-31 PROCEDURE — 97110 THERAPEUTIC EXERCISES: CPT | Performed by: OCCUPATIONAL THERAPIST

## 2021-08-31 NOTE — PROGRESS NOTES
Occupational Therapy Daily Treatment Note      Patient: Vivian Kauffman   : 1955  Referring practitioner: Miguel García, *  Date of Initial Visit: Type: THERAPY  Noted: 2021  Today's Date: 2021  Patient seen for 4 sessions    ICD-10-CM ICD-9-CM   1. Other fracture of third metacarpal bone, left hand, subsequent encounter for fracture with routine healing  S62.393D V54.12   2. Other fracture of fourth metacarpal bone, left hand, subsequent encounter for fracture with routine healing  S62.395D V54.12   3. Complex regional pain syndrome type 1 of left upper extremity  G90.512 337.21          Vivian Kauffman reports the tape helped and so did the suction.      Objective   See Exercise, Manual, and Modality Logs for complete treatment.       Assessment/Plan  Pt reports functional use of L hand is improving.       Cont per POC           Timed:  Manual Therapy:    10     mins  83185;  Therapeutic Exercise:    20     mins  22471;     Neuromuscular Tori:    0    mins  69944;    Ultrasound:     0     mins  21911;    Electrical Stimulation:    0     mins  30685;    Untimed:  Electrical Stimulation:    0     mins  56616 ( );  Fluidotherapy:        0    mins  67760    Timed Treatment:   30   mins   Total Treatment:     30   mins  DEBBY Tello, ANDREWR/L,CHT  Occupational Therapist, Certified Hand Therapist

## 2021-09-02 ENCOUNTER — TREATMENT (OUTPATIENT)
Dept: PHYSICAL THERAPY | Facility: CLINIC | Age: 66
End: 2021-09-02

## 2021-09-02 DIAGNOSIS — S62.395D OTHER FRACTURE OF FOURTH METACARPAL BONE, LEFT HAND, SUBSEQUENT ENCOUNTER FOR FRACTURE WITH ROUTINE HEALING: ICD-10-CM

## 2021-09-02 DIAGNOSIS — G90.512 COMPLEX REGIONAL PAIN SYNDROME TYPE 1 OF LEFT UPPER EXTREMITY: ICD-10-CM

## 2021-09-02 DIAGNOSIS — S62.393D: Primary | ICD-10-CM

## 2021-09-02 PROCEDURE — 97110 THERAPEUTIC EXERCISES: CPT | Performed by: OCCUPATIONAL THERAPIST

## 2021-09-02 PROCEDURE — 97112 NEUROMUSCULAR REEDUCATION: CPT | Performed by: OCCUPATIONAL THERAPIST

## 2021-09-02 PROCEDURE — 97140 MANUAL THERAPY 1/> REGIONS: CPT | Performed by: OCCUPATIONAL THERAPIST

## 2021-09-02 NOTE — PROGRESS NOTES
Re-Assessment / Re-Certification      Patient: Vivian Kauffman   : 1955  Diagnosis/ICD-10 Code:  Other fracture of third metacarpal bone, left hand, subsequent encounter for fracture with routine healing [S62.393D]  Referring practitioner: Miguel García, *  Date of Initial Visit: Type: THERAPY  Noted: 2021  Today's Date: 2021  Patient seen for 5 sessions      Subjective:   Vivian Kauffman reports: It is still sore but no pain today.  My middle finger is still crooked.  Subjective Questionnaire: QuickDASH: 34  Clinical Progress: improved  Home Program Compliance: Yes  Treatment has included: therapeutic exercise, neuromuscular re-education, manual therapy, therapeutic activity and electrical stimulation    Subjective   Objective          Active Range of Motion     Additional Active Range of Motion Details  LF  40-90  0-80  0-50    HICKS 180       Pt has gained 10 degrees HICKS since eval.    Assessment/Plan    Visit Diagnoses:    ICD-10-CM ICD-9-CM   1. Other fracture of third metacarpal bone, left hand, subsequent encounter for fracture with routine healing  S62.393D V54.12   2. Other fracture of fourth metacarpal bone, left hand, subsequent encounter for fracture with routine healing  S62.395D V54.12   3. Complex regional pain syndrome type 1 of left upper extremity  G90.512 337.21       Progress toward previous goals: Partially Met    Plan Goals: 1. The patient complains of pain in the l hand                  LTG 1: 12 weeks:  The patient will report a pain rating of 0/10 or better in order to improve sleep quality and tolerance to performance of activities of daily living.                                  STATUS:  MET                  STG 1a: 4weeks:  The patient will report a pain rating of 4/10 or better.                                   STATUS:  MET  2. The patient has limited ROM of the R digits                  LTG 2: 12 weeks:  The patient will demonstrate 0 degrees of full extension  at MP to allow the patient to WB.                                  STATUS:  NOT MET                  STG 2a: 4 weeks:  The patient will demonstrate 15 degrees of MP extension lag.                                  STATUS:  NOT MET                           3. The patient has limited strength of the L UE.                  LTG 3: 12 weeks:  The patient will demonstrate 30# in order to use hand for functional tasks.                                  STATUS:  Not met                  STG 3a: 4 weeks:  The patient will demonstrate tolerance to light strengthening without adverse reaction.                                  STATUS:  Not met  4. Carrying, Moving, and Handling Objects Functional Limitation                                   LTG 4: 12 weeks:  The patient will demonstrate 1-19% limitation by achieving a score of 19 on the Quick DASH.                                  STATUS:  Not met                  STG 4a: 4 weeks:  The patient will demonstrate 20-39% limitation by achieving a score of 27 on the Quick DASH.                                    STATUS:  Not met    Recommendations: Continue as planned  Timeframe: 2 months  Prognosis to achieve goals: good    OT Signature: Felicia Vasques OTD, OTR/L, CHT      Based upon review of the patient's progress and continued therapy plan, it is my medical opinion that Vivian Kauffman should continue occupational therapy treatment at [facility] PHYSICAL THERAPY  [address] [place] [address] [phone].    Signature: __________________________________  Miguel García MD  Timed:  Manual Therapy:    10     mins  81004;  Therapeutic Exercise:    20     mins  46600;     Neuromuscular Tori:    10    mins  19298;    Ultrasound:     0     mins  29802;    Electrical Stimulation:    0     mins  22938;    Untimed:  Electrical Stimulation:    0     mins  32560 ( );  Fluidotherapy:     0     mins  84934    Timed Treatment:   40   mins    Total Treatment:     40   mins

## 2021-09-09 RX ORDER — GABAPENTIN 300 MG/1
CAPSULE ORAL
Qty: 90 CAPSULE | Refills: 0 | Status: SHIPPED | OUTPATIENT
Start: 2021-09-09 | End: 2021-10-06

## 2021-09-10 ENCOUNTER — TREATMENT (OUTPATIENT)
Dept: PHYSICAL THERAPY | Facility: CLINIC | Age: 66
End: 2021-09-10

## 2021-09-10 DIAGNOSIS — S62.395D OTHER FRACTURE OF FOURTH METACARPAL BONE, LEFT HAND, SUBSEQUENT ENCOUNTER FOR FRACTURE WITH ROUTINE HEALING: ICD-10-CM

## 2021-09-10 DIAGNOSIS — S62.393D: Primary | ICD-10-CM

## 2021-09-10 DIAGNOSIS — G90.512 COMPLEX REGIONAL PAIN SYNDROME TYPE 1 OF LEFT UPPER EXTREMITY: ICD-10-CM

## 2021-09-10 PROCEDURE — 97112 NEUROMUSCULAR REEDUCATION: CPT | Performed by: OCCUPATIONAL THERAPIST

## 2021-09-10 PROCEDURE — 97140 MANUAL THERAPY 1/> REGIONS: CPT | Performed by: OCCUPATIONAL THERAPIST

## 2021-09-10 PROCEDURE — 97110 THERAPEUTIC EXERCISES: CPT | Performed by: OCCUPATIONAL THERAPIST

## 2021-09-10 NOTE — PROGRESS NOTES
Occupational Therapy Daily Treatment Note      Patient: Vivian Kauffman   : 1955  Referring practitioner: Miguel García, *  Date of Initial Visit: Type: THERAPY  Noted: 2021  Today's Date: 9/10/2021  Patient seen for 6 sessions  No diagnosis found.       Vivian Kuaffman reports the doctor wants there to continue therapy.      Objective   See Exercise, Manual, and Modality Logs for complete treatment.   Pt able to get full extension and fisting with vision occluded in B hand movements.  Improving speed and accuracy with laterality cards.      Assessment/Plan  Stretching LF MP radially pulls intrinsically, then results in increased extension actively following.  Pt responding well to cupping.       Cont per POC           Timed:  Manual Therapy:    15     mins  29128;  Therapeutic Exercise:    15     mins  64063;     Neuromuscular Tori:    10    mins  00976;    Ultrasound:     0     mins  07983;    Electrical Stimulation:    0     mins  24008;    Untimed:  Electrical Stimulation:    0     mins  24109 ( );  Fluidotherapy:        0    mins  07319    Timed Treatment:   40   mins   Total Treatment:     40   mins  DEBBY Tello, OTR/L,CHT  Occupational Therapist, Certified Hand Therapist

## 2021-09-16 ENCOUNTER — TREATMENT (OUTPATIENT)
Dept: PHYSICAL THERAPY | Facility: CLINIC | Age: 66
End: 2021-09-16

## 2021-09-16 DIAGNOSIS — S62.393D: Primary | ICD-10-CM

## 2021-09-16 DIAGNOSIS — G90.512 COMPLEX REGIONAL PAIN SYNDROME TYPE 1 OF LEFT UPPER EXTREMITY: ICD-10-CM

## 2021-09-16 DIAGNOSIS — S62.395D OTHER FRACTURE OF FOURTH METACARPAL BONE, LEFT HAND, SUBSEQUENT ENCOUNTER FOR FRACTURE WITH ROUTINE HEALING: ICD-10-CM

## 2021-09-16 PROCEDURE — G0283 ELEC STIM OTHER THAN WOUND: HCPCS | Performed by: OCCUPATIONAL THERAPIST

## 2021-09-16 PROCEDURE — 97110 THERAPEUTIC EXERCISES: CPT | Performed by: OCCUPATIONAL THERAPIST

## 2021-09-16 PROCEDURE — 97140 MANUAL THERAPY 1/> REGIONS: CPT | Performed by: OCCUPATIONAL THERAPIST

## 2021-09-16 NOTE — PROGRESS NOTES
Occupational Therapy Daily Treatment Note      Patient: Vivian Kauffman   : 1955  Referring practitioner: Miguel García, *  Date of Initial Visit: Type: THERAPY  Noted: 2021  Today's Date: 2021  Patient seen for 7 sessions    ICD-10-CM ICD-9-CM   1. Other fracture of third metacarpal bone, left hand, subsequent encounter for fracture with routine healing  S62.393D V54.12   2. Other fracture of fourth metacarpal bone, left hand, subsequent encounter for fracture with routine healing  S62.395D V54.12   3. Complex regional pain syndrome type 1 of left upper extremity  G90.512 337.21          Vivian Kauffman reports I got a flu shot in that arm, it is swollen all over today.     Objective   See Exercise, Manual, and Modality Logs for complete treatment.   20-85  0-100   0-60    Assessment/Plan  Pt progressing with AROM and MP extension.  Pt has improved fist this date. Kinesiotape applied for increase Mp extension      Cont per POC           Timed:  Manual Therapy:    10     mins  44966;  Therapeutic Exercise:    15     mins  04608;     Neuromuscular Tori:    0    mins  95161;    Ultrasound:     0     mins  64257;    Electrical Stimulation:    0     mins  67111;    Untimed:  Electrical Stimulation:    10     mins  23718 ( );  Fluidotherapy:        0    mins  90635    Timed Treatment:   25   mins   Total Treatment:     35   mins  DEBBY Tello, OTR/L,CHT  Occupational Therapist, Certified Hand Therapist

## 2021-09-20 ENCOUNTER — TELEPHONE (OUTPATIENT)
Dept: UROLOGY | Facility: CLINIC | Age: 66
End: 2021-09-20

## 2021-09-20 DIAGNOSIS — B37.9 YEAST INFECTION: Primary | ICD-10-CM

## 2021-09-20 RX ORDER — FLUCONAZOLE 150 MG/1
150 TABLET ORAL DAILY
Qty: 2 TABLET | Refills: 1 | Status: SHIPPED | OUTPATIENT
Start: 2021-09-20 | End: 2021-09-22

## 2021-09-21 ENCOUNTER — TREATMENT (OUTPATIENT)
Dept: PHYSICAL THERAPY | Facility: CLINIC | Age: 66
End: 2021-09-21

## 2021-09-21 DIAGNOSIS — S62.395D OTHER FRACTURE OF FOURTH METACARPAL BONE, LEFT HAND, SUBSEQUENT ENCOUNTER FOR FRACTURE WITH ROUTINE HEALING: ICD-10-CM

## 2021-09-21 DIAGNOSIS — G90.512 COMPLEX REGIONAL PAIN SYNDROME TYPE 1 OF LEFT UPPER EXTREMITY: ICD-10-CM

## 2021-09-21 DIAGNOSIS — S62.393D: Primary | ICD-10-CM

## 2021-09-21 PROCEDURE — G0283 ELEC STIM OTHER THAN WOUND: HCPCS | Performed by: OCCUPATIONAL THERAPIST

## 2021-09-21 PROCEDURE — 97110 THERAPEUTIC EXERCISES: CPT | Performed by: OCCUPATIONAL THERAPIST

## 2021-09-21 PROCEDURE — 97140 MANUAL THERAPY 1/> REGIONS: CPT | Performed by: OCCUPATIONAL THERAPIST

## 2021-09-21 NOTE — PROGRESS NOTES
Occupational Therapy Daily Treatment Note      Patient: Vivian Kauffman   : 1955  Referring practitioner: Miguel García, *  Date of Initial Visit: Type: THERAPY  Noted: 2021  Today's Date: 2021  Patient seen for 8 sessions    ICD-10-CM ICD-9-CM   1. Other fracture of third metacarpal bone, left hand, subsequent encounter for fracture with routine healing  S62.393D V54.12   2. Other fracture of fourth metacarpal bone, left hand, subsequent encounter for fracture with routine healing  S62.395D V54.12   3. Complex regional pain syndrome type 1 of left upper extremity  G90.512 337.21          Vivian Kauffman reports I am swelling more at night and in the evenings.  When I get up in the morning my hands are stiff.      Objective   See Exercise, Manual, and Modality Logs for complete treatment.       Assessment/Plan  Pt has full extension at MP joint after treatment this date.  With functional tasks it decreased to a 10 degree extension lag, but continuing to improve with extension.      Cont per POC           Timed:  Manual Therapy:    10     mins  94315;  Therapeutic Exercise:    20     mins  17462;     Neuromuscular Tori:    0    mins  96705;    Ultrasound:     0     mins  28484;    Electrical Stimulation:    0     mins  56882;    Untimed:  Electrical Stimulation:    10     mins  35362 ( );  Fluidotherapy:        0    mins  06703    Timed Treatment:   30   mins   Total Treatment:     40   mins  DEBBY Tello, OTR/L,CHT  Occupational Therapist, Certified Hand Therapist

## 2021-09-23 ENCOUNTER — TREATMENT (OUTPATIENT)
Dept: PHYSICAL THERAPY | Facility: CLINIC | Age: 66
End: 2021-09-23

## 2021-09-23 DIAGNOSIS — S62.395D OTHER FRACTURE OF FOURTH METACARPAL BONE, LEFT HAND, SUBSEQUENT ENCOUNTER FOR FRACTURE WITH ROUTINE HEALING: ICD-10-CM

## 2021-09-23 DIAGNOSIS — S62.393D: Primary | ICD-10-CM

## 2021-09-23 DIAGNOSIS — G90.512 COMPLEX REGIONAL PAIN SYNDROME TYPE 1 OF LEFT UPPER EXTREMITY: ICD-10-CM

## 2021-09-23 PROCEDURE — 97110 THERAPEUTIC EXERCISES: CPT | Performed by: OCCUPATIONAL THERAPIST

## 2021-09-23 PROCEDURE — G0283 ELEC STIM OTHER THAN WOUND: HCPCS | Performed by: OCCUPATIONAL THERAPIST

## 2021-09-23 PROCEDURE — 97112 NEUROMUSCULAR REEDUCATION: CPT | Performed by: OCCUPATIONAL THERAPIST

## 2021-09-23 NOTE — PROGRESS NOTES
Occupational Therapy Daily Treatment Note      Patient: Vivian Kauffman   : 1955  Referring practitioner: Miguel García, *  Date of Initial Visit: Type: THERAPY  Noted: 2021  Today's Date: 2021  Patient seen for 9 sessions    ICD-10-CM ICD-9-CM   1. Other fracture of third metacarpal bone, left hand, subsequent encounter for fracture with routine healing  S62.393D V54.12   2. Other fracture of fourth metacarpal bone, left hand, subsequent encounter for fracture with routine healing  S62.395D V54.12   3. Complex regional pain syndrome type 1 of left upper extremity  G90.512 337.21          Vivian Kauffman reports it still isn't straight.    Objective   See Exercise, Manual, and Modality Logs for complete treatment.       Assessment/Plan  Pt educated that she can straighten finger when she pays attention to positioning, so she needs to continue to be aware and straighten it when it is leaning ulnarly.    Cont per POC           Timed:  Manual Therapy:    5     mins  08059;  Therapeutic Exercise:    15     mins  17601;     Neuromuscular Tori:    10    mins  55726;    Ultrasound:     0     mins  74203;    Electrical Stimulation:    0     mins  65920;    Untimed:  Electrical Stimulation:    10     mins  13648 ( );  Fluidotherapy:        0    mins  28293    Timed Treatment:   30   mins   Total Treatment:     40   mins  DEBBY Tello, OTR/L,CHT  Occupational Therapist, Certified Hand Therapist

## 2021-09-28 ENCOUNTER — TREATMENT (OUTPATIENT)
Dept: PHYSICAL THERAPY | Facility: CLINIC | Age: 66
End: 2021-09-28

## 2021-09-28 DIAGNOSIS — S62.393D: Primary | ICD-10-CM

## 2021-09-28 DIAGNOSIS — S62.395D OTHER FRACTURE OF FOURTH METACARPAL BONE, LEFT HAND, SUBSEQUENT ENCOUNTER FOR FRACTURE WITH ROUTINE HEALING: ICD-10-CM

## 2021-09-28 DIAGNOSIS — G90.512 COMPLEX REGIONAL PAIN SYNDROME TYPE 1 OF LEFT UPPER EXTREMITY: ICD-10-CM

## 2021-09-28 PROCEDURE — 97110 THERAPEUTIC EXERCISES: CPT | Performed by: OCCUPATIONAL THERAPIST

## 2021-09-28 NOTE — PROGRESS NOTES
Re-Assessment / Re-Certification      Patient: Vivian Kauffman   : 1955  Diagnosis/ICD-10 Code:  Other fracture of third metacarpal bone, left hand, subsequent encounter for fracture with routine healing [S62.393D]  Referring practitioner: Miguel García, *  Date of Initial Visit: Type: THERAPY  Noted: 2021  Today's Date: 2021  Patient seen for 10 sessions      Subjective:   Vivian Kauffman reports: No pain just stiff.  Subjective Questionnaire: QuickDASH: 22  Clinical Progress: improved  Home Program Compliance: Yes  Treatment has included: therapeutic exercise, neuromuscular re-education, manual therapy and electrical stimulation    Subjective   Objective          Active Range of Motion     Additional Active Range of Motion Details  L LF  10-90  0-90  0-60    Strength/Myotome Testing     Left Wrist/Hand      (2nd hand position)   lbs: 24      Assessment/Plan    Visit Diagnoses:    ICD-10-CM ICD-9-CM   1. Other fracture of third metacarpal bone, left hand, subsequent encounter for fracture with routine healing  S62.393D V54.12   2. Other fracture of fourth metacarpal bone, left hand, subsequent encounter for fracture with routine healing  S62.395D V54.12   3. Complex regional pain syndrome type 1 of left upper extremity  G90.512 337.21       Progress toward previous goals: Partially Met    Plan Goals: 1. The patient complains of pain in the l hand                  LTG 1: 12 weeks:  The patient will report a pain rating of 0/10 or better in order to improve sleep quality and tolerance to performance of activities of daily living.                                  STATUS:  MET                  STG 1a: 4weeks:  The patient will report a pain rating of 4/10 or better.                                   STATUS:  MET  2. The patient has limited ROM of the R digits                  LTG 2: 12 weeks:  The patient will demonstrate 0 degrees of full extension at MP to allow the patient to  WB.                                  STATUS:  NOT MET                  STG 2a: 4 weeks:  The patient will demonstrate 15 degrees of MP extension lag.                                  STATUS:  MET                           3. The patient has limited strength of the L UE.                  LTG 3: 12 weeks:  The patient will demonstrate 30# in order to use hand for functional tasks.                                  STATUS:  Not met                  STG 3a: 4 weeks:  The patient will demonstrate tolerance to light strengthening without adverse reaction.                                  STATUS:  met  4. Carrying, Moving, and Handling Objects Functional Limitation                                   LTG 4: 12 weeks:  The patient will demonstrate 1-19% limitation by achieving a score of 19 on the Quick DASH.                                  STATUS:  Not met                  STG 4a: 4 weeks:  The patient will demonstrate 20-39% limitation by achieving a score of 27 on the Quick DASH.                                    STATUS:  met      Recommendations: Continue as planned  Timeframe: 2 months  Prognosis to achieve goals: good    OT Signature: DEBBY Tello, OTR/L, CHT      Based upon review of the patient's progress and continued therapy plan, it is my medical opinion that Vivian Kauffman should continue occupational therapy treatment at [facility] PHYSICAL THERAPY  [address] [facility]  [place] [address] [phone].    Signature: __________________________________  Miguel García MD  Timed:  Manual Therapy:    0     mins  27753;  Therapeutic Exercise:    30     mins  22822;     Neuromuscular Tori:    0    mins  50051;    Ultrasound:     0     mins  26549;    Electrical Stimulation:    0     mins  34740;    Untimed:  Electrical Stimulation:    0     mins  88395 ( );  Fluidotherapy:     0     mins  19553    Timed Treatment:   30   mins   Total Treatment:     30    mins

## 2021-09-29 RX ORDER — FLUOXETINE HYDROCHLORIDE 20 MG/1
CAPSULE ORAL
Qty: 360 CAPSULE | Refills: 0 | Status: SHIPPED | OUTPATIENT
Start: 2021-09-29 | End: 2021-11-16 | Stop reason: SDUPTHER

## 2021-09-29 RX ORDER — ATORVASTATIN CALCIUM 40 MG/1
TABLET, FILM COATED ORAL
Qty: 90 TABLET | Refills: 0 | Status: SHIPPED | OUTPATIENT
Start: 2021-09-29 | End: 2021-11-24 | Stop reason: SDUPTHER

## 2021-09-30 ENCOUNTER — TREATMENT (OUTPATIENT)
Dept: PHYSICAL THERAPY | Facility: CLINIC | Age: 66
End: 2021-09-30

## 2021-09-30 DIAGNOSIS — S62.393D: Primary | ICD-10-CM

## 2021-09-30 DIAGNOSIS — S62.395D OTHER FRACTURE OF FOURTH METACARPAL BONE, LEFT HAND, SUBSEQUENT ENCOUNTER FOR FRACTURE WITH ROUTINE HEALING: ICD-10-CM

## 2021-09-30 DIAGNOSIS — G90.512 COMPLEX REGIONAL PAIN SYNDROME TYPE 1 OF LEFT UPPER EXTREMITY: ICD-10-CM

## 2021-09-30 PROCEDURE — 97110 THERAPEUTIC EXERCISES: CPT | Performed by: OCCUPATIONAL THERAPIST

## 2021-09-30 PROCEDURE — G0283 ELEC STIM OTHER THAN WOUND: HCPCS | Performed by: OCCUPATIONAL THERAPIST

## 2021-09-30 PROCEDURE — 97112 NEUROMUSCULAR REEDUCATION: CPT | Performed by: OCCUPATIONAL THERAPIST

## 2021-09-30 NOTE — PROGRESS NOTES
Occupational Therapy Daily Treatment Note      Patient: Vivian Kauffman   : 1955  Referring practitioner: No ref. provider found  Date of Initial Visit: Type: THERAPY  Noted: 2021  Today's Date: 2021  Patient seen for 11 sessions    ICD-10-CM ICD-9-CM   1. Other fracture of third metacarpal bone, left hand, subsequent encounter for fracture with routine healing  S62.393D V54.12   2. Other fracture of fourth metacarpal bone, left hand, subsequent encounter for fracture with routine healing  S62.395D V54.12   3. Complex regional pain syndrome type 1 of left upper extremity  G90.512 337.21          Vivian Kauffman reports I used it a lot yesterday, so it is drooping some day        Objective   See Exercise, Manual, and Modality Logs for complete treatment.       Assessment/Plan  Pt educated and verbalized understanding of home TENS unit.     Cont per POC           Timed:  Manual Therapy:    0     mins  43116;  Therapeutic Exercise:    20     mins  79870;     Neuromuscular Tori:    10    mins  78368;    Ultrasound:     0     mins  27240;    Electrical Stimulation:    0     mins  98409;    Untimed:  Electrical Stimulation:    10     mins  02377 ( );  Fluidotherapy:        0    mins  94516    Timed Treatment:   30   mins   Total Treatment:     40   mins  DEBBY Tello, OTR/L,CHT  Occupational Therapist, Certified Hand Therapist

## 2021-10-06 ENCOUNTER — OFFICE VISIT (OUTPATIENT)
Dept: BARIATRICS/WEIGHT MGMT | Facility: CLINIC | Age: 66
End: 2021-10-06

## 2021-10-06 VITALS
WEIGHT: 210 LBS | HEART RATE: 74 BPM | BODY MASS INDEX: 37.21 KG/M2 | RESPIRATION RATE: 18 BRPM | TEMPERATURE: 97.7 F | DIASTOLIC BLOOD PRESSURE: 76 MMHG | HEIGHT: 63 IN | SYSTOLIC BLOOD PRESSURE: 133 MMHG

## 2021-10-06 DIAGNOSIS — E66.9 OBESITY, CLASS II, BMI 35-39.9: Primary | ICD-10-CM

## 2021-10-06 DIAGNOSIS — E66.9 DIABETES MELLITUS TYPE 2 IN OBESE (HCC): ICD-10-CM

## 2021-10-06 DIAGNOSIS — I10 ESSENTIAL HYPERTENSION: ICD-10-CM

## 2021-10-06 DIAGNOSIS — F32.A DEPRESSION, UNSPECIFIED DEPRESSION TYPE: ICD-10-CM

## 2021-10-06 DIAGNOSIS — E11.69 DIABETES MELLITUS TYPE 2 IN OBESE (HCC): ICD-10-CM

## 2021-10-06 DIAGNOSIS — M25.50 MULTIPLE JOINT PAIN: ICD-10-CM

## 2021-10-06 DIAGNOSIS — Z98.84 S/P LAPAROSCOPIC SLEEVE GASTRECTOMY: ICD-10-CM

## 2021-10-06 PROCEDURE — 99215 OFFICE O/P EST HI 40 MIN: CPT | Performed by: NURSE PRACTITIONER

## 2021-10-06 RX ORDER — GABAPENTIN 300 MG/1
CAPSULE ORAL
Qty: 90 CAPSULE | Refills: 0 | Status: SHIPPED | OUTPATIENT
Start: 2021-10-06 | End: 2021-11-09

## 2021-10-06 RX ORDER — SPIRONOLACTONE 25 MG/1
1 TABLET ORAL DAILY
COMMUNITY
Start: 2021-07-05 | End: 2021-11-24 | Stop reason: SDUPTHER

## 2021-10-06 RX ORDER — CLOTRIMAZOLE 1 %
1 CREAM (GRAM) TOPICAL 2 TIMES DAILY
COMMUNITY
End: 2022-02-16

## 2021-10-06 RX ORDER — GABAPENTIN 300 MG/1
CAPSULE ORAL
Qty: 270 CAPSULE | Refills: 0 | Status: SHIPPED | OUTPATIENT
Start: 2021-10-06 | End: 2021-11-24 | Stop reason: SDUPTHER

## 2021-10-06 NOTE — PROGRESS NOTES
[facility]IATRIC SURGERY  [address] [facility] [address]  [facility] [address]  [phone]  [address] [facility] [address]  [facility] [address]  [phone]  Dept: [phone]  10/6/2021      Vivian Kauffman.  [id number]  [id number]  1955  female      Chief Complaint   Patient presents with   • Follow-up     4 year sleeve follow up        Post-Op Bariatric Surgery:   Vivian Kauffman is status post Laparoscopic Sleeve/HH procedure, performed on 10/2/2017 who did lose a little over 4lbs at the 2 years post-op but has slowly, gradually regained weight over the following two years. Shortly after reaching her lowest weight in 2019 she developed pneumonia and then DKA. She was taken off her oral diabetic medications. She reports further issues with her blood sugar this last year after having covid. She has had better control of her blood sugar with humalog taken at meals and twice daily lantus. A few months ago her endocrinologist increased her dose of evening lantus.     HPI:   Today's weight is 95.3 kg (210 lb) pounds, today's BMI is Body mass index is 37.77 kg/m².,@ has a  gain of 4 pounds since the last visit and@ weight loss since surgery is 0 pounds. The patient reports a decreased portion size and loss of appetite.      Vivian Kauffman denies nausea, vomiting, reflux and reports that she feels well, but has been struggling with weight regain, slowly but surely.      Diet and Exercise: Diet history reviewed and discussed with the patient. Weight loss/gains to date discussed with the patient. The patient states they are eating 25 grams of protein per day. She reports eating 1 meals per day, a typical portion size of 1/2 cup, eating 0 snacks per day. She denies regular exercise and rarely drinks water. She primarily drinks diet mountain dew due to not being able to lift cases of water any longer.      She reports that she will have soup and crackers to eat. She will  add some grilled hamburger meat and one piece of cheese. She is concerned with her weight regain.     Supplements: none- recent CBC was WNL.     Review of Systems   Constitutional: Positive for appetite change and fatigue. Negative for unexpected weight change.   HENT: Negative.    Eyes: Negative.    Respiratory: Negative.    Cardiovascular: Negative.  Negative for leg swelling.   Gastrointestinal: Negative for abdominal distention, abdominal pain, constipation, diarrhea, nausea and vomiting.   Genitourinary: Negative for difficulty urinating, frequency and urgency.   Musculoskeletal: Negative for back pain.   Skin: Negative.    Psychiatric/Behavioral: Negative.    All other systems reviewed and are negative.      Patient Active Problem List   Diagnosis   • Essential hypertension   • Mixed hyperlipidemia   • Fatigue   • Alopecia   • Dietary counseling   • Abnormal electrocardiogram   • Anxiety   • Asthma   • Carpal tunnel syndrome   • Non-obstructive coronary artery disease   • Depression   • Exercise counseling   • Hx MRSA infection   • Diabetes mellitus type 2 in obese (HCC)   • Edema   • Fatty liver   • Multiple joint pain   • Seizure disorder (HCC)   • S/P laparoscopic sleeve gastrectomy   • Noncompliance with dietary regimen required status post bariatric surgery   • Acquired atrophy of thyroid   • Amnesia   • Diabetes mellitus (HCC)   • Fibromyositis   • Hyperglycemia due to type 2 diabetes mellitus (HCC)   • Vitamin D deficiency   • Hydronephrosis of left kidney   • Urge incontinence   • Hydronephrosis with ureteropelvic junction (UPJ) obstruction   • Obesity, Class II, BMI 35-39.9   • Chronic right-sided low back pain with right-sided sciatica   • Vitamin B12 deficiency   • Benign essential tremor       Past Medical History:   Diagnosis Date   • Anxiety    • Asthma    • Chronic lower back pain    • Coronary arteriosclerosis    • Depression    • Diabetes mellitus (HCC)     TYPE 2 - BLOOD GLUCOSE AROUND  "100-300   • Disease of thyroid gland    • Fatigue    • Fatty liver    • Fibromyalgia    • Fibromyositis    • GERD (gastroesophageal reflux disease)    • Heartburn    • [place] (hard of hearing)    • Hyperlipidemia    • Hypertension    • Insomnia    • Kidney stone    • Morbid obesity (HCC)    • Muscle spasm    • Neck pain    • Obesity    • Pain of both hip joints    • Polyphagia(783.6)    • PONV (postoperative nausea and vomiting)    • Poor vision    • Problems with hearing    • Seizures (HCC)    • Shortness of breath on exertion    • Sinus drainage    • Stroke syndrome     \"MINI STROKE\"  left side weakness    • Tremor, essential     IN NECK  AND HANDS   • Vertigo        The following portions of the patient's history were reviewed and updated as appropriate: allergies, current medications, past family history, past medical history, past social history, past surgical history and problem list.    Vitals:    10/06/21 0832   BP: 133/76   Pulse: 74   Resp: 18   Temp: 97.7 °F (36.5 °C)       Physical Exam  Constitutional:       Appearance: She is obese.   Cardiovascular:      Rate and Rhythm: Normal rate.   Pulmonary:      Effort: Pulmonary effort is normal.   Skin:     General: Skin is warm and dry.   Neurological:      Mental Status: She is alert.   Psychiatric:         Mood and Affect: Mood normal.         Behavior: Behavior normal.         Assessment:   Post-op, the patient is falling short of her goal for protein intake, .     Encounter Diagnoses   Name Primary?   • Obesity, Class II, BMI 35-39.9 Yes   • S/P laparoscopic sleeve gastrectomy    • Essential hypertension    • Diabetes mellitus type 2 in obese (HCC)        Plan:   Patient was advised to cut out soda, increase fluid intake, and slowly start increasing fiber intake over the next three weeks. She has never been one to eat more than a meal per day but does add hamburger meat to a homemade soup for dinner and gets protein at that meal. I would prefer that she get " more fiber from her diet and eat more frequently, but she will start with a fiber supplement instead. She was advised to discuss potential use of a GLP-1 drug and or metformin to help better control her hba1c as her weight has continued to slowly increase over time as her insulin dose has been increased. We discussed macronutrient goals and what to shop for at the grocery store.   Encouraged patient to be sure to get plenty of lean protein per day through small frequent meals all with a protein source.   Activity restrictions: none.   Recommended patient be sure to get at least 70 grams of protein per day by eating small, frequent meals all with high lean protein choices. Be sure to limit/cut back on daily carbohydrate intake. Discussed with the patient the recommended amount of water per day to intake- half of body weight in ounces. Reviewed vitamin requirements. Be sure to do routine exercise, 150 minutes per week minimum, including both cardio and strength training.     Instructions / Recommendations: dietary counseling recommended, recommended a daily protein intake of  grams, vitamin supplement(s) recommended, recommended exercising at least 150 minutes per week, behavior modifications recommended and instructed to call the office for concerns, questions, or problems.     The patient was instructed to follow up in 6-12 months .     Total time spent during this encounter today was 40 minutes

## 2021-10-08 ENCOUNTER — TREATMENT (OUTPATIENT)
Dept: PHYSICAL THERAPY | Facility: CLINIC | Age: 66
End: 2021-10-08

## 2021-10-08 DIAGNOSIS — S62.393D: Primary | ICD-10-CM

## 2021-10-08 DIAGNOSIS — G90.512 COMPLEX REGIONAL PAIN SYNDROME TYPE 1 OF LEFT UPPER EXTREMITY: ICD-10-CM

## 2021-10-08 DIAGNOSIS — S62.395D OTHER FRACTURE OF FOURTH METACARPAL BONE, LEFT HAND, SUBSEQUENT ENCOUNTER FOR FRACTURE WITH ROUTINE HEALING: ICD-10-CM

## 2021-10-08 PROCEDURE — 97110 THERAPEUTIC EXERCISES: CPT | Performed by: OCCUPATIONAL THERAPIST

## 2021-10-08 PROCEDURE — 97140 MANUAL THERAPY 1/> REGIONS: CPT | Performed by: OCCUPATIONAL THERAPIST

## 2021-10-08 RX ORDER — LEVOTHYROXINE SODIUM 25 UG/1
TABLET ORAL
Qty: 90 TABLET | Refills: 0 | Status: SHIPPED | OUTPATIENT
Start: 2021-10-08 | End: 2021-11-24 | Stop reason: SDUPTHER

## 2021-10-08 NOTE — PROGRESS NOTES
Occupational Therapy Daily Treatment Note      Patient: Vivian Kauffman   : 1955  Referring practitioner: Miguel García, *  Date of Initial Visit: Type: THERAPY  Noted: 2021  Today's Date: 10/8/2021  Patient seen for 12 sessions    ICD-10-CM ICD-9-CM   1. Other fracture of third metacarpal bone, left hand, subsequent encounter for fracture with routine healing  S62.393D V54.12   2. Other fracture of fourth metacarpal bone, left hand, subsequent encounter for fracture with routine healing  S62.395D V54.12   3. Complex regional pain syndrome type 1 of left upper extremity  G90.512 337.21          Vivian Kauffman reports I feel tight today.  I am using it more at home.  5-6/10      Objective   See Exercise, Manual, and Modality Logs for complete treatment.       Assessment/Plan  Pt has increased MP extension this date to full extension.       Cont per POC           Timed:  Manual Therapy:    10     mins  12724;  Therapeutic Exercise:    20     mins  71658;     Neuromuscular Tori:    0    mins  02865;    Ultrasound:     0     mins  85109;    Electrical Stimulation:    0     mins  94785;    Untimed:  Electrical Stimulation:    0     mins  82784 ( );  Fluidotherapy:        0    mins  79576    Timed Treatment:   30   mins   Total Treatment:     30   mins  DEBBY Tello, ANDREWR/L,CHT  Occupational Therapist, Certified Hand Therapist  
The patient is a 67y Female complaining of

## 2021-10-11 ENCOUNTER — TREATMENT (OUTPATIENT)
Dept: PHYSICAL THERAPY | Facility: CLINIC | Age: 66
End: 2021-10-11

## 2021-10-11 DIAGNOSIS — S62.395D OTHER FRACTURE OF FOURTH METACARPAL BONE, LEFT HAND, SUBSEQUENT ENCOUNTER FOR FRACTURE WITH ROUTINE HEALING: ICD-10-CM

## 2021-10-11 DIAGNOSIS — S62.393D: Primary | ICD-10-CM

## 2021-10-11 DIAGNOSIS — G90.512 COMPLEX REGIONAL PAIN SYNDROME TYPE 1 OF LEFT UPPER EXTREMITY: ICD-10-CM

## 2021-10-11 PROCEDURE — 97110 THERAPEUTIC EXERCISES: CPT | Performed by: OCCUPATIONAL THERAPIST

## 2021-10-11 NOTE — PROGRESS NOTES
Occupational Therapy Daily Treatment Note      Patient: Vivian Kauffman   : 1955  Referring practitioner: Miguel García, *  Date of Initial Visit: Type: THERAPY  Noted: 2021  Today's Date: 10/11/2021  Patient seen for 13 sessions    ICD-10-CM ICD-9-CM   1. Other fracture of third metacarpal bone, left hand, subsequent encounter for fracture with routine healing  S62.393D V54.12   2. Other fracture of fourth metacarpal bone, left hand, subsequent encounter for fracture with routine healing  S62.395D V54.12   3. Complex regional pain syndrome type 1 of left upper extremity  G90.512 337.21          Vivian Kauffman reports when I pull my seat belt across me it causes pain in my hand.       Objective   See Exercise, Manual, and Modality Logs for complete treatment.       Assessment/Plan  Pt has improved digital MP extension.  Continues to have some fatigue with repetitive exercises.   Cont per POC           Timed:  Manual Therapy:    0     mins  22800;  Therapeutic Exercise:    30     mins  34888;     Neuromuscular Tori:    0    mins  03242;    Ultrasound:     0     mins  39869;    Electrical Stimulation:    0     mins  15254;    Untimed:  Electrical Stimulation:    0     mins  60161 ( );  Fluidotherapy:        0    mins  91184    Timed Treatment:   30   mins   Total Treatment:     30   mins  DEBBY Tello, ANDREWR/L,CHT  Occupational Therapist, Certified Hand Therapist

## 2021-10-12 ENCOUNTER — TELEPHONE (OUTPATIENT)
Dept: INTERNAL MEDICINE | Facility: CLINIC | Age: 66
End: 2021-10-12

## 2021-10-12 NOTE — TELEPHONE ENCOUNTER
Pt states that she has been having stinging sensations on her arms and legs and she is very concerned about this. She states that the only thing that has changed is that she has been put on Metamucil for her Bowels. Please advise.

## 2021-10-13 NOTE — TELEPHONE ENCOUNTER
Spoke with Patient about setting up an appt with Jenna, but she declined and wants to see you whenever she can.

## 2021-10-14 ENCOUNTER — TREATMENT (OUTPATIENT)
Dept: PHYSICAL THERAPY | Facility: CLINIC | Age: 66
End: 2021-10-14

## 2021-10-14 DIAGNOSIS — S62.393D: Primary | ICD-10-CM

## 2021-10-14 DIAGNOSIS — S62.395D OTHER FRACTURE OF FOURTH METACARPAL BONE, LEFT HAND, SUBSEQUENT ENCOUNTER FOR FRACTURE WITH ROUTINE HEALING: ICD-10-CM

## 2021-10-14 DIAGNOSIS — G90.512 COMPLEX REGIONAL PAIN SYNDROME TYPE 1 OF LEFT UPPER EXTREMITY: ICD-10-CM

## 2021-10-14 PROCEDURE — 97110 THERAPEUTIC EXERCISES: CPT | Performed by: OCCUPATIONAL THERAPIST

## 2021-10-14 NOTE — PROGRESS NOTES
Occupational Therapy Daily Treatment Note      Patient: Vivian Kauffman   : 1955  Referring practitioner: Miguel García, *  Date of Initial Visit: Type: THERAPY  Noted: 2021  Today's Date: 10/14/2021  Patient seen for 14 sessions    ICD-10-CM ICD-9-CM   1. Other fracture of third metacarpal bone, left hand, subsequent encounter for fracture with routine healing  S62.393D V54.12   2. Other fracture of fourth metacarpal bone, left hand, subsequent encounter for fracture with routine healing  S62.395D V54.12   3. Complex regional pain syndrome type 1 of left upper extremity  G90.512 337.21          Vivian Kauffman reports I used it a bunch yesterday.  It is still waking up stiff in the morning        Objective   See Exercise, Manual, and Modality Logs for complete treatment.       Assessment/Plan  With scar massage, continues to have pinching with mobilization.      Cont per POC           Timed:  Manual Therapy:    5     mins  94773;  Therapeutic Exercise:    25     mins  54236;     Neuromuscular Tori:    0    mins  12606;    Ultrasound:     0     mins  16731;    Electrical Stimulation:    0     mins  89562;    Untimed:  Electrical Stimulation:    0     mins  86853 ( );  Fluidotherapy:        0    mins  17242    Timed Treatment:   30   mins   Total Treatment:     30   mins  DEBBY Tello, ANDREWR/L,CHT  Occupational Therapist, Certified Hand Therapist

## 2021-10-19 ENCOUNTER — TREATMENT (OUTPATIENT)
Dept: PHYSICAL THERAPY | Facility: CLINIC | Age: 66
End: 2021-10-19

## 2021-10-19 DIAGNOSIS — S62.395D OTHER FRACTURE OF FOURTH METACARPAL BONE, LEFT HAND, SUBSEQUENT ENCOUNTER FOR FRACTURE WITH ROUTINE HEALING: ICD-10-CM

## 2021-10-19 DIAGNOSIS — S62.393D: Primary | ICD-10-CM

## 2021-10-19 DIAGNOSIS — G90.512 COMPLEX REGIONAL PAIN SYNDROME TYPE 1 OF LEFT UPPER EXTREMITY: ICD-10-CM

## 2021-10-19 PROCEDURE — 97110 THERAPEUTIC EXERCISES: CPT | Performed by: OCCUPATIONAL THERAPIST

## 2021-10-19 NOTE — PROGRESS NOTES
Occupational Therapy Daily Treatment Note      Patient: Vivian Kauffman   : 1955  Referring practitioner: Miguel García, *  Date of Initial Visit: Type: THERAPY  Noted: 2021  Today's Date: 10/19/2021  Patient seen for 15 sessions    ICD-10-CM ICD-9-CM   1. Other fracture of third metacarpal bone, left hand, subsequent encounter for fracture with routine healing  S62.393D V54.12   2. Other fracture of fourth metacarpal bone, left hand, subsequent encounter for fracture with routine healing  S62.395D V54.12   3. Complex regional pain syndrome type 1 of left upper extremity  G90.512 337.21          Vivian Kauffman reports I haven't used it much this morning yet, so I am stiff      Objective   See Exercise, Manual, and Modality Logs for complete treatment.       Assessment/Plan    Continues to have stiffness with full extension until AROM and functional use loosens her up    Cont per POC           Timed:  Manual Therapy:    5     mins  71070;  Therapeutic Exercise:    25     mins  39493;     Neuromuscular Tori:    0    mins  42899;    Ultrasound:     0     mins  15291;    Electrical Stimulation:    0     mins  22175;    Untimed:  Electrical Stimulation:    00     mins  48434 ( );  Fluidotherapy:        0    mins  26639    Timed Treatment:   30   mins   Total Treatment:     30   mins  DEBBY Tello, ANDREWR/L,CHT  Occupational Therapist, Certified Hand Therapist

## 2021-10-21 ENCOUNTER — TREATMENT (OUTPATIENT)
Dept: PHYSICAL THERAPY | Facility: CLINIC | Age: 66
End: 2021-10-21

## 2021-10-21 DIAGNOSIS — G90.512 COMPLEX REGIONAL PAIN SYNDROME TYPE 1 OF LEFT UPPER EXTREMITY: ICD-10-CM

## 2021-10-21 DIAGNOSIS — S62.395D OTHER FRACTURE OF FOURTH METACARPAL BONE, LEFT HAND, SUBSEQUENT ENCOUNTER FOR FRACTURE WITH ROUTINE HEALING: ICD-10-CM

## 2021-10-21 DIAGNOSIS — S62.393D: Primary | ICD-10-CM

## 2021-10-21 PROCEDURE — 97110 THERAPEUTIC EXERCISES: CPT | Performed by: OCCUPATIONAL THERAPIST

## 2021-10-21 PROCEDURE — 97530 THERAPEUTIC ACTIVITIES: CPT | Performed by: OCCUPATIONAL THERAPIST

## 2021-10-21 NOTE — PROGRESS NOTES
Occupational Therapy Daily Treatment Note      Patient: Vivian Kauffman   : 1955  Referring practitioner: Miguel García, *  Date of Initial Visit: Type: THERAPY  Noted: 2021  Today's Date: 10/21/2021  Patient seen for 16 sessions    ICD-10-CM ICD-9-CM   1. Other fracture of third metacarpal bone, left hand, subsequent encounter for fracture with routine healing  S62.393D V54.12   2. Other fracture of fourth metacarpal bone, left hand, subsequent encounter for fracture with routine healing  S62.395D V54.12   3. Complex regional pain syndrome type 1 of left upper extremity  G90.512 337.21          Vivian Kauffman reports I've been using it more and it is still doing the cross over to .       Objective   See Exercise, Manual, and Modality Logs for complete treatment.   kinesiotape applied to help support Radial deviation of MP joint.    Assessment/Plan    Pt continues to have pain in MP joint of L LF  Cont per POC           Timed:  Manual Therapy:    10     mins  33742;  Therapeutic Exercise:    10     mins  60095;  Therapeutic Activity:    10     mins  53585;     Neuromuscular Tori:    0    mins  78090;    Ultrasound:     0     mins  33067;    Electrical Stimulation:    0     mins  30733;    Untimed:  Electrical Stimulation:    0     mins  45348 ( );  Fluidotherapy:        0    mins  49920  Paraffin:                          0    mins  22259    Timed Treatment:   30   mins   Total Treatment:     30   mins    OT SIGNATURE: DEBBY Tello, ANDREWR/L, CHT     Electronically signed    [place] LICENSE: [id number]

## 2021-10-26 ENCOUNTER — TREATMENT (OUTPATIENT)
Dept: PHYSICAL THERAPY | Facility: CLINIC | Age: 66
End: 2021-10-26

## 2021-10-26 ENCOUNTER — LAB (OUTPATIENT)
Dept: LAB | Facility: HOSPITAL | Age: 66
End: 2021-10-26

## 2021-10-26 ENCOUNTER — TRANSCRIBE ORDERS (OUTPATIENT)
Dept: ADMINISTRATIVE | Facility: HOSPITAL | Age: 66
End: 2021-10-26

## 2021-10-26 DIAGNOSIS — E55.9 VITAMIN D DEFICIENCY: ICD-10-CM

## 2021-10-26 DIAGNOSIS — E78.2 MIXED HYPERLIPIDEMIA: ICD-10-CM

## 2021-10-26 DIAGNOSIS — E78.5 HYPERLIPIDEMIA, UNSPECIFIED HYPERLIPIDEMIA TYPE: ICD-10-CM

## 2021-10-26 DIAGNOSIS — E03.4 ACQUIRED ATROPHY OF THYROID: ICD-10-CM

## 2021-10-26 DIAGNOSIS — E03.9 MYXEDEMA HEART DISEASE: ICD-10-CM

## 2021-10-26 DIAGNOSIS — I51.9 MYXEDEMA HEART DISEASE: ICD-10-CM

## 2021-10-26 DIAGNOSIS — I15.2 OBESITY, DIABETES, AND HYPERTENSION SYNDROME (HCC): ICD-10-CM

## 2021-10-26 DIAGNOSIS — S62.393D: Primary | ICD-10-CM

## 2021-10-26 DIAGNOSIS — E66.9 OBESITY, DIABETES, AND HYPERTENSION SYNDROME (HCC): ICD-10-CM

## 2021-10-26 DIAGNOSIS — Z98.84 BARIATRIC SURGERY STATUS: ICD-10-CM

## 2021-10-26 DIAGNOSIS — E66.9 DIABETES MELLITUS TYPE 2 IN OBESE (HCC): ICD-10-CM

## 2021-10-26 DIAGNOSIS — E53.8 VITAMIN B12 DEFICIENCY: ICD-10-CM

## 2021-10-26 DIAGNOSIS — I25.10 DISEASE OF CARDIOVASCULAR SYSTEM: ICD-10-CM

## 2021-10-26 DIAGNOSIS — E66.9 OBESITY, UNSPECIFIED CLASSIFICATION, UNSPECIFIED OBESITY TYPE, UNSPECIFIED WHETHER SERIOUS COMORBIDITY PRESENT: Primary | ICD-10-CM

## 2021-10-26 DIAGNOSIS — E11.59 OBESITY, DIABETES, AND HYPERTENSION SYNDROME (HCC): ICD-10-CM

## 2021-10-26 DIAGNOSIS — E11.69 OBESITY, DIABETES, AND HYPERTENSION SYNDROME (HCC): ICD-10-CM

## 2021-10-26 DIAGNOSIS — S62.395D OTHER FRACTURE OF FOURTH METACARPAL BONE, LEFT HAND, SUBSEQUENT ENCOUNTER FOR FRACTURE WITH ROUTINE HEALING: ICD-10-CM

## 2021-10-26 DIAGNOSIS — G90.512 COMPLEX REGIONAL PAIN SYNDROME TYPE 1 OF LEFT UPPER EXTREMITY: ICD-10-CM

## 2021-10-26 DIAGNOSIS — E11.69 DIABETES MELLITUS TYPE 2 IN OBESE (HCC): ICD-10-CM

## 2021-10-26 DIAGNOSIS — E66.9 OBESITY, UNSPECIFIED CLASSIFICATION, UNSPECIFIED OBESITY TYPE, UNSPECIFIED WHETHER SERIOUS COMORBIDITY PRESENT: ICD-10-CM

## 2021-10-26 LAB
25(OH)D3 SERPL-MCNC: 28.5 NG/ML (ref 30–100)
ALBUMIN SERPL-MCNC: 4.6 G/DL (ref 3.5–5.2)
ALBUMIN UR-MCNC: <1.2 MG/DL
ALBUMIN/GLOB SERPL: 1.6 G/DL
ALP SERPL-CCNC: 102 U/L (ref 39–117)
ALT SERPL W P-5'-P-CCNC: 36 U/L (ref 1–33)
ANION GAP SERPL CALCULATED.3IONS-SCNC: 11.2 MMOL/L (ref 5–15)
AST SERPL-CCNC: 28 U/L (ref 1–32)
BILIRUB SERPL-MCNC: 0.3 MG/DL (ref 0–1.2)
BUN SERPL-MCNC: 13 MG/DL (ref 8–23)
BUN/CREAT SERPL: 20.6 (ref 7–25)
CALCIUM SPEC-SCNC: 9.5 MG/DL (ref 8.6–10.5)
CHLORIDE SERPL-SCNC: 102 MMOL/L (ref 98–107)
CHOLEST SERPL-MCNC: 149 MG/DL (ref 0–200)
CO2 SERPL-SCNC: 27.8 MMOL/L (ref 22–29)
CREAT SERPL-MCNC: 0.63 MG/DL (ref 0.57–1)
CREAT UR-MCNC: 96 MG/DL
FOLATE SERPL-MCNC: 9.5 NG/ML (ref 4.78–24.2)
GFR SERPL CREATININE-BSD FRML MDRD: 95 ML/MIN/1.73
GLOBULIN UR ELPH-MCNC: 2.8 GM/DL
GLUCOSE SERPL-MCNC: 114 MG/DL (ref 65–99)
HBA1C MFR BLD: 9.6 % (ref 4.8–5.6)
HDLC SERPL-MCNC: 47 MG/DL (ref 40–60)
LDLC SERPL CALC-MCNC: 86 MG/DL (ref 0–100)
LDLC/HDLC SERPL: 1.82 {RATIO}
MICROALBUMIN/CREAT UR: NORMAL MG/G{CREAT}
POTASSIUM SERPL-SCNC: 3.8 MMOL/L (ref 3.5–5.2)
PROT SERPL-MCNC: 7.4 G/DL (ref 6–8.5)
SODIUM SERPL-SCNC: 141 MMOL/L (ref 136–145)
TRIGL SERPL-MCNC: 83 MG/DL (ref 0–150)
TSH SERPL DL<=0.05 MIU/L-ACNC: 1.36 UIU/ML (ref 0.27–4.2)
VIT B12 BLD-MCNC: 475 PG/ML (ref 211–946)
VLDLC SERPL-MCNC: 16 MG/DL (ref 5–40)

## 2021-10-26 PROCEDURE — 82607 VITAMIN B-12: CPT

## 2021-10-26 PROCEDURE — 80053 COMPREHEN METABOLIC PANEL: CPT

## 2021-10-26 PROCEDURE — 82746 ASSAY OF FOLIC ACID SERUM: CPT

## 2021-10-26 PROCEDURE — 82306 VITAMIN D 25 HYDROXY: CPT

## 2021-10-26 PROCEDURE — 97112 NEUROMUSCULAR REEDUCATION: CPT | Performed by: OCCUPATIONAL THERAPIST

## 2021-10-26 PROCEDURE — 97110 THERAPEUTIC EXERCISES: CPT | Performed by: OCCUPATIONAL THERAPIST

## 2021-10-26 PROCEDURE — 82652 VIT D 1 25-DIHYDROXY: CPT

## 2021-10-26 PROCEDURE — 97530 THERAPEUTIC ACTIVITIES: CPT | Performed by: OCCUPATIONAL THERAPIST

## 2021-10-26 PROCEDURE — 36415 COLL VENOUS BLD VENIPUNCTURE: CPT

## 2021-10-26 PROCEDURE — 80061 LIPID PANEL: CPT

## 2021-10-26 PROCEDURE — 84443 ASSAY THYROID STIM HORMONE: CPT

## 2021-10-26 PROCEDURE — 82570 ASSAY OF URINE CREATININE: CPT

## 2021-10-26 PROCEDURE — 83036 HEMOGLOBIN GLYCOSYLATED A1C: CPT

## 2021-10-26 PROCEDURE — 82043 UR ALBUMIN QUANTITATIVE: CPT

## 2021-10-26 NOTE — PROGRESS NOTES
Re-Assessment / Re-Certification      Patient: Vivian Kauffman   : 1955  Diagnosis/ICD-10 Code:  Other fracture of third metacarpal bone, left hand, subsequent encounter for fracture with routine healing [S62.393D]  Referring practitioner: Miguel García, *  Date of Initial Visit: Type: THERAPY  Noted: 2021  Today's Date: 10/26/2021  Patient seen for 17 sessions      Subjective:   Vivian Kauffman reports: I am still so stiff in the morning.  Subjective Questionnaire: QuickDASH: 34  Clinical Progress: improved  Home Program Compliance: Yes  Treatment has included: therapeutic exercise, neuromuscular re-education, manual therapy, therapeutic activity and electrical stimulation    Subjective   Objective          Active Range of Motion     Additional Active Range of Motion Details  0-90 0-90 0-40  15-90 0-90 0-60  10-85 0-85 0-60  0-85 0-85 0-60    Strength/Myotome Testing     Left Wrist/Hand      (2nd hand position)   lbs: 20      Assessment/Plan    Visit Diagnoses:    ICD-10-CM ICD-9-CM   1. Other fracture of third metacarpal bone, left hand, subsequent encounter for fracture with routine healing  S62.393D V54.12   2. Other fracture of fourth metacarpal bone, left hand, subsequent encounter for fracture with routine healing  S62.395D V54.12   3. Complex regional pain syndrome type 1 of left upper extremity  G90.512 337.21       Progress toward previous goals: Partially Met    Plan Goals: 1. The patient complains of pain in the l hand                  LTG 1: 12 weeks:  The patient will report a pain rating of 0/10 or better in order to improve sleep quality and tolerance to performance of activities of daily living.                                  STATUS:  MET                  STG 1a: 4weeks:  The patient will report a pain rating of 4/10 or better.                                   STATUS:  MET  2. The patient has limited ROM of the R digits                  LTG 2: 12 weeks:  The patient will  demonstrate 0 degrees of full extension at MP to allow the patient to WB.                                  STATUS:  NOT MET                  STG 2a: 4 weeks:  The patient will demonstrate 15 degrees of MP extension lag.                                  STATUS:  MET                           3. The patient has limited strength of the L UE.                  LTG 3: 12 weeks:  The patient will demonstrate 30# in order to use hand for functional tasks.                                  STATUS:  Not met                  STG 3a: 4 weeks:  The patient will demonstrate tolerance to light strengthening without adverse reaction.                                  STATUS:  met  4. Carrying, Moving, and Handling Objects Functional Limitation                                   LTG 4: 12 weeks:  The patient will demonstrate 1-19% limitation by achieving a score of 19 on the Quick DASH.                                  STATUS:  Not met                  STG 4a: 4 weeks:  The patient will demonstrate 20-39% limitation by achieving a score of 27 on the Quick DASH.                                    STATUS:  npt met      Recommendations: Continue with recommendations recommend transition to HEP  Timeframe: 1 month  Prognosis to achieve goals: good      OT SIGNATURE: Felicia Vasques OTD, OTR/L, CHT     Electronically signed    [place] LICENSE: [id number]   DATE TREATMENT INITIATED: 10/26/2021      90 Day Recertification  Certification Period: 10/26/2021 thru 1/23/2022  I certify that the therapy services are furnished while this patient is under my care.  The services outlined above are required by this patient, and will be reviewed every 90 days.      Based upon review of the patient's progress and continued therapy plan, it is my medical opinion that Vivian Kauffman should continue occupational therapy treatment at [facility]  [address]  [place] [address] [phone].    Signature:  __________________________________  Miguel García MD    Timed:  Manual Therapy:    0     mins  88884;  Therapeutic Exercise:    18     mins  33490;  Therapeutic Activity:   10     mins  62638;     Neuromuscular Tori:    10    mins  18652;    Ultrasound:     0     mins  08120;    Electrical Stimulation:    0     mins  15455;    Untimed:  Electrical Stimulation:    0     mins  55109 ( );  Fluidotherapy:        0    mins  30217  Paraffin:                          0    mins  99968    Timed Treatment:   38   mins   Total Treatment:     38   mins

## 2021-10-28 ENCOUNTER — TELEPHONE (OUTPATIENT)
Dept: INTERNAL MEDICINE | Facility: CLINIC | Age: 66
End: 2021-10-28

## 2021-10-28 LAB — 1,25(OH)2D SERPL-MCNC: 39.1 PG/ML (ref 19.9–79.3)

## 2021-10-28 NOTE — TELEPHONE ENCOUNTER
"PATIENT CALLED. STATED ON TUES SHE GOT HER MODERNA BOOSTER SHOT AND HAS \"BEEN SICK EVER SINCE.\"  COMPLAINS OF HEADACHE, LOW GRADE FEVER, BODY ACHES, CHILLS. PATIENT GOT HER FLU SHOT APPROX 2 WEEKS AGO.      SHE WAS AT HER DIABETIC DR MARYCRUZ IN [place] TODAY AND THAT OFFICE TOLD HER SHE NEEDED TO CONTACT HER PCP TO SEE ABOUT BEING COVID TESTED.  HER TEMP AT THAT APPT THIS MORNING WAS 99.     PATIENT IS ASKING WHAT DOES DR. ENCINAS WANT HER TO DO.     PT # [phone]     "

## 2021-10-28 NOTE — TELEPHONE ENCOUNTER
I want her to take Tylenol and push fluids.  Highly unlikely that she has Covid, this is just related to her body mounting immune response.  Patient to go to urgent care if she develops fevers over 101 or shortness of breath.

## 2021-11-06 RX ORDER — AMLODIPINE BESYLATE 2.5 MG/1
TABLET ORAL
Qty: 30 TABLET | Refills: 0 | Status: SHIPPED | OUTPATIENT
Start: 2021-11-06 | End: 2021-11-24 | Stop reason: SDUPTHER

## 2021-11-09 RX ORDER — GABAPENTIN 300 MG/1
CAPSULE ORAL
Qty: 90 CAPSULE | Refills: 0 | Status: SHIPPED | OUTPATIENT
Start: 2021-11-09 | End: 2021-11-16 | Stop reason: SDUPTHER

## 2021-11-15 PROBLEM — E66.01 MORBID (SEVERE) OBESITY DUE TO EXCESS CALORIES: Status: ACTIVE | Noted: 2021-11-15

## 2021-11-15 PROBLEM — F33.41 RECURRENT MAJOR DEPRESSIVE DISORDER, IN PARTIAL REMISSION: Status: ACTIVE | Noted: 2021-11-15

## 2021-11-15 PROBLEM — R60.9 EDEMA: Status: RESOLVED | Noted: 2017-06-06 | Resolved: 2021-11-15

## 2021-11-16 ENCOUNTER — TELEPHONE (OUTPATIENT)
Dept: INTERNAL MEDICINE | Facility: CLINIC | Age: 66
End: 2021-11-16

## 2021-11-16 ENCOUNTER — OFFICE VISIT (OUTPATIENT)
Dept: INTERNAL MEDICINE | Facility: CLINIC | Age: 66
End: 2021-11-16

## 2021-11-16 VITALS
BODY MASS INDEX: 36.54 KG/M2 | OXYGEN SATURATION: 97 % | WEIGHT: 206.2 LBS | DIASTOLIC BLOOD PRESSURE: 69 MMHG | HEIGHT: 63 IN | SYSTOLIC BLOOD PRESSURE: 146 MMHG | TEMPERATURE: 97.6 F | HEART RATE: 84 BPM

## 2021-11-16 DIAGNOSIS — E66.9 DIABETES MELLITUS TYPE 2 IN OBESE (HCC): ICD-10-CM

## 2021-11-16 DIAGNOSIS — E66.01 MORBID (SEVERE) OBESITY DUE TO EXCESS CALORIES (HCC): ICD-10-CM

## 2021-11-16 DIAGNOSIS — E53.8 VITAMIN B12 DEFICIENCY: ICD-10-CM

## 2021-11-16 DIAGNOSIS — I10 ESSENTIAL HYPERTENSION: Primary | ICD-10-CM

## 2021-11-16 DIAGNOSIS — E11.69 DIABETES MELLITUS TYPE 2 IN OBESE (HCC): ICD-10-CM

## 2021-11-16 DIAGNOSIS — G25.0 BENIGN ESSENTIAL TREMOR: ICD-10-CM

## 2021-11-16 DIAGNOSIS — E55.9 VITAMIN D DEFICIENCY: ICD-10-CM

## 2021-11-16 DIAGNOSIS — E03.4 ACQUIRED ATROPHY OF THYROID: ICD-10-CM

## 2021-11-16 DIAGNOSIS — E78.2 MIXED HYPERLIPIDEMIA: ICD-10-CM

## 2021-11-16 DIAGNOSIS — F33.41 RECURRENT MAJOR DEPRESSIVE DISORDER, IN PARTIAL REMISSION (HCC): ICD-10-CM

## 2021-11-16 PROBLEM — E11.11: Status: ACTIVE | Noted: 2021-10-27

## 2021-11-16 PROBLEM — M19.90 IDIOPATHIC OSTEOARTHRITIS: Status: ACTIVE | Noted: 2021-10-27

## 2021-11-16 PROCEDURE — 99214 OFFICE O/P EST MOD 30 MIN: CPT | Performed by: INTERNAL MEDICINE

## 2021-11-16 RX ORDER — RAMIPRIL 2.5 MG/1
2.5 CAPSULE ORAL DAILY
Qty: 90 CAPSULE | Refills: 1 | Status: SHIPPED | OUTPATIENT
Start: 2021-11-16 | End: 2021-11-24 | Stop reason: SDUPTHER

## 2021-11-16 RX ORDER — PROCHLORPERAZINE MALEATE 5 MG/1
TABLET ORAL
COMMUNITY
Start: 2021-11-14 | End: 2022-02-16
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
## 2021-11-16 NOTE — TELEPHONE ENCOUNTER
[facility] called and states that the Altace and Spirolactone interacts, it can cause hyperglycemia, is it okay that she still takes both of them?

## 2021-11-17 ENCOUNTER — HOSPITAL ENCOUNTER (EMERGENCY)
Facility: HOSPITAL | Age: 66
Discharge: HOME | End: 2021-11-17
Attending: EMERGENCY MEDICINE | Admitting: EMERGENCY MEDICINE

## 2021-11-17 VITALS
DIASTOLIC BLOOD PRESSURE: 62 MMHG | OXYGEN SATURATION: 96 % | RESPIRATION RATE: 20 BRPM | TEMPERATURE: 98 F | SYSTOLIC BLOOD PRESSURE: 114 MMHG | BODY MASS INDEX: 36.64 KG/M2 | WEIGHT: 206.79 LBS | HEART RATE: 72 BPM | HEIGHT: 63 IN

## 2021-11-17 DIAGNOSIS — Z79.4 TYPE 2 DIABETES MELLITUS WITHOUT COMPLICATION, WITH LONG-TERM CURRENT USE OF INSULIN (HCC): Primary | ICD-10-CM

## 2021-11-17 DIAGNOSIS — E11.9 TYPE 2 DIABETES MELLITUS WITHOUT COMPLICATION, WITH LONG-TERM CURRENT USE OF INSULIN (HCC): Primary | ICD-10-CM

## 2021-11-17 LAB
ANION GAP SERPL CALCULATED.3IONS-SCNC: 10 MMOL/L (ref 5–15)
BASOPHILS # BLD AUTO: 0.03 10*3/MM3 (ref 0–0.2)
BASOPHILS NFR BLD AUTO: 0.5 % (ref 0–1.5)
BUN SERPL-MCNC: 10 MG/DL (ref 8–23)
BUN/CREAT SERPL: 17.5 (ref 7–25)
CALCIUM SPEC-SCNC: 9.5 MG/DL (ref 8.6–10.5)
CHLORIDE SERPL-SCNC: 101 MMOL/L (ref 98–107)
CO2 SERPL-SCNC: 27 MMOL/L (ref 22–29)
CREAT SERPL-MCNC: 0.57 MG/DL (ref 0.57–1)
DEPRECATED RDW RBC AUTO: 40 FL (ref 37–54)
EOSINOPHIL # BLD AUTO: 0.09 10*3/MM3 (ref 0–0.4)
EOSINOPHIL NFR BLD AUTO: 1.4 % (ref 0.3–6.2)
ERYTHROCYTE [DISTWIDTH] IN BLOOD BY AUTOMATED COUNT: 13.5 % (ref 12.3–15.4)
GFR SERPL CREATININE-BSD FRML MDRD: 106 ML/MIN/1.73
GLUCOSE BLDC GLUCOMTR-MCNC: 118 MG/DL (ref 70–99)
GLUCOSE BLDC GLUCOMTR-MCNC: 191 MG/DL (ref 70–99)
GLUCOSE BLDC GLUCOMTR-MCNC: 94 MG/DL (ref 70–99)
GLUCOSE SERPL-MCNC: 127 MG/DL (ref 65–99)
HCT VFR BLD AUTO: 39.8 % (ref 34–46.6)
HGB BLD-MCNC: 13.5 G/DL (ref 12–15.9)
IMM GRANULOCYTES # BLD AUTO: 0 10*3/MM3 (ref 0–0.05)
IMM GRANULOCYTES NFR BLD AUTO: 0 % (ref 0–0.5)
LYMPHOCYTES # BLD AUTO: 2.35 10*3/MM3 (ref 0.7–3.1)
LYMPHOCYTES NFR BLD AUTO: 36 % (ref 19.6–45.3)
MCH RBC QN AUTO: 28 PG (ref 26.6–33)
MCHC RBC AUTO-ENTMCNC: 33.9 G/DL (ref 31.5–35.7)
MCV RBC AUTO: 82.4 FL (ref 79–97)
MONOCYTES # BLD AUTO: 0.5 10*3/MM3 (ref 0.1–0.9)
MONOCYTES NFR BLD AUTO: 7.7 % (ref 5–12)
NEUTROPHILS NFR BLD AUTO: 3.55 10*3/MM3 (ref 1.7–7)
NEUTROPHILS NFR BLD AUTO: 54.4 % (ref 42.7–76)
NRBC BLD AUTO-RTO: 0 /100 WBC (ref 0–0.2)
PLATELET # BLD AUTO: 178 10*3/MM3 (ref 140–450)
PMV BLD AUTO: 9.1 FL (ref 6–12)
POTASSIUM SERPL-SCNC: 3.8 MMOL/L (ref 3.5–5.2)
RBC # BLD AUTO: 4.83 10*6/MM3 (ref 3.77–5.28)
SODIUM SERPL-SCNC: 138 MMOL/L (ref 136–145)
WBC NRBC COR # BLD: 6.52 10*3/MM3 (ref 3.4–10.8)

## 2021-11-17 PROCEDURE — 82962 GLUCOSE BLOOD TEST: CPT

## 2021-11-17 PROCEDURE — 85025 COMPLETE CBC W/AUTO DIFF WBC: CPT | Performed by: NURSE PRACTITIONER

## 2021-11-17 PROCEDURE — 99283 EMERGENCY DEPT VISIT LOW MDM: CPT

## 2021-11-17 PROCEDURE — 80048 BASIC METABOLIC PNL TOTAL CA: CPT | Performed by: NURSE PRACTITIONER

## 2021-11-18 NOTE — ED NOTES
PATIENT'S POC , PATIENT'S DEVICE SHOWS 55. PATIENT ASYMPTOMATIC      Juan Rosario, RN  11/17/21 1840

## 2021-11-18 NOTE — ED PROVIDER NOTES
Time: 23:01 EST  Arrived by: Private vehicle  Chief Complaint: Low blood sugar  History provided by: Patient  History is limited by: N/A    History of Present Illness:  Patient is a 66 y.o. year old female that presents to the emergency department with dropped to 40 2 times today and 50 once  Now her monitor reads 78 but our bedside read was 191. Thinks she may have taken long acting at lunch instead of just breakfast and dinner    On arrival our bedside glucose was 191 and the patient's monitor initially read 131      History provided by:  Patient  Hypoglycemia  Initial blood sugar:  40 at certain points during the day today  Severity:  Unable to specify  Onset quality:  Unable to specify  Duration:  12 hours  Timing:  Intermittent  Progression:  Waxing and waning  Chronicity:  Chronic  Diabetic status:  Controlled with insulin  Current diabetic therapy:  Uses both long-acting and short acting insulin and has a pod  Time since last antidiabetic medication: Wears a pod.  Context comment:  Patient states her monitor has read in the 40s twice and 50s once today so she was concerned she was getting too hypoglycemic and may have accidentally took an extra dose of long-acting insulin when she was not supposed to.  She did not remember doing that  Relieved by:  Eating  Ineffective treatments:  Eating (Patient states that even though she ate and drank juice earlier she still had drop a few more times)  Associated symptoms: sweats ( When it goes low)    Associated symptoms: no seizures, no shortness of breath and no vomiting            Similar Symptoms Previously: Yes  Recently seen: No      Patient Care Team  Primary Care Provider: Sees Dr. Acosta as a PCP and sees an endocrinologist at [facility]    Past Medical History:     Allergies   Allergen Reactions   • Jardiance [Empagliflozin] Anaphylaxis     Went into ketoacidosis   • Peanut Oil Anaphylaxis     throat to close    • Latex Hives     SKIN BLISTERS   • Sulfa Antibiotics  "Rash     Past Medical History:   Diagnosis Date   • Anxiety    • Asthma    • Chronic lower back pain    • Coronary arteriosclerosis    • Depression    • Diabetes mellitus (HCC)     TYPE 2 - BLOOD GLUCOSE AROUND 100-300   • Disease of thyroid gland    • Fatigue    • Fatty liver    • Fibromyalgia    • Fibromyositis    • GERD (gastroesophageal reflux disease)    • Heartburn    • [place] (hard of hearing)    • Hyperlipidemia    • Hypertension    • Insomnia    • Kidney stone    • Morbid obesity (HCC)    • Muscle spasm    • Neck pain    • Obesity    • Pain of both hip joints    • Polyphagia(783.6)    • PONV (postoperative nausea and vomiting)    • Poor vision    • Problems with hearing    • Seizures (HCC)    • Shortness of breath on exertion    • Sinus drainage    • Stroke syndrome     \"MINI STROKE\"  left side weakness    • Tremor, essential     IN NECK  AND HANDS   • Vertigo      Past Surgical History:   Procedure Laterality Date   • BREAST BIOPSY Right    • CARDIAC CATHETERIZATION      no stents 9/2013 at [facility]   • CARPAL TUNNEL RELEASE Bilateral 2009   • COLONOSCOPY     • CYSTOSCOPY NEPHROURETEROSCOPY Left 6/29/2021    Procedure: CYSTOSCOPY NEPHROURETEROSCOPY;  Surgeon: Mandie Hamilton MD;  Location: [facility];  Service: Urology;  Laterality: Left;   • ENDOSCOPY N/A 9/9/2016    Procedure: ESOPHAGOGASTRODUODENOSCOPY WITH BIOPSY;  Surgeon: Chris Ackerman Jr., MD;  Location: [facility] ENDOSCOPY;  Service:    • EXTRACORPOREAL SHOCKWAVE LITHOTRIPSY (ESWL), STENT INSERTION/REMOVAL  09/2013   • FOOT SURGERY Left    • GASTRIC BANDING REMOVAL N/A 11/30/2016    Procedure: LAPAROSCOPIC GASTRIC BANDING AND PORT REMOVAL ;  Surgeon: Chris Ackerman Jr., MD;  Location: [facility];  Service:    • GASTRIC SLEEVE LAPAROSCOPIC N/A 10/2/2017    Procedure: GASTRIC SLEEVE LAPAROSCOPIC revision WITH LYSIS OF ADHESIONS AND HITAL HERNIA REPAIR ;  Surgeon: Chris Ackerman Jr., MD;  [place] [facility] [place] [facility] [place]  " Service:    • GASTRIC SLEEVE LAPAROSCOPIC     • HAND SURGERY Left    • LAPAROSCOPIC CHOLECYSTECTOMY     • MASS EXCISION Right     RT FOOT    • NOSE SURGERY     • TONSILLECTOMY     • URETEROSCOPY STENT INSERTION Left 6/29/2021    Procedure: URETEROSCOPY STENT INSERTION;  Surgeon: Mandie Hamilton MD;  [facility];  Service: Urology;  Laterality: Left;     Family History   Problem Relation Age of Onset   • Heart attack Mother    • Diabetes Mother    • Hypertension Mother    • Stroke Mother    • Heart attack Father    • Diabetes Father    • Hypertension Father    • Obesity Father         Morbid Obesity   • Stroke Father    • Heart attack Brother    • Cancer Brother         Bladder   • Heart attack Maternal Grandmother    • Heart attack Maternal Grandfather    • Stroke Paternal Grandmother    • Heart attack Paternal Grandfather    • Malig Hyperthermia Neg Hx        Home Medications:  Prior to Admission medications    Medication Sig Start Date End Date Taking? Authorizing Provider   albuterol sulfate  (90 Base) MCG/ACT inhaler albuterol sulfate 90 mcg/actuation inhalation HFA aerosol inhaler inhale 1 - 2 puffs (90 - 180 mcg) by inhalation route every 4-6 hours as needed   Active    Jessica Martínez MD   amLODIPine (NORVASC) 2.5 MG tablet Take 1 tablet by mouth once daily 11/6/21   Shahbaz Acosta MD   atorvastatin (LIPITOR) 40 MG tablet Take 1 tablet by mouth once daily 9/29/21   Shahbaz Acosta MD   cetirizine (zyrTEC) 10 MG tablet Take 10 mg by mouth Daily. 5/7/20   Jessica Martínez MD   clopidogrel (PLAVIX) 75 MG tablet Take 75 mg by mouth Every Night. To stop 3 days prior to surgery per Dr. Landis instructions.   Patient aware per Dr. Landis 5/8/13   Jessica Martínez MD   clotrimazole (LOTRIMIN) 1 % cream Apply 1 application topically to the appropriate area as directed 2 (two) times a day.    Jessica Martínez MD   Euthyrox 25 MCG tablet Take 1 tablet by mouth once daily  10/8/21   Shahbaz Acosta MD   FLUoxetine (PROzac) 20 MG tablet Take 80 mg by mouth Every Morning. 5/8/13   Jessica Martínez MD   gabapentin (NEURONTIN) 300 MG capsule TAKE 1 CAPSULE BY MOUTH ONCE DAILY IN THE MORNING AND 2 CAPSULES IN THE EVENING 10/6/21   Shahbaz Acosta MD   Insulin Lispro (HumaLOG) 100 UNIT/ML solution cartridge Inject 9 Units under the skin into the appropriate area as directed 3 (Three) Times a Day With Meals.    Jessica Martínez MD   LANTUS SOLOSTAR 100 UNIT/ML injection pen Inject 1 pen under the skin into the appropriate area as directed 2 (Two) Times a Day. 21 units in the morning and 24 units at night 6/6/20   Jessica Martínez MD   levETIRAcetam (KEPPRA) 500 MG tablet TAKE 2 TABLETS BY MOUTH IN THE MORNING AND 4 TABLETS AT BEDTIME 6/9/21   Jessica Martínez MD   montelukast (SINGULAIR) 10 MG tablet Take 10 mg by mouth Every Night. 5/8/13   Jessica Martínez MD   oxybutynin XL (DITROPAN-XL) 5 MG 24 hr tablet Take 5 mg by mouth Daily. FOR 30 DAYS 5/28/20   eJssica Martínez MD   prochlorperazine (COMPAZINE) 5 MG tablet  11/14/21   Jessica Martínez MD   propranolol (INDERAL) 20 MG tablet Take 20 mg by mouth Daily As Needed (migraines). 7/14/20   Jessica Martínez MD   ramipril (Altace) 2.5 MG capsule Take 1 capsule by mouth Daily. 11/16/21   Shahbaz Acosta MD   Semaglutide,0.25 or 0.5MG/DOS, (OZEMPIC) 2 MG/1.5ML solution pen-injector Inject 0.5 mg under the skin into the appropriate area as directed. 10/28/21 10/28/22  Jessica Martínez MD   spironolactone (ALDACTONE) 25 MG tablet Take 1 tablet by mouth Daily. 7/5/21   Jessica Martínez MD        Social History:   PT  reports that she quit smoking about 26 years ago. Her smoking use included cigarettes. She has a 60.00 pack-year smoking history. She has never used smokeless tobacco. She reports previous alcohol use. She reports that she does not use drugs.    Record Review:  I have reviewed the  "patient's records in Epic.     Review of Systems  Review of Systems   Constitutional: Positive for diaphoresis ( When it goes low). Negative for chills and fever.   HENT: Negative for congestion, ear pain and sore throat.    Eyes: Negative for pain.   Respiratory: Negative for cough, chest tightness and shortness of breath.    Cardiovascular: Negative for chest pain.   Gastrointestinal: Negative for abdominal pain, diarrhea, nausea and vomiting.   Genitourinary: Negative for flank pain and hematuria.   Musculoskeletal: Negative for joint swelling.   Skin: Negative for pallor.   Neurological: Negative for seizures and headaches.   Hematological: Negative.    Psychiatric/Behavioral: Negative.    All other systems reviewed and are negative.       Physical Exam  /62   Pulse 72   Temp 98 °F (36.7 °C) (Oral)   Resp 20   Ht 160 cm (63\")   Wt 93.8 kg (206 lb 12.7 oz)   SpO2 96%   Breastfeeding No   BMI 36.63 kg/m²     Physical Exam  Vitals and nursing note reviewed.   Constitutional:       General: She is not in acute distress.     Appearance: Normal appearance. She is not toxic-appearing.   HENT:      Head: Normocephalic and atraumatic.      Right Ear: Tympanic membrane, ear canal and external ear normal.      Left Ear: Tympanic membrane, ear canal and external ear normal.      Mouth/Throat:      Mouth: Mucous membranes are moist.   Eyes:      General: No scleral icterus.     Conjunctiva/sclera: Conjunctivae normal.      Pupils: Pupils are equal, round, and reactive to light.   Cardiovascular:      Rate and Rhythm: Normal rate and regular rhythm.      Pulses: Normal pulses.      Heart sounds: Normal heart sounds.   Pulmonary:      Effort: Pulmonary effort is normal. No respiratory distress.      Breath sounds: Normal breath sounds.   Abdominal:      General: Bowel sounds are normal.      Palpations: Abdomen is soft.      Tenderness: There is no abdominal tenderness.   Musculoskeletal:         General: Normal " "range of motion.      Cervical back: Normal range of motion and neck supple.   Skin:     General: Skin is warm and dry.   Neurological:      Mental Status: She is alert and oriented to person, place, and time.      Sensory: No sensory deficit.      Motor: No weakness.   Psychiatric:         Mood and Affect: Mood normal.         Behavior: Behavior normal.         Thought Content: Thought content normal.         Judgment: Judgment normal.                  ED Course  /62   Pulse 72   Temp 98 °F (36.7 °C) (Oral)   Resp 20   Ht 160 cm (63\")   Wt 93.8 kg (206 lb 12.7 oz)   SpO2 96%   Breastfeeding No   BMI 36.63 kg/m²   Results for orders placed or performed during the hospital encounter of 11/17/21   Basic Metabolic Panel    Specimen: Blood   Result Value Ref Range    Glucose 127 (H) 65 - 99 mg/dL    BUN 10 8 - 23 mg/dL    Creatinine 0.57 0.57 - 1.00 mg/dL    Sodium 138 136 - 145 mmol/L    Potassium 3.8 3.5 - 5.2 mmol/L    Chloride 101 98 - 107 mmol/L    CO2 27.0 22.0 - 29.0 mmol/L    Calcium 9.5 8.6 - 10.5 mg/dL    eGFR Non African Amer 106 >60 mL/min/1.73    BUN/Creatinine Ratio 17.5 7.0 - 25.0    Anion Gap 10.0 5.0 - 15.0 mmol/L   CBC Auto Differential    Specimen: Blood   Result Value Ref Range    WBC 6.52 3.40 - 10.80 10*3/mm3    RBC 4.83 3.77 - 5.28 10*6/mm3    Hemoglobin 13.5 12.0 - 15.9 g/dL    Hematocrit 39.8 34.0 - 46.6 %    MCV 82.4 79.0 - 97.0 fL    MCH 28.0 26.6 - 33.0 pg    MCHC 33.9 31.5 - 35.7 g/dL    RDW 13.5 12.3 - 15.4 %    RDW-SD 40.0 37.0 - 54.0 fl    MPV 9.1 6.0 - 12.0 fL    Platelets 178 140 - 450 10*3/mm3    Neutrophil % 54.4 42.7 - 76.0 %    Lymphocyte % 36.0 19.6 - 45.3 %    Monocyte % 7.7 5.0 - 12.0 %    Eosinophil % 1.4 0.3 - 6.2 %    Basophil % 0.5 0.0 - 1.5 %    Immature Grans % 0.0 0.0 - 0.5 %    Neutrophils, Absolute 3.55 1.70 - 7.00 10*3/mm3    Lymphocytes, Absolute 2.35 0.70 - 3.10 10*3/mm3    Monocytes, Absolute 0.50 0.10 - 0.90 10*3/mm3    Eosinophils, Absolute 0.09 0.00 " - 0.40 10*3/mm3    Basophils, Absolute 0.03 0.00 - 0.20 10*3/mm3    Immature Grans, Absolute 0.00 0.00 - 0.05 10*3/mm3    nRBC 0.0 0.0 - 0.2 /100 WBC   POC Glucose Once    Specimen: Blood   Result Value Ref Range    Glucose 191 (H) 70 - 99 mg/dL   POC Glucose Once    Specimen: Blood   Result Value Ref Range    Glucose 118 (H) 70 - 99 mg/dL   POC Glucose Once    Specimen: Blood   Result Value Ref Range    Glucose 94 70 - 99 mg/dL     Medications - No data to display  No results found.    Medical Decision Making:                     MDM  Number of Diagnoses or Management Options  Type 2 diabetes mellitus without complication, with long-term current use of insulin (HCC)  Diagnosis management comments: I have spoken with the patient. I have explained the patient´s condition, diagnoses and treatment plan based on the information available to me at this time. I have answered the patient's questions and addressed any concerns. The patient has a good  understanding of the patient´s diagnosis, condition, and treatment plan as can be expected at this point. The vital signs have been stable. The patient´s condition is stable and appropriate for discharge from the emergency department.      The patient will pursue further outpatient evaluation with the primary care physician or other designated or consulting physician as outlined in the discharge instructions. They are agreeable to this plan of care and follow-up instructions have been explained in detail. The patient has received these instructions in written format and have expressed an understanding of the discharge instructions. The patient is aware that any significant change in condition or worsening of symptoms should prompt an immediate return to this or the closest emergency department or call to 911.         Amount and/or Complexity of Data Reviewed  Clinical lab tests: reviewed and ordered    Risk of Complications, Morbidity, and/or Mortality  Presenting problems:  low  Diagnostic procedures: low  Management options: low    Patient Progress  Patient progress: stable       Final diagnoses:   Type 2 diabetes mellitus without complication, with long-term current use of insulin (HCC)        Disposition:  ED Disposition     ED Disposition Condition Comment    Discharge Stable            Paulina Powell, APRN  11/17/21 [id number]

## 2021-11-18 NOTE — DISCHARGE INSTRUCTIONS
After multiple checks during your emergency department visit with our own glucometer and comparing it to your current reading on your monitor it seems that your readings are significantly lower than ours as well as lower than our blood draw done at same time.  You were not found to have a blood sugar under 94 during your visit.   Please change out your potting recalibrate your monitor and follow-up with either your PCP or endocrinologist for further evaluation.  Tonight please eat when you arrive home since you have not eaten this evening    Return for any new or worsening symptoms

## 2021-11-18 NOTE — ED NOTES
POC glucose shows 191. Patient checked her monitor at same time, her monitor read 131     Juan Rosario, RN  11/17/21 2055

## 2021-11-24 RX ORDER — AMLODIPINE BESYLATE 2.5 MG/1
2.5 TABLET ORAL DAILY
Qty: 90 TABLET | Refills: 0 | Status: SHIPPED | OUTPATIENT
Start: 2021-11-24 | End: 2021-12-13

## 2021-11-24 RX ORDER — MONTELUKAST SODIUM 10 MG/1
10 TABLET ORAL NIGHTLY
Qty: 90 TABLET | Refills: 0 | Status: SHIPPED | OUTPATIENT
Start: 2021-11-24 | End: 2022-02-21

## 2021-11-24 RX ORDER — CLOPIDOGREL BISULFATE 75 MG/1
75 TABLET ORAL NIGHTLY
Qty: 90 TABLET | Refills: 0 | Status: SHIPPED | OUTPATIENT
Start: 2021-11-24 | End: 2022-03-09

## 2021-11-24 RX ORDER — ATORVASTATIN CALCIUM 40 MG/1
40 TABLET, FILM COATED ORAL DAILY
Qty: 90 TABLET | Refills: 0 | Status: SHIPPED | OUTPATIENT
Start: 2021-11-24 | End: 2022-04-05

## 2021-11-24 RX ORDER — RAMIPRIL 2.5 MG/1
2.5 CAPSULE ORAL DAILY
Qty: 90 CAPSULE | Refills: 0 | Status: SHIPPED | OUTPATIENT
Start: 2021-11-24 | End: 2021-12-13 | Stop reason: SINTOL

## 2021-11-24 RX ORDER — MONTELUKAST SODIUM 10 MG/1
TABLET ORAL
Qty: 90 TABLET | Refills: 0 | OUTPATIENT
Start: 2021-11-24

## 2021-11-24 RX ORDER — SPIRONOLACTONE 25 MG/1
25 TABLET ORAL DAILY
Qty: 90 TABLET | Refills: 0 | Status: SHIPPED | OUTPATIENT
Start: 2021-11-24 | End: 2022-03-15

## 2021-11-24 RX ORDER — MONTELUKAST SODIUM 10 MG/1
TABLET ORAL
Qty: 30 TABLET | Refills: 0 | OUTPATIENT
Start: 2021-11-24

## 2021-11-24 RX ORDER — FLUOXETINE 20 MG/1
TABLET, FILM COATED ORAL
Qty: 270 TABLET | Refills: 0 | Status: SHIPPED | OUTPATIENT
Start: 2021-11-24 | End: 2021-12-13 | Stop reason: ALTCHOICE

## 2021-11-24 RX ORDER — LEVOTHYROXINE SODIUM 0.03 MG/1
25 TABLET ORAL DAILY
Qty: 90 TABLET | Refills: 0 | Status: SHIPPED | OUTPATIENT
Start: 2021-11-24 | End: 2021-12-15

## 2021-11-24 RX ORDER — GABAPENTIN 300 MG/1
300 CAPSULE ORAL 3 TIMES DAILY
Qty: 270 CAPSULE | Refills: 0 | Status: SHIPPED | OUTPATIENT
Start: 2021-11-24 | End: 2021-12-09

## 2021-12-06 ENCOUNTER — DOCUMENTATION (OUTPATIENT)
Dept: PHYSICAL THERAPY | Facility: CLINIC | Age: 66
End: 2021-12-06

## 2021-12-06 DIAGNOSIS — S62.393D: Primary | ICD-10-CM

## 2021-12-06 DIAGNOSIS — G90.512 COMPLEX REGIONAL PAIN SYNDROME TYPE 1 OF LEFT UPPER EXTREMITY: ICD-10-CM

## 2021-12-06 DIAGNOSIS — S62.395D OTHER FRACTURE OF FOURTH METACARPAL BONE, LEFT HAND, SUBSEQUENT ENCOUNTER FOR FRACTURE WITH ROUTINE HEALING: ICD-10-CM

## 2021-12-06 NOTE — PROGRESS NOTES
Discharge Summary  Discharge Summary from Occupational Therapy Report    Patient Information  Vivian Kauffman  1955    Dates OT visit: 8/5/21-10/26/21  Number of Visits: 17     Discharge Status of Patient: See MD Note dated 12/6/21    Goals: Partially Met    Visit Diagnoses:    ICD-10-CM ICD-9-CM   1. Other fracture of third metacarpal bone, left hand, subsequent encounter for fracture with routine healing  S62.393D V54.12   2. Other fracture of fourth metacarpal bone, left hand, subsequent encounter for fracture with routine healing  S62.395D V54.12   3. Complex regional pain syndrome type 1 of left upper extremity  G90.512 337.21       Discharge Plan: D/C to [facility]    Comments transition to [facility] recommended at last reassessment    Date of Discharge 12/6/21        DEBBY Tello, OTR/L, CHT  Occupational Therapist, Certified Hand therapist    Electronically Signed   [place] LICENSE: [id number]

## 2021-12-09 ENCOUNTER — TELEPHONE (OUTPATIENT)
Dept: INTERNAL MEDICINE | Facility: CLINIC | Age: 66
End: 2021-12-09

## 2021-12-09 RX ORDER — GABAPENTIN 300 MG/1
CAPSULE ORAL
Qty: 90 CAPSULE | Refills: 2 | Status: SHIPPED | OUTPATIENT
Start: 2021-12-09 | End: 2022-03-09

## 2021-12-09 NOTE — TELEPHONE ENCOUNTER
Pt got sick last Saturday and had been exposed to COVID, she went to [facility] and they gave her Tessalon Pearles and she can't take those she got sick to her stomach.     She states that they told her that she has red patches on her throat. Her throat is sore. She wanted to know what she should do. Please advise.

## 2021-12-09 NOTE — TELEPHONE ENCOUNTER
Televisit if Covid swab not done and negative, or office visit with Maria Elena or Jenna tomorrow.

## 2021-12-12 NOTE — ASSESSMENT & PLAN NOTE
GASTRIC SLEEVE 10/17  BMI 36.5 with associated medical illnesses.  Patient is working on diet as of 11/21.

## 2021-12-12 NOTE — ASSESSMENT & PLAN NOTE
I discussed with patient at 7/13/2021 office visit that she can try off of the half dose of primidone that she is taking at bedtime.  She does not take this during the day.  She reported she is still getting Botox injections for this as well.
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
## 2021-12-12 NOTE — ASSESSMENT & PLAN NOTE
TSH is 1.3 as of 11/21.  Patient is stable on very low-dose Synthroid, 25 mcg daily, continue same.

## 2021-12-12 NOTE — ASSESSMENT & PLAN NOTE
Patient is is on maintenance SSRI with 80 mg fluoxetine as of 11/21.  She is stable on this, continue same dosing.

## 2021-12-12 NOTE — ASSESSMENT & PLAN NOTE
B12 level is 475 as of 11/21.  This is without any supplementation, patient previously on shots for this, doing very well without.  We will monitor this periodically.

## 2021-12-12 NOTE — ASSESSMENT & PLAN NOTE
Patient remains well controlled 11/21 on low-dose treatment with 2.5 mg of amlodipine along with low-dose Aldactone.  Preference would be for patient to be on low-dose ACEI, and actually she was previously on Altace.  I recommend we resume this now at low-dose 2.5 mg daily, and put amlodipine to the side for now.---> Blood pressure stable 12/21 urgent visit, but she is probably having a reaction to the Altase, so we will discontinue this at but also reasonable to defer resuming the amlodipine if her blood pressure stays down.  Could potentially consider trial of ARB in the future.

## 2021-12-12 NOTE — ASSESSMENT & PLAN NOTE
Vitamin D is 39 as of 11/21, and this is apparently without any over-the-counter supplementation.  We will monitor this from time to time.

## 2021-12-12 NOTE — ASSESSMENT & PLAN NOTE
LDL was 60 in January 2021, but is up to 86 as of 11/21.  Patient is fairly stable on 40 mg of Lipitor, but need to repeat levels again on return to office to ensure not trending further up.

## 2021-12-12 NOTE — ASSESSMENT & PLAN NOTE
Patient is followed by endocrinology for this.  She sees Dr. Coello in [place].  A1C is 9.6 as of 11/21.  Endo is aware, she has been started on a continuous glucose monitor, and they are evaluating her for insulin pump.

## 2021-12-13 ENCOUNTER — OFFICE VISIT (OUTPATIENT)
Dept: INTERNAL MEDICINE | Facility: CLINIC | Age: 66
End: 2021-12-13

## 2021-12-13 VITALS
DIASTOLIC BLOOD PRESSURE: 57 MMHG | SYSTOLIC BLOOD PRESSURE: 96 MMHG | TEMPERATURE: 97.3 F | WEIGHT: 197 LBS | HEIGHT: 63 IN | BODY MASS INDEX: 34.91 KG/M2 | HEART RATE: 80 BPM | OXYGEN SATURATION: 97 %

## 2021-12-13 DIAGNOSIS — I10 ESSENTIAL HYPERTENSION: Primary | ICD-10-CM

## 2021-12-13 DIAGNOSIS — J30.1 SEASONAL ALLERGIC RHINITIS DUE TO POLLEN: ICD-10-CM

## 2021-12-13 PROCEDURE — 99213 OFFICE O/P EST LOW 20 MIN: CPT | Performed by: INTERNAL MEDICINE

## 2021-12-13 RX ORDER — FLUOXETINE HYDROCHLORIDE 20 MG/1
80 CAPSULE ORAL DAILY
COMMUNITY
End: 2022-03-09

## 2021-12-13 NOTE — ASSESSMENT & PLAN NOTE
Patient has typical seasonal allergic rhinitis, is generally stable on Singulair and Zyrtec, but current presentation differs from this.  She is been tested for Covid twice and it is negative.  Her cough is a little better, but regardless I am suspicious this is related to the recent addition of ACE inhibitor to her regimen.  Think the best thing to do is discontinue Altace and watch for him resolution of her symptoms.

## 2021-12-15 RX ORDER — LEVOTHYROXINE SODIUM 25 UG/1
TABLET ORAL
Qty: 90 TABLET | Refills: 0 | Status: SHIPPED | OUTPATIENT
Start: 2021-12-15 | End: 2022-03-09

## 2021-12-17 ENCOUNTER — TELEPHONE (OUTPATIENT)
Dept: INTERNAL MEDICINE | Facility: CLINIC | Age: 66
End: 2021-12-17

## 2022-01-04 ENCOUNTER — HOSPITAL ENCOUNTER (OUTPATIENT)
Dept: CT IMAGING | Facility: HOSPITAL | Age: 67
Discharge: HOME | End: 2022-01-04
Admitting: UROLOGY

## 2022-01-04 ENCOUNTER — PREP FOR SURGERY (OUTPATIENT)
Dept: OTHER | Facility: HOSPITAL | Age: 67
End: 2022-01-04

## 2022-01-04 ENCOUNTER — CLINICAL SUPPORT (OUTPATIENT)
Dept: GASTROENTEROLOGY | Facility: CLINIC | Age: 67
End: 2022-01-04

## 2022-01-04 DIAGNOSIS — N13.30 HYDRONEPHROSIS OF LEFT KIDNEY: ICD-10-CM

## 2022-01-04 DIAGNOSIS — N20.0 KIDNEY STONES: ICD-10-CM

## 2022-01-04 DIAGNOSIS — Z86.010 PERSONAL HISTORY OF COLONIC POLYPS: Primary | ICD-10-CM

## 2022-01-04 LAB — CREAT BLDA-MCNC: 0.7 MG/DL

## 2022-01-04 PROCEDURE — 0 IOPAMIDOL PER 1 ML: Performed by: UROLOGY

## 2022-01-04 PROCEDURE — 74178 CT ABD&PLV WO CNTR FLWD CNTR: CPT

## 2022-01-04 PROCEDURE — 82565 ASSAY OF CREATININE: CPT

## 2022-01-04 RX ADMIN — IOPAMIDOL 100 ML: 755 INJECTION, SOLUTION INTRAVENOUS at 08:56

## 2022-01-05 PROBLEM — Z86.0100 PERSONAL HISTORY OF COLONIC POLYPS: Status: ACTIVE | Noted: 2022-01-05

## 2022-01-05 PROBLEM — Z86.010 PERSONAL HISTORY OF COLONIC POLYPS: Status: ACTIVE | Noted: 2022-01-05

## 2022-01-17 ENCOUNTER — TELEPHONE (OUTPATIENT)
Dept: UROLOGY | Facility: CLINIC | Age: 67
End: 2022-01-17

## 2022-01-17 NOTE — TELEPHONE ENCOUNTER
Her scan is pretty normal.  She has some constipation and a few tiny renal cysts that are nothing to worry about.  She also has a fibroid in her uterus which is something she can follow up with her gyn about.  I called her with results. She did not answer.  It is okay to call her tomorrow and give her results.  Thanks.

## 2022-01-17 NOTE — TELEPHONE ENCOUNTER
Caller:  CHAITANYA BRANHAM     Relationship: SELF    Best call back number: [phone]    What is your medical concern? PT HAD TO RESCHEDULE APPT THAT WAS FOR TODAY, HAD TO RESCHEDULE HER SOMETIME IN MARCH. PT WAS A LITTLE CONCERNED DUE TO THIS APPT BEING A 6 MONTH F/U TO REVIEW THE RESULTS OF HER RENAL U/S, WANTED TO KNOW IF THERE WAS ANYTHING SERIOUS ON IT IF SOMEONE WOULD REACH OUT AND LET HER KNOW OR IF SHE NEEDS TO BE SOONER.

## 2022-01-18 ENCOUNTER — TELEPHONE (OUTPATIENT)
Dept: OBSTETRICS AND GYNECOLOGY | Facility: CLINIC | Age: 67
End: 2022-01-18

## 2022-01-18 NOTE — TELEPHONE ENCOUNTER
Spoke with patient and results given to her. Asked her to call back with any issues and we will see her in March.

## 2022-01-18 NOTE — TELEPHONE ENCOUNTER
Patient is currently being followed by Dr. Hamilton. The patient had a CT scan on 1/4 that showed a 1.5 cm fibroid in the fundus of the uterus. She was advised to call by Dr. Haimlton to see if this needed any additional work up. Please advise.

## 2022-02-07 DIAGNOSIS — N32.81 OAB (OVERACTIVE BLADDER): Primary | ICD-10-CM

## 2022-02-07 RX ORDER — OXYBUTYNIN CHLORIDE 5 MG/1
TABLET, EXTENDED RELEASE ORAL
Qty: 30 TABLET | Refills: 0 | Status: SHIPPED | OUTPATIENT
Start: 2022-02-07 | End: 2022-03-08

## 2022-02-16 RX ORDER — PROCHLORPERAZINE MALEATE 5 MG/1
5 TABLET ORAL EVERY 6 HOURS PRN
COMMUNITY
End: 2022-12-16

## 2022-02-16 NOTE — PRE-PROCEDURE INSTRUCTIONS
Pt. Instructed on laxative and skin prep, pre-op meds, clear liquid diet. Ok to take all meds except Fiber, Plavix, Insulin, Ozempic, Spironolactone a.m. of procedure.

## 2022-02-18 ENCOUNTER — ANESTHESIA (OUTPATIENT)
Dept: GASTROENTEROLOGY | Facility: HOSPITAL | Age: 67
End: 2022-02-18

## 2022-02-18 ENCOUNTER — HOSPITAL ENCOUNTER (OUTPATIENT)
Facility: HOSPITAL | Age: 67
Setting detail: HOSPITAL OUTPATIENT SURGERY
Discharge: HOME | End: 2022-02-18
Attending: INTERNAL MEDICINE | Admitting: INTERNAL MEDICINE

## 2022-02-18 ENCOUNTER — ANESTHESIA EVENT (OUTPATIENT)
Dept: GASTROENTEROLOGY | Facility: HOSPITAL | Age: 67
End: 2022-02-18

## 2022-02-18 VITALS
RESPIRATION RATE: 17 BRPM | HEART RATE: 72 BPM | SYSTOLIC BLOOD PRESSURE: 119 MMHG | DIASTOLIC BLOOD PRESSURE: 55 MMHG | WEIGHT: 193.12 LBS | BODY MASS INDEX: 34.22 KG/M2 | TEMPERATURE: 97 F | OXYGEN SATURATION: 99 % | HEIGHT: 63 IN

## 2022-02-18 DIAGNOSIS — Z86.010 PERSONAL HISTORY OF COLONIC POLYPS: ICD-10-CM

## 2022-02-18 LAB — GLUCOSE BLDC GLUCOMTR-MCNC: 148 MG/DL (ref 70–99)

## 2022-02-18 PROCEDURE — 25010000002 PROPOFOL 10 MG/ML EMULSION: Performed by: NURSE ANESTHETIST, CERTIFIED REGISTERED

## 2022-02-18 PROCEDURE — 82962 GLUCOSE BLOOD TEST: CPT

## 2022-02-18 PROCEDURE — 88305 TISSUE EXAM BY PATHOLOGIST: CPT | Performed by: INTERNAL MEDICINE

## 2022-02-18 PROCEDURE — 45385 COLONOSCOPY W/LESION REMOVAL: CPT | Performed by: INTERNAL MEDICINE

## 2022-02-18 RX ORDER — PROPOFOL 10 MG/ML
VIAL (ML) INTRAVENOUS AS NEEDED
Status: DISCONTINUED | OUTPATIENT
Start: 2022-02-18 | End: 2022-02-18 | Stop reason: SURG

## 2022-02-18 RX ORDER — SODIUM CHLORIDE, SODIUM LACTATE, POTASSIUM CHLORIDE, CALCIUM CHLORIDE 600; 310; 30; 20 MG/100ML; MG/100ML; MG/100ML; MG/100ML
30 INJECTION, SOLUTION INTRAVENOUS CONTINUOUS
Status: DISCONTINUED | OUTPATIENT
Start: 2022-02-18 | End: 2022-02-18 | Stop reason: HOSPADM

## 2022-02-18 RX ORDER — LIDOCAINE HYDROCHLORIDE 20 MG/ML
INJECTION, SOLUTION INFILTRATION; PERINEURAL AS NEEDED
Status: DISCONTINUED | OUTPATIENT
Start: 2022-02-18 | End: 2022-02-18 | Stop reason: SURG

## 2022-02-18 RX ADMIN — PROPOFOL 200 MCG/KG/MIN: 10 INJECTION, EMULSION INTRAVENOUS at 08:24

## 2022-02-18 RX ADMIN — LIDOCAINE HYDROCHLORIDE 90 MG: 20 INJECTION, SOLUTION INFILTRATION; PERINEURAL at 08:24

## 2022-02-18 RX ADMIN — PROPOFOL 70 MG: 10 INJECTION, EMULSION INTRAVENOUS at 08:24

## 2022-02-18 RX ADMIN — SODIUM CHLORIDE, POTASSIUM CHLORIDE, SODIUM LACTATE AND CALCIUM CHLORIDE: 600; 310; 30; 20 INJECTION, SOLUTION INTRAVENOUS at 08:21

## 2022-02-18 NOTE — H&P
"Pre Procedure History & Physical    Chief Complaint:   History colon polyp    Subjective     HPI:   Past history colon polyp    Past Medical History:   Past Medical History:   Diagnosis Date   • Anxiety    • Asthma    • Coronary arteriosclerosis    • Depression    • Diabetes mellitus (HCC)     TYPE 2 - BLOOD GLUCOSE AROUND 100-300   • Disease of thyroid gland    • Fatigue    • Fatty liver    • Fibromyalgia    • Fibromyositis    • GERD (gastroesophageal reflux disease)    • Heartburn    • History of COVID-19     2020   • [place] hearing[place]    • Hyperlipidemia    • Hypertension    • Insomnia    • Kidney stone    • Morbid obesity (HCC)    • Muscle spasm    • Pain of both hip joints    • Polyphagia(783.6)    • PONV (postoperative nausea and vomiting)    • Poor vision    • Problems with hearing    • Seizures (HCC)    • Shortness of breath on exertion    • Sinus drainage    • Stroke syndrome     \"MINI STROKE\"  left side weakness    • Tremor, essential     IN NECK  AND HANDS   • Vertigo        Past Surgical History:  Past Surgical History:   Procedure Laterality Date   • BREAST BIOPSY Right    • CARDIAC CATHETERIZATION      no stents 9/2013 at [facility]   • CARPAL TUNNEL RELEASE Bilateral 2009   • COLONOSCOPY      2018   • CYSTOSCOPY NEPHROURETEROSCOPY Left 6/29/2021    Procedure: CYSTOSCOPY NEPHROURETEROSCOPY;  Surgeon: Mandie Hamilton MD;  Location: [facility];  Service: Urology;  Laterality: Left;   • ENDOSCOPY N/A 9/9/2016    Procedure: ESOPHAGOGASTRODUODENOSCOPY WITH BIOPSY;  Surgeon: Chris Ackerman Jr., MD;  Location: [facility] ENDOSCOPY;  Service:    • EXTRACORPOREAL SHOCKWAVE LITHOTRIPSY (ESWL), STENT INSERTION/REMOVAL  09/2013   • FOOT SURGERY Left    • FRACTURE SURGERY      left hand   • GASTRIC BANDING REMOVAL N/A 11/30/2016    Procedure: LAPAROSCOPIC GASTRIC BANDING AND PORT REMOVAL ;  Surgeon: Chris Ackerman Jr., MD;  Location: [facility];  Service:    • GASTRIC SLEEVE " LAPAROSCOPIC N/A 10/2/2017    Procedure: GASTRIC SLEEVE LAPAROSCOPIC revision WITH LYSIS OF ADHESIONS AND HITAL HERNIA REPAIR ;  Surgeon: Chris Ackerman Jr., MD;  Location:  [facility] [place] [facility];  Service:    • GASTRIC SLEEVE LAPAROSCOPIC     • HAND SURGERY Left    • LAPAROSCOPIC CHOLECYSTECTOMY     • MASS EXCISION Right     RT FOOT    • NOSE SURGERY     • TONSILLECTOMY     • URETEROSCOPY STENT INSERTION Left 6/29/2021    Procedure: URETEROSCOPY STENT INSERTION;  Surgeon: Mandie Hamilton MD;  Location: [facility] OR;  Service: Urology;  Laterality: Left;       Family History:  Family History   Problem Relation Age of Onset   • Heart attack Mother    • Diabetes Mother    • Hypertension Mother    • Stroke Mother    • Heart attack Father    • Diabetes Father    • Hypertension Father    • Obesity Father         Morbid Obesity   • Stroke Father    • Heart attack Brother    • Cancer Brother         Bladder   • Heart attack Maternal Grandmother    • Heart attack Maternal Grandfather    • Stroke Paternal Grandmother    • Heart attack Paternal Grandfather    • Malig Hyperthermia Neg Hx    • Colon cancer Neg Hx        Social History:   reports that she quit smoking about 27 years ago. Her smoking use included cigarettes. She has a 60.00 pack-year smoking history. She has never used smokeless tobacco. She reports previous alcohol use. She reports that she does not use drugs.    Medications:   Medications Prior to Admission   Medication Sig Dispense Refill Last Dose   • albuterol sulfate  (90 Base) MCG/ACT inhaler albuterol sulfate 90 mcg/actuation inhalation HFA aerosol inhaler inhale 1 - 2 puffs (90 - 180 mcg) by inhalation route every 4-6 hours as needed   Active   Past Month at Unknown time   • atorvastatin (LIPITOR) 40 MG tablet Take 1 tablet by mouth Daily. (Patient taking differently: Take 40 mg by mouth Every Night.) 90 tablet 0 2/17/2022 at Unknown time   • Calcium Polycarbophil (FIBER-CAPS PO) Take  by mouth.    Past Week at Unknown time   • cetirizine (zyrTEC) 10 MG tablet Take 10 mg by mouth Daily.   2/18/2022 at Unknown time   • clopidogrel (Plavix) 75 MG tablet Take 1 tablet by mouth Every Night. To stop 3 days prior to surgery per Dr. Landis instructions.   Patient aware per Dr. Landis (Patient taking differently: Take 75 mg by mouth Every Night.) 90 tablet 0 2/10/2022   • Euthyrox 25 MCG tablet Take 1 tablet by mouth once daily 90 tablet 0 2/18/2022 at Unknown time   • FLUoxetine (PROzac) 20 MG capsule Take 80 mg by mouth Daily. 4 tabs   2/18/2022 at Unknown time   • gabapentin (NEURONTIN) 300 MG capsule TAKE 1 CAPSULE BY MOUTH ONCE DAILY IN THE MORNING AND 2 ONCE DAILY IN THE EVENING 90 capsule 2 2/18/2022 at Unknown time   • Insulin Lispro (HumaLOG) 100 UNIT/ML solution cartridge Inject 5 Units under the skin into the appropriate area as directed 3 (Three) Times a Day With Meals.   2/17/2022 at Unknown time   • LANTUS SOLOSTAR 100 UNIT/ML injection pen Inject 10 Units under the skin into the appropriate area as directed 2 (Two) Times a Day. 21 units in the morning and 24 units at night   2/17/2022 at Unknown time   • levETIRAcetam (KEPPRA) 500 MG tablet TAKE 2 TABLETS BY MOUTH IN THE MORNING AND 4 TABLETS AT BEDTIME   2/18/2022 at Unknown time   • montelukast (Singulair) 10 MG tablet Take 1 tablet by mouth Every Night. 90 tablet 0 2/17/2022 at Unknown time   • oxybutynin XL (DITROPAN-XL) 5 MG 24 hr tablet Take 1 tablet by mouth once daily (Patient taking differently: Take 5 mg by mouth Daily.) 30 tablet 0 2/18/2022 at Unknown time   • prochlorperazine (COMPAZINE) 5 MG tablet Take 5 mg by mouth Every 6 (Six) Hours As Needed for Nausea or Vomiting.   Past Month at Unknown time   • spironolactone (ALDACTONE) 25 MG tablet Take 1 tablet by mouth Daily. 90 tablet 0 2/18/2022 at Unknown time   • Semaglutide,0.25 or 0.5MG/DOS, (OZEMPIC) 2 MG/1.5ML solution pen-injector Inject 0.5 mg under the skin into the appropriate area  "as directed.   2/12/2022       Allergies:  Jardiance [empagliflozin], Peanut oil, Latex, and Sulfa antibiotics        Objective     Blood pressure 122/69, pulse 67, temperature 96.6 °F (35.9 °C), temperature source Temporal, resp. rate 18, height 160.1 cm (63.03\"), weight 87.6 kg (193 lb 2 oz), SpO2 97 %, not currently breastfeeding.    Physical Exam   Constitutional: Pt is oriented to person, place, and time and well-developed, well-nourished, and in no distress.   Mouth/Throat: Oropharynx is clear and moist.   Neck: Normal range of motion.   Cardiovascular: Normal rate, regular rhythm and normal heart sounds.    Pulmonary/Chest: Effort normal and breath sounds normal.   Abdominal: Soft. Nontender  Skin: Skin is warm and dry.   Psychiatric: Mood, memory, affect and judgment normal.     Assessment/Plan     Diagnosis:  Surveillance    Anticipated Surgical Procedure:  Colonoscopy    The risks, benefits, and alternatives of this procedure have been discussed with the patient or the responsible party- the patient understands and agrees to proceed.            "

## 2022-02-18 NOTE — ANESTHESIA PREPROCEDURE EVALUATION
Anesthesia Evaluation     Patient summary reviewed and Nursing notes reviewed   history of anesthetic complications: PONV  NPO Solid Status: > 8 hours  NPO Liquid Status: > 2 hours           Airway   Mallampati: II  TM distance: >3 FB  Neck ROM: full  No difficulty expected  Dental    (+) upper dentures    Pulmonary - normal exam    breath sounds clear to auscultation  (+) asthma,shortness of breath,   Cardiovascular - normal exam  Exercise tolerance: good (4-7 METS)    Rhythm: regular  Rate: normal    (+) hypertension, CAD, hyperlipidemia,       Neuro/Psych  (+) seizures, CVA, dizziness/light headedness, numbness, psychiatric history Anxiety and Depression,    GI/Hepatic/Renal/Endo    (+) obesity,  GERD,  liver disease fatty liver disease, renal disease, diabetes mellitus, thyroid problem hypothyroidism    Musculoskeletal     (+) myalgias,   Abdominal    Substance History - negative use     OB/GYN negative ob/gyn ROS         Other   arthritis,                      Anesthesia Plan    ASA 4       total IV anesthesia  intravenous induction     Anesthetic plan, all risks, benefits, and alternatives have been provided, discussed and informed consent has been obtained with: patient.    Plan discussed with CRNA.        CODE STATUS:

## 2022-02-18 NOTE — ANESTHESIA POSTPROCEDURE EVALUATION
Patient: Vivian Kauffman    Procedure Summary     Date: 02/18/22 Room / Location: [facility] 4 / [facility]    Anesthesia Start: 0821 Anesthesia Stop: 0842    Procedure: COLONOSCOPY SCREENING WITH POLYPECTOMY (N/A ) Diagnosis:       Personal history of colonic polyps      (Personal history of colonic polyps [Z86.010])    Surgeons: David Love MD Provider: Clair Horne MD    Anesthesia Type: Not recorded ASA Status: 4          Anesthesia Type: No value filed.    Vitals  Vitals Value Taken Time   /55 02/18/22 0904   Temp 36.1 °C (97 °F) 02/18/22 0904   Pulse 72 02/18/22 0904   Resp 17 02/18/22 0904   SpO2 99 % 02/18/22 0904           Post Anesthesia Care and Evaluation    Patient location during evaluation: bedside  Patient participation: complete - patient participated  Level of consciousness: awake  Pain score: 0  Pain management: adequate  Airway patency: patent  Anesthetic complications: No anesthetic complications  PONV Status: none  Cardiovascular status: acceptable and stable  Respiratory status: acceptable and room air  Hydration status: acceptable    Comments: An Anesthesiologist personally participated in the most demanding procedures (including induction and emergence if applicable) in the anesthesia plan, monitored the course of anesthesia administration at frequent intervals and remained physically present and available for immediate diagnosis and treatment of emergencies.

## 2022-02-21 ENCOUNTER — LAB (OUTPATIENT)
Dept: LAB | Facility: HOSPITAL | Age: 67
End: 2022-02-21

## 2022-02-21 DIAGNOSIS — E78.2 MIXED HYPERLIPIDEMIA: ICD-10-CM

## 2022-02-21 DIAGNOSIS — I10 ESSENTIAL HYPERTENSION: ICD-10-CM

## 2022-02-21 DIAGNOSIS — J30.1 SEASONAL ALLERGIC RHINITIS DUE TO POLLEN: Primary | ICD-10-CM

## 2022-02-21 LAB
ALBUMIN SERPL-MCNC: 4.2 G/DL (ref 3.5–5.2)
ALBUMIN/GLOB SERPL: 1.4 G/DL
ALP SERPL-CCNC: 108 U/L (ref 39–117)
ALT SERPL W P-5'-P-CCNC: 57 U/L (ref 1–33)
ANION GAP SERPL CALCULATED.3IONS-SCNC: 12.9 MMOL/L (ref 5–15)
AST SERPL-CCNC: 32 U/L (ref 1–32)
BILIRUB SERPL-MCNC: 0.4 MG/DL (ref 0–1.2)
BUN SERPL-MCNC: 9 MG/DL (ref 8–23)
BUN/CREAT SERPL: 11.7 (ref 7–25)
CALCIUM SPEC-SCNC: 9.5 MG/DL (ref 8.6–10.5)
CHLORIDE SERPL-SCNC: 101 MMOL/L (ref 98–107)
CHOLEST SERPL-MCNC: 155 MG/DL (ref 0–200)
CO2 SERPL-SCNC: 25.1 MMOL/L (ref 22–29)
CREAT SERPL-MCNC: 0.77 MG/DL (ref 0.57–1)
CYTO UR: NORMAL
GFR SERPL CREATININE-BSD FRML MDRD: 75 ML/MIN/1.73
GLOBULIN UR ELPH-MCNC: 3.1 GM/DL
GLUCOSE SERPL-MCNC: 165 MG/DL (ref 65–99)
HDLC SERPL-MCNC: 48 MG/DL (ref 40–60)
LAB AP CASE REPORT: NORMAL
LAB AP CLINICAL INFORMATION: NORMAL
LDLC SERPL CALC-MCNC: 78 MG/DL (ref 0–100)
LDLC/HDLC SERPL: 1.53 {RATIO}
PATH REPORT.FINAL DX SPEC: NORMAL
PATH REPORT.GROSS SPEC: NORMAL
POTASSIUM SERPL-SCNC: 3.9 MMOL/L (ref 3.5–5.2)
PROT SERPL-MCNC: 7.3 G/DL (ref 6–8.5)
SODIUM SERPL-SCNC: 139 MMOL/L (ref 136–145)
TRIGL SERPL-MCNC: 169 MG/DL (ref 0–150)
VLDLC SERPL-MCNC: 29 MG/DL (ref 5–40)

## 2022-02-21 PROCEDURE — 36415 COLL VENOUS BLD VENIPUNCTURE: CPT

## 2022-02-21 PROCEDURE — 80053 COMPREHEN METABOLIC PANEL: CPT

## 2022-02-21 PROCEDURE — 80061 LIPID PANEL: CPT

## 2022-02-21 RX ORDER — MONTELUKAST SODIUM 10 MG/1
TABLET ORAL
Qty: 90 TABLET | Refills: 1 | Status: SHIPPED | OUTPATIENT
Start: 2022-02-21 | End: 2022-08-26

## 2022-02-24 ENCOUNTER — OFFICE VISIT (OUTPATIENT)
Dept: INTERNAL MEDICINE | Facility: CLINIC | Age: 67
End: 2022-02-24

## 2022-02-24 VITALS
TEMPERATURE: 97.4 F | OXYGEN SATURATION: 98 % | BODY MASS INDEX: 34.38 KG/M2 | DIASTOLIC BLOOD PRESSURE: 76 MMHG | SYSTOLIC BLOOD PRESSURE: 118 MMHG | HEIGHT: 63 IN | WEIGHT: 194 LBS

## 2022-02-24 DIAGNOSIS — E55.9 VITAMIN D DEFICIENCY: ICD-10-CM

## 2022-02-24 DIAGNOSIS — M81.0 POSTMENOPAUSAL BONE LOSS: ICD-10-CM

## 2022-02-24 DIAGNOSIS — E78.2 MIXED HYPERLIPIDEMIA: ICD-10-CM

## 2022-02-24 DIAGNOSIS — E66.01 MORBID (SEVERE) OBESITY DUE TO EXCESS CALORIES: ICD-10-CM

## 2022-02-24 DIAGNOSIS — E11.69 DIABETES MELLITUS TYPE 2 IN OBESE: ICD-10-CM

## 2022-02-24 DIAGNOSIS — E53.8 VITAMIN B12 DEFICIENCY: ICD-10-CM

## 2022-02-24 DIAGNOSIS — E03.4 ACQUIRED ATROPHY OF THYROID: ICD-10-CM

## 2022-02-24 DIAGNOSIS — I10 ESSENTIAL HYPERTENSION: Primary | ICD-10-CM

## 2022-02-24 DIAGNOSIS — E66.9 DIABETES MELLITUS TYPE 2 IN OBESE: ICD-10-CM

## 2022-02-24 DIAGNOSIS — F33.41 RECURRENT MAJOR DEPRESSIVE DISORDER, IN PARTIAL REMISSION: ICD-10-CM

## 2022-02-24 DIAGNOSIS — Z12.31 BREAST CANCER SCREENING BY MAMMOGRAM: ICD-10-CM

## 2022-02-24 PROCEDURE — 99214 OFFICE O/P EST MOD 30 MIN: CPT | Performed by: INTERNAL MEDICINE

## 2022-02-24 RX ORDER — PEN NEEDLE, DIABETIC 29 G X1/2"
NEEDLE, DISPOSABLE MISCELLANEOUS
COMMUNITY
Start: 2022-02-07

## 2022-02-24 RX ORDER — POLYETHYLENE GLYCOL-3350 AND ELECTROLYTES 236; 6.74; 5.86; 2.97; 22.74 G/274.31G; G/274.31G; G/274.31G; G/274.31G; G/274.31G
POWDER, FOR SOLUTION ORAL
COMMUNITY
Start: 2022-02-11 | End: 2022-02-24

## 2022-02-24 NOTE — ASSESSMENT & PLAN NOTE
BMI is down from 36.5 to 34.3 as of 2/22 office visit.  Patient has obesity related medical conditions as previously noted.  Patient working hard on diet for the past several months, says the Dexcom helps her monitor sugars and needs for food a lot better.

## 2022-02-24 NOTE — ASSESSMENT & PLAN NOTE
Patient feels that she is getting her sugars under much better control as of 2/22 office visit since being started on the Dexcom monitor.  Says typically the sugars are in the 120 ballpark in the a.m.  She has follow-up with endocrinology in just about 2 weeks, we will see what her A1c is then.

## 2022-02-24 NOTE — ASSESSMENT & PLAN NOTE
LDL is down to 78 as of 2/22, this is an improvement due to her changes in her diet etc.  She is stable on 40 mg Lipitor, continue same.

## 2022-02-24 NOTE — ASSESSMENT & PLAN NOTE
Blood pressure is very stable as of 2/22 office visit.  Patient is stable on Aldactone only, continue same.  Of note if need additional agent, will use ARB given URI/cough on ACEI.

## 2022-02-24 NOTE — ASSESSMENT & PLAN NOTE
Patient is clinically euthyroid as of 2/22.  She is stable on low-dose Synthroid, 25 mcg daily.  She has follow-up with endocrinology in about 2 to 3 weeks.
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
## 2022-03-07 DIAGNOSIS — N32.81 OAB (OVERACTIVE BLADDER): ICD-10-CM

## 2022-03-08 DIAGNOSIS — F19.20 CHRONIC PAIN WITH DRUG DEPENDENCE: Primary | ICD-10-CM

## 2022-03-08 DIAGNOSIS — G89.29 CHRONIC PAIN WITH DRUG DEPENDENCE: Primary | ICD-10-CM

## 2022-03-08 RX ORDER — OXYBUTYNIN CHLORIDE 5 MG/1
5 TABLET, EXTENDED RELEASE ORAL DAILY
Qty: 90 TABLET | Refills: 3 | Status: SHIPPED | OUTPATIENT
Start: 2022-03-08 | End: 2023-03-03

## 2022-03-09 ENCOUNTER — TRANSCRIBE ORDERS (OUTPATIENT)
Dept: LAB | Facility: HOSPITAL | Age: 67
End: 2022-03-09

## 2022-03-09 ENCOUNTER — LAB (OUTPATIENT)
Dept: LAB | Facility: HOSPITAL | Age: 67
End: 2022-03-09

## 2022-03-09 DIAGNOSIS — E11.69 DIABETES MELLITUS ASSOCIATED WITH HORMONAL ETIOLOGY: ICD-10-CM

## 2022-03-09 DIAGNOSIS — E78.2 MIXED HYPERLIPIDEMIA: ICD-10-CM

## 2022-03-09 DIAGNOSIS — E66.9 OBESITY, UNSPECIFIED CLASSIFICATION, UNSPECIFIED OBESITY TYPE, UNSPECIFIED WHETHER SERIOUS COMORBIDITY PRESENT: ICD-10-CM

## 2022-03-09 DIAGNOSIS — E03.9 HYPOTHYROIDISM, ADULT: ICD-10-CM

## 2022-03-09 DIAGNOSIS — E66.9 OBESITY, UNSPECIFIED CLASSIFICATION, UNSPECIFIED OBESITY TYPE, UNSPECIFIED WHETHER SERIOUS COMORBIDITY PRESENT: Primary | ICD-10-CM

## 2022-03-09 LAB
ALBUMIN SERPL-MCNC: 4.6 G/DL (ref 3.5–5.2)
ALBUMIN/GLOB SERPL: 1.6 G/DL
ALP SERPL-CCNC: 107 U/L (ref 39–117)
ALT SERPL W P-5'-P-CCNC: 49 U/L (ref 1–33)
ANION GAP SERPL CALCULATED.3IONS-SCNC: 9.7 MMOL/L (ref 5–15)
AST SERPL-CCNC: 35 U/L (ref 1–32)
BILIRUB SERPL-MCNC: 0.3 MG/DL (ref 0–1.2)
BUN SERPL-MCNC: 10 MG/DL (ref 8–23)
BUN/CREAT SERPL: 17.9 (ref 7–25)
CALCIUM SPEC-SCNC: 9.2 MG/DL (ref 8.6–10.5)
CHLORIDE SERPL-SCNC: 103 MMOL/L (ref 98–107)
CHOLEST SERPL-MCNC: 142 MG/DL (ref 0–200)
CO2 SERPL-SCNC: 25.3 MMOL/L (ref 22–29)
CREAT SERPL-MCNC: 0.56 MG/DL (ref 0.57–1)
EGFRCR SERPLBLD CKD-EPI 2021: 100.2 ML/MIN/1.73
GLOBULIN UR ELPH-MCNC: 2.9 GM/DL
GLUCOSE SERPL-MCNC: 148 MG/DL (ref 65–99)
HDLC SERPL-MCNC: 46 MG/DL (ref 40–60)
LDLC SERPL CALC-MCNC: 80 MG/DL (ref 0–100)
LDLC/HDLC SERPL: 1.73 {RATIO}
POTASSIUM SERPL-SCNC: 4.2 MMOL/L (ref 3.5–5.2)
PROT SERPL-MCNC: 7.5 G/DL (ref 6–8.5)
SODIUM SERPL-SCNC: 138 MMOL/L (ref 136–145)
TRIGL SERPL-MCNC: 81 MG/DL (ref 0–150)
TSH SERPL DL<=0.05 MIU/L-ACNC: 0.94 UIU/ML (ref 0.27–4.2)
VLDLC SERPL-MCNC: 16 MG/DL (ref 5–40)

## 2022-03-09 PROCEDURE — 83036 HEMOGLOBIN GLYCOSYLATED A1C: CPT

## 2022-03-09 PROCEDURE — 80053 COMPREHEN METABOLIC PANEL: CPT

## 2022-03-09 PROCEDURE — 80061 LIPID PANEL: CPT

## 2022-03-09 PROCEDURE — 84443 ASSAY THYROID STIM HORMONE: CPT

## 2022-03-09 PROCEDURE — 36415 COLL VENOUS BLD VENIPUNCTURE: CPT

## 2022-03-09 RX ORDER — FLUOXETINE HYDROCHLORIDE 20 MG/1
CAPSULE ORAL
Qty: 360 CAPSULE | Refills: 0 | Status: SHIPPED | OUTPATIENT
Start: 2022-03-09 | End: 2022-06-06

## 2022-03-09 RX ORDER — CLOPIDOGREL BISULFATE 75 MG/1
75 TABLET ORAL NIGHTLY
Qty: 90 TABLET | Refills: 1 | Status: SHIPPED | OUTPATIENT
Start: 2022-03-09 | End: 2022-09-09 | Stop reason: SDUPTHER

## 2022-03-09 RX ORDER — GABAPENTIN 300 MG/1
300 CAPSULE ORAL 3 TIMES DAILY
Qty: 270 CAPSULE | Refills: 0 | Status: SHIPPED | OUTPATIENT
Start: 2022-03-09 | End: 2022-06-06

## 2022-03-09 RX ORDER — FLUOXETINE 20 MG/1
TABLET, FILM COATED ORAL
Qty: 270 TABLET | Refills: 1 | Status: SHIPPED | OUTPATIENT
Start: 2022-03-09 | End: 2022-03-11

## 2022-03-09 RX ORDER — LEVOTHYROXINE SODIUM 0.03 MG/1
TABLET ORAL
Qty: 90 TABLET | Refills: 1 | Status: SHIPPED | OUTPATIENT
Start: 2022-03-09 | End: 2023-01-03

## 2022-03-10 LAB — HBA1C MFR BLD: 7 % (ref 4.8–5.6)

## 2022-03-11 ENCOUNTER — OFFICE VISIT (OUTPATIENT)
Dept: UROLOGY | Facility: CLINIC | Age: 67
End: 2022-03-11

## 2022-03-11 VITALS
SYSTOLIC BLOOD PRESSURE: 137 MMHG | WEIGHT: 194.4 LBS | DIASTOLIC BLOOD PRESSURE: 51 MMHG | HEIGHT: 63 IN | BODY MASS INDEX: 34.45 KG/M2

## 2022-03-11 DIAGNOSIS — R31.29 MICROHEMATURIA: Primary | ICD-10-CM

## 2022-03-11 LAB
BILIRUB BLD-MCNC: NEGATIVE MG/DL
CLARITY, POC: CLEAR
COLOR UR: YELLOW
EXPIRATION DATE: ABNORMAL
GLUCOSE UR STRIP-MCNC: NEGATIVE MG/DL
KETONES UR QL: ABNORMAL
LEUKOCYTE EST, POC: ABNORMAL
Lab: ABNORMAL
NITRITE UR-MCNC: NEGATIVE MG/ML
PH UR: 5.5 [PH] (ref 5–8)
PROT UR STRIP-MCNC: NEGATIVE MG/DL
RBC # UR STRIP: NEGATIVE /UL
SP GR UR: 1.03 (ref 1–1.03)
UROBILINOGEN UR QL: NORMAL

## 2022-03-11 PROCEDURE — 81003 URINALYSIS AUTO W/O SCOPE: CPT | Performed by: UROLOGY

## 2022-03-11 PROCEDURE — 99212 OFFICE O/P EST SF 10 MIN: CPT | Performed by: UROLOGY

## 2022-03-12 NOTE — PROGRESS NOTES
"Chief Complaint  Imaging Only (6 mo. Follow up - renal US. )    Subjective          Vivian Kauffman presents to [facility]  History of Present Illness    The patient has consented to being recorded using ELLEN.    The patient presents today for a follow-up.     Hematuria  The patient is doing well overall. No new complaints.  No gross hematuria.     Objective   Vital Signs:   /51   Ht 160 cm (63\")   Wt 88.2 kg (194 lb 6.4 oz)   BMI 34.44 kg/m²     Physical Exam  Vitals and nursing note reviewed.   Constitutional:       Appearance: Normal appearance. She is well-developed.   Pulmonary:      Effort: Pulmonary effort is normal.      Breath sounds: Normal air entry.   Neurological:      Mental Status: She is alert and oriented to person, place, and time.      Motor: Motor function is intact.   Psychiatric:         Mood and Affect: Mood normal.         Behavior: Behavior normal.        Result Review :   The following data was reviewed by: Mandie Hamilton MD on 03/11/2022:    Data reviewed: There was no swelling of the kidneys, no masses, no tumors, and no stones           Results for orders placed or performed in visit on 03/11/22   POC Urinalysis Dipstick, Automated    Specimen: Urine   Result Value Ref Range    Color Yellow Yellow, Straw, Dark Yellow, Adriana    Clarity, UA Clear Clear    Specific Gravity  1.030 1.005 - 1.030    pH, Urine 5.5 5.0 - 8.0    Leukocytes Trace (A) Negative    Nitrite, UA Negative Negative    Protein, POC Negative Negative mg/dL    Glucose, UA Negative Negative, 1000 mg/dL (3+) mg/dL    Ketones, UA 15 mg/dL (A) Negative    Urobilinogen, UA Normal Normal    Bilirubin Negative Negative    Blood, UA Negative Negative    Lot Number [id number]     Expiration Date 02/01/2023        Assessment and Plan    Diagnoses and all orders for this visit:    1. Hydronephrosis of left kidney (Primary)  -     POC Urinalysis Dipstick, Automated    Follow up in one year. "         Follow Up   Return in about 1 year (around 3/11/2023).  Patient was given instructions and counseling regarding her condition or for health maintenance advice. Please see specific information pulled into the AVS if appropriate.     Transcribed from ambient dictation for Mandie Hamilton MD by Isacc Stratton  03/11/22   20:08 EST    Patient verbalized consent to the visit recording.  I have personally performed the services described in this document as transcribed by the above individual, and it is both accurate and complete.  Mandie Hamilton MD  3/14/2022  23:11 EDT

## 2022-03-14 PROBLEM — R31.29 MICROHEMATURIA: Status: ACTIVE | Noted: 2022-03-14

## 2022-03-15 RX ORDER — SPIRONOLACTONE 25 MG/1
TABLET ORAL
Qty: 90 TABLET | Refills: 1 | Status: SHIPPED | OUTPATIENT
Start: 2022-03-15 | End: 2022-09-26

## 2022-04-05 RX ORDER — ATORVASTATIN CALCIUM 40 MG/1
40 TABLET, FILM COATED ORAL NIGHTLY
Qty: 90 TABLET | Refills: 1 | Status: SHIPPED | OUTPATIENT
Start: 2022-04-05 | End: 2022-09-26

## 2022-05-16 ENCOUNTER — TELEPHONE (OUTPATIENT)
Dept: UROLOGY | Facility: CLINIC | Age: 67
End: 2022-05-16

## 2022-05-16 ENCOUNTER — HOSPITAL ENCOUNTER (EMERGENCY)
Facility: HOSPITAL | Age: 67
Discharge: HOME | End: 2022-05-16
Attending: EMERGENCY MEDICINE | Admitting: EMERGENCY MEDICINE

## 2022-05-16 VITALS
SYSTOLIC BLOOD PRESSURE: 134 MMHG | TEMPERATURE: 97.9 F | HEART RATE: 66 BPM | WEIGHT: 190.92 LBS | DIASTOLIC BLOOD PRESSURE: 61 MMHG | BODY MASS INDEX: 33.83 KG/M2 | HEIGHT: 63 IN | RESPIRATION RATE: 18 BRPM | OXYGEN SATURATION: 97 %

## 2022-05-16 DIAGNOSIS — R53.83 FATIGUE, UNSPECIFIED TYPE: Primary | ICD-10-CM

## 2022-05-16 LAB
ALBUMIN SERPL-MCNC: 4.4 G/DL (ref 3.5–5.2)
ALBUMIN/GLOB SERPL: 1.5 G/DL
ALP SERPL-CCNC: 110 U/L (ref 39–117)
ALT SERPL W P-5'-P-CCNC: 49 U/L (ref 1–33)
ANION GAP SERPL CALCULATED.3IONS-SCNC: 12 MMOL/L (ref 5–15)
AST SERPL-CCNC: 44 U/L (ref 1–32)
BASOPHILS # BLD AUTO: 0.03 10*3/MM3 (ref 0–0.2)
BASOPHILS NFR BLD AUTO: 0.5 % (ref 0–1.5)
BILIRUB SERPL-MCNC: 0.3 MG/DL (ref 0–1.2)
BILIRUB UR QL STRIP: NEGATIVE
BUN SERPL-MCNC: 9 MG/DL (ref 8–23)
BUN/CREAT SERPL: 12.9 (ref 7–25)
CALCIUM SPEC-SCNC: 9.8 MG/DL (ref 8.6–10.5)
CHLORIDE SERPL-SCNC: 101 MMOL/L (ref 98–107)
CLARITY UR: CLEAR
CO2 SERPL-SCNC: 25 MMOL/L (ref 22–29)
COLOR UR: YELLOW
CREAT SERPL-MCNC: 0.7 MG/DL (ref 0.57–1)
DEPRECATED RDW RBC AUTO: 39.3 FL (ref 37–54)
EGFRCR SERPLBLD CKD-EPI 2021: 94.9 ML/MIN/1.73
EOSINOPHIL # BLD AUTO: 0.05 10*3/MM3 (ref 0–0.4)
EOSINOPHIL NFR BLD AUTO: 0.9 % (ref 0.3–6.2)
ERYTHROCYTE [DISTWIDTH] IN BLOOD BY AUTOMATED COUNT: 12.9 % (ref 12.3–15.4)
FLUAV AG NPH QL: NEGATIVE
FLUBV AG NPH QL IA: NEGATIVE
GLOBULIN UR ELPH-MCNC: 3 GM/DL
GLUCOSE BLDC GLUCOMTR-MCNC: 165 MG/DL (ref 70–99)
GLUCOSE SERPL-MCNC: 133 MG/DL (ref 65–99)
GLUCOSE UR STRIP-MCNC: NEGATIVE MG/DL
HCT VFR BLD AUTO: 44 % (ref 34–46.6)
HGB BLD-MCNC: 14.7 G/DL (ref 12–15.9)
HGB UR QL STRIP.AUTO: NEGATIVE
IMM GRANULOCYTES # BLD AUTO: 0.01 10*3/MM3 (ref 0–0.05)
IMM GRANULOCYTES NFR BLD AUTO: 0.2 % (ref 0–0.5)
KETONES UR QL STRIP: NEGATIVE
LEUKOCYTE ESTERASE UR QL STRIP.AUTO: NEGATIVE
LYMPHOCYTES # BLD AUTO: 1.6 10*3/MM3 (ref 0.7–3.1)
LYMPHOCYTES NFR BLD AUTO: 27.6 % (ref 19.6–45.3)
MCH RBC QN AUTO: 28.1 PG (ref 26.6–33)
MCHC RBC AUTO-ENTMCNC: 33.4 G/DL (ref 31.5–35.7)
MCV RBC AUTO: 84.1 FL (ref 79–97)
MONOCYTES # BLD AUTO: 0.44 10*3/MM3 (ref 0.1–0.9)
MONOCYTES NFR BLD AUTO: 7.6 % (ref 5–12)
NEUTROPHILS NFR BLD AUTO: 3.66 10*3/MM3 (ref 1.7–7)
NEUTROPHILS NFR BLD AUTO: 63.2 % (ref 42.7–76)
NITRITE UR QL STRIP: NEGATIVE
NRBC BLD AUTO-RTO: 0 /100 WBC (ref 0–0.2)
PH UR STRIP.AUTO: 5.5 [PH] (ref 5–8)
PLATELET # BLD AUTO: 172 10*3/MM3 (ref 140–450)
PMV BLD AUTO: 9.4 FL (ref 6–12)
POTASSIUM SERPL-SCNC: 5.2 MMOL/L (ref 3.5–5.2)
PROT SERPL-MCNC: 7.4 G/DL (ref 6–8.5)
PROT UR QL STRIP: NEGATIVE
RBC # BLD AUTO: 5.23 10*6/MM3 (ref 3.77–5.28)
SODIUM SERPL-SCNC: 138 MMOL/L (ref 136–145)
SP GR UR STRIP: 1.01 (ref 1–1.03)
UROBILINOGEN UR QL STRIP: NORMAL
WBC NRBC COR # BLD: 5.79 10*3/MM3 (ref 3.4–10.8)

## 2022-05-16 PROCEDURE — 82962 GLUCOSE BLOOD TEST: CPT

## 2022-05-16 PROCEDURE — 87804 INFLUENZA ASSAY W/OPTIC: CPT | Performed by: NURSE PRACTITIONER

## 2022-05-16 PROCEDURE — 36415 COLL VENOUS BLD VENIPUNCTURE: CPT

## 2022-05-16 PROCEDURE — 81003 URINALYSIS AUTO W/O SCOPE: CPT | Performed by: NURSE PRACTITIONER

## 2022-05-16 PROCEDURE — 99283 EMERGENCY DEPT VISIT LOW MDM: CPT

## 2022-05-16 PROCEDURE — 85025 COMPLETE CBC W/AUTO DIFF WBC: CPT | Performed by: NURSE PRACTITIONER

## 2022-05-16 PROCEDURE — 80053 COMPREHEN METABOLIC PANEL: CPT | Performed by: NURSE PRACTITIONER

## 2022-05-16 NOTE — ED PROVIDER NOTES
"Subjective   Pt is 68 yo female with hx of HTN, DM2, fibromyalgia, CAD, anxiety, depression is presenting to ED with complaint of \"feeling bad.\" States started to feel bad 3 days ago. Complains of decreased urination, decreased appetite, fatigue, no energy, unable to sleep. States is concerned because she has diabetes and has been in ketoacidosis in the past. Denies any N/V/D, fevers, chills, SOA, cough. States blood sugar has been 200-300.          Review of Systems   Constitutional: Positive for appetite change and fatigue. Negative for chills and fever.   HENT: Negative for ear pain, rhinorrhea and sore throat.    Eyes: Negative for visual disturbance.   Respiratory: Negative for cough and shortness of breath.    Cardiovascular: Negative for chest pain.   Gastrointestinal: Negative for abdominal pain, diarrhea and vomiting.   Genitourinary: Negative for difficulty urinating.        Decreased urination   Musculoskeletal: Negative for arthralgias, back pain and myalgias.   Skin: Negative for rash.   Neurological: Negative for light-headedness and headaches.   Hematological: Negative for adenopathy.   Psychiatric/Behavioral: Negative.        Past Medical History:   Diagnosis Date   • Anxiety    • Asthma    • Coronary arteriosclerosis    • Depression    • Diabetes mellitus (HCC)     TYPE 2 - BLOOD GLUCOSE AROUND 100-300   • Disease of thyroid gland    • Fatigue    • Fatty liver    • Fibromyalgia    • Fibromyositis    • GERD (gastroesophageal reflux disease)    • Heartburn    • History of COVID-19     2020   • [place] (hard of hearing)    • Hyperlipidemia    • Hypertension    • Insomnia    • Kidney stone    • Morbid obesity (HCC)    • Muscle spasm    • Pain of both hip joints    • Polyphagia(783.6)    • PONV (postoperative nausea and vomiting)    • Poor vision    • Problems with hearing    • Seizures (HCC)    • Shortness of breath on exertion    • Sinus drainage    • Stroke syndrome     \"MINI STROKE\"  left side weakness  "   • Tremor, essential     IN NECK  AND HANDS   • Vertigo        Allergies   Allergen Reactions   • Jardiance [Empagliflozin] Anaphylaxis     Went into ketoacidosis   • Peanut Oil Anaphylaxis     throat to close    • Latex Hives     SKIN BLISTERS   • Sulfa Antibiotics Rash   • Ace Inhibitors Cough       Past Surgical History:   Procedure Laterality Date   • BREAST BIOPSY Right    • CARDIAC CATHETERIZATION      no stents 9/2013 at [facility]   • CARPAL TUNNEL RELEASE Bilateral 2009   • COLONOSCOPY      2018   • COLONOSCOPY N/A 2/18/2022    Procedure: COLONOSCOPY SCREENING WITH POLYPECTOMY;  Surgeon: David Love MD;  Location: [facility] ENDOSCOPY;  Service: Gastroenterology;  Laterality: N/A;  COLON POLYP   • CYSTOSCOPY NEPHROURETEROSCOPY Left 6/29/2021    Procedure: CYSTOSCOPY NEPHROURETEROSCOPY;  Surgeon: Mandie Hamilton MD;  Location: [facility] OR;  Service: Urology;  Laterality: Left;   • ENDOSCOPY N/A 9/9/2016    Procedure: ESOPHAGOGASTRODUODENOSCOPY WITH BIOPSY;  Surgeon: Chris Ackerman Jr., MD;  Location: [facility] ENDOSCOPY;  Service:    • EXTRACORPOREAL SHOCKWAVE LITHOTRIPSY (ESWL), STENT INSERTION/REMOVAL  09/2013   • FOOT SURGERY Left    • FRACTURE SURGERY      left hand   • GASTRIC BANDING REMOVAL N/A 11/30/2016    Procedure: LAPAROSCOPIC GASTRIC BANDING AND PORT REMOVAL ;  Surgeon: Chris Ackerman Jr., MD;  Location: [facility];  Service:    • GASTRIC SLEEVE LAPAROSCOPIC N/A 10/2/2017    Procedure: GASTRIC SLEEVE LAPAROSCOPIC revision WITH LYSIS OF ADHESIONS AND HITAL HERNIA REPAIR ;  Surgeon: Chris Ackerman Jr., MD;  Location: [facility];  Service:    • GASTRIC SLEEVE LAPAROSCOPIC     • HAND SURGERY Left    • LAPAROSCOPIC CHOLECYSTECTOMY     • MASS EXCISION Right     RT FOOT    • NOSE SURGERY     • TONSILLECTOMY     • URETEROSCOPY STENT INSERTION Left 6/29/2021    Procedure: URETEROSCOPY STENT INSERTION;  Surgeon: Mandie Hamilton MD;  Location: [facility]   Service: Urology;  Laterality: Left;       Family History   Problem Relation Age of Onset   • Heart attack Mother    • Diabetes Mother    • Hypertension Mother    • Stroke Mother    • Heart attack Father    • Diabetes Father    • Hypertension Father    • Obesity Father         Morbid Obesity   • Stroke Father    • Heart attack Brother    • Cancer Brother         Bladder   • Heart attack Maternal Grandmother    • Heart attack Maternal Grandfather    • Stroke Paternal Grandmother    • Heart attack Paternal Grandfather    • Malig Hyperthermia Neg Hx    • Colon cancer Neg Hx        Social History     Socioeconomic History   • Marital status: Single   • Number of children: 1   Tobacco Use   • Smoking status: Former Smoker     Packs/day: 2.00     Years: 30.00     Pack years: 60.00     Types: Cigarettes     Quit date:      Years since quittin.3   • Smokeless tobacco: Never Used   Vaping Use   • Vaping Use: Never used   Substance and Sexual Activity   • Alcohol use: Not Currently   • Drug use: Never   • Sexual activity: Defer           Objective   Physical Exam  Vitals and nursing note reviewed.   Constitutional:       General: She is not in acute distress.     Appearance: Normal appearance. She is not toxic-appearing.   HENT:      Head: Normocephalic and atraumatic.   Cardiovascular:      Rate and Rhythm: Normal rate and regular rhythm.      Pulses: Normal pulses.      Heart sounds: Normal heart sounds.   Pulmonary:      Effort: Pulmonary effort is normal.      Breath sounds: Normal breath sounds.   Abdominal:      General: Bowel sounds are normal.      Palpations: Abdomen is soft.      Tenderness: There is no abdominal tenderness.   Musculoskeletal:         General: Normal range of motion.      Cervical back: Normal range of motion.   Skin:     General: Skin is warm and dry.   Neurological:      General: No focal deficit present.      Mental Status: She is alert and oriented to person, place, and time.    Psychiatric:         Mood and Affect: Mood normal.         Behavior: Behavior normal.         Thought Content: Thought content normal.         Judgment: Judgment normal.         Procedures           ED Course      Labs Reviewed   COMPREHENSIVE METABOLIC PANEL - Abnormal; Notable for the following components:       Result Value    Glucose 133 (*)     ALT (SGPT) 49 (*)     AST (SGOT) 44 (*)     All other components within normal limits    Narrative:     GFR Normal >60  Chronic Kidney Disease <60  Kidney Failure <15     POCT GLUCOSE FINGERSTICK - Abnormal; Notable for the following components:    Glucose 165 (*)     All other components within normal limits   INFLUENZA ANTIGEN, RAPID - Normal   URINALYSIS W/ MICROSCOPIC IF INDICATED (NO CULTURE) - Normal    Narrative:     Urine microscopic not indicated.   CBC WITH AUTO DIFFERENTIAL - Normal   CBC AND DIFFERENTIAL    Narrative:     The following orders were created for panel order CBC & Differential.  Procedure                               Abnormality         Status                     ---------                               -----------         ------                     CBC Auto Differential[[id number]]        Normal              Final result                 Please view results for these tests on the individual orders.          1415: Discussed ED findings with pt and plan for discharge. Instructed to follow up with PCP for further management. Pt verbalized understanding and agrees with plan.                                      MDM  Number of Diagnoses or Management Options  Fatigue, unspecified type: new and requires workup     Amount and/or Complexity of Data Reviewed  Clinical lab tests: reviewed and ordered    Risk of Complications, Morbidity, and/or Mortality  Presenting problems: moderate  Diagnostic procedures: moderate  Management options: moderate    Patient Progress  Patient progress: stable      Final diagnoses:   Fatigue, unspecified type       ED  Disposition  ED Disposition     ED Disposition   Discharge    Condition   Stable    Comment   --             Shahbaz Acosta MD  [address]  [facility] [address]  [place] [id number] [phone]    Call today           Medication List      No changes were made to your prescriptions during this visit.          Danielle Harding, APRN  05/17/22 0713

## 2022-05-16 NOTE — TELEPHONE ENCOUNTER
Patient said she has felt bad since Wednesday.  She is a diabetic and is on insulin. She does not have much urine output, and said she has not been eating or drinking a lot.  She said her sugar has been 200-300 with her taking her shots and medications, and with her not eating a lot.    She doesn't know if it is something with her kidneys, a stone, or something with her sugar.      She said she had gone into keto acidosis before, and doesn't want that to happen.    She called [facility], and they told her to go to ER, and she is at [facility] ER now.  She couldn't get in with PCP, and her doctor she sees for diabetes is in [place].    She doesn't know if Dr. Hamilton wants to order any tests, or if she wants to see her today.  She is staying in ER for evaluation.

## 2022-05-18 ENCOUNTER — TELEPHONE (OUTPATIENT)
Dept: INTERNAL MEDICINE | Facility: CLINIC | Age: 67
End: 2022-05-18

## 2022-05-18 DIAGNOSIS — E11.69 DIABETES MELLITUS TYPE 2 IN OBESE: ICD-10-CM

## 2022-05-18 DIAGNOSIS — E66.9 DIABETES MELLITUS TYPE 2 IN OBESE: ICD-10-CM

## 2022-05-18 DIAGNOSIS — E03.4 ACQUIRED ATROPHY OF THYROID: Primary | ICD-10-CM

## 2022-05-18 NOTE — TELEPHONE ENCOUNTER
The patient had numerous labs written for at her February appointment.  There in her chart, actually scheduled for 4 months, so they all say June 24.  She can go to the lab anytime and asked them to do the June 24 labs.  They are fasting, they will include her sugar as well as her cholesterol.  I went ahead and added a TSH as well.    Also noted that I ordered a bone density as well as a mammogram at her February appointment.  I am confused as to why they have not been obtained yet, and not do not believe I see that there is an appointment scheduled for them.  Can you ask her if she has heard from radiology, and does she plan on getting them scheduled?

## 2022-05-18 NOTE — TELEPHONE ENCOUNTER
Patient called she wanted a appointment to see you. Her appointment is 05-25-22. She is wanting to know if she needs any labs done or not before her appointment? She asked if you could check her Thyroid.

## 2022-05-21 PROCEDURE — U0004 COV-19 TEST NON-CDC HGH THRU: HCPCS | Performed by: NURSE PRACTITIONER

## 2022-05-21 PROCEDURE — U0005 INFEC AGEN DETEC AMPLI PROBE: HCPCS | Performed by: NURSE PRACTITIONER

## 2022-05-22 ENCOUNTER — TELEPHONE (OUTPATIENT)
Dept: URGENT CARE | Facility: CLINIC | Age: 67
End: 2022-05-22

## 2022-05-22 NOTE — TELEPHONE ENCOUNTER
----- Message from JIM Morse sent at 5/22/2022  1:27 PM EDT -----  Please call the patient regarding her negative result.

## 2022-05-23 ENCOUNTER — LAB (OUTPATIENT)
Dept: LAB | Facility: HOSPITAL | Age: 67
End: 2022-05-23

## 2022-05-23 ENCOUNTER — HOSPITAL ENCOUNTER (OUTPATIENT)
Dept: BONE DENSITY | Facility: HOSPITAL | Age: 67
Discharge: HOME | End: 2022-05-23

## 2022-05-23 ENCOUNTER — HOSPITAL ENCOUNTER (OUTPATIENT)
Dept: MAMMOGRAPHY | Facility: HOSPITAL | Age: 67
Discharge: HOME | End: 2022-05-23

## 2022-05-23 DIAGNOSIS — Z12.31 BREAST CANCER SCREENING BY MAMMOGRAM: ICD-10-CM

## 2022-05-23 DIAGNOSIS — E66.9 DIABETES MELLITUS TYPE 2 IN OBESE: ICD-10-CM

## 2022-05-23 DIAGNOSIS — M81.0 POSTMENOPAUSAL BONE LOSS: ICD-10-CM

## 2022-05-23 DIAGNOSIS — I10 ESSENTIAL HYPERTENSION: ICD-10-CM

## 2022-05-23 DIAGNOSIS — E55.9 VITAMIN D DEFICIENCY: ICD-10-CM

## 2022-05-23 DIAGNOSIS — E53.8 VITAMIN B12 DEFICIENCY: ICD-10-CM

## 2022-05-23 DIAGNOSIS — E03.4 ACQUIRED ATROPHY OF THYROID: ICD-10-CM

## 2022-05-23 DIAGNOSIS — E78.2 MIXED HYPERLIPIDEMIA: ICD-10-CM

## 2022-05-23 DIAGNOSIS — E11.69 DIABETES MELLITUS TYPE 2 IN OBESE: ICD-10-CM

## 2022-05-23 LAB
ALBUMIN SERPL-MCNC: 4.8 G/DL (ref 3.5–5.2)
ALBUMIN/GLOB SERPL: 1.9 G/DL
ALP SERPL-CCNC: 95 U/L (ref 39–117)
ALT SERPL W P-5'-P-CCNC: 36 U/L (ref 1–33)
ANION GAP SERPL CALCULATED.3IONS-SCNC: 16.9 MMOL/L (ref 5–15)
AST SERPL-CCNC: 25 U/L (ref 1–32)
BASOPHILS # BLD AUTO: 0.04 10*3/MM3 (ref 0–0.2)
BASOPHILS NFR BLD AUTO: 0.7 % (ref 0–1.5)
BILIRUB SERPL-MCNC: 0.3 MG/DL (ref 0–1.2)
BUN SERPL-MCNC: 14 MG/DL (ref 8–23)
BUN/CREAT SERPL: 21.9 (ref 7–25)
CALCIUM SPEC-SCNC: 9.7 MG/DL (ref 8.6–10.5)
CHLORIDE SERPL-SCNC: 100 MMOL/L (ref 98–107)
CHOLEST SERPL-MCNC: 128 MG/DL (ref 0–200)
CO2 SERPL-SCNC: 23.1 MMOL/L (ref 22–29)
CREAT SERPL-MCNC: 0.64 MG/DL (ref 0.57–1)
DEPRECATED RDW RBC AUTO: 38.1 FL (ref 37–54)
EGFRCR SERPLBLD CKD-EPI 2021: 97 ML/MIN/1.73
EOSINOPHIL # BLD AUTO: 0.09 10*3/MM3 (ref 0–0.4)
EOSINOPHIL NFR BLD AUTO: 1.6 % (ref 0.3–6.2)
ERYTHROCYTE [DISTWIDTH] IN BLOOD BY AUTOMATED COUNT: 12.8 % (ref 12.3–15.4)
FOLATE SERPL-MCNC: 6.93 NG/ML (ref 4.78–24.2)
GLOBULIN UR ELPH-MCNC: 2.5 GM/DL
GLUCOSE SERPL-MCNC: 139 MG/DL (ref 65–99)
HBA1C MFR BLD: 8 % (ref 4.8–5.6)
HCT VFR BLD AUTO: 43.3 % (ref 34–46.6)
HDLC SERPL-MCNC: 51 MG/DL (ref 40–60)
HGB BLD-MCNC: 14.5 G/DL (ref 12–15.9)
IMM GRANULOCYTES # BLD AUTO: 0.02 10*3/MM3 (ref 0–0.05)
IMM GRANULOCYTES NFR BLD AUTO: 0.4 % (ref 0–0.5)
LDLC SERPL CALC-MCNC: 58 MG/DL (ref 0–100)
LDLC/HDLC SERPL: 1.09 {RATIO}
LYMPHOCYTES # BLD AUTO: 1.44 10*3/MM3 (ref 0.7–3.1)
LYMPHOCYTES NFR BLD AUTO: 25.5 % (ref 19.6–45.3)
MCH RBC QN AUTO: 27.7 PG (ref 26.6–33)
MCHC RBC AUTO-ENTMCNC: 33.5 G/DL (ref 31.5–35.7)
MCV RBC AUTO: 82.8 FL (ref 79–97)
MONOCYTES # BLD AUTO: 0.41 10*3/MM3 (ref 0.1–0.9)
MONOCYTES NFR BLD AUTO: 7.3 % (ref 5–12)
NEUTROPHILS NFR BLD AUTO: 3.65 10*3/MM3 (ref 1.7–7)
NEUTROPHILS NFR BLD AUTO: 64.5 % (ref 42.7–76)
NRBC BLD AUTO-RTO: 0 /100 WBC (ref 0–0.2)
PLATELET # BLD AUTO: 164 10*3/MM3 (ref 140–450)
PMV BLD AUTO: 10 FL (ref 6–12)
POTASSIUM SERPL-SCNC: 4.1 MMOL/L (ref 3.5–5.2)
PROT SERPL-MCNC: 7.3 G/DL (ref 6–8.5)
RBC # BLD AUTO: 5.23 10*6/MM3 (ref 3.77–5.28)
SODIUM SERPL-SCNC: 140 MMOL/L (ref 136–145)
T4 FREE SERPL-MCNC: 1.17 NG/DL (ref 0.93–1.7)
TRIGL SERPL-MCNC: 106 MG/DL (ref 0–150)
TSH SERPL DL<=0.05 MIU/L-ACNC: 1.52 UIU/ML (ref 0.27–4.2)
VIT B12 BLD-MCNC: 339 PG/ML (ref 211–946)
VLDLC SERPL-MCNC: 19 MG/DL (ref 5–40)
WBC NRBC COR # BLD: 5.65 10*3/MM3 (ref 3.4–10.8)

## 2022-05-23 PROCEDURE — 82607 VITAMIN B-12: CPT

## 2022-05-23 PROCEDURE — 82746 ASSAY OF FOLIC ACID SERUM: CPT

## 2022-05-23 PROCEDURE — 36415 COLL VENOUS BLD VENIPUNCTURE: CPT

## 2022-05-23 PROCEDURE — 84439 ASSAY OF FREE THYROXINE: CPT

## 2022-05-23 PROCEDURE — 82652 VIT D 1 25-DIHYDROXY: CPT

## 2022-05-23 PROCEDURE — 77063 BREAST TOMOSYNTHESIS BI: CPT

## 2022-05-23 PROCEDURE — 84443 ASSAY THYROID STIM HORMONE: CPT

## 2022-05-23 PROCEDURE — 85025 COMPLETE CBC W/AUTO DIFF WBC: CPT

## 2022-05-23 PROCEDURE — 80053 COMPREHEN METABOLIC PANEL: CPT

## 2022-05-23 PROCEDURE — 77080 DXA BONE DENSITY AXIAL: CPT

## 2022-05-23 PROCEDURE — 83036 HEMOGLOBIN GLYCOSYLATED A1C: CPT

## 2022-05-23 PROCEDURE — 77067 SCR MAMMO BI INCL CAD: CPT

## 2022-05-23 PROCEDURE — 80061 LIPID PANEL: CPT

## 2022-05-25 LAB — 1,25(OH)2D SERPL-MCNC: 49.8 PG/ML (ref 19.9–79.3)

## 2022-06-06 DIAGNOSIS — F19.20 CHRONIC PAIN WITH DRUG DEPENDENCE: ICD-10-CM

## 2022-06-06 DIAGNOSIS — G89.29 CHRONIC PAIN WITH DRUG DEPENDENCE: ICD-10-CM

## 2022-06-06 RX ORDER — FLUOXETINE HYDROCHLORIDE 20 MG/1
60 CAPSULE ORAL DAILY
Qty: 270 CAPSULE | Refills: 1 | Status: SHIPPED | OUTPATIENT
Start: 2022-06-06 | End: 2022-10-17

## 2022-06-06 RX ORDER — GABAPENTIN 300 MG/1
CAPSULE ORAL
Qty: 270 CAPSULE | Refills: 1 | Status: SHIPPED | OUTPATIENT
Start: 2022-06-06 | End: 2022-12-09

## 2022-06-15 ENCOUNTER — OFFICE VISIT (OUTPATIENT)
Dept: BARIATRICS/WEIGHT MGMT | Facility: CLINIC | Age: 67
End: 2022-06-15

## 2022-06-15 VITALS
RESPIRATION RATE: 18 BRPM | DIASTOLIC BLOOD PRESSURE: 75 MMHG | HEART RATE: 79 BPM | HEIGHT: 63 IN | WEIGHT: 194 LBS | TEMPERATURE: 97.5 F | SYSTOLIC BLOOD PRESSURE: 130 MMHG | BODY MASS INDEX: 34.38 KG/M2

## 2022-06-15 DIAGNOSIS — Z98.84 S/P LAPAROSCOPIC SLEEVE GASTRECTOMY: ICD-10-CM

## 2022-06-15 DIAGNOSIS — I10 ESSENTIAL HYPERTENSION: ICD-10-CM

## 2022-06-15 DIAGNOSIS — E66.9 DIABETES MELLITUS TYPE 2 IN OBESE: ICD-10-CM

## 2022-06-15 DIAGNOSIS — E55.9 VITAMIN D DEFICIENCY: ICD-10-CM

## 2022-06-15 DIAGNOSIS — M25.50 MULTIPLE JOINT PAIN: ICD-10-CM

## 2022-06-15 DIAGNOSIS — E53.8 VITAMIN B12 DEFICIENCY: ICD-10-CM

## 2022-06-15 DIAGNOSIS — G89.29 CHRONIC PAIN WITH DRUG DEPENDENCE: ICD-10-CM

## 2022-06-15 DIAGNOSIS — E66.9 OBESITY, CLASS I, BMI 30-34.9: Primary | ICD-10-CM

## 2022-06-15 DIAGNOSIS — E11.69 DIABETES MELLITUS TYPE 2 IN OBESE: ICD-10-CM

## 2022-06-15 DIAGNOSIS — F19.20 CHRONIC PAIN WITH DRUG DEPENDENCE: ICD-10-CM

## 2022-06-15 PROBLEM — E11.11: Status: RESOLVED | Noted: 2021-10-27 | Resolved: 2022-06-15

## 2022-06-15 PROBLEM — Z91.119 NONCOMPLIANCE WITH DIETARY REGIMEN REQUIRED STATUS POST BARIATRIC SURGERY: Status: RESOLVED | Noted: 2018-03-27 | Resolved: 2022-06-15

## 2022-06-15 PROBLEM — Z86.14 HX MRSA INFECTION: Status: RESOLVED | Noted: 2017-01-04 | Resolved: 2022-06-15

## 2022-06-15 PROBLEM — E66.01 MORBID (SEVERE) OBESITY DUE TO EXCESS CALORIES (HCC): Status: RESOLVED | Noted: 2021-11-15 | Resolved: 2022-06-15

## 2022-06-15 PROCEDURE — 99213 OFFICE O/P EST LOW 20 MIN: CPT | Performed by: NURSE PRACTITIONER

## 2022-06-15 NOTE — PROGRESS NOTES
[facility] BARIATRIC SURGERY  [address] [facility] [address]  [place]iatric [id number]  [phone]  [address] [facility] [address]  [place] [id number]  [phone]  Dept: [phone]  6/15/2022      Vivian Kauffman.  [id number]  [id number]  1955  female      Chief Complaint   Patient presents with   • Follow-up     5 year sleeve       BH Post-Op Bariatric Surgery:   Vivian Kauffman is status post Laparoscopic Sleeve procedure, performed on 10/2/2017 who reports that she was started on ozempic via her endocrinologist and reports that she tolerated it really well and lost about 22lbs. But reports that she can no longer afford it due to the price increasing. She reports that she has been off for a little over a month and has seen that her blood sugar isn't as well controlled off of it and she has seen a 5lb weight gain since stopping her medication and reports that generally she has more fatigue.     HPI:   Today's weight is 88 kg (194 lb) pounds, today's BMI is Body mass index is 34.9 kg/m².,@ has a  loss of 16 pounds since the last visit and@ weight loss since surgery is 16 pounds. The patient reports a decreased portion size and loss of appetite.      Vivian Kauffman denies nausea, vomiting, reflux and reports that she is still working a second job to try to afford her insulin costs. She doesn't usually eat breakfast. She may or may not have lunch, she always has dinner. She is trying to keep her carb count under 20g. She gets at least 1 20g protein shake in daily. She will make pork with onions and peppers in the crock pot.      Diet and Exercise: Diet history reviewed and discussed with the patient. Weight loss/gains to date discussed with the patient. The patient states they are eating 60 grams of protein per day. She reports eating 1 meals per day, a typical portion size of 1/2 cup, eating 1 snacks per day, drinking 0-1 or more 8-oz. glasses of water per day, no  carbonated beverage consumption.     Supplements: hair skin and nail [employer].     Review of Systems   Constitutional: Positive for appetite change. Negative for fatigue and unexpected weight change.   HENT: Negative.    Eyes: Negative.    Respiratory: Negative.    Cardiovascular: Negative.  Negative for leg swelling.   Gastrointestinal: Negative for abdominal distention, abdominal pain, constipation, diarrhea, nausea and vomiting.   Genitourinary: Negative for difficulty urinating, frequency and urgency.   Musculoskeletal: Negative for back pain.   Skin: Negative.    Psychiatric/Behavioral: Negative.    All other systems reviewed and are negative.      Patient Active Problem List   Diagnosis   • Essential hypertension   • Mixed hyperlipidemia   • Alopecia   • Obesity, Class I, BMI 30-34.9   • Asthma   • Carpal tunnel syndrome   • Non-obstructive coronary artery disease   • Diabetes mellitus type 2 in obese (HCC)   • Fatty liver   • Multiple joint pain   • Seizure disorder (HCC)   • S/P laparoscopic sleeve gastrectomy   • Acquired atrophy of thyroid   • Amnesia   • Fibromyositis   • Vitamin D deficiency   • Hydronephrosis of left kidney   • Urge incontinence   • Hydronephrosis with ureteropelvic junction (UPJ) obstruction   • Chronic right-sided low back pain with right-sided sciatica   • Vitamin B12 deficiency   • Benign essential tremor   • Recurrent major depressive disorder, in partial remission ([facility])   • Idiopathic osteoarthritis   • Seasonal allergic rhinitis due to pollen   • Personal history of colonic polyps   • Microhematuria       Past Medical History:   Diagnosis Date   • Anxiety    • Asthma    • Coronary arteriosclerosis    • Depression    • Diabetes mellitus (HCC)     TYPE 2 - BLOOD GLUCOSE AROUND 100-300   • Disease of thyroid gland    • Fatigue    • Fatty liver    • Fibromyalgia    • Fibromyositis    • GERD (gastroesophageal reflux disease)    • Heartburn    • History of COVID-19     2020   • [place] (hard of  "hearing)    • Hyperlipidemia    • Hypertension    • Insomnia    • Kidney stone    • Morbid obesity (HCC)    • Muscle spasm    • Pain of both hip joints    • Polyphagia(783.6)    • PONV (postoperative nausea and vomiting)    • Poor vision    • Problems with hearing    • Seizures (HCC)    • Shortness of breath on exertion    • Sinus drainage    • Stroke syndrome     \"MINI STROKE\"  left side weakness    • Tremor, essential     IN NECK  AND HANDS   • Vertigo        The following portions of the patient's history were reviewed and updated as appropriate: allergies, current medications, past family history, past medical history, past social history, past surgical history and problem list.    Vitals:    06/15/22 1032   BP: 130/75   Pulse: 79   Resp: 18   Temp: 97.5 °F (36.4 °C)       Physical Exam  Vitals and nursing note reviewed.   Constitutional:       Appearance: She is well-developed.   Neck:      Thyroid: No thyromegaly.   Cardiovascular:      Rate and Rhythm: Normal rate.   Pulmonary:      Effort: Pulmonary effort is normal. No respiratory distress.   Abdominal:      Palpations: Abdomen is soft.   Musculoskeletal:         General: No tenderness.   Skin:     General: Skin is warm and dry.   Neurological:      Mental Status: She is alert.   Psychiatric:         Behavior: Behavior normal.         Assessment:   Post-op, the patient is frustrated that she is unable to afford ozempic as her hba1c was better controlled, she was seeing weight loss, and she was able to bring her insulin dose down.     Encounter Diagnoses   Name Primary?   • Obesity, Class I, BMI 30-34.9 Yes   • Essential hypertension    • Diabetes mellitus type 2 in obese (HCC)    • S/P laparoscopic sleeve gastrectomy    • Vitamin D deficiency    • Vitamin B12 deficiency    • Multiple joint pain    • Chronic pain with drug dependence (HCC)        Plan:   Encouraged patient to be sure to get plenty of lean protein per day through small frequent meals all with " a protein source.   Activity restrictions: none.   Recommended patient be sure to get at least 70 grams of protein per day by eating small, frequent meals all with high lean protein choices. Be sure to limit/cut back on daily carbohydrate intake. Discussed with the patient the recommended amount of water per day to intake- half of body weight in ounces. Reviewed vitamin requirements. Be sure to do routine exercise, 150 minutes per week minimum, including both cardio and strength training.     Instructions / Recommendations: dietary counseling recommended, recommended a daily protein intake of  grams, vitamin supplement(s) recommended, recommended exercising at least 150 minutes per week, behavior modifications recommended and instructed to call the office for concerns, questions, or problems.     The patient was instructed to follow up in 12 months .      Total time spent during this encounter today was 25 minutes
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
## 2022-06-16 ENCOUNTER — OFFICE VISIT (OUTPATIENT)
Dept: INTERNAL MEDICINE | Facility: CLINIC | Age: 67
End: 2022-06-16

## 2022-06-16 VITALS
WEIGHT: 191.2 LBS | HEART RATE: 86 BPM | BODY MASS INDEX: 33.88 KG/M2 | TEMPERATURE: 97.5 F | HEIGHT: 63 IN | SYSTOLIC BLOOD PRESSURE: 126 MMHG | OXYGEN SATURATION: 96 % | DIASTOLIC BLOOD PRESSURE: 67 MMHG

## 2022-06-16 DIAGNOSIS — F33.41 RECURRENT MAJOR DEPRESSIVE DISORDER, IN PARTIAL REMISSION: ICD-10-CM

## 2022-06-16 DIAGNOSIS — E03.4 ACQUIRED ATROPHY OF THYROID: Primary | ICD-10-CM

## 2022-06-16 DIAGNOSIS — E55.9 VITAMIN D DEFICIENCY: ICD-10-CM

## 2022-06-16 DIAGNOSIS — E66.9 DIABETES MELLITUS TYPE 2 IN OBESE: ICD-10-CM

## 2022-06-16 DIAGNOSIS — I10 ESSENTIAL HYPERTENSION: ICD-10-CM

## 2022-06-16 DIAGNOSIS — E11.69 DIABETES MELLITUS TYPE 2 IN OBESE: ICD-10-CM

## 2022-06-16 DIAGNOSIS — E78.2 MIXED HYPERLIPIDEMIA: ICD-10-CM

## 2022-06-16 PROCEDURE — 90471 IMMUNIZATION ADMIN: CPT | Performed by: INTERNAL MEDICINE

## 2022-06-16 PROCEDURE — 99214 OFFICE O/P EST MOD 30 MIN: CPT | Performed by: INTERNAL MEDICINE

## 2022-06-16 PROCEDURE — 90714 TD VACC NO PRESV 7 YRS+ IM: CPT | Performed by: INTERNAL MEDICINE

## 2022-06-16 NOTE — ASSESSMENT & PLAN NOTE
Vitamin B12 is low normal at 340 as of 6/22, but this is without supplementation, so certainly reasonable to continue as is.

## 2022-06-16 NOTE — ASSESSMENT & PLAN NOTE
A1c was 8.0 via labs obtained about 3 weeks prior to her 6/22 appointment.  Concerning issue with that is her Ozempic was discontinued about just about a week before those labs due to inability to afford it.  Certainly A1c is gone to be higher next time.  Patient is going to work with endocrinologist and trying to get it covered.  Otherwise continue with insulin alone, Lantus 12 units twice daily and Humalog 7 units 3 times daily with sliding scale.

## 2022-06-16 NOTE — ASSESSMENT & PLAN NOTE
Patient remained stable on maintenance SSRI as of 6/22.  Refill was sent in earlier this month, continue current full dose.

## 2022-06-16 NOTE — ASSESSMENT & PLAN NOTE
LDL of 58 as of 6/22 is easily at goal.  Patient is stable on moderate dose Lipitor, continue same.

## 2022-06-16 NOTE — ASSESSMENT & PLAN NOTE
This is a nonissue as of 6/22.  She is stable on spironolactone only.  She will need an ARB if blood pressure trends up in the future.

## 2022-06-16 NOTE — ASSESSMENT & PLAN NOTE
Vitamin D is 50 as of 6/22.  Curiously enough this is without any specific supplementation, only with sweats and her food it sounds like.

## 2022-06-28 ENCOUNTER — OFFICE VISIT (OUTPATIENT)
Dept: CARDIOLOGY | Facility: CLINIC | Age: 67
End: 2022-06-28

## 2022-06-28 VITALS
HEIGHT: 63 IN | OXYGEN SATURATION: 97 % | WEIGHT: 194.4 LBS | HEART RATE: 75 BPM | DIASTOLIC BLOOD PRESSURE: 82 MMHG | SYSTOLIC BLOOD PRESSURE: 124 MMHG | BODY MASS INDEX: 34.45 KG/M2

## 2022-06-28 DIAGNOSIS — I25.10 CHRONIC CORONARY ARTERY DISEASE: Primary | ICD-10-CM

## 2022-06-28 DIAGNOSIS — E78.2 MIXED HYPERLIPIDEMIA: ICD-10-CM

## 2022-06-28 DIAGNOSIS — I10 ESSENTIAL HYPERTENSION: ICD-10-CM

## 2022-06-28 PROCEDURE — 93000 ELECTROCARDIOGRAM COMPLETE: CPT | Performed by: INTERNAL MEDICINE

## 2022-06-28 PROCEDURE — 99214 OFFICE O/P EST MOD 30 MIN: CPT | Performed by: INTERNAL MEDICINE

## 2022-06-28 NOTE — PROGRESS NOTES
Subjective:     Encounter Date:06/28/2022      Patient ID: Vivian Kauffman is a 67 y.o. female.    Chief Complaint:  History of Present Illness    The patient is a 67-year-old female with history of nonobstructive coronary artery disease, hypertension, dyslipidemia, diabetes mellitus type 2, prior stroke, seizure disorder, obesity status post lap band, and subsequent removal of the lap band, who presents for followup.      She presents today for annual follow-up.  Overall she has been doing well.  She denies any chest pain, shortness of breath, palpitations, orthopnea, near-syncope or syncope, or lower extremity edema.  She reports that for about a week or 2 she had episodes where she had some difficulty breathing while laying down at night.  This has since resolved and has not recurred.     Prior History:  I saw the patient initially for a preoperative evaluation in 8/2015.   She was getting ready to get her lap band removed and was noted to have an abnormal EKG at Dr. Ackerman's office.   She was previously followed by a cardiologist in [place], but was unable to get in with them at the time. Her prior cardiac workup included an evaluation for an abnormal EKG back in 11/2013 at which time she saw Dr. Duarte.  She underwent a stress test that showed anterior ischemia and subsequently underwent a cardiac catheterization that showed 50% stenosis of her proximal LAD, luminal irregularities of her left circumflex, and 50% stenosis of her right coronary artery.  Her echocardiogram showed pulmonary artery pressure of 40 mmHg, moderate TR, but otherwise unremarkable.   When I saw the patient initially, I compared these findings with her prior EKGs and it showed no change.  She also reported no new symptoms at the time and she was subsequently cleared for surgery.       In 3/2017 she had successfully undergone removal of her lap band.  She denied any symptoms but wanted to proceed with repeat workup to ensure  that her nonobstructive coronary artery disease has not progressed.  Proceeded with an echocardiogram that showed normal left ventricular systolic function and wall motion with an estimated ejection fraction of 65-70% and grade 1 diastolic dysfunction.  We attempted to proceed with a treadmill stress test but her baseline EKG abnormalities precluded a diagnostic tests so we proceeded with a perfusion stress test.  This was performed on 3/30/2017 and was negative for ischemia.          Review of Systems   Constitutional: Negative for malaise/fatigue.   HENT: Negative for hearing loss, hoarse voice, nosebleeds and sore throat.    Eyes: Negative for pain.   Cardiovascular: Negative for chest pain, claudication, cyanosis, dyspnea on exertion, irregular heartbeat, leg swelling, near-syncope, orthopnea, palpitations, paroxysmal nocturnal dyspnea and syncope.   Respiratory: Negative for shortness of breath and snoring.    Endocrine: Negative for cold intolerance, heat intolerance, polydipsia, polyphagia and polyuria.   Skin: Negative for itching and rash.   Musculoskeletal: Negative for arthritis, falls, joint pain, joint swelling, muscle cramps, muscle weakness and myalgias.   Gastrointestinal: Negative for constipation, diarrhea, dysphagia, heartburn, hematemesis, hematochezia, melena, nausea and vomiting.   Genitourinary: Negative for frequency, hematuria and hesitancy.   Neurological: Negative for excessive daytime sleepiness, dizziness, headaches, light-headedness, numbness and weakness.   Psychiatric/Behavioral: Negative for depression. The patient is not nervous/anxious.          Current Outpatient Medications:   •  albuterol sulfate  (90 Base) MCG/ACT inhaler, 1 puff As Needed., Disp: , Rfl:   •  atorvastatin (LIPITOR) 40 MG tablet, Take 1 tablet by mouth Every Night., Disp: 90 tablet, Rfl: 1  •  Calcium Polycarbophil (FIBER-CAPS PO), Take  by mouth. 3 capsules in am, 2 at night, Disp: , Rfl:   •  cetirizine  (zyrTEC) 10 MG tablet, TAKE 1 TABLET BY MOUTH ONCE DAILY AS NEEDED FOR SNEEZING, ITCHING, RUNNY NOSE, Disp: , Rfl:   •  clopidogrel (PLAVIX) 75 MG tablet, Take 1 tablet by mouth Every Night., Disp: 90 tablet, Rfl: 1  •  FLUoxetine (PROzac) 20 MG capsule, Take 3 capsules by mouth Daily for 90 days., Disp: 270 capsule, Rfl: 1  •  gabapentin (NEURONTIN) 300 MG capsule, TAKE 1 CAPSULE BY MOUTH THREE TIMES DAILY, Disp: 270 capsule, Rfl: 1  •  Insulin Lispro (HumaLOG) 100 UNIT/ML solution cartridge, Inject 7 Units under the skin into the appropriate area as directed 3 (Three) Times a Day With Meals., Disp: , Rfl:   •  LANTUS SOLOSTAR 100 UNIT/ML injection pen, Inject 12 Units under the skin into the appropriate area as directed 2 (Two) Times a Day. 14 units in the morning and 14 units at night, Disp: , Rfl:   •  levETIRAcetam (KEPPRA) 500 MG tablet, 6 tablets daily, Disp: , Rfl:   •  levothyroxine (SYNTHROID, LEVOTHROID) 25 MCG tablet, Take 1 tablet by mouth once daily, Disp: 90 tablet, Rfl: 1  •  montelukast (SINGULAIR) 10 MG tablet, TAKE 1 TABLET BY MOUTH ONCE DAILY AT NIGHT (Patient taking differently: Take 10 mg by mouth Every Night.), Disp: 90 tablet, Rfl: 1  •  oxybutynin XL (DITROPAN-XL) 5 MG 24 hr tablet, Take 1 tablet by mouth Daily for 360 days., Disp: 90 tablet, Rfl: 3  •  prochlorperazine (COMPAZINE) 5 MG tablet, Take 5 mg by mouth Every 6 (Six) Hours As Needed for Nausea or Vomiting., Disp: , Rfl:   •  RELION PEN NEEDLE 31G/8MM 31G X 8 MM misc, USE 4- 5 TIMES DAILY AS DIRECTED, Disp: , Rfl:   •  spironolactone (ALDACTONE) 25 MG tablet, Take 1 tablet by mouth once daily, Disp: 90 tablet, Rfl: 1    Past Medical History:   Diagnosis Date   • Anxiety    • Asthma    • Coronary arteriosclerosis    • Depression    • Diabetes mellitus (HCC)     TYPE 2 - BLOOD GLUCOSE AROUND 100-300   • Disease of thyroid gland    • Fatigue    • Fatty liver    • Fibromyalgia    • Fibromyositis    • GERD (gastroesophageal reflux  "disease)    • Heartburn    • History of COVID-19     2020   • [place] (hard of hearing)    • Hyperlipidemia    • Hypertension    • Insomnia    • Kidney stone    • Morbid obesity (HCC)    • Muscle spasm    • Pain of both hip joints    • Polyphagia(783.6)    • PONV (postoperative nausea and vomiting)    • Poor vision    • Problems with hearing    • Seizures (HCC)    • Shortness of breath on exertion    • Sinus drainage    • Stroke syndrome     \"MINI STROKE\"  left side weakness    • Tremor, essential     IN NECK  AND HANDS   • Vertigo        Past Surgical History:   Procedure Laterality Date   • BREAST BIOPSY Right    • CARDIAC CATHETERIZATION      no stents 9/2013 at [facility]   • CARPAL TUNNEL RELEASE Bilateral 2009   • COLONOSCOPY      2018   • COLONOSCOPY N/A 2/18/2022    Procedure: COLONOSCOPY SCREENING WITH POLYPECTOMY;  Surgeon: David Love MD;  Location: [facility] ENDOSCOPY;  Service: Gastroenterology;  Laterality: N/A;  COLON POLYP   • CYSTOSCOPY NEPHROURETEROSCOPY Left 6/29/2021    Procedure: CYSTOSCOPY NEPHROURETEROSCOPY;  Surgeon: Mandie Hamilton MD;  Location: [facility] MAIN OR;  Service: Urology;  Laterality: Left;   • ENDOSCOPY N/A 9/9/2016    Procedure: ESOPHAGOGASTRODUODENOSCOPY WITH BIOPSY;  Surgeon: Chris Ackerman Jr., MD;  Location: [facility] ENDOSCOPY;  Service:    • EXTRACORPOREAL SHOCKWAVE LITHOTRIPSY (ESWL), STENT INSERTION/REMOVAL  09/2013   • FOOT SURGERY Left    • FRACTURE SURGERY      left hand   • GASTRIC BANDING REMOVAL N/A 11/30/2016    Procedure: LAPAROSCOPIC GASTRIC BANDING AND PORT REMOVAL ;  Surgeon: Chris Ackerman Jr., MD;  Location: [facility] OR [facility];  Service:    • GASTRIC SLEEVE LAPAROSCOPIC N/A 10/2/2017    Procedure: GASTRIC SLEEVE LAPAROSCOPIC revision WITH LYSIS OF ADHESIONS AND HITAL HERNIA REPAIR ;  Surgeon: Chris Ackerman Jr., MD;  Location: [facility] OR [facility];  Service:    • GASTRIC SLEEVE LAPAROSCOPIC     • HAND SURGERY Left    • LAPAROSCOPIC " "CHOLECYSTECTOMY     • MASS EXCISION Right     RT FOOT    • NOSE SURGERY     • TONSILLECTOMY     • URETEROSCOPY STENT INSERTION Left 2021    Procedure: URETEROSCOPY STENT INSERTION;  Surgeon: Mandie Hamilton MD;  Location: [facility] MAIN OR;  Service: Urology;  Laterality: Left;       Family History   Problem Relation Age of Onset   • Heart attack Mother    • Diabetes Mother    • Hypertension Mother    • Stroke Mother    • Heart attack Father    • Diabetes Father    • Hypertension Father    • Obesity Father         Morbid Obesity   • Stroke Father    • Heart attack Brother    • Cancer Brother         Bladder   • Heart attack Maternal Grandmother    • Heart attack Maternal Grandfather    • Stroke Paternal Grandmother    • Heart attack Paternal Grandfather    • Malig Hyperthermia Neg Hx    • Colon cancer Neg Hx        Social History     Tobacco Use   • Smoking status: Former Smoker     Packs/day: 2.00     Years: 30.00     Pack years: 60.00     Types: Cigarettes     Quit date:      Years since quittin.5   • Smokeless tobacco: Never Used   • Tobacco comment: caffeine use   Vaping Use   • Vaping Use: Never used   Substance Use Topics   • Alcohol use: Not Currently   • Drug use: Never         ECG 12 Lead    Date/Time: 2022 11:06 AM  Performed by: Kia Waters MD  Authorized by: Kia Waters MD   Comparison: compared with previous ECG   Similar to previous ECG  Rhythm: sinus rhythm  T flattening: V1, V2, V3 and V4                 Objective:     Visit Vitals  /82 (BP Location: Right arm, Patient Position: Sitting, Cuff Size: Large Adult)   Pulse 75   Ht 160 cm (63\")   Wt 88.2 kg (194 lb 6.4 oz)   SpO2 97%   BMI 34.44 kg/m²         Constitutional:       Appearance: Normal appearance. Well-developed.   Eyes:      General: Lids are normal.      Conjunctiva/sclera: Conjunctivae normal.      Pupils: Pupils are equal, round, and reactive to light.   HENT:      Head: Normocephalic and atraumatic. "   Neck:      Vascular: No carotid bruit or JVD.      Lymphadenopathy: No cervical adenopathy.   Pulmonary:      Effort: Pulmonary effort is normal.      Breath sounds: Normal breath sounds.   Cardiovascular:      Normal rate. Regular rhythm.      No gallop.   Pulses:     Radial: 2+ bilaterally.  Edema:     Peripheral edema absent.   Abdominal:      Palpations: Abdomen is soft.   Musculoskeletal:      Cervical back: Full passive range of motion without pain, normal range of motion and neck supple. Skin:     General: Skin is warm and dry.   Neurological:      Mental Status: Alert and oriented to person, place, and time.             Assessment:          Diagnosis Plan   1. Non-obstructive coronary artery disease     2. Essential hypertension     3. Mixed hyperlipidemia            Plan:         1.  Moderate nonobstructive coronary artery disease.  Noted on a cardiac catheterization back in 2015 including 50% stenosis of the proximal LAD and the right coronary artery.  She had a stress test in 2017 that showed no evidence of ischemia.  EKG appears stable and she is not having any symptoms concerning for angina at this time.  2.  Hypertension.  Well-controlled on current regimen medications.  3.  Hyperlipidemia.  Her lipids are at goal including an LDL of around 58 on her recent lipid panel.  Continue current dose of atorvastatin.  4.  Diabetes mellitus type 2  5.  History of strokes.  On medical management with clopidogrel and statin.    I will plan to see the patient back again in 1 year or sooner if further issues arise.

## 2022-06-29 ENCOUNTER — TELEPHONE (OUTPATIENT)
Dept: INTERNAL MEDICINE | Facility: CLINIC | Age: 67
End: 2022-06-29

## 2022-06-29 NOTE — TELEPHONE ENCOUNTER
Caller: Vivian Kauffman    Relationship: Self    Best call back number: [phone]     What medication are you requesting: DIFLUCAN     What are your current symptoms: YEAST INFECTION     How long have you been experiencing symptoms: YESTERDAY     Have you had these symptoms before:    [x] Yes  [] No    Have you been treated for these symptoms before:   [x] Yes  [] No    If a prescription is needed, what is your preferred pharmacy and phone number: [facility] [address] [place] [address] [phone] [facility] [phone]      Additional notes: PLEASE CALL AND ADVISE.

## 2022-06-29 NOTE — TELEPHONE ENCOUNTER
Caller: Vivian Kauffman    Relationship to patient: Self    Best call back number: [phone]     Patient is needing: PATIENT IS NEEDING HER TEST RESULTS ABOUT HER HEART BLOCKAGE. PLEASE CALL AND ADVISE. SHE WANTS SHREYA TO MAIL RESULTS TO HER. PLEASE CALL AND ADVISE.

## 2022-06-30 RX ORDER — FLUCONAZOLE 100 MG/1
100 TABLET ORAL EVERY OTHER DAY
Qty: 6 TABLET | Refills: 1 | Status: SHIPPED | OUTPATIENT
Start: 2022-06-30 | End: 2022-07-11

## 2022-06-30 NOTE — TELEPHONE ENCOUNTER
Please let patient know I sent fluconazole to [facility].  If she needs it sent somewhere else, please forward it there instead.  I do not know that we have access to what ever imaging study Dr. Duarte had done when she was in the hospital back in 2018.  If she is actively seeing a cardiologist in [place] for this, they should be able to request the results from the hospital directly.  Otherwise, let her know we/you will follow-up on it next week.  Thanks.

## 2022-06-30 NOTE — TELEPHONE ENCOUNTER
Patient called dr ragsdale last night very upset and was at the pharmacy waiting for the medication and it was not called in

## 2022-08-22 ENCOUNTER — TELEPHONE (OUTPATIENT)
Dept: INTERNAL MEDICINE | Facility: CLINIC | Age: 67
End: 2022-08-22

## 2022-08-22 NOTE — TELEPHONE ENCOUNTER
Pt states that she got her allergy shots today. She has had a headache all week and has had a floating feeling. She states that her BP was 168/64, didn't leave her HR on voicemail. She states that she hasn't been on blood pressure medication for awhile. Please advise.

## 2022-08-22 NOTE — TELEPHONE ENCOUNTER
Too hard to tell if it is the chicken or the egg here, her blood pressure was fine at her last office visit.  Have the patient monitor her blood pressure twice daily and drop off the list next week.  She should be using Tylenol for the headache and ensure she is getting enough sleep.

## 2022-08-26 DIAGNOSIS — J30.1 SEASONAL ALLERGIC RHINITIS DUE TO POLLEN: ICD-10-CM

## 2022-08-26 RX ORDER — MONTELUKAST SODIUM 10 MG/1
TABLET ORAL
Qty: 90 TABLET | Refills: 0 | Status: SHIPPED | OUTPATIENT
Start: 2022-08-26 | End: 2022-11-28

## 2022-09-09 RX ORDER — CLOPIDOGREL BISULFATE 75 MG/1
75 TABLET ORAL NIGHTLY
Qty: 90 TABLET | Refills: 1 | Status: SHIPPED | OUTPATIENT
Start: 2022-09-09 | End: 2023-03-13

## 2022-09-09 NOTE — TELEPHONE ENCOUNTER
Caller: Vivian Kauffman    Relationship: Self    Best call back number: [phone]      Requested Prescriptions:   Requested Prescriptions     Pending Prescriptions Disp Refills   • clopidogrel (PLAVIX) 75 MG tablet 90 tablet 1     Sig: Take 1 tablet by mouth Every Night.        Pharmacy where request should be sent: [facility] [address] [place] [address] [phone] [facility] [phone] FX     Additional details provided by patient: COMPLETLEY OUT OF MEDICATION     Does the patient have less than a 3 day supply:  [x] Yes  [] No    Betty Augustine Rep   09/09/22 10:01 EDT

## 2022-09-26 RX ORDER — ATORVASTATIN CALCIUM 40 MG/1
TABLET, FILM COATED ORAL
Qty: 90 TABLET | Refills: 0 | Status: SHIPPED | OUTPATIENT
Start: 2022-09-26 | End: 2022-12-29

## 2022-09-26 RX ORDER — SPIRONOLACTONE 25 MG/1
TABLET ORAL
Qty: 90 TABLET | Refills: 0 | Status: SHIPPED | OUTPATIENT
Start: 2022-09-26 | End: 2022-12-29

## 2022-10-17 RX ORDER — FLUOXETINE HYDROCHLORIDE 20 MG/1
CAPSULE ORAL
Qty: 270 CAPSULE | Refills: 0 | Status: SHIPPED | OUTPATIENT
Start: 2022-10-17 | End: 2022-10-20 | Stop reason: SDUPTHER

## 2022-10-20 ENCOUNTER — TELEPHONE (OUTPATIENT)
Dept: INTERNAL MEDICINE | Facility: CLINIC | Age: 67
End: 2022-10-20

## 2022-10-20 RX ORDER — FLUOXETINE HYDROCHLORIDE 20 MG/1
80 CAPSULE ORAL DAILY
Qty: 360 CAPSULE | Refills: 1 | Status: SHIPPED | OUTPATIENT
Start: 2022-10-20

## 2022-10-20 NOTE — TELEPHONE ENCOUNTER
Caller: Vivian Kauffman KEMI    Relationship: Self    Best call back number: [phone]    What medications are you currently taking:   Current Outpatient Medications on File Prior to Visit   Medication Sig Dispense Refill   • albuterol sulfate  (90 Base) MCG/ACT inhaler 1 puff As Needed.     • atorvastatin (LIPITOR) 40 MG tablet TAKE 1 TABLET BY MOUTH ONCE DAILY AT NIGHT 90 tablet 0   • Calcium Polycarbophil (FIBER-CAPS PO) Take  by mouth. 3 capsules in am, 2 at night     • cetirizine (zyrTEC) 10 MG tablet TAKE 1 TABLET BY MOUTH ONCE DAILY AS NEEDED FOR SNEEZING, ITCHING, RUNNY NOSE     • clopidogrel (PLAVIX) 75 MG tablet Take 1 tablet by mouth Every Night. 90 tablet 1   • FLUoxetine (PROzac) 20 MG capsule TAKE 3 CAPSULES BY MOUTH ONCE DAILY 270 capsule 0   • gabapentin (NEURONTIN) 300 MG capsule TAKE 1 CAPSULE BY MOUTH THREE TIMES DAILY 270 capsule 1   • Insulin Lispro (HumaLOG) 100 UNIT/ML solution cartridge Inject 7 Units under the skin into the appropriate area as directed 3 (Three) Times a Day With Meals.     • LANTUS SOLOSTAR 100 UNIT/ML injection pen Inject 12 Units under the skin into the appropriate area as directed 2 (Two) Times a Day. 14 units in the morning and 14 units at night     • levETIRAcetam (KEPPRA) 500 MG tablet 6 tablets daily     • levothyroxine (SYNTHROID, LEVOTHROID) 25 MCG tablet Take 1 tablet by mouth once daily 90 tablet 1   • montelukast (SINGULAIR) 10 MG tablet Take 1 tablet by mouth once daily 90 tablet 0   • oxybutynin XL (DITROPAN-XL) 5 MG 24 hr tablet Take 1 tablet by mouth Daily for 360 days. 90 tablet 3   • prochlorperazine (COMPAZINE) 5 MG tablet Take 5 mg by mouth Every 6 (Six) Hours As Needed for Nausea or Vomiting.     • RELION PEN NEEDLE 31G/8MM 31G X 8 MM misc USE 4- 5 TIMES DAILY AS DIRECTED     • spironolactone (ALDACTONE) 25 MG tablet Take 1 tablet by mouth once daily 90 tablet 0     No current facility-administered medications on file prior to visit.        Which  medication are you concerned about: FLUoxetine (PROzac) 20 MG capsule    Who prescribed you this medication: HUANG    What are your concerns:   PATIENT TAKES THIS MEDICATION BY 4 PILLS IN THE MORNING EACH DAY. THE SCRIPT IS CALLED IN FOR 3 PILLS DAILY. SHE IS NEEDING THIS CORRECTED AS SOON AS POSSIBLE.    How long have you had these concerns: 10/20/2022

## 2022-11-15 ENCOUNTER — TELEPHONE (OUTPATIENT)
Dept: INTERNAL MEDICINE | Facility: CLINIC | Age: 67
End: 2022-11-15

## 2022-11-15 NOTE — TELEPHONE ENCOUNTER
Caller: Vivian Kauffman    Relationship to patient: Self    Best call back number: [phone]    Patient is needing: PATIENT IS WANTING TO SPEAK WITH SHREYA REGARDING A BILL SHE RECEIVED FROM A RECENT DOCTORS APPOINTMENT. PLEASE CALL AND ADVISE.

## 2022-11-18 NOTE — TELEPHONE ENCOUNTER
I called pt and this was a bill that she got from [facility], I explained that she would have to call [facility].

## 2022-11-26 DIAGNOSIS — J30.1 SEASONAL ALLERGIC RHINITIS DUE TO POLLEN: ICD-10-CM

## 2022-11-28 RX ORDER — MONTELUKAST SODIUM 10 MG/1
TABLET ORAL
Qty: 90 TABLET | Refills: 1 | Status: SHIPPED | OUTPATIENT
Start: 2022-11-28

## 2022-12-07 ENCOUNTER — TELEPHONE (OUTPATIENT)
Dept: INTERNAL MEDICINE | Facility: CLINIC | Age: 67
End: 2022-12-07

## 2022-12-07 NOTE — TELEPHONE ENCOUNTER
Caller: Vivian Kauffman    Relationship to patient: Self    Best call back number: [phone]    Patient is needing: PATIENT CALLED [facility] BACK TO INQUIRE ABOUT IV TREATMENT AND THEY TOLD PATIENT THAT IT IS NO LONGER AVAILABLE. PLEASE ADVISE PATIENT ON NEXT STEPS.

## 2022-12-07 NOTE — TELEPHONE ENCOUNTER
Caller: Vivian Kauffman    Relationship to patient: Self    Best call back number: [phone]      Date of positive COVID19 test: 12/06/2022    Date if possible COVID19 exposure:     COVID19 symptoms: FEVER, CHILLS, COUGH    Date of initial quarantine: 12/06/2022    Additional information or concerns: PATIENT WAS SEEN AT [facility]  URGENT CARE AND THEY OFFERED TWO OPTIONS, MEDICATION OR IV TREATMENT SHE NEEDS TO DISCUSS THIS WITH DR. MACHDAO TO DETERMINE HER BEST OPTION PLEASE CONTACT AND ADVISE     What is the patients preferred pharmacy: [facility] [address] [place] [address] [address] [facility] [phone] [facility] [phone]

## 2022-12-07 NOTE — TELEPHONE ENCOUNTER
I thought perhaps they knew of a treatment protocol that I was not aware of, because we have not been utilizing that at [facility] either.  There are other treatments available for patients that are admitted, perhaps that is what they were referring to.  I recommend the patient isolate, keep well-hydrated, get plenty of rest, and keep on top of her blood sugars.  She needs to isolate for at least 5 days, then she needs to wear a mask for an additional 5 days when out in public.

## 2022-12-07 NOTE — TELEPHONE ENCOUNTER
The patient is on Plavix, so she cannot take the oral agent Paxlovid, so they should look into the IV treatment after reviewing her medications to ensure no significant interactions.

## 2022-12-08 DIAGNOSIS — G89.29 CHRONIC PAIN WITH DRUG DEPENDENCE: ICD-10-CM

## 2022-12-08 DIAGNOSIS — F19.20 CHRONIC PAIN WITH DRUG DEPENDENCE: ICD-10-CM

## 2022-12-09 RX ORDER — GABAPENTIN 300 MG/1
CAPSULE ORAL
Qty: 270 CAPSULE | Refills: 0 | Status: SHIPPED | OUTPATIENT
Start: 2022-12-09 | End: 2023-03-09

## 2022-12-09 NOTE — TELEPHONE ENCOUNTER
Caller: Jamari Vivian L    Relationship: Self    Best call back number: [phone]    Requested Prescriptions:   Requested Prescriptions     Pending Prescriptions Disp Refills   • gabapentin (NEURONTIN) 300 MG capsule [Pharmacy Med Name: Gabapentin 300 MG Oral Capsule] 270 capsule 0     Sig: TAKE 1 CAPSULE BY MOUTH THREE TIMES DAILY        Pharmacy where request should be sent: [facility] [address] [place] [address] [phone] [facility] [phone]      Additional details provided by patient: THE PATIENT IS ENTIRELY OUT OF MEDICATION    Does the patient have less than a 3 day supply:  [x] Yes  [] No    Would you like a call back once the refill request has been completed: [x] Yes [] No    If the office needs to give you a call back, can they leave a voicemail: [x] Yes [] No    Betty Farley Rep   12/09/22 08:06 EST

## 2022-12-16 ENCOUNTER — LAB (OUTPATIENT)
Dept: LAB | Facility: HOSPITAL | Age: 67
End: 2022-12-16

## 2022-12-16 ENCOUNTER — OFFICE VISIT (OUTPATIENT)
Dept: INTERNAL MEDICINE | Facility: CLINIC | Age: 67
End: 2022-12-16

## 2022-12-16 VITALS
HEIGHT: 63 IN | TEMPERATURE: 97.1 F | HEART RATE: 74 BPM | BODY MASS INDEX: 32.36 KG/M2 | DIASTOLIC BLOOD PRESSURE: 74 MMHG | OXYGEN SATURATION: 97 % | SYSTOLIC BLOOD PRESSURE: 104 MMHG | WEIGHT: 182.6 LBS

## 2022-12-16 DIAGNOSIS — E78.2 MIXED HYPERLIPIDEMIA: ICD-10-CM

## 2022-12-16 DIAGNOSIS — Z00.00 WELL ADULT EXAM: ICD-10-CM

## 2022-12-16 DIAGNOSIS — E66.9 DIABETES MELLITUS TYPE 2 IN OBESE: ICD-10-CM

## 2022-12-16 DIAGNOSIS — E03.4 ACQUIRED ATROPHY OF THYROID: ICD-10-CM

## 2022-12-16 DIAGNOSIS — E11.69 DIABETES MELLITUS TYPE 2 IN OBESE: ICD-10-CM

## 2022-12-16 DIAGNOSIS — I10 ESSENTIAL HYPERTENSION: ICD-10-CM

## 2022-12-16 DIAGNOSIS — E55.9 VITAMIN D DEFICIENCY: ICD-10-CM

## 2022-12-16 DIAGNOSIS — I10 ESSENTIAL HYPERTENSION: Primary | ICD-10-CM

## 2022-12-16 LAB
ALBUMIN SERPL-MCNC: 4.4 G/DL (ref 3.5–5.2)
ALBUMIN/GLOB SERPL: 1.6 G/DL
ALP SERPL-CCNC: 85 U/L (ref 39–117)
ALT SERPL W P-5'-P-CCNC: 53 U/L (ref 1–33)
ANION GAP SERPL CALCULATED.3IONS-SCNC: 8 MMOL/L (ref 5–15)
AST SERPL-CCNC: 44 U/L (ref 1–32)
BILIRUB SERPL-MCNC: 0.3 MG/DL (ref 0–1.2)
BUN SERPL-MCNC: 11 MG/DL (ref 8–23)
BUN/CREAT SERPL: 17.7 (ref 7–25)
CALCIUM SPEC-SCNC: 9.2 MG/DL (ref 8.6–10.5)
CHLORIDE SERPL-SCNC: 103 MMOL/L (ref 98–107)
CHOLEST SERPL-MCNC: 125 MG/DL (ref 0–200)
CO2 SERPL-SCNC: 29 MMOL/L (ref 22–29)
CREAT SERPL-MCNC: 0.62 MG/DL (ref 0.57–1)
EGFRCR SERPLBLD CKD-EPI 2021: 97.7 ML/MIN/1.73
GLOBULIN UR ELPH-MCNC: 2.8 GM/DL
GLUCOSE SERPL-MCNC: 94 MG/DL (ref 65–99)
HBA1C MFR BLD: 6.6 % (ref 4.8–5.6)
HDLC SERPL-MCNC: 49 MG/DL (ref 40–60)
LDLC SERPL CALC-MCNC: 58 MG/DL (ref 0–100)
LDLC/HDLC SERPL: 1.18 {RATIO}
POTASSIUM SERPL-SCNC: 3.9 MMOL/L (ref 3.5–5.2)
PROT SERPL-MCNC: 7.2 G/DL (ref 6–8.5)
SODIUM SERPL-SCNC: 140 MMOL/L (ref 136–145)
TRIGL SERPL-MCNC: 92 MG/DL (ref 0–150)
TSH SERPL DL<=0.05 MIU/L-ACNC: 0.57 UIU/ML (ref 0.27–4.2)
VLDLC SERPL-MCNC: 18 MG/DL (ref 5–40)

## 2022-12-16 PROCEDURE — 83036 HEMOGLOBIN GLYCOSYLATED A1C: CPT

## 2022-12-16 PROCEDURE — 99214 OFFICE O/P EST MOD 30 MIN: CPT | Performed by: INTERNAL MEDICINE

## 2022-12-16 PROCEDURE — 36415 COLL VENOUS BLD VENIPUNCTURE: CPT

## 2022-12-16 PROCEDURE — 84443 ASSAY THYROID STIM HORMONE: CPT

## 2022-12-16 PROCEDURE — 80053 COMPREHEN METABOLIC PANEL: CPT

## 2022-12-16 PROCEDURE — 80061 LIPID PANEL: CPT

## 2022-12-16 RX ORDER — ERGOCALCIFEROL 1.25 MG/1
50000 CAPSULE ORAL WEEKLY
COMMUNITY
Start: 2022-12-05

## 2022-12-16 RX ORDER — CLOPIDOGREL 300 MG/1
300 TABLET, FILM COATED ORAL
COMMUNITY
End: 2022-12-16

## 2022-12-16 RX ORDER — SEMAGLUTIDE 1.34 MG/ML
INJECTION, SOLUTION SUBCUTANEOUS
COMMUNITY
Start: 2022-11-25

## 2022-12-16 RX ORDER — INSULIN LISPRO 200 [IU]/ML
INJECTION, SOLUTION SUBCUTANEOUS
COMMUNITY
Start: 2022-12-14

## 2022-12-16 NOTE — ASSESSMENT & PLAN NOTE
TSH was fine in 6/22, we need to repeat as of 12/22 but she will continue with low-dose levothyroxine for now.

## 2022-12-16 NOTE — ASSESSMENT & PLAN NOTE
Patient is followed closely by endocrinology for this.  She was able to get back on Ozempic.  We will see what A1c runs per their labs.

## 2022-12-16 NOTE — ASSESSMENT & PLAN NOTE
This is a nonissue as of her 12/22 office visit.  She is maintained on Aldactone only.  We will add ARB if needed later.

## 2022-12-16 NOTE — ASSESSMENT & PLAN NOTE
Labs pending as of her 12/22 office visit.  She will continue with moderate dose atorvastatin for now, will make further recommendations after labs seen.
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
## 2022-12-16 NOTE — PATIENT INSTRUCTIONS
We are doing some labs today, call Monday for the results please.    2.  I also put in fasting labs to be done before your appointment in April, just do them a few days before.

## 2022-12-19 ENCOUNTER — TELEPHONE (OUTPATIENT)
Dept: INTERNAL MEDICINE | Facility: CLINIC | Age: 67
End: 2022-12-19

## 2022-12-19 NOTE — TELEPHONE ENCOUNTER
Caller: Vivian Kauffman    Relationship: Self    Best call back number: [phone]    Caller requesting test results: YES     What test was performed: LABS     When was the test performed: 12/16/2022     Where was the test performed: [facility]     Additional notes: PATIENT ADVISED TO CONTACT OFFICE TO GO OVER LAB RESULTS, REQUESTED TO SPEAK WITH SHREYA.

## 2022-12-21 NOTE — TELEPHONE ENCOUNTER
Caller: Vivian Kauffman    Relationship to patient: Self    Best call back number:    [phone]        Patient is needing: PATIENT IS RETURNING A CALL TO HEAR HER LAB RESULTS.    HUB WAS UNABLE TO WARM TRANSFER.

## 2022-12-28 ENCOUNTER — TELEPHONE (OUTPATIENT)
Dept: UROLOGY | Facility: CLINIC | Age: 67
End: 2022-12-28

## 2022-12-28 ENCOUNTER — LAB (OUTPATIENT)
Dept: LAB | Facility: HOSPITAL | Age: 67
End: 2022-12-28
Payer: MEDICARE

## 2022-12-28 DIAGNOSIS — R31.29 MICROHEMATURIA: Primary | ICD-10-CM

## 2022-12-28 DIAGNOSIS — N30.00 ACUTE CYSTITIS WITHOUT HEMATURIA: ICD-10-CM

## 2022-12-28 PROCEDURE — 87086 URINE CULTURE/COLONY COUNT: CPT

## 2022-12-28 PROCEDURE — 87077 CULTURE AEROBIC IDENTIFY: CPT

## 2022-12-28 PROCEDURE — 87186 SC STD MICRODIL/AGAR DIL: CPT

## 2022-12-28 NOTE — TELEPHONE ENCOUNTER
Left message for PT explaining that we were ordering a urine culture that she can give either at the lab near [facility] or at the hospital without an appointment. I explained that it can take up to 48 hours for the result to come back so the sooner she can give it the sooner it will be resulted.

## 2022-12-28 NOTE — TELEPHONE ENCOUNTER
Patient has burning with urination, cloudy urine, and stomach pain.  She is urinating a little more than a dribble.  She is diabetic, and doesn't know if she is dehydrated, but is trying to drink fluids.    She would like to have urine tested.

## 2022-12-29 RX ORDER — SPIRONOLACTONE 25 MG/1
TABLET ORAL
Qty: 90 TABLET | Refills: 0 | Status: SHIPPED | OUTPATIENT
Start: 2022-12-29

## 2022-12-29 RX ORDER — ATORVASTATIN CALCIUM 40 MG/1
TABLET, FILM COATED ORAL
Qty: 90 TABLET | Refills: 0 | Status: SHIPPED | OUTPATIENT
Start: 2022-12-29 | End: 2023-03-31

## 2022-12-30 ENCOUNTER — TELEPHONE (OUTPATIENT)
Dept: UROLOGY | Facility: CLINIC | Age: 67
End: 2022-12-30

## 2022-12-30 LAB — BACTERIA SPEC AEROBE CULT: ABNORMAL

## 2022-12-30 RX ORDER — LEVOFLOXACIN 500 MG/1
500 TABLET, FILM COATED ORAL DAILY
Qty: 7 TABLET | Refills: 0 | Status: SHIPPED | OUTPATIENT
Start: 2022-12-30 | End: 2023-01-06

## 2022-12-30 NOTE — TELEPHONE ENCOUNTER
Per Dr Hamilton I gave pt her results and let her know and antibiotic was sent. She verbalized understanding.

## 2022-12-30 NOTE — TELEPHONE ENCOUNTER
----- Message from Mandie Hamilton MD sent at 12/30/2022  1:32 PM EST -----  Please call the patient regarding her abnormal result.  Antibiotics sent.

## 2023-01-03 RX ORDER — LEVOTHYROXINE SODIUM 0.03 MG/1
TABLET ORAL
Qty: 90 TABLET | Refills: 0 | Status: SHIPPED | OUTPATIENT
Start: 2023-01-03

## 2023-02-06 ENCOUNTER — TELEPHONE (OUTPATIENT)
Dept: INTERNAL MEDICINE | Facility: CLINIC | Age: 68
End: 2023-02-06
Payer: MEDICARE

## 2023-02-06 NOTE — TELEPHONE ENCOUNTER
Her thyroid level was at the upper end of normal we checked it last, so tell her to lower her dose to 1/2 tablet daily instead.  If this is not helpful, she will need to run the issue by her neurologist.  Thanks.   yes

## 2023-02-06 NOTE — TELEPHONE ENCOUNTER
Patient called office stating she is getting botox for her tremors and now she is having tremors on her chest, she is not sure what she needs to do with this, please advise.

## 2023-03-09 DIAGNOSIS — G89.29 CHRONIC PAIN WITH DRUG DEPENDENCE: ICD-10-CM

## 2023-03-09 DIAGNOSIS — F19.20 CHRONIC PAIN WITH DRUG DEPENDENCE: ICD-10-CM

## 2023-03-09 RX ORDER — GABAPENTIN 300 MG/1
CAPSULE ORAL
Qty: 270 CAPSULE | Refills: 0 | Status: SHIPPED | OUTPATIENT
Start: 2023-03-09

## 2023-03-13 DIAGNOSIS — N39.41 URGE INCONTINENCE: Primary | ICD-10-CM

## 2023-03-13 RX ORDER — OXYBUTYNIN CHLORIDE 5 MG/1
5 TABLET, EXTENDED RELEASE ORAL DAILY
Qty: 30 TABLET | Refills: 0 | Status: SHIPPED | OUTPATIENT
Start: 2023-03-13 | End: 2023-04-07

## 2023-03-13 RX ORDER — CLOPIDOGREL BISULFATE 75 MG/1
TABLET ORAL
Qty: 90 TABLET | Refills: 0 | Status: SHIPPED | OUTPATIENT
Start: 2023-03-13

## 2023-03-22 ENCOUNTER — OFFICE VISIT (OUTPATIENT)
Dept: UROLOGY | Facility: CLINIC | Age: 68
End: 2023-03-22
Payer: MEDICARE

## 2023-03-22 VITALS — WEIGHT: 182 LBS | BODY MASS INDEX: 32.25 KG/M2 | HEIGHT: 63 IN

## 2023-03-22 DIAGNOSIS — R31.29 MICROHEMATURIA: ICD-10-CM

## 2023-03-22 DIAGNOSIS — N13.30 HYDRONEPHROSIS OF LEFT KIDNEY: Primary | ICD-10-CM

## 2023-03-22 LAB
BILIRUB BLD-MCNC: NEGATIVE MG/DL
CLARITY, POC: CLEAR
COLOR UR: YELLOW
EXPIRATION DATE: ABNORMAL
GLUCOSE UR STRIP-MCNC: NEGATIVE MG/DL
KETONES UR QL: NEGATIVE
LEUKOCYTE EST, POC: NEGATIVE
Lab: ABNORMAL
NITRITE UR-MCNC: NEGATIVE MG/ML
PH UR: 5.5 [PH] (ref 5–8)
PROT UR STRIP-MCNC: NEGATIVE MG/DL
RBC # UR STRIP: ABNORMAL /UL
SP GR UR: 1.02 (ref 1–1.03)
UROBILINOGEN UR QL: ABNORMAL

## 2023-03-22 PROCEDURE — 81003 URINALYSIS AUTO W/O SCOPE: CPT | Performed by: UROLOGY

## 2023-03-22 PROCEDURE — 1159F MED LIST DOCD IN RCRD: CPT | Performed by: UROLOGY

## 2023-03-22 PROCEDURE — 99212 OFFICE O/P EST SF 10 MIN: CPT | Performed by: UROLOGY

## 2023-03-22 PROCEDURE — 1160F RVW MEDS BY RX/DR IN RCRD: CPT | Performed by: UROLOGY

## 2023-03-22 RX ORDER — ALBUTEROL SULFATE 90 UG/1
INHALANT RESPIRATORY (INHALATION)
COMMUNITY
Start: 2023-03-20

## 2023-03-22 NOTE — PROGRESS NOTES
"Chief Complaint  HYDRONEPHROSIS    Subjective          Vivian Kauffman presents to [facility] UROLOGY  History of Present Illness  The patient is doing well. She denies any issues this year. The patient denies any pain on the left side. She is unsure if she has had any hematuria. The patient reports every now and then she feels like she has an infection. She denies being treated for any infections. She had a positive culture in 12/2022. She was treated with antibiotics. She has trace hematuria today. Her kidney function is normal.     Objective   Vital Signs:   Ht 160 cm (62.99\")   Wt 82.6 kg (182 lb)   BMI 32.25 kg/m²       Physical Exam  Vitals and nursing note reviewed.   Constitutional:       Appearance: Normal appearance. She is well-developed.   Pulmonary:      Effort: Pulmonary effort is normal.      Breath sounds: Normal air entry.   Neurological:      Mental Status: She is alert and oriented to person, place, and time.      Motor: Motor function is intact.   Psychiatric:         Mood and Affect: Mood normal.         Behavior: Behavior normal.          Result Review :   The following data was reviewed by: Mandie Hamilton MD on 03/22/2023:    Results for orders placed or performed in visit on 03/22/23   POC Urinalysis Dipstick, Automated    Specimen: Urine   Result Value Ref Range    Color Yellow Yellow, Straw, Dark Yellow, Adriana    Clarity, UA Clear Clear    Specific Gravity  1.025 1.005 - 1.030    pH, Urine 5.5 5.0 - 8.0    Leukocytes Negative Negative    Nitrite, UA Negative Negative    Protein, POC Negative Negative mg/dL    Glucose, UA Negative Negative mg/dL    Ketones, UA Negative Negative    Urobilinogen, UA 0.2 E.U./dL Normal, 0.2 E.U./dL    Bilirubin Negative Negative    Blood, UA Trace (A) Negative    Lot Number [id number]     Expiration Date 42,024             Assessment and Plan    Diagnoses and all orders for this visit:    1. Hydronephrosis of left kidney (Primary)  -     " POC Urinalysis Dipstick, Automated    2. Microhematuria    Follow up in one year.         Follow Up       No follow-ups on file.  Patient was given instructions and counseling regarding her condition or for health maintenance advice. Please see specific information pulled into the AVS if appropriate.     Transcribed from ambient dictation for Mandie Hamilton MD by Rema Richmond.  03/22/23   10:02 EDT    Patient or patient representative verbalized consent to the visit recording.  I have personally performed the services described in this document as transcribed by the above individual, and it is both accurate and complete.  Mandie Hamilton MD  3/24/2023  17:21 EDT

## 2023-03-29 ENCOUNTER — LAB (OUTPATIENT)
Dept: LAB | Facility: HOSPITAL | Age: 68
End: 2023-03-29
Payer: MEDICARE

## 2023-03-29 ENCOUNTER — TELEPHONE (OUTPATIENT)
Dept: UROLOGY | Facility: CLINIC | Age: 68
End: 2023-03-29
Payer: MEDICARE

## 2023-03-29 DIAGNOSIS — N30.00 ACUTE CYSTITIS WITHOUT HEMATURIA: Primary | ICD-10-CM

## 2023-03-29 DIAGNOSIS — N30.00 ACUTE CYSTITIS WITHOUT HEMATURIA: ICD-10-CM

## 2023-03-29 PROCEDURE — 87186 SC STD MICRODIL/AGAR DIL: CPT

## 2023-03-29 PROCEDURE — 87077 CULTURE AEROBIC IDENTIFY: CPT

## 2023-03-29 PROCEDURE — 87086 URINE CULTURE/COLONY COUNT: CPT

## 2023-03-29 NOTE — TELEPHONE ENCOUNTER
Patient said her is urine cloudy, and she is not urinating very much, and her stomach or kidneys are hurting.    She said she is diabetic, and usually urinates a lot.    She wants to know if she should have a culture done.

## 2023-03-29 NOTE — TELEPHONE ENCOUNTER
Spoke with patient and told her that Dr. Hamilton does want her to get a urine culture. She voiced understanding.

## 2023-03-31 LAB — BACTERIA SPEC AEROBE CULT: ABNORMAL

## 2023-03-31 RX ORDER — LEVOFLOXACIN 500 MG/1
500 TABLET, FILM COATED ORAL DAILY
Qty: 7 TABLET | Refills: 0 | Status: SHIPPED | OUTPATIENT
Start: 2023-03-31 | End: 2023-04-07

## 2023-03-31 RX ORDER — ATORVASTATIN CALCIUM 40 MG/1
TABLET, FILM COATED ORAL
Qty: 90 TABLET | Refills: 0 | Status: SHIPPED | OUTPATIENT
Start: 2023-03-31

## 2023-04-03 ENCOUNTER — TELEPHONE (OUTPATIENT)
Dept: UROLOGY | Facility: CLINIC | Age: 68
End: 2023-04-03
Payer: MEDICARE

## 2023-04-03 NOTE — TELEPHONE ENCOUNTER
Pt called back and said that her abdomen feels full and feels like she needs to go to the bathroom all the time but doesn't go when she tries.

## 2023-04-03 NOTE — TELEPHONE ENCOUNTER
Pt has a UTI, she started her Levaquin on Friday. Yesterday morning she woke up with low back pain that is new. She just wanted to let you know and doesn't know if it's related to the uti.

## 2023-04-04 ENCOUNTER — HOSPITAL ENCOUNTER (OUTPATIENT)
Dept: GENERAL RADIOLOGY | Facility: HOSPITAL | Age: 68
Discharge: HOME | End: 2023-04-04
Admitting: UROLOGY
Payer: MEDICARE

## 2023-04-04 DIAGNOSIS — Q62.11 HYDRONEPHROSIS WITH URETEROPELVIC JUNCTION (UPJ) OBSTRUCTION: ICD-10-CM

## 2023-04-04 DIAGNOSIS — Q62.11 HYDRONEPHROSIS WITH URETEROPELVIC JUNCTION (UPJ) OBSTRUCTION: Primary | ICD-10-CM

## 2023-04-04 PROCEDURE — 74018 RADEX ABDOMEN 1 VIEW: CPT

## 2023-04-04 NOTE — TELEPHONE ENCOUNTER
Spoke with patient informing her that Dr. Hamilton can order a KUB to assess for a possible stone. If no stones are present, then she may need to see her pcp. Order placed into her chart and she will complete.

## 2023-04-04 NOTE — TELEPHONE ENCOUNTER
Patient called and said she has bad low back pain when she moves. Tylenol is not helping.    She feels like her stomach is full, and she is not urinating very much.  Yesterday, her urine was cloudy.  No burning with urination.  She is almost finished with the antibiotic.    She said she had a kidney stone once.

## 2023-04-05 ENCOUNTER — HOSPITAL ENCOUNTER (OUTPATIENT)
Dept: GENERAL RADIOLOGY | Facility: HOSPITAL | Age: 68
Discharge: HOME | End: 2023-04-05
Admitting: INTERNAL MEDICINE
Payer: MEDICARE

## 2023-04-05 ENCOUNTER — OFFICE VISIT (OUTPATIENT)
Dept: INTERNAL MEDICINE | Facility: CLINIC | Age: 68
End: 2023-04-05
Payer: MEDICARE

## 2023-04-05 VITALS
RESPIRATION RATE: 18 BRPM | BODY MASS INDEX: 31.4 KG/M2 | SYSTOLIC BLOOD PRESSURE: 126 MMHG | OXYGEN SATURATION: 98 % | TEMPERATURE: 98.4 F | DIASTOLIC BLOOD PRESSURE: 78 MMHG | HEIGHT: 63 IN | WEIGHT: 177.2 LBS | HEART RATE: 86 BPM

## 2023-04-05 DIAGNOSIS — M54.50 ACUTE LOW BACK PAIN WITHOUT SCIATICA, UNSPECIFIED BACK PAIN LATERALITY: ICD-10-CM

## 2023-04-05 DIAGNOSIS — M54.50 ACUTE LOW BACK PAIN WITHOUT SCIATICA, UNSPECIFIED BACK PAIN LATERALITY: Primary | ICD-10-CM

## 2023-04-05 DIAGNOSIS — K59.09 OTHER CONSTIPATION: ICD-10-CM

## 2023-04-05 DIAGNOSIS — N30.01 ACUTE CYSTITIS WITH HEMATURIA: ICD-10-CM

## 2023-04-05 PROCEDURE — 3078F DIAST BP <80 MM HG: CPT | Performed by: INTERNAL MEDICINE

## 2023-04-05 PROCEDURE — 3074F SYST BP LT 130 MM HG: CPT | Performed by: INTERNAL MEDICINE

## 2023-04-05 PROCEDURE — 99214 OFFICE O/P EST MOD 30 MIN: CPT | Performed by: INTERNAL MEDICINE

## 2023-04-05 PROCEDURE — 72100 X-RAY EXAM L-S SPINE 2/3 VWS: CPT

## 2023-04-05 PROCEDURE — 1160F RVW MEDS BY RX/DR IN RCRD: CPT | Performed by: INTERNAL MEDICINE

## 2023-04-05 PROCEDURE — 1159F MED LIST DOCD IN RCRD: CPT | Performed by: INTERNAL MEDICINE

## 2023-04-05 RX ORDER — TIZANIDINE HYDROCHLORIDE 4 MG/1
4 CAPSULE, GELATIN COATED ORAL 3 TIMES DAILY
Qty: 30 CAPSULE | Refills: 0 | Status: SHIPPED | OUTPATIENT
Start: 2023-04-05 | End: 2023-04-17 | Stop reason: SDUPTHER

## 2023-04-05 RX ORDER — DOCUSATE SODIUM 100 MG/1
100 CAPSULE, LIQUID FILLED ORAL 2 TIMES DAILY
Qty: 60 CAPSULE | Refills: 0 | Status: SHIPPED | OUTPATIENT
Start: 2023-04-05

## 2023-04-05 NOTE — PROGRESS NOTES
CHIEF COMPLAINT  Vivian Kauffman presents to [facility] for follow-up of Follow-up (68 year old female here today to follow up on UTI. States she has been on 2 rounds of antibiotics and now her back is hurting since Sunday. States she seen Dr Hamilton Thursday and had a UA/CS done. States she had a KUB done yesterday to check for Kidney stones. ).    HPI    68-year-old patient of Dr. Acosta is here for follow-up of UTI.  Main concern is back pain hurting for the past 3 days.  She was seen at Dr. Mike's office March 22, 2023 for hydronephrosis of the left kidney she was asymptomatic for pain there- UA showed trace urine -KUB done yesterday did not reveal any stones but had some stool burden.Had ABX called in by Dr Mike 5 days ago for UTI-levofloxacin 500 mg one qd x 7 days-has one more day left--no dysuria, no urinary frequency -no injury recalled.    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  Plan of care is as follows below.    Current Outpatient Medications:   •  albuterol sulfate  (90 Base) MCG/ACT inhaler, 1 puff As Needed., Disp: , Rfl:   •  atorvastatin (LIPITOR) 40 MG tablet, TAKE 1 TABLET BY MOUTH ONCE DAILY AT NIGHT, Disp: 90 tablet, Rfl: 0  •  cetirizine (zyrTEC) 10 MG tablet, TAKE 1 TABLET BY MOUTH ONCE DAILY AS NEEDED FOR SNEEZING, ITCHING, RUNNY NOSE, Disp: , Rfl:   •  clopidogrel (PLAVIX) 75 MG tablet, TAKE 1 TABLET BY MOUTH ONCE DAILY AT NIGHT, Disp: 90 tablet, Rfl: 0  •  FLUoxetine (PROzac) 20 MG capsule, Take 4 capsules by mouth Daily., Disp: 360 capsule, Rfl: 1  •  gabapentin (NEURONTIN) 300 MG capsule, TAKE 1 CAPSULE BY MOUTH THREE TIMES DAILY, Disp: 270 capsule, Rfl: 0  •  HumaLOG KwikPen 200 UNIT/ML solution pen-injector, INJECT 5 UNITS SUB-Q BEFORE MEALS AND 5 UNITS WITH LUNCH AND 5 UNITS WITH SUPPER. PLUS SLIDING  "SCALE (MAX DAILY OF 60 UNITS), Disp: , Rfl:   •  LANTUS SOLOSTAR 100 UNIT/ML injection pen, Inject 12 Units under the skin into the appropriate area as directed 2 (Two) Times a Day. 14 units in the morning and 14 units at night, Disp: , Rfl:   •  levETIRAcetam (KEPPRA) 500 MG tablet, 6 tablets daily, Disp: , Rfl:   •  levoFLOXacin (LEVAQUIN) 500 MG tablet, Take 1 tablet by mouth Daily for 7 days., Disp: 7 tablet, Rfl: 0  •  levothyroxine (SYNTHROID, LEVOTHROID) 25 MCG tablet, Take 1 tablet by mouth once daily, Disp: 90 tablet, Rfl: 0  •  montelukast (SINGULAIR) 10 MG tablet, Take 1 tablet by mouth once daily, Disp: 90 tablet, Rfl: 1  •  oxybutynin XL (DITROPAN-XL) 5 MG 24 hr tablet, Take 1 tablet by mouth Daily., Disp: 30 tablet, Rfl: 0  •  Ozempic, 1 MG/DOSE, 4 MG/3ML solution pen-injector, INJECT 1MG SUBCUTANEOUSLY ONCE A WEEK, Disp: , Rfl:   •  ProAir Digihaler 108 (90 Base) MCG/ACT aerosol powder , , Disp: , Rfl:   •  RELION PEN NEEDLE 31G/8MM 31G X 8 MM misc, USE 4- 5 TIMES DAILY AS DIRECTED, Disp: , Rfl:   •  spironolactone (ALDACTONE) 25 MG tablet, Take 1 tablet by mouth once daily, Disp: 90 tablet, Rfl: 0  •  vitamin D (ERGOCALCIFEROL) 1.25 MG (09765 UT) capsule capsule, Take 1 capsule by mouth 1 (One) Time Per Week., Disp: , Rfl:   •  docusate sodium (Colace) 100 MG capsule, Take 1 capsule by mouth 2 (Two) Times a Day. Prn constipation, Disp: 60 capsule, Rfl: 0  •  TiZANidine (Zanaflex) 4 MG capsule, Take 1 capsule by mouth 3 (Three) Times a Day., Disp: 30 capsule, Rfl: 0   PFSH reviewed.  ROS negative    OBJECTIVE  Vital Signs  Vitals:    04/05/23 0959   BP: 126/78   BP Location: Left arm   Patient Position: Sitting   Pulse: 86   Resp: 18   Temp: 98.4 °F (36.9 °C)   TempSrc: Temporal   SpO2: 98%   Weight: 80.4 kg (177 lb 3.2 oz)   Height: 160 cm (62.99\")      Body mass index is 31.4 kg/m².    Physical Exam  Vitals and nursing note reviewed.   Constitutional:       Appearance: Normal appearance.   HENT:     "  Head: Normocephalic and atraumatic.   Cardiovascular:      Rate and Rhythm: Normal rate and regular rhythm.   Pulmonary:      Effort: Pulmonary effort is normal.      Breath sounds: Normal breath sounds.   Abdominal:      General: Bowel sounds are normal.      Palpations: Abdomen is soft.   Musculoskeletal:         General: Normal range of motion.      Cervical back: Normal range of motion and neck supple.      Comments: Negative straight leg raising tender at paraspinal muscles at the lower lumbar area   Neurological:      General: No focal deficit present.      Mental Status: She is alert and oriented to person, place, and time.          RESULTS REVIEW  Lab Results   Component Value Date    BNP 51 12/05/2020    BNP 61 02/15/2020     06/20/2019     CMP    CMP 5/16/22 5/23/22 12/16/22   Glucose 133 (A) 139 (A) 94   BUN 9 14 11   Creatinine 0.70 0.64 0.62   eGFR 94.9 97.0 97.7   Sodium 138 140 140   Potassium 5.2 4.1 3.9   Chloride 101 100 103   Calcium 9.8 9.7 9.2   Total Protein 7.4 7.3 7.2   Albumin 4.40 4.80 4.40   Globulin 3.0 2.5 2.8   Total Bilirubin 0.3 0.3 0.3   Alkaline Phosphatase 110 95 85   AST (SGOT) 44 (A) 25 44 (A)   ALT (SGPT) 49 (A) 36 (A) 53 (A)   Albumin/Globulin Ratio 1.5 1.9 1.6   BUN/Creatinine Ratio 12.9 21.9 17.7   Anion Gap 12.0 16.9 (A) 8.0   (A) Abnormal value       Comments are available for some flowsheets but are not being displayed.           CBC w/diff    CBC w/Diff 5/16/22 5/23/22   WBC 5.79 5.65   RBC 5.23 5.23   Hemoglobin 14.7 14.5   Hematocrit 44.0 43.3   MCV 84.1 82.8   MCH 28.1 27.7   MCHC 33.4 33.5   RDW 12.9 12.8   Platelets 172 164   Neutrophil Rel % 63.2 64.5   Immature Granulocyte Rel % 0.2 0.4   Lymphocyte Rel % 27.6 25.5   Monocyte Rel % 7.6 7.3   Eosinophil Rel % 0.9 1.6   Basophil Rel % 0.5 0.7            Lipid Panel    Lipid Panel 5/23/22 12/16/22   Total Cholesterol 128 125   Triglycerides 106 92   HDL Cholesterol 51 49   VLDL Cholesterol 19 18   LDL  Cholesterol  58 58   LDL/HDL Ratio 1.09 1.18            Lab Results   Component Value Date    TSH 0.568 12/16/2022    TSH 1.520 05/23/2022    TSH 0.939 03/09/2022      Lab Results   Component Value Date    FREET4 1.17 05/23/2022    FREET4 1.3 03/31/2020      A1C Last 3 Results    HGBA1C Last 3 Results 5/23/22 12/16/22   Hemoglobin A1C 8.00 (A) 6.60 (A)   (A) Abnormal value             Lab Results   Component Value Date    XTNARVVK20 339 05/23/2022    CFIF49ND 28.5 (L) 10/26/2021    MG 1.7 07/17/2020        XR Abdomen KUB    Result Date: 4/4/2023    No acute intra-abdominal or intrapelvic process.  No suspicious calcifications identified.      NITZA ROMERO MD       Electronically Signed and Approved By: NITZA ROMERO MD on 4/04/2023 at 13:30                        ASSESSMENT & PLAN  Diagnoses and all orders for this visit:    1. Acute low back pain without sciatica, unspecified back pain laterality (Primary)  Comments:  check xray-use tyl -try zanaflex prn  Orders:  -     TiZANidine (Zanaflex) 4 MG capsule; Take 1 capsule by mouth 3 (Three) Times a Day.  Dispense: 30 capsule; Refill: 0  -     XR Spine Lumbar 2 or 3 View; Future    2. Acute cystitis with hematuria  Comments:  had trace blood on last UA--on levaquin 500 mg one qd x 7 days--last dose is tomorrow--no dysuria or unrinary frequency-f/u with PCP Dr Acosta as scheduled  Orders:  -     TiZANidine (Zanaflex) 4 MG capsule; Take 1 capsule by mouth 3 (Three) Times a Day.  Dispense: 30 capsule; Refill: 0    3. Other constipation  Comments:  push fluids-try colace daily prn -high fiber diet  Orders:  -     docusate sodium (Colace) 100 MG capsule; Take 1 capsule by mouth 2 (Two) Times a Day. Prn constipation  Dispense: 60 capsule; Refill: 0                  FOLLOW UP  Return if symptoms worsen or fail to improve, for Next scheduled follow up, Recheck.    Patient was given instructions and counseling regarding her condition or for health maintenance advice. Please see  specific information pulled into the AVS if appropriate.

## 2023-04-07 DIAGNOSIS — N39.41 URGE INCONTINENCE: ICD-10-CM

## 2023-04-07 RX ORDER — OXYBUTYNIN CHLORIDE 5 MG/1
TABLET, EXTENDED RELEASE ORAL
Qty: 30 TABLET | Refills: 10 | Status: SHIPPED | OUTPATIENT
Start: 2023-04-07

## 2023-04-07 RX ORDER — SPIRONOLACTONE 25 MG/1
TABLET ORAL
Qty: 90 TABLET | Refills: 0 | Status: SHIPPED | OUTPATIENT
Start: 2023-04-07

## 2023-04-10 ENCOUNTER — TELEPHONE (OUTPATIENT)
Dept: INTERNAL MEDICINE | Facility: CLINIC | Age: 68
End: 2023-04-10
Payer: MEDICARE

## 2023-04-10 NOTE — TELEPHONE ENCOUNTER
Caller: Vivian Kauffman    Relationship to patient: Self    Best call back number: [phone]     Patient is needing: PATIENT STATES THAT SHE NEEDS TO KNOW IF THE ORDERS FOR HER YEARLY BLOODWORK HAVE BEEN SENT TO THE HOSPITAL YET AND IF THERE WAS ALSO A URINE TEST SENT. PATIENT STATES THAT THE URINE TEST WOULD BE TO CHECK IF HER UTI HAS CLEARED UP AND THAT WHEN SHE IS CALLED BACK SHE WOULD LIKE TO SPEAK TO SHREYA.         [facility] WAS UNABLE TO WARM TRANSFER.

## 2023-04-14 ENCOUNTER — LAB (OUTPATIENT)
Dept: LAB | Facility: HOSPITAL | Age: 68
End: 2023-04-14
Payer: MEDICARE

## 2023-04-14 DIAGNOSIS — Z00.00 WELL ADULT EXAM: ICD-10-CM

## 2023-04-14 DIAGNOSIS — E55.9 VITAMIN D DEFICIENCY: ICD-10-CM

## 2023-04-14 DIAGNOSIS — E03.4 ACQUIRED ATROPHY OF THYROID: ICD-10-CM

## 2023-04-14 DIAGNOSIS — E78.2 MIXED HYPERLIPIDEMIA: ICD-10-CM

## 2023-04-14 DIAGNOSIS — E66.9 DIABETES MELLITUS TYPE 2 IN OBESE: ICD-10-CM

## 2023-04-14 DIAGNOSIS — I10 ESSENTIAL HYPERTENSION: ICD-10-CM

## 2023-04-14 DIAGNOSIS — E11.69 DIABETES MELLITUS TYPE 2 IN OBESE: ICD-10-CM

## 2023-04-14 PROBLEM — Z98.84 S/P LAPAROSCOPIC SLEEVE GASTRECTOMY: Status: RESOLVED | Noted: 2018-03-27 | Resolved: 2023-04-14

## 2023-04-14 PROBLEM — Q62.11 HYDRONEPHROSIS WITH URETEROPELVIC JUNCTION (UPJ) OBSTRUCTION: Status: RESOLVED | Noted: 2021-06-29 | Resolved: 2023-04-14

## 2023-04-14 PROBLEM — M54.41 CHRONIC RIGHT-SIDED LOW BACK PAIN WITH RIGHT-SIDED SCIATICA: Status: RESOLVED | Noted: 2021-07-13 | Resolved: 2023-04-14

## 2023-04-14 PROBLEM — M19.90 IDIOPATHIC OSTEOARTHRITIS: Status: RESOLVED | Noted: 2021-10-27 | Resolved: 2023-04-14

## 2023-04-14 PROBLEM — N39.41 URGE INCONTINENCE: Status: RESOLVED | Noted: 2021-06-21 | Resolved: 2023-04-14

## 2023-04-14 PROBLEM — R31.29 MICROHEMATURIA: Status: RESOLVED | Noted: 2022-03-14 | Resolved: 2023-04-14

## 2023-04-14 PROBLEM — R41.3 AMNESIA: Status: RESOLVED | Noted: 2020-07-17 | Resolved: 2023-04-14

## 2023-04-14 PROBLEM — M25.50 MULTIPLE JOINT PAIN: Status: RESOLVED | Noted: 2017-06-06 | Resolved: 2023-04-14

## 2023-04-14 PROBLEM — G89.29 CHRONIC RIGHT-SIDED LOW BACK PAIN WITH RIGHT-SIDED SCIATICA: Status: RESOLVED | Noted: 2021-07-13 | Resolved: 2023-04-14

## 2023-04-14 PROBLEM — M79.7 FIBROMYOSITIS: Status: RESOLVED | Noted: 2020-07-17 | Resolved: 2023-04-14

## 2023-04-14 LAB
25(OH)D3 SERPL-MCNC: 45.6 NG/ML (ref 30–100)
ALBUMIN SERPL-MCNC: 4.4 G/DL (ref 3.5–5.2)
ALBUMIN UR-MCNC: 1.3 MG/DL
ALBUMIN/GLOB SERPL: 1.6 G/DL
ALP SERPL-CCNC: 94 U/L (ref 39–117)
ALT SERPL W P-5'-P-CCNC: 97 U/L (ref 1–33)
ANION GAP SERPL CALCULATED.3IONS-SCNC: 10.1 MMOL/L (ref 5–15)
AST SERPL-CCNC: 80 U/L (ref 1–32)
BASOPHILS # BLD AUTO: 0.03 10*3/MM3 (ref 0–0.2)
BASOPHILS NFR BLD AUTO: 0.6 % (ref 0–1.5)
BILIRUB SERPL-MCNC: 0.5 MG/DL (ref 0–1.2)
BUN SERPL-MCNC: 10 MG/DL (ref 8–23)
BUN/CREAT SERPL: 13.5 (ref 7–25)
CALCIUM SPEC-SCNC: 9.8 MG/DL (ref 8.6–10.5)
CHLORIDE SERPL-SCNC: 103 MMOL/L (ref 98–107)
CHOLEST SERPL-MCNC: 163 MG/DL (ref 0–200)
CO2 SERPL-SCNC: 27.9 MMOL/L (ref 22–29)
CREAT SERPL-MCNC: 0.74 MG/DL (ref 0.57–1)
CREAT UR-MCNC: 131.7 MG/DL
DEPRECATED RDW RBC AUTO: 43.1 FL (ref 37–54)
EGFRCR SERPLBLD CKD-EPI 2021: 88.3 ML/MIN/1.73
EOSINOPHIL # BLD AUTO: 0.08 10*3/MM3 (ref 0–0.4)
EOSINOPHIL NFR BLD AUTO: 1.6 % (ref 0.3–6.2)
ERYTHROCYTE [DISTWIDTH] IN BLOOD BY AUTOMATED COUNT: 14.1 % (ref 12.3–15.4)
GLOBULIN UR ELPH-MCNC: 2.8 GM/DL
GLUCOSE SERPL-MCNC: 87 MG/DL (ref 65–99)
HCT VFR BLD AUTO: 42.4 % (ref 34–46.6)
HDLC SERPL-MCNC: 62 MG/DL (ref 40–60)
HGB BLD-MCNC: 13.9 G/DL (ref 12–15.9)
IMM GRANULOCYTES # BLD AUTO: 0.01 10*3/MM3 (ref 0–0.05)
IMM GRANULOCYTES NFR BLD AUTO: 0.2 % (ref 0–0.5)
LDLC SERPL CALC-MCNC: 86 MG/DL (ref 0–100)
LDLC/HDLC SERPL: 1.37 {RATIO}
LYMPHOCYTES # BLD AUTO: 1.39 10*3/MM3 (ref 0.7–3.1)
LYMPHOCYTES NFR BLD AUTO: 27.6 % (ref 19.6–45.3)
MCH RBC QN AUTO: 27.7 PG (ref 26.6–33)
MCHC RBC AUTO-ENTMCNC: 32.8 G/DL (ref 31.5–35.7)
MCV RBC AUTO: 84.6 FL (ref 79–97)
MICROALBUMIN/CREAT UR: 9.9 MG/G
MONOCYTES # BLD AUTO: 0.42 10*3/MM3 (ref 0.1–0.9)
MONOCYTES NFR BLD AUTO: 8.3 % (ref 5–12)
NEUTROPHILS NFR BLD AUTO: 3.1 10*3/MM3 (ref 1.7–7)
NEUTROPHILS NFR BLD AUTO: 61.7 % (ref 42.7–76)
NRBC BLD AUTO-RTO: 0 /100 WBC (ref 0–0.2)
PLATELET # BLD AUTO: 173 10*3/MM3 (ref 140–450)
PMV BLD AUTO: 10.1 FL (ref 6–12)
POTASSIUM SERPL-SCNC: 4.4 MMOL/L (ref 3.5–5.2)
PROT SERPL-MCNC: 7.2 G/DL (ref 6–8.5)
RBC # BLD AUTO: 5.01 10*6/MM3 (ref 3.77–5.28)
SODIUM SERPL-SCNC: 141 MMOL/L (ref 136–145)
TRIGL SERPL-MCNC: 81 MG/DL (ref 0–150)
TSH SERPL DL<=0.05 MIU/L-ACNC: 1.33 UIU/ML (ref 0.27–4.2)
VLDLC SERPL-MCNC: 15 MG/DL (ref 5–40)
WBC NRBC COR # BLD: 5.03 10*3/MM3 (ref 3.4–10.8)

## 2023-04-14 PROCEDURE — 84443 ASSAY THYROID STIM HORMONE: CPT

## 2023-04-14 PROCEDURE — 82570 ASSAY OF URINE CREATININE: CPT

## 2023-04-14 PROCEDURE — 80053 COMPREHEN METABOLIC PANEL: CPT

## 2023-04-14 PROCEDURE — 82043 UR ALBUMIN QUANTITATIVE: CPT

## 2023-04-14 PROCEDURE — 82306 VITAMIN D 25 HYDROXY: CPT

## 2023-04-14 PROCEDURE — 80061 LIPID PANEL: CPT

## 2023-04-14 PROCEDURE — 36415 COLL VENOUS BLD VENIPUNCTURE: CPT

## 2023-04-14 PROCEDURE — 85025 COMPLETE CBC W/AUTO DIFF WBC: CPT
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
## 2023-04-17 ENCOUNTER — LAB (OUTPATIENT)
Dept: LAB | Facility: HOSPITAL | Age: 68
End: 2023-04-17
Payer: MEDICARE

## 2023-04-17 ENCOUNTER — OFFICE VISIT (OUTPATIENT)
Dept: INTERNAL MEDICINE | Facility: CLINIC | Age: 68
End: 2023-04-17
Payer: MEDICARE

## 2023-04-17 ENCOUNTER — TELEPHONE (OUTPATIENT)
Dept: INTERNAL MEDICINE | Facility: CLINIC | Age: 68
End: 2023-04-17

## 2023-04-17 VITALS
HEIGHT: 63 IN | OXYGEN SATURATION: 97 % | WEIGHT: 172.6 LBS | HEART RATE: 84 BPM | TEMPERATURE: 97.8 F | SYSTOLIC BLOOD PRESSURE: 108 MMHG | BODY MASS INDEX: 30.58 KG/M2 | DIASTOLIC BLOOD PRESSURE: 70 MMHG

## 2023-04-17 DIAGNOSIS — Z51.81 ENCOUNTER FOR MEDICATION MONITORING: ICD-10-CM

## 2023-04-17 DIAGNOSIS — E03.4 ACQUIRED ATROPHY OF THYROID: ICD-10-CM

## 2023-04-17 DIAGNOSIS — Z00.00 MEDICARE ANNUAL WELLNESS VISIT, SUBSEQUENT: Primary | ICD-10-CM

## 2023-04-17 DIAGNOSIS — K76.0 FATTY LIVER: ICD-10-CM

## 2023-04-17 DIAGNOSIS — E78.2 MIXED HYPERLIPIDEMIA: ICD-10-CM

## 2023-04-17 DIAGNOSIS — M54.50 ACUTE LOW BACK PAIN WITHOUT SCIATICA, UNSPECIFIED BACK PAIN LATERALITY: ICD-10-CM

## 2023-04-17 DIAGNOSIS — N30.01 ACUTE CYSTITIS WITH HEMATURIA: ICD-10-CM

## 2023-04-17 DIAGNOSIS — E11.69 DIABETES MELLITUS TYPE 2 IN OBESE: ICD-10-CM

## 2023-04-17 DIAGNOSIS — I10 ESSENTIAL HYPERTENSION: ICD-10-CM

## 2023-04-17 DIAGNOSIS — E66.9 DIABETES MELLITUS TYPE 2 IN OBESE: ICD-10-CM

## 2023-04-17 DIAGNOSIS — E66.01 MORBID (SEVERE) OBESITY DUE TO EXCESS CALORIES: ICD-10-CM

## 2023-04-17 LAB
AMPHET+METHAMPHET UR QL: NEGATIVE
BARBITURATES UR QL SCN: NEGATIVE
BENZODIAZ UR QL SCN: NEGATIVE
CANNABINOIDS SERPL QL: NEGATIVE
COCAINE UR QL: NEGATIVE
METHADONE UR QL SCN: NEGATIVE
OPIATES UR QL: NEGATIVE
OXYCODONE UR QL SCN: NEGATIVE

## 2023-04-17 PROCEDURE — 80307 DRUG TEST PRSMV CHEM ANLYZR: CPT

## 2023-04-17 RX ORDER — TIZANIDINE HYDROCHLORIDE 4 MG/1
4 CAPSULE, GELATIN COATED ORAL 3 TIMES DAILY PRN
Qty: 30 CAPSULE | Refills: 1 | Status: SHIPPED | OUTPATIENT
Start: 2023-04-17

## 2023-04-17 RX ORDER — FLUOXETINE HYDROCHLORIDE 20 MG/1
80 CAPSULE ORAL DAILY
Qty: 360 CAPSULE | Refills: 1 | Status: SHIPPED | OUTPATIENT
Start: 2023-04-17

## 2023-04-17 NOTE — ASSESSMENT & PLAN NOTE
This is lingering on as of her 4/23 office visit.  She is Dr. Llamas about 2 weeks ago, had plain films with no fracture no lytic lesions.  Had expected arthritis.  She been utilizing Zanaflex as a muscle relaxer, needs a refill.  She is on Tylenol, although we have to lower her to 4 of the 500 mg pills presently since her LFTs are slightly bumped.  Discussed with her we could look into physical therapy when she is ready, she is going to continue with conservative treatment at home for now, she will contact us if things are progressing.

## 2023-04-17 NOTE — ASSESSMENT & PLAN NOTE
Blood pressure is still a nonissue as of 4/23.  She is just on low-dose spironolactone, fine to continue same, think she had some hypokalemia at one point, add ARB if blood pressure trends up down the road.

## 2023-04-17 NOTE — PROGRESS NOTES
The ABCs of the Annual Wellness Visit  Subsequent Medicare Wellness Visit    Subjective      Vivian Kauffman is a 68 y.o. female who presents for a Subsequent Medicare Wellness Visit.    The following portions of the patient's history were reviewed and   updated as appropriate: allergies, current medications, past family history, past medical history, past social history, past surgical history and problem list.    Compared to one year ago, the patient feels her physical   health is the same.  Except for ongoing issues with bronchitis and back pain past 6 weeks.    Compared to one year ago, the patient feels her mental   health is the same.    Recent Hospitalizations:  She was not admitted to the hospital during the last year.       Current Medical Providers:  Patient Care Team:  Shahbaz Acosta MD as PCP - General (Internal Medicine)  Kia Waters MD as Consulting Physician (Cardiology)  Mandie Hamilton MD as Consulting Physician (Urology)    Outpatient Medications Prior to Visit   Medication Sig Dispense Refill   • albuterol sulfate  (90 Base) MCG/ACT inhaler 1 puff As Needed.     • atorvastatin (LIPITOR) 40 MG tablet TAKE 1 TABLET BY MOUTH ONCE DAILY AT NIGHT 90 tablet 0   • cetirizine (zyrTEC) 10 MG tablet TAKE 1 TABLET BY MOUTH ONCE DAILY AS NEEDED FOR SNEEZING, ITCHING, RUNNY NOSE     • clopidogrel (PLAVIX) 75 MG tablet TAKE 1 TABLET BY MOUTH ONCE DAILY AT NIGHT 90 tablet 0   • docusate sodium (Colace) 100 MG capsule Take 1 capsule by mouth 2 (Two) Times a Day. Prn constipation 60 capsule 0   • FLUoxetine (PROzac) 20 MG capsule Take 4 capsules by mouth Daily. 360 capsule 1   • gabapentin (NEURONTIN) 300 MG capsule TAKE 1 CAPSULE BY MOUTH THREE TIMES DAILY 270 capsule 0   • HumaLOG KwikPen 200 UNIT/ML solution pen-injector INJECT 5 UNITS SUB-Q BEFORE MEALS AND 5 UNITS WITH LUNCH AND 5 UNITS WITH SUPPER. PLUS SLIDING SCALE (MAX DAILY OF 60 UNITS)     • LANTUS SOLOSTAR 100 UNIT/ML injection pen  Inject 12 Units under the skin into the appropriate area as directed 2 (Two) Times a Day. 14 units in the morning and 14 units at night     • levETIRAcetam (KEPPRA) 500 MG tablet 6 tablets daily     • levothyroxine (SYNTHROID, LEVOTHROID) 25 MCG tablet Take 1 tablet by mouth once daily 90 tablet 0   • montelukast (SINGULAIR) 10 MG tablet Take 1 tablet by mouth once daily 90 tablet 1   • oxybutynin XL (DITROPAN-XL) 5 MG 24 hr tablet Take 1 tablet by mouth once daily 30 tablet 10   • Ozempic, 1 MG/DOSE, 4 MG/3ML solution pen-injector INJECT 1MG SUBCUTANEOUSLY ONCE A WEEK     • ProAir Digihaler 108 (90 Base) MCG/ACT aerosol powder       • RELION PEN NEEDLE 31G/8MM 31G X 8 MM misc USE 4- 5 TIMES DAILY AS DIRECTED     • spironolactone (ALDACTONE) 25 MG tablet Take 1 tablet by mouth once daily 90 tablet 0   • TiZANidine (Zanaflex) 4 MG capsule Take 1 capsule by mouth 3 (Three) Times a Day. 30 capsule 0   • vitamin D (ERGOCALCIFEROL) 1.25 MG (31281 UT) capsule capsule Take 1 capsule by mouth 1 (One) Time Per Week.       No facility-administered medications prior to visit.       No opioid medication identified on active medication list. I have reviewed chart for other potential  high risk medication/s and harmful drug interactions in the elderly.          Aspirin is not on active medication list.  Aspirin use is not indicated based on review of current medical condition/s. Risk of harm outweighs potential benefits.  .    Patient Active Problem List   Diagnosis   • Essential hypertension   • Mixed hyperlipidemia   • Diabetes mellitus type 2 in obese (HCC)   • Fatty liver   • Seizure disorder   • Acquired atrophy of thyroid   • Vitamin D deficiency   • Hydronephrosis of left kidney   • Vitamin B12 deficiency   • Benign essential tremor   • Morbid (severe) obesity due to excess calories   • Recurrent major depressive disorder, in partial remission   • Seasonal allergic rhinitis due to pollen   • Personal history of colonic  "polyps   • Acute low back pain without sciatica   • Medicare annual wellness visit, subsequent     Advance Care Planning   Advance Care Planning     Advance Directive is not on file.  ACP discussion was held with the patient during this visit. Patient does not have an advance directive, information provided.     Objective    Vitals:    23 1051   BP: 108/70   Pulse: 84   Temp: 97.8 °F (36.6 °C)   TempSrc: Skin   SpO2: 97%   Weight: 78.3 kg (172 lb 9.6 oz)   Height: 160 cm (62.99\")     Estimated body mass index is 30.58 kg/m² as calculated from the following:    Height as of this encounter: 160 cm (62.99\").    Weight as of this encounter: 78.3 kg (172 lb 9.6 oz).    BMI is >= 30 and <35. (Class 1 Obesity). The following options were offered after discussion;: nutrition counseling/recommendations      Does the patient have evidence of cognitive impairment?   No    Lab Results   Component Value Date    TRIG 81 2023    HDL 62 (H) 2023    LDL 86 2023    VLDL 15 2023          HEALTH RISK ASSESSMENT    Smoking Status:  Social History     Tobacco Use   Smoking Status Former   • Packs/day: 2.00   • Years: 30.00   • Pack years: 60.00   • Types: Cigarettes   • Start date:    • Quit date:    • Years since quittin.3   Smokeless Tobacco Never   Tobacco Comments    caffeine use     Alcohol Consumption:  Social History     Substance and Sexual Activity   Alcohol Use Not Currently     Fall Risk Screen:    MEEK Fall Risk Assessment was completed, and patient is at LOW risk for falls.Assessment completed on:2023    Depression Screenin/17/2023    10:53 AM   PHQ-2/PHQ-9 Depression Screening   Little Interest or Pleasure in Doing Things 0-->not at all   Feeling Down, Depressed or Hopeless 0-->not at all   PHQ-9: Brief Depression Severity Measure Score 0       Health Habits and Functional and Cognitive Screenin/17/2023    10:00 AM   Functional & Cognitive Status   Do you " have difficulty preparing food and eating? No   Do you have difficulty bathing yourself, getting dressed or grooming yourself? No   Do you have difficulty using the toilet? No   Do you have difficulty moving around from place to place? No   Do you have trouble with steps or getting out of a bed or a chair? No   Current Diet Well Balanced Diet   Do you need help using the phone?  No   Are you deaf or do you have serious difficulty hearing?  No   Do you need help with transportation? No   Do you need help shopping? No   Do you need help preparing meals?  No   Do you need help with housework?  No   Do you need help with laundry? No   Do you need help taking your medications? No   Do you need help managing money? No   Do you ever drive or ride in a car without wearing a seat belt? No   Do you have difficulty concentrating, remembering or making decisions? No       Age-appropriate Screening Schedule:  Refer to the list below for future screening recommendations based on patient's age, sex and/or medical conditions. Orders for these recommended tests are listed in the plan section. The patient has been provided with a written plan.    Health Maintenance   Topic Date Due   • ANNUAL WELLNESS VISIT  Never done   • DIABETIC FOOT EXAM  Never done   • PAP SMEAR  Never done   • ZOSTER VACCINE (2 of 2) 04/07/2023   • HEMOGLOBIN A1C  06/22/2023   • INFLUENZA VACCINE  08/01/2023   • DIABETIC EYE EXAM  12/15/2023   • LIPID PANEL  04/14/2024   • URINE MICROALBUMIN  04/14/2024   • MAMMOGRAM  05/23/2024   • DXA SCAN  05/23/2024   • COLORECTAL CANCER SCREENING  02/18/2032   • TDAP/TD VACCINES (3 - Td or Tdap) 06/17/2032   • HEPATITIS C SCREENING  Completed   • COVID-19 Vaccine  Completed   • Pneumococcal Vaccine 65+  Completed                  CMS Preventative Services Quick Reference  Risk Factors Identified During Encounter:    None Identified    The above risks/problems have been discussed with the patient.  Pertinent information has  been shared with the patient in the After Visit Summary.    Diagnoses and all orders for this visit:    1. Medicare annual wellness visit, subsequent (Primary)  Assessment & Plan:  AWV completed 4/23.  Patient remains very active and independent.  No falls, no hospitalizations.  Information given regarding advance directive.      2. Diabetes mellitus type 2 in obese (HCC)  Assessment & Plan:  Still follows closely with endocrinology, Dr. Tolbert.  Sounds like her A1c was around 8 recently, will try to get copies of those records.  She has continuous glucose monitor, she had a few lows when she did not eat enough/soon enough, but otherwise no concerning hypoglycemic episodes.  Will defer to their expertise, she is on Lantus and Humalog along with Ozempic.      3. Essential hypertension  Assessment & Plan:  Blood pressure is still a nonissue as of 4/23.  She is just on low-dose spironolactone, fine to continue same, think she had some hypokalemia at one point, add ARB if blood pressure trends up down the road.      4. Acquired atrophy of thyroid  Assessment & Plan:  TSH is 1.3 as of 4/23, patient stable to continue just very low-dose levothyroxine.      5. Mixed hyperlipidemia  Assessment & Plan:  LDL of 86 is at goal for primary prevention as of 4/23.  She will continue with moderate dose atorvastatin.      6. Morbid (severe) obesity due to excess calories    7. Fatty liver    8. Encounter for medication monitoring    9. Acute low back pain without sciatica, unspecified back pain laterality  Comments:  check xray-use tyl -try zanaflex prn    10. Acute cystitis with hematuria  Comments:  had trace blood on last UA--on levaquin 500 mg one qd x 7 days--last dose is tomorrow--no dysuria or unrinary frequency-f/u with PCP Dr Acosta as scheduled      Follow Up:   Next Medicare Wellness visit to be scheduled in 1 year.      An After Visit Summary and PPPS were made available to the patient.

## 2023-04-17 NOTE — ASSESSMENT & PLAN NOTE
LFTs are up some as of her 4/23 office visit.  Patient has underlying fatty liver, she has had mildly elevated LFTs for many years now. She has been on max dose Tylenol here lately due to a flare of back pain.  She will back down to half dose Tylenol now, we will repeat the LFTs in about 4 to 6 weeks, asked patient to call for the results.  Of note she had a CT scan of her abdomen in 1/22 which did not reveal any new abnormalities with the liver.  Will defer imaging studies for now unless the enzymes do not hit back down.

## 2023-04-17 NOTE — ASSESSMENT & PLAN NOTE
AWV completed 4/23.  Patient remains very active and independent.  No falls, no hospitalizations.  Information given regarding advance directive.

## 2023-04-17 NOTE — ASSESSMENT & PLAN NOTE
Still follows closely with endocrinology, Dr. Tolbert.  Sounds like her A1c was around 8 recently, will try to get copies of those records.  She has continuous glucose monitor, she had a few lows when she did not eat enough/soon enough, but otherwise no concerning hypoglycemic episodes.  Will defer to their expertise, she is on Lantus and Humalog along with Ozempic.

## 2023-04-17 NOTE — PATIENT INSTRUCTIONS
You have nonfasting labs scheduled in about a month in regards to the elevated liver enzymes, please just call for these results.    2.  You have routine fasting labs scheduled in 4 months, just do them a few days before your appointment.

## 2023-04-17 NOTE — Clinical Note
See if you can look into why her [facility] endocrinology stuff is not being updated in care everywhere.  There are some old records from them back in 2021, but she is still followed closely by them, has had labs earlier this year.  Particularly interested in her A1c.  Sorry and thanks.

## 2023-04-17 NOTE — TELEPHONE ENCOUNTER
Caller: Vivian Kauffman    Relationship to patient: Self    Best call back number:    [phone]          Patient is needing: PATIENT STATES THAT SHE IS UNSURE WHY THERE WAS MENTION OF A URINE DRUG SCREEN ON HER CHECK OUT PAPERWORK FROM THE OFFICE TODAY. SHE STATES THAT SHE JUST HAD A URINE TEST ON 4.14.23. SHE STATES THAT A VOICEMAIL IS OK.

## 2023-04-17 NOTE — ASSESSMENT & PLAN NOTE
LDL of 86 is at goal for primary prevention as of 4/23.  She will continue with moderate dose atorvastatin.

## 2023-05-01 NOTE — TELEPHONE ENCOUNTER
Patient stated that Dr. Duarte had order this test when she was in the hospital back in 2018.  She stated that the cardiologist in [place] told her that she has blockages in the front and back of her heart. I'm working on getting the results.   Also, she stated that when her sugar levels are inconsistent/ irregular it causes her to get yeast infections.    sig other at bedside for supplemental hx.

## 2023-05-12 ENCOUNTER — LAB (OUTPATIENT)
Dept: LAB | Facility: HOSPITAL | Age: 68
End: 2023-05-12
Payer: MEDICARE

## 2023-05-12 DIAGNOSIS — K76.0 FATTY LIVER: ICD-10-CM

## 2023-05-12 LAB
ALBUMIN SERPL-MCNC: 4.6 G/DL (ref 3.5–5.2)
ALP SERPL-CCNC: 108 U/L (ref 39–117)
ALT SERPL W P-5'-P-CCNC: 160 U/L (ref 1–33)
AST SERPL-CCNC: 104 U/L (ref 1–32)
BILIRUB CONJ SERPL-MCNC: <0.2 MG/DL (ref 0–0.3)
BILIRUB INDIRECT SERPL-MCNC: ABNORMAL MG/DL
BILIRUB SERPL-MCNC: 0.5 MG/DL (ref 0–1.2)
CK SERPL-CCNC: 60 U/L (ref 20–180)
GGT SERPL-CCNC: 44 U/L (ref 5–36)
PROT SERPL-MCNC: 7.4 G/DL (ref 6–8.5)

## 2023-05-12 PROCEDURE — 80076 HEPATIC FUNCTION PANEL: CPT

## 2023-05-12 PROCEDURE — 36415 COLL VENOUS BLD VENIPUNCTURE: CPT

## 2023-05-12 PROCEDURE — 82550 ASSAY OF CK (CPK): CPT

## 2023-05-12 PROCEDURE — 82977 ASSAY OF GGT: CPT

## 2023-05-14 DIAGNOSIS — K76.0 FATTY LIVER: Primary | ICD-10-CM

## 2023-05-14 DIAGNOSIS — R79.89 ELEVATED LFTS: ICD-10-CM

## 2023-05-16 ENCOUNTER — TELEPHONE (OUTPATIENT)
Dept: INTERNAL MEDICINE | Facility: CLINIC | Age: 68
End: 2023-05-16
Payer: MEDICARE

## 2023-05-16 DIAGNOSIS — R94.5 ABNORMAL RESULTS OF LIVER FUNCTION STUDIES: ICD-10-CM

## 2023-05-16 DIAGNOSIS — E78.2 MIXED HYPERLIPIDEMIA: ICD-10-CM

## 2023-05-16 DIAGNOSIS — K76.0 FATTY LIVER: ICD-10-CM

## 2023-05-16 DIAGNOSIS — R79.89 ELEVATED LFTS: Primary | ICD-10-CM

## 2023-05-16 NOTE — TELEPHONE ENCOUNTER
Caller: Vivian Kauffman    Relationship: Self    Best call back number: [phone]    Who are you requesting to speak with (clinical staff, provider,  specific staff member): SHREYA    What was the call regarding: PATIENT IS RETURNING CALL    Do you require a callback: YES

## 2023-05-16 NOTE — TELEPHONE ENCOUNTER
I put in the hepatic panel and the ammonia level with a diagnosis of elevated LFTs.  As long as you get the PT/INR to go through, that is all that matters, we can disregard the PTT.

## 2023-05-16 NOTE — TELEPHONE ENCOUNTER
I have let pt know of this and she voiced understanding.   I have scheduled appt and put orders in.     Dr. Acosta, I couldn't put in Liver profile, PTT or Ammonia level. Can you help me, I don't understand why it won't come up. Thanks.

## 2023-05-16 NOTE — TELEPHONE ENCOUNTER
"----- Message from Shahbaz Acosta MD sent at 5/14/2023  2:18 PM EDT -----  1. Stop lipitor/atorvastatin.  2. Stop tylenol/motrin/advil/aleve/erc.  3. Place orders from the old \"full liver sheet\" to be done in 2 weeks.    4. Appt to f/u the above labs.  5. Thanks.  "

## 2023-05-26 DIAGNOSIS — F19.20 CHRONIC PAIN WITH DRUG DEPENDENCE: ICD-10-CM

## 2023-05-26 DIAGNOSIS — G89.29 CHRONIC PAIN WITH DRUG DEPENDENCE: ICD-10-CM

## 2023-05-26 RX ORDER — GABAPENTIN 300 MG/1
CAPSULE ORAL
Qty: 270 CAPSULE | Refills: 0 | Status: SHIPPED | OUTPATIENT
Start: 2023-05-26

## 2023-05-30 DIAGNOSIS — J30.1 SEASONAL ALLERGIC RHINITIS DUE TO POLLEN: ICD-10-CM

## 2023-05-31 RX ORDER — LEVOTHYROXINE SODIUM 0.03 MG/1
TABLET ORAL
Qty: 90 TABLET | Refills: 0 | Status: SHIPPED | OUTPATIENT
Start: 2023-05-31

## 2023-05-31 RX ORDER — MONTELUKAST SODIUM 10 MG/1
TABLET ORAL
Qty: 90 TABLET | Refills: 0 | Status: SHIPPED | OUTPATIENT
Start: 2023-05-31

## 2023-06-03 ENCOUNTER — HOSPITAL ENCOUNTER (OUTPATIENT)
Dept: ULTRASOUND IMAGING | Facility: HOSPITAL | Age: 68
Discharge: HOME | End: 2023-06-03
Payer: MEDICARE

## 2023-06-03 ENCOUNTER — LAB (OUTPATIENT)
Dept: LAB | Facility: HOSPITAL | Age: 68
End: 2023-06-03
Payer: MEDICARE

## 2023-06-03 DIAGNOSIS — K76.0 FATTY LIVER: ICD-10-CM

## 2023-06-03 DIAGNOSIS — R79.89 ELEVATED LFTS: ICD-10-CM

## 2023-06-03 DIAGNOSIS — E78.2 MIXED HYPERLIPIDEMIA: ICD-10-CM

## 2023-06-03 DIAGNOSIS — I10 ESSENTIAL HYPERTENSION: ICD-10-CM

## 2023-06-03 DIAGNOSIS — R94.5 ABNORMAL RESULTS OF LIVER FUNCTION STUDIES: ICD-10-CM

## 2023-06-03 LAB
ALBUMIN SERPL-MCNC: 4.3 G/DL (ref 3.5–5.2)
ALBUMIN/GLOB SERPL: 1.5 G/DL
ALP SERPL-CCNC: 118 U/L (ref 39–117)
ALPHA-FETOPROTEIN: <2 NG/ML (ref 0–8.3)
ALPHA1 GLOB MFR UR ELPH: 175 MG/DL (ref 90–200)
ALT SERPL W P-5'-P-CCNC: 110 U/L (ref 1–33)
AMMONIA BLD-SCNC: 17 UMOL/L (ref 11–51)
ANION GAP SERPL CALCULATED.3IONS-SCNC: 9 MMOL/L (ref 5–15)
AST SERPL-CCNC: 66 U/L (ref 1–32)
BASOPHILS # BLD AUTO: 0.03 10*3/MM3 (ref 0–0.2)
BASOPHILS NFR BLD AUTO: 0.7 % (ref 0–1.5)
BILIRUB CONJ SERPL-MCNC: <0.2 MG/DL (ref 0–0.3)
BILIRUB SERPL-MCNC: 0.3 MG/DL (ref 0–1.2)
BUN SERPL-MCNC: 12 MG/DL (ref 8–23)
BUN/CREAT SERPL: 16.2 (ref 7–25)
CALCIUM SPEC-SCNC: 9 MG/DL (ref 8.6–10.5)
CERULOPLASMIN SERPL-MCNC: 28 MG/DL (ref 19–39)
CHLORIDE SERPL-SCNC: 104 MMOL/L (ref 98–107)
CK SERPL-CCNC: 35 U/L (ref 20–180)
CO2 SERPL-SCNC: 27 MMOL/L (ref 22–29)
CREAT SERPL-MCNC: 0.74 MG/DL (ref 0.57–1)
DEPRECATED RDW RBC AUTO: 43.7 FL (ref 37–54)
EGFRCR SERPLBLD CKD-EPI 2021: 88.3 ML/MIN/1.73
EOSINOPHIL # BLD AUTO: 0.11 10*3/MM3 (ref 0–0.4)
EOSINOPHIL NFR BLD AUTO: 2.5 % (ref 0.3–6.2)
ERYTHROCYTE [DISTWIDTH] IN BLOOD BY AUTOMATED COUNT: 13.9 % (ref 12.3–15.4)
ERYTHROCYTE [SEDIMENTATION RATE] IN BLOOD: 35 MM/HR (ref 0–30)
FERRITIN SERPL-MCNC: 303 NG/ML (ref 13–150)
FOLATE SERPL-MCNC: 7.51 NG/ML (ref 4.78–24.2)
GGT SERPL-CCNC: 48 U/L (ref 5–36)
GLOBULIN UR ELPH-MCNC: 2.9 GM/DL
GLUCOSE SERPL-MCNC: 172 MG/DL (ref 65–99)
HAV IGM SERPL QL IA: NORMAL
HBA1C MFR BLD: 6.8 % (ref 4.8–5.6)
HBV CORE IGM SERPL QL IA: NORMAL
HBV SURFACE AG SERPL QL IA: NORMAL
HCT VFR BLD AUTO: 42.2 % (ref 34–46.6)
HCV AB SER DONR QL: NORMAL
HGB BLD-MCNC: 13.8 G/DL (ref 12–15.9)
IMM GRANULOCYTES # BLD AUTO: 0.01 10*3/MM3 (ref 0–0.05)
IMM GRANULOCYTES NFR BLD AUTO: 0.2 % (ref 0–0.5)
INR PPP: 1.03 (ref 0.86–1.15)
IRON 24H UR-MRATE: 55 MCG/DL (ref 37–145)
IRON SATN MFR SERPL: 13 % (ref 20–50)
LYMPHOCYTES # BLD AUTO: 1.11 10*3/MM3 (ref 0.7–3.1)
LYMPHOCYTES NFR BLD AUTO: 25.3 % (ref 19.6–45.3)
MCH RBC QN AUTO: 28.1 PG (ref 26.6–33)
MCHC RBC AUTO-ENTMCNC: 32.7 G/DL (ref 31.5–35.7)
MCV RBC AUTO: 85.9 FL (ref 79–97)
MONOCYTES # BLD AUTO: 0.48 10*3/MM3 (ref 0.1–0.9)
MONOCYTES NFR BLD AUTO: 11 % (ref 5–12)
NEUTROPHILS NFR BLD AUTO: 2.64 10*3/MM3 (ref 1.7–7)
NEUTROPHILS NFR BLD AUTO: 60.3 % (ref 42.7–76)
NRBC BLD AUTO-RTO: 0 /100 WBC (ref 0–0.2)
PLATELET # BLD AUTO: 154 10*3/MM3 (ref 140–450)
PMV BLD AUTO: 9.5 FL (ref 6–12)
POTASSIUM SERPL-SCNC: 4 MMOL/L (ref 3.5–5.2)
PROT SERPL-MCNC: 7.2 G/DL (ref 6–8.5)
PROTHROMBIN TIME: 13.6 SECONDS (ref 11.8–14.9)
RBC # BLD AUTO: 4.91 10*6/MM3 (ref 3.77–5.28)
SODIUM SERPL-SCNC: 140 MMOL/L (ref 136–145)
TIBC SERPL-MCNC: 431 MCG/DL (ref 298–536)
TRANSFERRIN SERPL-MCNC: 289 MG/DL (ref 200–360)
VIT B12 BLD-MCNC: 831 PG/ML (ref 211–946)
WBC NRBC COR # BLD: 4.38 10*3/MM3 (ref 3.4–10.8)

## 2023-06-03 PROCEDURE — 82977 ASSAY OF GGT: CPT

## 2023-06-03 PROCEDURE — 86225 DNA ANTIBODY NATIVE: CPT

## 2023-06-03 PROCEDURE — 36415 COLL VENOUS BLD VENIPUNCTURE: CPT

## 2023-06-03 PROCEDURE — 83036 HEMOGLOBIN GLYCOSYLATED A1C: CPT

## 2023-06-03 PROCEDURE — 82105 ALPHA-FETOPROTEIN SERUM: CPT

## 2023-06-03 PROCEDURE — 83540 ASSAY OF IRON: CPT

## 2023-06-03 PROCEDURE — 82550 ASSAY OF CK (CPK): CPT

## 2023-06-03 PROCEDURE — 85610 PROTHROMBIN TIME: CPT

## 2023-06-03 PROCEDURE — 86015 ACTIN ANTIBODY EACH: CPT

## 2023-06-03 PROCEDURE — 80053 COMPREHEN METABOLIC PANEL: CPT

## 2023-06-03 PROCEDURE — 85025 COMPLETE CBC W/AUTO DIFF WBC: CPT

## 2023-06-03 PROCEDURE — 82607 VITAMIN B-12: CPT

## 2023-06-03 PROCEDURE — 82248 BILIRUBIN DIRECT: CPT

## 2023-06-03 PROCEDURE — 85652 RBC SED RATE AUTOMATED: CPT

## 2023-06-03 PROCEDURE — 80074 ACUTE HEPATITIS PANEL: CPT

## 2023-06-03 PROCEDURE — 86038 ANTINUCLEAR ANTIBODIES: CPT

## 2023-06-03 PROCEDURE — 82103 ALPHA-1-ANTITRYPSIN TOTAL: CPT

## 2023-06-03 PROCEDURE — 86381 MITOCHONDRIAL ANTIBODY EACH: CPT

## 2023-06-03 PROCEDURE — 76705 ECHO EXAM OF ABDOMEN: CPT

## 2023-06-03 PROCEDURE — 82390 ASSAY OF CERULOPLASMIN: CPT

## 2023-06-03 PROCEDURE — 82140 ASSAY OF AMMONIA: CPT

## 2023-06-03 PROCEDURE — 84466 ASSAY OF TRANSFERRIN: CPT

## 2023-06-03 PROCEDURE — 82728 ASSAY OF FERRITIN: CPT

## 2023-06-03 PROCEDURE — 82746 ASSAY OF FOLIC ACID SERUM: CPT

## 2023-06-06 LAB
DSDNA IGG SERPL IA-ACNC: NEGATIVE [IU]/ML
MITOCHONDRIA M2 IGG SER-ACNC: <20 UNITS (ref 0–20)
NUCLEAR IGG SER IA-RTO: NEGATIVE
SMA IGG SER-ACNC: 5 UNITS (ref 0–19)

## 2023-06-07 ENCOUNTER — OFFICE VISIT (OUTPATIENT)
Dept: INTERNAL MEDICINE | Facility: CLINIC | Age: 68
End: 2023-06-07
Payer: MEDICARE

## 2023-06-07 VITALS
WEIGHT: 173 LBS | DIASTOLIC BLOOD PRESSURE: 84 MMHG | HEIGHT: 63 IN | BODY MASS INDEX: 30.65 KG/M2 | SYSTOLIC BLOOD PRESSURE: 128 MMHG | OXYGEN SATURATION: 91 % | TEMPERATURE: 98.2 F | HEART RATE: 82 BPM

## 2023-06-07 DIAGNOSIS — I10 ESSENTIAL HYPERTENSION: ICD-10-CM

## 2023-06-07 DIAGNOSIS — E11.69 DIABETES MELLITUS TYPE 2 IN OBESE: ICD-10-CM

## 2023-06-07 DIAGNOSIS — E66.9 DIABETES MELLITUS TYPE 2 IN OBESE: ICD-10-CM

## 2023-06-07 DIAGNOSIS — E66.01 MORBID (SEVERE) OBESITY DUE TO EXCESS CALORIES: ICD-10-CM

## 2023-06-07 DIAGNOSIS — K76.0 FATTY LIVER: Primary | ICD-10-CM

## 2023-06-07 DIAGNOSIS — E78.2 MIXED HYPERLIPIDEMIA: ICD-10-CM

## 2023-06-07 NOTE — ASSESSMENT & PLAN NOTE
Blood pressure is well controlled as of her 6/23 office visit.  She is only on low-dose spironolactone given prior hypokalemia, so stable to continue same.

## 2023-06-07 NOTE — Clinical Note
Please see me about arranging the fibrosis study, I believe we were looking into this on another patient as well.

## 2023-06-07 NOTE — ASSESSMENT & PLAN NOTE
"1. Stop lipitor/atorvastatin.  2. Stop tylenol/motrin/advil/aleve/etc.  3. Place orders from the old \"full liver sheet\" to be done in 2 weeks.    4. Appt to f/u the above labs.    ---> pt here 6/23 to f/u after above    ALT has trending down already from 160 to 110 as of her 6/23 follow-up.    Her A1c was well normal, so its not playing a role here.  Her CK was negative, but atorvastatin was discontinued regardless for the time being at least.  Her ceruloplasm was negative, alpha-1 antitrypsin negative, hep A, B, C were all negative, SAMMI was negative, anti-smooth muscle antibody negative, antimitochondrial antibody negative as well.    Ammonia fine, INR normal, AFP was normal as well.    Liver ultrasound with fatty liver as noted above.    Current working diagnosis is that perhaps she developed an intolerance to Lipitor over time, so we will continue to hold that.  Once the liver calms back down, we will look at initiating a different statin.  Discussed with patient that it be fine for her to go ahead and resume her low-dose Tylenol.  We will look into getting the fibrosis study as well.  "

## 2023-06-07 NOTE — ASSESSMENT & PLAN NOTE
Patient is having a drug holiday from the statins as of her 6/23 office visit.  Her ALT trended up to 160, her baseline was around 50.  She has been on 40 of Lipitor for several years now, but do not have another culprit that we can pointer finger at at this time at least.  ALT is trending down some, so we will continue to hold statin.  If we get her back to previous baseline, we will use a different statin going forward.

## 2023-06-07 NOTE — ASSESSMENT & PLAN NOTE
A1c is only 6.8 as of her 6/23 office visit.  She is followed by endocrinology, Dr Tolbert, and she is utilizing the continuous glucose monitor.  Continue with current dosing with Lantus, Humalog, and Ozempic.
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
## 2023-06-08 ENCOUNTER — OFFICE VISIT (OUTPATIENT)
Dept: CARDIOLOGY | Facility: CLINIC | Age: 68
End: 2023-06-08
Payer: MEDICARE

## 2023-06-08 VITALS
HEIGHT: 63 IN | HEART RATE: 72 BPM | BODY MASS INDEX: 30.69 KG/M2 | WEIGHT: 173.2 LBS | DIASTOLIC BLOOD PRESSURE: 68 MMHG | SYSTOLIC BLOOD PRESSURE: 120 MMHG

## 2023-06-08 DIAGNOSIS — E66.01 MORBID (SEVERE) OBESITY DUE TO EXCESS CALORIES: ICD-10-CM

## 2023-06-08 DIAGNOSIS — E66.9 DIABETES MELLITUS TYPE 2 IN OBESE: ICD-10-CM

## 2023-06-08 DIAGNOSIS — E11.69 DIABETES MELLITUS TYPE 2 IN OBESE: ICD-10-CM

## 2023-06-08 DIAGNOSIS — I10 ESSENTIAL HYPERTENSION: Primary | ICD-10-CM

## 2023-06-08 DIAGNOSIS — E78.2 MIXED HYPERLIPIDEMIA: ICD-10-CM

## 2023-06-08 RX ORDER — PROCHLORPERAZINE MALEATE 5 MG/1
5 TABLET ORAL EVERY 6 HOURS PRN
COMMUNITY
Start: 2023-03-10

## 2023-06-08 NOTE — PROGRESS NOTES
Subjective:     Encounter Date:06/08/2023      Patient ID: Vivian Kauffman is a 68 y.o. female.    Chief Complaint:follow up CAD, HTN  History of Present Illness  This is a 67 y/o female who follows with Dr. Waters and is new to me today. She has a pmhx of nonobstructive coronary artery disease, hypertension, hyperlipidemia, diabetes, CVA, seizure disorder, obesity s/p lap band followed by subsequent removal of lap band.     Dr. Waters began following the patient in 2015 when she presented for preoperative evaluation. She was to undergo having her lab band removed and was noted to have an abnormal EKG during her preop testing. She had previously followed with Dr. Duarte in 2013 for an abnormal EKG and underwent a stress test that showed anterior ischemia. She then underwent a cardiac catheterization that showed 50% stenosis of the RCA. Echocardiogram showed pulmonary artery pressure of 40 mmHg, moderate TR. When she saw Dr. Waters, her EKG showed no changes from her EKG in 2013.     In 2017, she underwent a repeat stress test to ensure her CAD had not progressed and it was negative for ischemia. Echocardiogram showed a normal left ventricular systolic function and wall motion with an EF of 65-70%, grade 1 diastolic dysfunction.    She was last seen by Dr. Waters in June 2022 and was doing well. No changes were made and she was instructed to return in 1 year. She is here today for her follow up visit. She is here today for a follow up visit. She denies any chest pain or shortness of breath. She has brief palpitations about once a month. She denies any swelling in her lower extremities, orthopnea or PND. She denies any dizziness or syncope. She is now on Ozempic and has seen significant improvement in her A1C. She thinks she has lost about 10 lbs since starting it as well.    I have reviewed and updated as appropriate allergies, current medications, past family history, past medical history, past surgical  history and problem list.    Review of Systems   Constitutional: Positive for malaise/fatigue. Negative for fever, weight gain and weight loss.   HENT:  Negative for congestion, hoarse voice and sore throat.    Eyes:  Negative for blurred vision and double vision.   Cardiovascular:  Positive for palpitations. Negative for chest pain, dyspnea on exertion, leg swelling, orthopnea and syncope.   Respiratory:  Negative for cough, shortness of breath and wheezing.    Gastrointestinal:  Negative for abdominal pain, hematemesis, hematochezia and melena.   Genitourinary:  Negative for dysuria and hematuria.   Neurological:  Negative for dizziness, headaches, light-headedness and numbness.   Psychiatric/Behavioral:  Negative for depression. The patient is not nervous/anxious.        Current Outpatient Medications:     albuterol sulfate  (90 Base) MCG/ACT inhaler, 1 puff As Needed., Disp: , Rfl:     cetirizine (zyrTEC) 10 MG tablet, TAKE 1 TABLET BY MOUTH ONCE DAILY AS NEEDED FOR SNEEZING, ITCHING, RUNNY NOSE, Disp: , Rfl:     clopidogrel (PLAVIX) 75 MG tablet, TAKE 1 TABLET BY MOUTH ONCE DAILY AT NIGHT, Disp: 90 tablet, Rfl: 0    docusate sodium (Colace) 100 MG capsule, Take 1 capsule by mouth 2 (Two) Times a Day. Prn constipation, Disp: 60 capsule, Rfl: 0    FLUoxetine (PROzac) 20 MG capsule, Take 4 capsules by mouth Daily., Disp: 360 capsule, Rfl: 1    gabapentin (NEURONTIN) 300 MG capsule, TAKE 1 CAPSULE BY MOUTH THREE TIMES DAILY, Disp: 270 capsule, Rfl: 0    HumaLOG KwikPen 200 UNIT/ML solution pen-injector, INJECT 5 UNITS SUB-Q BEFORE MEALS AND 5 UNITS WITH LUNCH AND 5 UNITS WITH SUPPER. PLUS SLIDING SCALE (MAX DAILY OF 60 UNITS), Disp: , Rfl:     LANTUS SOLOSTAR 100 UNIT/ML injection pen, Inject 12 Units under the skin into the appropriate area as directed 2 (Two) Times a Day. 14 units in the morning and 14 units at night, Disp: , Rfl:     levETIRAcetam (KEPPRA) 500 MG tablet, 6 tablets daily, Disp: , Rfl:      "levothyroxine (SYNTHROID, LEVOTHROID) 25 MCG tablet, Take 1 tablet by mouth once daily, Disp: 90 tablet, Rfl: 0    montelukast (SINGULAIR) 10 MG tablet, Take 1 tablet by mouth once daily, Disp: 90 tablet, Rfl: 0    oxybutynin XL (DITROPAN-XL) 5 MG 24 hr tablet, Take 1 tablet by mouth once daily, Disp: 30 tablet, Rfl: 10    Ozempic, 1 MG/DOSE, 4 MG/3ML solution pen-injector, INJECT 1MG SUBCUTANEOUSLY ONCE A WEEK, Disp: , Rfl:     ProAir Digihaler 108 (90 Base) MCG/ACT aerosol powder , , Disp: , Rfl:     prochlorperazine (COMPAZINE) 5 MG tablet, Take 1 tablet by mouth Every 6 (Six) Hours As Needed., Disp: , Rfl:     RELION PEN NEEDLE 31G/8MM 31G X 8 MM misc, USE 4- 5 TIMES DAILY AS DIRECTED, Disp: , Rfl:     spironolactone (ALDACTONE) 25 MG tablet, Take 1 tablet by mouth once daily, Disp: 90 tablet, Rfl: 0    TiZANidine (Zanaflex) 4 MG capsule, Take 1 capsule by mouth 3 (Three) Times a Day As Needed for Muscle Spasms., Disp: 30 capsule, Rfl: 1    vitamin D (ERGOCALCIFEROL) 1.25 MG (19222 UT) capsule capsule, Take 1 capsule by mouth 1 (One) Time Per Week., Disp: , Rfl:     Past Medical History:   Diagnosis Date    Anxiety     Asthma     Coronary arteriosclerosis     Depression     Diabetes mellitus     TYPE 2 - BLOOD GLUCOSE AROUND 100-300    Disease of thyroid gland     Fatigue     Fatty liver     Fibromyalgia     Fibromyositis     GERD (gastroesophageal reflux disease)     Heartburn     History of COVID-19 2020    [place])     Hyperlipidemia     Hypertension     Insomnia     Kidney stone     Morbid obesity     Muscle spasm     Pain of both hip joints     Polyphagia(783.6)     PONV (postoperative nausea and vomiting)     Poor vision     Problems with hearing     Seizures     Shortness of breath on exertion     Sinus drainage     Stroke syndrome     \"MINI STROKE\"  left side weakness     Tremor, essential     IN NECK  AND HANDS    Vertigo        Past Surgical History:   Procedure Laterality Date    " BREAST BIOPSY Right     CARDIAC CATHETERIZATION      no stents 9/2013 at [facility]    CARPAL TUNNEL RELEASE Bilateral 2009    COLONOSCOPY      2018    COLONOSCOPY N/A 2/18/2022    Procedure: COLONOSCOPY SCREENING WITH POLYPECTOMY;  Surgeon: David Love MD;  Location: [facility] ENDOSCOPY;  Service: Gastroenterology;  Laterality: N/A;  COLON POLYP    CYSTOSCOPY NEPHROURETEROSCOPY Left 6/29/2021    Procedure: CYSTOSCOPY NEPHROURETEROSCOPY;  Surgeon: Mandie Hamilton MD;  Location: [facility] MAIN OR;  Service: Urology;  Laterality: Left;    ENDOSCOPY N/A 9/9/2016    Procedure: ESOPHAGOGASTRODUODENOSCOPY WITH BIOPSY;  Surgeon: Chris Ackerman Jr., MD;  Location: [facility] ENDOSCOPY;  Service:     EXTRACORPOREAL SHOCKWAVE LITHOTRIPSY (ESWL), STENT INSERTION/REMOVAL  09/2013    FOOT SURGERY Left     FRACTURE SURGERY      left hand    GASTRIC BANDING REMOVAL N/A 11/30/2016    Procedure: LAPAROSCOPIC GASTRIC BANDING AND PORT REMOVAL ;  Surgeon: Chris Ackerman Jr., MD;  Location: [facility] OR [facility];  Service:     GASTRIC SLEEVE LAPAROSCOPIC N/A 10/2/2017    Procedure: GASTRIC SLEEVE LAPAROSCOPIC revision WITH LYSIS OF ADHESIONS AND HITAL HERNIA REPAIR ;  Surgeon: Chris Ackerman Jr., MD;  Location: [facility] OR [facility];  Service:     GASTRIC SLEEVE LAPAROSCOPIC      HAND SURGERY Left     LAPAROSCOPIC CHOLECYSTECTOMY      MASS EXCISION Right     RT FOOT     NOSE SURGERY      TONSILLECTOMY      URETEROSCOPY STENT INSERTION Left 6/29/2021    Procedure: URETEROSCOPY STENT INSERTION;  Surgeon: Mandie Hamilton MD;  Location: [facility] MAIN OR;  Service: Urology;  Laterality: Left;       Family History   Problem Relation Age of Onset    Heart attack Mother     Diabetes Mother     Hypertension Mother     Stroke Mother     Heart attack Father     Diabetes Father     Hypertension Father     Obesity Father         Morbid Obesity    Stroke Father     Heart attack Brother     Cancer Brother         Bladder    Heart attack  "Maternal Grandmother     Heart attack Maternal Grandfather     Stroke Paternal Grandmother     Heart attack Paternal Grandfather     Malig Hyperthermia Neg Hx     Colon cancer Neg Hx        Social History     Tobacco Use    Smoking status: Former     Packs/day: 2.00     Years: 30.00     Pack years: 60.00     Types: Cigarettes     Start date:      Quit date:      Years since quittin.4    Smokeless tobacco: Never    Tobacco comments:     caffeine use   Vaping Use    Vaping Use: Never used   Substance Use Topics    Alcohol use: Not Currently    Drug use: Never         ECG 12 Lead    Date/Time: 2023 1:51 PM  Performed by: Amanda Villalba APRN  Authorized by: Amanda Villalba APRN   Comparison: compared with previous ECG from 2022  Similar to previous ECG  Rhythm: sinus rhythm  T flattening: V2 and V3  Comments: No significant change from previous EKG           Objective:     Visit Vitals  /68   Pulse 72   Ht 160 cm (62.99\")   Wt 78.6 kg (173 lb 3.2 oz)   BMI 30.69 kg/m²             Physical Exam  Constitutional:       Appearance: Normal appearance. She is normal weight.   HENT:      Head: Normocephalic.   Neck:      Vascular: No carotid bruit.   Cardiovascular:      Rate and Rhythm: Normal rate and regular rhythm.      Chest Wall: PMI is not displaced.      Pulses: Normal pulses.           Radial pulses are 2+ on the right side and 2+ on the left side.        Posterior tibial pulses are 2+ on the right side and 2+ on the left side.      Heart sounds: Normal heart sounds. No murmur heard.    No friction rub. No gallop.   Pulmonary:      Effort: Pulmonary effort is normal.      Breath sounds: Normal breath sounds.   Abdominal:      General: Bowel sounds are normal. There is no distension.      Palpations: Abdomen is soft.   Musculoskeletal:      Right lower leg: No edema.      Left lower leg: No edema.   Skin:     General: Skin is warm and dry.      Capillary Refill: Capillary refill takes less " than 2 seconds.   Neurological:      Mental Status: She is alert and oriented to person, place, and time.   Psychiatric:         Mood and Affect: Mood normal.         Behavior: Behavior normal.         Thought Content: Thought content normal.        Lab Review:   Lipid Panel          12/16/2022    12:56 4/14/2023    11:31   Lipid Panel   Total Cholesterol 125  163    Triglycerides 92  81    HDL Cholesterol 49  62    VLDL Cholesterol 18  15    LDL Cholesterol  58  86    LDL/HDL Ratio 1.18  1.37          Cardiac Procedures:       Assessment:         Diagnoses and all orders for this visit:    1. Essential hypertension (Primary)    2. Mixed hyperlipidemia    3. Diabetes mellitus type 2 in obese (HCC)    4. Morbid (severe) obesity due to excess calories    Other orders  -     ECG 12 Lead            Plan:       CAD: moderate nonobstrucitve disease with 50% stenosis of the proximal LAD and RCA in 2015. Stress test in 2017 showed no evidence of ischemia. EKG shows no new ischemic changes. Will continue with current medical management.  HTN: well controlled. No changes.  HLD: on statin with goal LDL 58  Diabetes  H/o CVA: on clopidogrel and statin.   Obesity: history of lap band. She has lost some with Ozempic. Encourage lifestyle modifications.    Thank you for allowing me to participate in this patient's care. Please call with any questions or concerns. *Ms. Kauffman will follow up with Dr. Waters in 1 year          Your medication list            Accurate as of June 8, 2023  1:53 PM. If you have any questions, ask your nurse or doctor.                CONTINUE taking these medications        Instructions Last Dose Given Next Dose Due   albuterol sulfate  (90 Base) MCG/ACT inhaler  Commonly known as: PROVENTIL HFA;VENTOLIN HFA;PROAIR HFA      1 puff As Needed.       cetirizine 10 MG tablet  Commonly known as: zyrTEC      TAKE 1 TABLET BY MOUTH ONCE DAILY AS NEEDED FOR SNEEZING, ITCHING, RUNNY NOSE       clopidogrel  75 MG tablet  Commonly known as: PLAVIX      TAKE 1 TABLET BY MOUTH ONCE DAILY AT NIGHT       docusate sodium 100 MG capsule  Commonly known as: Colace      Take 1 capsule by mouth 2 (Two) Times a Day. Prn constipation       FLUoxetine 20 MG capsule  Commonly known as: PROzac      Take 4 capsules by mouth Daily.       gabapentin 300 MG capsule  Commonly known as: NEURONTIN      TAKE 1 CAPSULE BY MOUTH THREE TIMES DAILY       HumaLOG KwikPen 200 UNIT/ML solution pen-injector  Generic drug: Insulin Lispro      INJECT 5 UNITS SUB-Q BEFORE MEALS AND 5 UNITS WITH LUNCH AND 5 UNITS WITH SUPPER. PLUS SLIDING SCALE (MAX DAILY OF 60 UNITS)       Lantus SoloStar 100 UNIT/ML injection pen  Generic drug: Insulin Glargine      Inject 12 Units under the skin into the appropriate area as directed 2 (Two) Times a Day. 14 units in the morning and 14 units at night       levETIRAcetam 500 MG tablet  Commonly known as: KEPPRA      6 tablets daily       levothyroxine 25 MCG tablet  Commonly known as: SYNTHROID, LEVOTHROID      Take 1 tablet by mouth once daily       montelukast 10 MG tablet  Commonly known as: SINGULAIR      Take 1 tablet by mouth once daily       oxybutynin XL 5 MG 24 hr tablet  Commonly known as: DITROPAN-XL      Take 1 tablet by mouth once daily       Ozempic (1 MG/DOSE) 4 MG/3ML solution pen-injector  Generic drug: Semaglutide (1 MG/DOSE)      INJECT 1MG SUBCUTANEOUSLY ONCE A WEEK       ProAir Digihaler 108 (90 Base) MCG/ACT aerosol powder   Generic drug: Albuterol Sulfate (sensor)           prochlorperazine 5 MG tablet  Commonly known as: COMPAZINE      Take 1 tablet by mouth Every 6 (Six) Hours As Needed.       RELION PEN NEEDLE 31G/8MM 31G X 8 MM misc  Generic drug: Insulin Pen Needle      USE 4- 5 TIMES DAILY AS DIRECTED       spironolactone 25 MG tablet  Commonly known as: ALDACTONE      Take 1 tablet by mouth once daily       TiZANidine 4 MG capsule  Commonly known as: Zanaflex      Take 1 capsule by mouth 3  (Three) Times a Day As Needed for Muscle Spasms.       vitamin D 1.25 MG (86822 UT) capsule capsule  Commonly known as: ERGOCALCIFEROL      Take 1 capsule by mouth 1 (One) Time Per Week.                  JIM Ying  06/08/23  9:12 AM EDT

## 2023-06-09 RX ORDER — CLOPIDOGREL BISULFATE 75 MG/1
TABLET ORAL
Qty: 90 TABLET | Refills: 1 | Status: SHIPPED | OUTPATIENT
Start: 2023-06-09

## 2023-06-30 NOTE — ADDENDUM NOTE
Addended by: MAKENNA BABCOCK on: 12/28/2022 01:13 PM     Modules accepted: Orders    
Addended by: MENDEZ FINCH on: 12/30/2022 01:32 PM     Modules accepted: Orders    
Risk Assessment Explanation (Does Not Render In The Note): Clinical determination of the probability and/or consequences of an event, such as surgery. Clinical assessment of the level of risk is affected by the nature of the event under consideration for the patient. Modifier 57 is used to indicate an Evaluation and Management (E/M) service resulted in the initial decision to perform surgery either the day before a major surgery (90 day global) or the day of a major surgery.
Complexity (Necessary For Coding; Major - 90 Day Global With Some Exceptions; Minor - 10 Day Global): minor
Initial Decision For Surgery?: Yes

## 2023-08-01 ENCOUNTER — DOCUMENTATION (OUTPATIENT)
Dept: BARIATRICS/WEIGHT MGMT | Facility: CLINIC | Age: 68
End: 2023-08-01
Payer: MEDICARE

## 2023-08-07 DIAGNOSIS — N30.00 ACUTE CYSTITIS WITHOUT HEMATURIA: Primary | ICD-10-CM

## 2023-08-11 ENCOUNTER — LAB (OUTPATIENT)
Dept: LAB | Facility: HOSPITAL | Age: 68
End: 2023-08-11
Payer: MEDICARE

## 2023-08-11 DIAGNOSIS — E11.69 DIABETES MELLITUS TYPE 2 IN OBESE: ICD-10-CM

## 2023-08-11 DIAGNOSIS — K76.0 FATTY LIVER: ICD-10-CM

## 2023-08-11 DIAGNOSIS — E66.9 DIABETES MELLITUS TYPE 2 IN OBESE: ICD-10-CM

## 2023-08-11 DIAGNOSIS — E78.2 MIXED HYPERLIPIDEMIA: ICD-10-CM

## 2023-08-11 DIAGNOSIS — N30.00 ACUTE CYSTITIS WITHOUT HEMATURIA: ICD-10-CM

## 2023-08-11 LAB
ALBUMIN SERPL-MCNC: 4.1 G/DL (ref 3.5–5.2)
ALBUMIN/GLOB SERPL: 1.5 G/DL
ALP SERPL-CCNC: 96 U/L (ref 39–117)
ALT SERPL W P-5'-P-CCNC: 80 U/L (ref 1–33)
ANION GAP SERPL CALCULATED.3IONS-SCNC: 10 MMOL/L (ref 5–15)
AST SERPL-CCNC: 53 U/L (ref 1–32)
BILIRUB SERPL-MCNC: 0.4 MG/DL (ref 0–1.2)
BUN SERPL-MCNC: 13 MG/DL (ref 8–23)
BUN/CREAT SERPL: 23.6 (ref 7–25)
CALCIUM SPEC-SCNC: 9.4 MG/DL (ref 8.6–10.5)
CHLORIDE SERPL-SCNC: 101 MMOL/L (ref 98–107)
CHOLEST SERPL-MCNC: 227 MG/DL (ref 0–200)
CO2 SERPL-SCNC: 27 MMOL/L (ref 22–29)
CREAT SERPL-MCNC: 0.55 MG/DL (ref 0.57–1)
EGFRCR SERPLBLD CKD-EPI 2021: 100 ML/MIN/1.73
FERRITIN SERPL-MCNC: 244 NG/ML (ref 13–150)
GLOBULIN UR ELPH-MCNC: 2.8 GM/DL
GLUCOSE SERPL-MCNC: 105 MG/DL (ref 65–99)
HBA1C MFR BLD: 6.2 % (ref 4.8–5.6)
HDLC SERPL-MCNC: 48 MG/DL (ref 40–60)
IRON 24H UR-MRATE: 73 MCG/DL (ref 37–145)
IRON SATN MFR SERPL: 18 % (ref 20–50)
LDLC SERPL CALC-MCNC: 161 MG/DL (ref 0–100)
LDLC/HDLC SERPL: 3.3 {RATIO}
POTASSIUM SERPL-SCNC: 3.9 MMOL/L (ref 3.5–5.2)
PROT SERPL-MCNC: 6.9 G/DL (ref 6–8.5)
SODIUM SERPL-SCNC: 138 MMOL/L (ref 136–145)
TIBC SERPL-MCNC: 405 MCG/DL (ref 298–536)
TRANSFERRIN SERPL-MCNC: 272 MG/DL (ref 200–360)
TRIGL SERPL-MCNC: 102 MG/DL (ref 0–150)
VLDLC SERPL-MCNC: 18 MG/DL (ref 5–40)

## 2023-08-11 PROCEDURE — 87086 URINE CULTURE/COLONY COUNT: CPT

## 2023-08-11 PROCEDURE — 36415 COLL VENOUS BLD VENIPUNCTURE: CPT

## 2023-08-11 PROCEDURE — 84466 ASSAY OF TRANSFERRIN: CPT

## 2023-08-11 PROCEDURE — 82728 ASSAY OF FERRITIN: CPT

## 2023-08-11 PROCEDURE — 83540 ASSAY OF IRON: CPT

## 2023-08-11 PROCEDURE — 83036 HEMOGLOBIN GLYCOSYLATED A1C: CPT

## 2023-08-11 PROCEDURE — 80061 LIPID PANEL: CPT

## 2023-08-11 PROCEDURE — 80053 COMPREHEN METABOLIC PANEL: CPT

## 2023-08-12 LAB — BACTERIA SPEC AEROBE CULT: NO GROWTH

## 2023-08-17 ENCOUNTER — OFFICE VISIT (OUTPATIENT)
Dept: BARIATRICS/WEIGHT MGMT | Facility: CLINIC | Age: 68
End: 2023-08-17
Payer: MEDICARE

## 2023-08-17 VITALS
BODY MASS INDEX: 29.77 KG/M2 | HEART RATE: 72 BPM | TEMPERATURE: 98.1 F | SYSTOLIC BLOOD PRESSURE: 133 MMHG | HEIGHT: 63 IN | DIASTOLIC BLOOD PRESSURE: 73 MMHG | WEIGHT: 168 LBS

## 2023-08-17 DIAGNOSIS — I10 ESSENTIAL HYPERTENSION: ICD-10-CM

## 2023-08-17 DIAGNOSIS — E11.69 DIABETES MELLITUS TYPE 2 IN OBESE: ICD-10-CM

## 2023-08-17 DIAGNOSIS — E66.01 MORBID (SEVERE) OBESITY DUE TO EXCESS CALORIES: ICD-10-CM

## 2023-08-17 DIAGNOSIS — E66.9 OBESITY, CLASS I, BMI 30-34.9: Primary | ICD-10-CM

## 2023-08-17 DIAGNOSIS — E66.9 DIABETES MELLITUS TYPE 2 IN OBESE: ICD-10-CM

## 2023-08-17 PROBLEM — E55.9 VITAMIN D DEFICIENCY: Status: RESOLVED | Noted: 2021-04-05 | Resolved: 2023-08-17

## 2023-08-17 NOTE — PROGRESS NOTES
[facility] BARIATRIC SURGERY  [address] [address]  [phone]  [address] [address]  [phone]  Dept: [phone]  8/17/2023      Vivian Kauffman.  [id number]  [id number]  1955  female      Chief Complaint   Patient presents with    Follow-up     sleeve       BH Post-Op Bariatric Surgery:   Vivian Kauffman is status post Laparoscopic Sleeve Revision from Band procedure, performed on 10/2/2017     HPI:     Today's weight is 76.2 kg (168 lb) pounds, today's BMI is Body mass index is 30.22 kg/mý., she has a loss of 26 pounds since the last visit and her weight loss since surgery is 42 pounds. The patient reports a decreased portion size and loss of appetite.    Vivian Kauffman denies  and reports that she is doing well.      Diet and Exercise: Diet history reviewed and discussed with the patient. Weight loss/gains to date discussed with the patient. The patient states they are eating 60 grams of protein per day. She reports eating 3 meals per day, a typical portion size of 1/2 cup, eating 1-2 snacks per day, drinking 5-6 or more 8-oz. glasses of water per day, no carbonated beverage consumption and exercising regularly.       Review of Systems   Constitutional:  Positive for appetite change. Negative for fatigue and unexpected weight change.   HENT: Negative.     Eyes: Negative.    Respiratory: Negative.     Cardiovascular: Negative.  Negative for leg swelling.   Gastrointestinal:  Negative for abdominal distention, abdominal pain, constipation, diarrhea, nausea and vomiting.   Genitourinary:  Negative for difficulty urinating, frequency and urgency.   Musculoskeletal:  Negative for back pain.   Skin: Negative.    Psychiatric/Behavioral: Negative.     All other systems reviewed and are negative.    Patient Active Problem List   Diagnosis    Essential hypertension    Mixed hyperlipidemia     "Obesity, Class I, BMI 30-34.9    Diabetes mellitus type 2 in obese    Fatty liver    Seizure disorder    Acquired atrophy of thyroid    Hydronephrosis of left kidney    Vitamin B12 deficiency    Benign essential tremor    Recurrent major depressive disorder, in partial remission    Seasonal allergic rhinitis due to pollen    Personal history of colonic polyps    Acute low back pain without sciatica    Medicare annual wellness visit, subsequent       Past Medical History:   Diagnosis Date    Anxiety     Asthma     Coronary arteriosclerosis     Depression     Diabetes mellitus     TYPE 2 - BLOOD GLUCOSE AROUND 100-300    Disease of thyroid gland     Fatigue     Fatty liver     Fibromyalgia     Fibromyositis     GERD (gastroesophageal reflux disease)     Heartburn     History of COVID-19     2020    [place] (hard of hearing)     Hyperlipidemia     Hypertension     Insomnia     Kidney stone     Morbid obesity     Muscle spasm     Pain of both hip joints     Polyphagia(783.6)     PONV (postoperative nausea and vomiting)     Poor vision     Problems with hearing     Seizures     Shortness of breath on exertion     Sinus drainage     Stroke syndrome     \"MINI STROKE\"  left side weakness     Tremor, essential     IN NECK  AND HANDS    Vertigo        The following portions of the patient's history were reviewed and updated as appropriate: allergies, current medications, past family history, past medical history, past social history, past surgical history, and problem list.    Vitals:    08/17/23 0923   BP: 133/73   Pulse: 72   Temp: 98.1 øF (36.7 øC)       Physical Exam  Vitals and nursing note reviewed.   Constitutional:       Appearance: She is well-developed.   Neck:      Thyroid: No thyromegaly.   Cardiovascular:      Rate and Rhythm: Normal rate.   Pulmonary:      Effort: Pulmonary effort is normal. No respiratory distress.   Abdominal:      Palpations: Abdomen is soft.   Musculoskeletal:         General: No tenderness. "   Skin:     General: Skin is warm and dry.   Neurological:      Mental Status: She is alert.   Psychiatric:         Behavior: Behavior normal.       Assessment:   Post-op, the patient is doing well.     Encounter Diagnoses   Name Primary?    Obesity, Class I, BMI 30-34.9 Yes    Essential hypertension     Diabetes mellitus type 2 in obese     Morbid (severe) obesity due to excess calories        Plan:     Encouraged patient to be sure to get plenty of lean protein per day through small frequent meals all with a protein source.   Activity restrictions: none.   Recommended patient be sure to get at least 70 grams of protein per day by eating small, frequent meals all with high lean protein choices. Be sure to limit/cut back on daily carbohydrate intake. Discussed with the patient the recommended amount of water per day to intake- half of body weight in ounces. Reviewed vitamin requirements. Be sure to do routine exercise, 150 minutes per week minimum, including both cardio and strength training.     Instructions / Recommendations: dietary counseling recommended, recommended a daily protein intake of  grams, vitamin supplement(s) recommended, recommended exercising at least 150 minutes per week, behavior modifications recommended and instructed to call the office for concerns, questions, or problems.     The patient was instructed to follow up in 6 months .    Total time spent during this encounter today was 25 minutes

## 2023-08-24 ENCOUNTER — OFFICE VISIT (OUTPATIENT)
Dept: UROLOGY | Facility: CLINIC | Age: 68
End: 2023-08-24
Payer: MEDICARE

## 2023-08-24 VITALS — BODY MASS INDEX: 30.91 KG/M2 | HEIGHT: 62 IN | RESPIRATION RATE: 16 BRPM | WEIGHT: 168 LBS

## 2023-08-24 DIAGNOSIS — N30.00 ACUTE CYSTITIS WITHOUT HEMATURIA: Primary | ICD-10-CM

## 2023-08-24 LAB
BILIRUB BLD-MCNC: NEGATIVE MG/DL
CLARITY, POC: CLEAR
COLOR UR: YELLOW
EXPIRATION DATE: ABNORMAL
GLUCOSE UR STRIP-MCNC: NEGATIVE MG/DL
KETONES UR QL: NEGATIVE
LEUKOCYTE EST, POC: ABNORMAL
Lab: ABNORMAL
NITRITE UR-MCNC: NEGATIVE MG/ML
PH UR: 5 [PH] (ref 5–8)
PROT UR STRIP-MCNC: ABNORMAL MG/DL
RBC # UR STRIP: NEGATIVE /UL
SP GR UR: 1.03 (ref 1–1.03)
UROBILINOGEN UR QL: NORMAL

## 2023-08-24 PROCEDURE — 1159F MED LIST DOCD IN RCRD: CPT | Performed by: NURSE PRACTITIONER

## 2023-08-24 PROCEDURE — 87186 SC STD MICRODIL/AGAR DIL: CPT | Performed by: NURSE PRACTITIONER

## 2023-08-24 PROCEDURE — 81003 URINALYSIS AUTO W/O SCOPE: CPT | Performed by: NURSE PRACTITIONER

## 2023-08-24 PROCEDURE — 1160F RVW MEDS BY RX/DR IN RCRD: CPT | Performed by: NURSE PRACTITIONER

## 2023-08-24 PROCEDURE — 99213 OFFICE O/P EST LOW 20 MIN: CPT | Performed by: NURSE PRACTITIONER

## 2023-08-24 PROCEDURE — 87086 URINE CULTURE/COLONY COUNT: CPT | Performed by: NURSE PRACTITIONER

## 2023-08-24 PROCEDURE — 87077 CULTURE AEROBIC IDENTIFY: CPT | Performed by: NURSE PRACTITIONER

## 2023-08-24 NOTE — PROGRESS NOTES
"Chief Complaint: Urinary Tract Infection    Subjective         History of Present Illness  Vivian Kauffman is a 68 y.o. female presents to [facility] UROLOGY to be seen for follow-up UTIs.    The patient is an established patient with Dr. Mandie Hamilton she was previously seen by  in March of this year for hydronephrosis.  The patient had reported \"every now and then feeling like she has an infection\".       Per 's last note she had a positive urine culture in 2022 and was treated with antibiotics.    In 6/22 the patient had called 's office with complaints of cloudy urine with blood clots and abdominal fullness and cramping.  The patient had been seen apparently by another provider and had a urine culture performed.  It does not appear that the urine culture was ever performed however given that her \"urine test showed a UTI\" she was treated with ciprofloxacin.    She was seen at [facility] on 7/25/2023 with complaints of cloudy and dark brown urine.  She was given cephalexin based on urinalysis at that visit.  Her urinalysis revealed nitrite positive urine 30 mg/dL protein 15 mg/dL of ketones 1 mg/dL bilirubin and 2+ blood. Her urine culture was positive for Klebsiella pneumoniae greater than 100,000 colony-forming units per milliliter that was resistant to ampicillin.    The patient had a urine culture performed on 8/12/2023 which was negative for any bacterial growth. She was not having any symptoms when this culture was    She states she has pain her her lower abdomen and burning in the lower abdomen. She states she has the urge to void but cannot during these events.    She is drinking diet Mountain dew daily and tea and very little water.    She has never taken cranberry or probiotics.     She denies issues with vaginal dryness or itching.        Objective     Past Medical History:   Diagnosis Date    Anxiety     Asthma     Coronary arteriosclerosis     " "Depression     Diabetes mellitus     TYPE 2 - BLOOD GLUCOSE AROUND 100-300    Disease of thyroid gland     Fatigue     Fatty liver     Fibromyalgia     Fibromyositis     GERD (gastroesophageal reflux disease)     Heartburn     History of COVID-19 2020    [place] (hard of hearing)     Hyperlipidemia     Hypertension     Insomnia     Kidney stone     Morbid obesity     Muscle spasm     Pain of both hip joints     Polyphagia(783.6)     PONV (postoperative nausea and vomiting)     Poor vision     Problems with hearing     Seizures     Shortness of breath on exertion     Sinus drainage     Stroke syndrome     \"MINI STROKE\"  left side weakness     Tremor, essential     IN NECK  AND HANDS    Vertigo        Past Surgical History:   Procedure Laterality Date    BREAST BIOPSY Right     CARDIAC CATHETERIZATION      no stents 9/2013 at [facility]    CARPAL TUNNEL RELEASE Bilateral 2009    COLONOSCOPY      2018    COLONOSCOPY N/A 2/18/2022    Procedure: COLONOSCOPY SCREENING WITH POLYPECTOMY;  Surgeon: David Love MD;  Location: [facility] ENDOSCOPY;  Service: Gastroenterology;  Laterality: N/A;  COLON POLYP    CYSTOSCOPY NEPHROURETEROSCOPY Left 6/29/2021    Procedure: CYSTOSCOPY NEPHROURETEROSCOPY;  Surgeon: Mandie Hamilton MD;  Location: [facility] MAIN OR;  Service: Urology;  Laterality: Left;    ENDOSCOPY N/A 9/9/2016    Procedure: ESOPHAGOGASTRODUODENOSCOPY WITH BIOPSY;  Surgeon: Chris Ackerman Jr., MD;  Location: [facility] ENDOSCOPY;  Service:     EXTRACORPOREAL SHOCKWAVE LITHOTRIPSY (ESWL), STENT INSERTION/REMOVAL  09/2013    FOOT SURGERY Left     FRACTURE SURGERY      left hand    GASTRIC BANDING REMOVAL N/A 11/30/2016    Procedure: LAPAROSCOPIC GASTRIC BANDING AND PORT REMOVAL ;  Surgeon: Chris Ackerman Jr., MD;  Location: [facility] OR [facility];  Service:     GASTRIC SLEEVE LAPAROSCOPIC N/A 10/2/2017    Procedure: GASTRIC SLEEVE LAPAROSCOPIC revision WITH LYSIS OF ADHESIONS AND HITAL HERNIA REPAIR ;  " Surgeon: Chris Ackerman Jr., MD;  Location:  [facility];  Service:     GASTRIC SLEEVE LAPAROSCOPIC      HAND SURGERY Left     LAPAROSCOPIC CHOLECYSTECTOMY      MASS EXCISION Right     RT FOOT     NOSE SURGERY      TONSILLECTOMY      URETEROSCOPY STENT INSERTION Left 6/29/2021    Procedure: URETEROSCOPY STENT INSERTION;  Surgeon: Mandie Hamilton MD;  Location: [facility] MAIN OR;  Service: Urology;  Laterality: Left;         Current Outpatient Medications:     albuterol sulfate  (90 Base) MCG/ACT inhaler, 1 puff As Needed., Disp: , Rfl:     cetirizine (zyrTEC) 10 MG tablet, TAKE 1 TABLET BY MOUTH ONCE DAILY AS NEEDED FOR SNEEZING, ITCHING, RUNNY NOSE, Disp: , Rfl:     clopidogrel (PLAVIX) 75 MG tablet, TAKE 1 TABLET BY MOUTH ONCE DAILY AT NIGHT, Disp: 90 tablet, Rfl: 1    docusate sodium (Colace) 100 MG capsule, Take 1 capsule by mouth 2 (Two) Times a Day. Prn constipation, Disp: 60 capsule, Rfl: 0    FLUoxetine (PROzac) 20 MG capsule, Take 4 capsules by mouth Daily., Disp: 360 capsule, Rfl: 1    gabapentin (NEURONTIN) 300 MG capsule, TAKE 1 CAPSULE BY MOUTH THREE TIMES DAILY, Disp: 270 capsule, Rfl: 0    HumaLOG KwikPen 200 UNIT/ML solution pen-injector, INJECT 5 UNITS SUB-Q BEFORE MEALS AND 5 UNITS WITH LUNCH AND 5 UNITS WITH SUPPER. PLUS SLIDING SCALE (MAX DAILY OF 60 UNITS), Disp: , Rfl:     LANTUS SOLOSTAR 100 UNIT/ML injection pen, Inject 12 Units under the skin into the appropriate area as directed 2 (Two) Times a Day. 14 units in the morning and 14 units at night, Disp: , Rfl:     levETIRAcetam (KEPPRA) 500 MG tablet, 6 tablets daily, Disp: , Rfl:     levothyroxine (SYNTHROID, LEVOTHROID) 25 MCG tablet, Take 1 tablet by mouth once daily, Disp: 90 tablet, Rfl: 0    montelukast (SINGULAIR) 10 MG tablet, Take 1 tablet by mouth once daily, Disp: 90 tablet, Rfl: 0    oxybutynin XL (DITROPAN-XL) 5 MG 24 hr tablet, Take 1 tablet by mouth once daily, Disp: 30 tablet, Rfl: 10    Ozempic, 1 MG/DOSE, 4  MG/3ML solution pen-injector, INJECT 1MG SUBCUTANEOUSLY ONCE A WEEK, Disp: , Rfl:     ProAir Digihaler 108 (90 Base) MCG/ACT aerosol powder , , Disp: , Rfl:     prochlorperazine (COMPAZINE) 5 MG tablet, Take 1 tablet by mouth Every 6 (Six) Hours As Needed., Disp: , Rfl:     RELION PEN NEEDLE 31G/8MM 31G X 8 MM misc, USE 4- 5 TIMES DAILY AS DIRECTED, Disp: , Rfl:     spironolactone (ALDACTONE) 25 MG tablet, Take 1 tablet by mouth once daily, Disp: 90 tablet, Rfl: 0    TiZANidine (Zanaflex) 4 MG capsule, Take 1 capsule by mouth 3 (Three) Times a Day As Needed for Muscle Spasms., Disp: 30 capsule, Rfl: 1    vitamin D (ERGOCALCIFEROL) 1.25 MG (32339 UT) capsule capsule, Take 1 capsule by mouth 1 (One) Time Per Week., Disp: , Rfl:     Allergies   Allergen Reactions    Jardiance [Empagliflozin] Anaphylaxis     Went into ketoacidosis    Peanut Oil Anaphylaxis     throat to close     Latex Hives     SKIN BLISTERS    Sulfa Antibiotics Rash    Ace Inhibitors Cough and Other (See Comments)        Family History   Problem Relation Age of Onset    Heart attack Mother     Diabetes Mother     Hypertension Mother     Stroke Mother     Heart attack Father     Diabetes Father     Hypertension Father     Obesity Father         Morbid Obesity    Stroke Father     Heart attack Brother     Cancer Brother         Bladder    Heart attack Maternal Grandmother     Heart attack Maternal Grandfather     Stroke Paternal Grandmother     Heart attack Paternal Grandfather     Malig Hyperthermia Neg Hx     Colon cancer Neg Hx        Social History     Socioeconomic History    Marital status: Single    Number of children: 1   Tobacco Use    Smoking status: Former     Packs/day: 2.00     Years: 30.00     Pack years: 60.00     Types: Cigarettes     Start date:      Quit date:      Years since quittin.6    Smokeless tobacco: Never    Tobacco comments:     caffeine use   Vaping Use    Vaping Use: Never used   Substance and Sexual Activity  "   Alcohol use: Not Currently    Drug use: Never    Sexual activity: Defer       Vital Signs:   Resp 16   Ht 157.5 cm (62\")   Wt 76.2 kg (168 lb)   BMI 30.73 kg/mý      Physical Exam     Result Review :   The following data was reviewed by: JIM Limon on 08/24/2023:  Results for orders placed or performed in visit on 08/24/23   POC Urinalysis Dipstick, Automated    Specimen: Urine   Result Value Ref Range    Color Yellow Yellow, Straw, Dark Yellow, Adriana    Clarity, UA Clear Clear    Specific Gravity  1.030 1.005 - 1.030    pH, Urine 5.0 5.0 - 8.0    Leukocytes Small (1+) (A) Negative    Nitrite, UA Negative Negative    Protein, POC Trace (A) Negative mg/dL    Glucose, UA Negative Negative mg/dL    Ketones, UA Negative Negative    Urobilinogen, UA Normal Normal, 0.2 E.U./dL    Bilirubin Negative Negative    Blood, UA Negative Negative    Lot Number [id number]     Expiration Date 7/2,024             Procedures        Assessment and Plan    Diagnoses and all orders for this visit:    1. Acute cystitis without hematuria (Primary)  -     POC Urinalysis Dipstick, Automated  -     Urine Culture - Urine, Urine, Clean Catch; Future        Recurrent urinary tract infection-no evidence on urine dip of infection today.  Discussed with patient that chronic recurrent UTI is a common condition that is multifactorial in nature, difficult to treat, unlikely to completely irradicate, and the specific cause for recurrent infections is often unknown.  Encouraged behavioral modifications including fluid management, hydration, not delaying urgency when she needs to void.  Recommend cranberry supplementation daily to aid with prevention of urinary tract infection.  Consider trial of localized estrogen cream we will hold off for now  Discussed with patient the importance of obtaining urine culture prior to treatment with antibiotics for urinary tract infection.     Discussed that routine screening UA for infection is not " recommended as in postmenopausal women asymptomatic bacteruria is common and does not warrant treatment     Discussed that obtaining urine culture after treatment for UTI is not recommended unless a patient has persistent symptoms of UTI given prevalence of asymptomatic UTI.     Recommend the above and on demand abx when culture positive  Discussed the importance of antibiotic stewardship; the prevalence of resistant bacteria; and adverse effects of prolonged antibiotic treatment including yeast infections and possible C. difficile colitis; thus would recommend continued use of on-demand antibiotics per culture sensitivities.            I spent 10 minutes caring for Vivian on this date of service. This time includes time spent by me in the following activities:reviewing tests, obtaining and/or reviewing a separately obtained history, performing a medically appropriate examination and/or evaluation , counseling and educating the patient/family/caregiver, ordering medications, tests, or procedures, and documenting information in the medical record  Follow Up   Return in about 3 months (around 11/24/2023) for f/u UTIs.  Patient was given instructions and counseling regarding her condition or for health maintenance advice. Please see specific information pulled into the AVS if appropriate.         This document has been electronically signed by JIM Limon  August 24, 2023 10:35 EDT

## 2023-08-26 ENCOUNTER — DOCUMENTATION (OUTPATIENT)
Dept: UROLOGY | Facility: CLINIC | Age: 68
End: 2023-08-26
Payer: MEDICARE

## 2023-08-26 DIAGNOSIS — N30.00 ACUTE CYSTITIS WITHOUT HEMATURIA: Primary | ICD-10-CM

## 2023-08-26 LAB — BACTERIA SPEC AEROBE CULT: ABNORMAL

## 2023-08-26 RX ORDER — CEFDINIR 300 MG/1
300 CAPSULE ORAL 2 TIMES DAILY
Qty: 20 CAPSULE | Refills: 0 | Status: SHIPPED | OUTPATIENT
Start: 2023-08-26 | End: 2023-09-05

## 2023-09-04 DIAGNOSIS — J30.1 SEASONAL ALLERGIC RHINITIS DUE TO POLLEN: ICD-10-CM

## 2023-09-04 DIAGNOSIS — G89.29 CHRONIC PAIN WITH DRUG DEPENDENCE: ICD-10-CM

## 2023-09-04 DIAGNOSIS — F19.20 CHRONIC PAIN WITH DRUG DEPENDENCE: ICD-10-CM

## 2023-09-05 PROBLEM — Z86.0100 PERSONAL HISTORY OF COLONIC POLYPS: Status: RESOLVED | Noted: 2022-01-05 | Resolved: 2023-09-05

## 2023-09-05 PROBLEM — Z86.010 PERSONAL HISTORY OF COLONIC POLYPS: Status: RESOLVED | Noted: 2022-01-05 | Resolved: 2023-09-05

## 2023-09-05 RX ORDER — MONTELUKAST SODIUM 10 MG/1
TABLET ORAL
Qty: 90 TABLET | Refills: 3 | Status: SHIPPED | OUTPATIENT
Start: 2023-09-05

## 2023-09-05 RX ORDER — GABAPENTIN 300 MG/1
CAPSULE ORAL
Qty: 270 CAPSULE | Refills: 1 | Status: SHIPPED | OUTPATIENT
Start: 2023-09-05

## 2023-09-06 ENCOUNTER — OFFICE VISIT (OUTPATIENT)
Dept: INTERNAL MEDICINE | Facility: CLINIC | Age: 68
End: 2023-09-06
Payer: MEDICARE

## 2023-09-06 VITALS
HEART RATE: 78 BPM | TEMPERATURE: 97.3 F | DIASTOLIC BLOOD PRESSURE: 70 MMHG | HEIGHT: 62 IN | BODY MASS INDEX: 30.66 KG/M2 | OXYGEN SATURATION: 92 % | WEIGHT: 166.6 LBS | SYSTOLIC BLOOD PRESSURE: 108 MMHG

## 2023-09-06 DIAGNOSIS — E03.4 ACQUIRED ATROPHY OF THYROID: ICD-10-CM

## 2023-09-06 DIAGNOSIS — I10 ESSENTIAL HYPERTENSION: ICD-10-CM

## 2023-09-06 DIAGNOSIS — G47.10 HYPERSOMNOLENCE: ICD-10-CM

## 2023-09-06 DIAGNOSIS — E78.2 MIXED HYPERLIPIDEMIA: ICD-10-CM

## 2023-09-06 DIAGNOSIS — Z12.31 BREAST CANCER SCREENING BY MAMMOGRAM: ICD-10-CM

## 2023-09-06 DIAGNOSIS — E66.9 DIABETES MELLITUS TYPE 2 IN OBESE: ICD-10-CM

## 2023-09-06 DIAGNOSIS — K76.0 FATTY LIVER: Primary | ICD-10-CM

## 2023-09-06 DIAGNOSIS — E11.69 DIABETES MELLITUS TYPE 2 IN OBESE: ICD-10-CM

## 2023-09-06 DIAGNOSIS — E83.19 IRON OVERLOAD: ICD-10-CM

## 2023-09-06 PROBLEM — R79.89 ELEVATED LFTS: Status: ACTIVE | Noted: 2023-09-06

## 2023-09-06 PROCEDURE — 99214 OFFICE O/P EST MOD 30 MIN: CPT | Performed by: INTERNAL MEDICINE

## 2023-09-06 PROCEDURE — 3044F HG A1C LEVEL LT 7.0%: CPT | Performed by: INTERNAL MEDICINE

## 2023-09-06 PROCEDURE — 1159F MED LIST DOCD IN RCRD: CPT | Performed by: INTERNAL MEDICINE

## 2023-09-06 PROCEDURE — 3074F SYST BP LT 130 MM HG: CPT | Performed by: INTERNAL MEDICINE

## 2023-09-06 PROCEDURE — 3078F DIAST BP <80 MM HG: CPT | Performed by: INTERNAL MEDICINE

## 2023-09-06 PROCEDURE — 1160F RVW MEDS BY RX/DR IN RCRD: CPT | Performed by: INTERNAL MEDICINE

## 2023-09-06 RX ORDER — INSULIN GLARGINE 100 [IU]/ML
4 INJECTION, SOLUTION SUBCUTANEOUS 2 TIMES DAILY
COMMUNITY

## 2023-09-06 RX ORDER — INSULIN GLARGINE 100 [IU]/ML
12 INJECTION, SOLUTION SUBCUTANEOUS 2 TIMES DAILY
Status: SHIPPED | COMMUNITY
Start: 2023-09-06 | End: 2023-09-06 | Stop reason: SDUPTHER

## 2023-09-06 RX ORDER — INSULIN LISPRO 100 [IU]/ML
2 INJECTION, SOLUTION INTRAVENOUS; SUBCUTANEOUS
COMMUNITY

## 2023-09-06 NOTE — ASSESSMENT & PLAN NOTE
Blood pressure is well controlled as of her 9/23 office visit. She is only on low-dose spironolactone given prior hypokalemia, so stable to continue same

## 2023-09-06 NOTE — ASSESSMENT & PLAN NOTE
I reviewed her 6/23 note as well for further details.  LFTs have been mildly elevated for a while, but she spiked to 160 earlier this spring.  We discontinued atorvastatin at that time, she trended down to 110, and now down to 80.  Her baseline previously was around 50 it looks like.  We will continue to monitor and consider the fibrosis study as per 6/23 note.

## 2023-09-06 NOTE — ASSESSMENT & PLAN NOTE
Ferritin was just mildly elevated, she is on no iron, we will repeat this in 3 months with her liver enzymes.
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
## 2023-09-06 NOTE — ASSESSMENT & PLAN NOTE
LFTs have been mildly elevated for a while, but she spiked to 160 earlier this spring.  We discontinued atorvastatin at that time, she trended down to 110, and now down to 80.  Her baseline previously was around 50 it looks like.  We will continue to monitor.

## 2023-09-06 NOTE — ASSESSMENT & PLAN NOTE
LDL is stable at 160.  Patient is on a drug holiday from 40 of Lipitor secondary to elevated LFTs.  We will evaluate treating this again later in the year.

## 2023-09-06 NOTE — ASSESSMENT & PLAN NOTE
Patient's A1c continues with excellent control, it was 6.2 in mid August.  She has backed her Humalog down to 2 units with meals, and basically does not need to add any sliding scale.  Additionally her Lantus is back down to only 4 units twice a day.  She continues with moderate dose of Ozempic as well.  Still followed by endocrinology routinely.

## 2023-09-21 RX ORDER — SPIRONOLACTONE 25 MG/1
TABLET ORAL
Qty: 90 TABLET | Refills: 0 | Status: SHIPPED | OUTPATIENT
Start: 2023-09-21

## 2023-10-05 ENCOUNTER — HOSPITAL ENCOUNTER (OUTPATIENT)
Dept: MAMMOGRAPHY | Facility: HOSPITAL | Age: 68
Discharge: HOME | End: 2023-10-05
Admitting: INTERNAL MEDICINE
Payer: MEDICARE

## 2023-10-05 DIAGNOSIS — Z12.31 BREAST CANCER SCREENING BY MAMMOGRAM: ICD-10-CM

## 2023-10-05 PROCEDURE — 77063 BREAST TOMOSYNTHESIS BI: CPT

## 2023-10-05 PROCEDURE — 77067 SCR MAMMO BI INCL CAD: CPT

## 2023-10-09 RX ORDER — FLUOXETINE HYDROCHLORIDE 20 MG/1
80 CAPSULE ORAL DAILY
Qty: 360 CAPSULE | Refills: 0 | Status: SHIPPED | OUTPATIENT
Start: 2023-10-09

## 2023-11-27 RX ORDER — CLOPIDOGREL BISULFATE 75 MG/1
TABLET ORAL
Qty: 90 TABLET | Refills: 1 | Status: SHIPPED | OUTPATIENT
Start: 2023-11-27

## 2023-11-28 ENCOUNTER — OFFICE VISIT (OUTPATIENT)
Dept: UROLOGY | Facility: CLINIC | Age: 68
End: 2023-11-28
Payer: MEDICARE

## 2023-11-28 VITALS
DIASTOLIC BLOOD PRESSURE: 73 MMHG | HEART RATE: 69 BPM | BODY MASS INDEX: 27.9 KG/M2 | HEIGHT: 64 IN | WEIGHT: 163.4 LBS | RESPIRATION RATE: 12 BRPM | SYSTOLIC BLOOD PRESSURE: 122 MMHG

## 2023-11-28 DIAGNOSIS — N30.00 ACUTE CYSTITIS WITHOUT HEMATURIA: Primary | ICD-10-CM

## 2023-11-28 DIAGNOSIS — R31.29 MICROHEMATURIA: ICD-10-CM

## 2023-11-28 LAB
BILIRUB BLD-MCNC: ABNORMAL MG/DL
CLARITY, POC: CLEAR
COLOR UR: YELLOW
EXPIRATION DATE: ABNORMAL
GLUCOSE UR STRIP-MCNC: NEGATIVE MG/DL
KETONES UR QL: ABNORMAL
LEUKOCYTE EST, POC: NEGATIVE
Lab: ABNORMAL
NITRITE UR-MCNC: NEGATIVE MG/ML
PH UR: 5 [PH] (ref 5–8)
PROT UR STRIP-MCNC: NEGATIVE MG/DL
RBC # UR STRIP: ABNORMAL /UL
SP GR UR: 1.02 (ref 1–1.03)
UROBILINOGEN UR QL: NORMAL

## 2023-11-28 PROCEDURE — 1160F RVW MEDS BY RX/DR IN RCRD: CPT | Performed by: NURSE PRACTITIONER

## 2023-11-28 PROCEDURE — 3078F DIAST BP <80 MM HG: CPT | Performed by: NURSE PRACTITIONER

## 2023-11-28 PROCEDURE — 81003 URINALYSIS AUTO W/O SCOPE: CPT | Performed by: NURSE PRACTITIONER

## 2023-11-28 PROCEDURE — 3074F SYST BP LT 130 MM HG: CPT | Performed by: NURSE PRACTITIONER

## 2023-11-28 PROCEDURE — 99213 OFFICE O/P EST LOW 20 MIN: CPT | Performed by: NURSE PRACTITIONER

## 2023-11-28 PROCEDURE — 1159F MED LIST DOCD IN RCRD: CPT | Performed by: NURSE PRACTITIONER

## 2023-11-28 RX ORDER — ESTRADIOL 0.1 MG/G
CREAM VAGINAL
Qty: 42.5 G | Refills: 6 | Status: SHIPPED | OUTPATIENT
Start: 2023-11-28

## 2023-11-28 RX ORDER — LEVOTHYROXINE SODIUM 0.03 MG/1
TABLET ORAL
Qty: 90 TABLET | Refills: 0 | Status: SHIPPED | OUTPATIENT
Start: 2023-11-28

## 2023-11-28 NOTE — PROGRESS NOTES
"Chief Complaint: Urinary Tract Infection (Pt here for follow up. Pt is not having any issues today.)    Subjective         History of Present Illness  Vivian Kauffman is a 68 y.o. female presents to [facility] to be seen for follow-up UTIs.    The patient states she has had no Utis since we last saw her.    She was taking a cranberry supplement but stopped when it ran out.    She didn't want to do estrace at last visit.    She continues on oxybutynin.    She stopped diet mountain dew and she is only drinking water.    Previous:    The patient is an established patient with Dr. Mandie Hamilton she was previously seen by  in March of this year for hydronephrosis.  The patient had reported \"every now and then feeling like she has an infection\".       Per 's last note she had a positive urine culture in 2022 and was treated with antibiotics.    In 6/22 the patient had called 's office with complaints of cloudy urine with blood clots and abdominal fullness and cramping.  The patient had been seen apparently by another provider and had a urine culture performed.  It does not appear that the urine culture was ever performed however given that her \"urine test showed a UTI\" she was treated with ciprofloxacin.    She was seen at [facility] on 7/25/2023 with complaints of cloudy and dark brown urine.  She was given cephalexin based on urinalysis at that visit.  Her urinalysis revealed nitrite positive urine 30 mg/dL protein 15 mg/dL of ketones 1 mg/dL bilirubin and 2+ blood. Her urine culture was positive for Klebsiella pneumoniae greater than 100,000 colony-forming units per milliliter that was resistant to ampicillin.    The patient had a urine culture performed on 8/12/2023 which was negative for any bacterial growth. She was not having any symptoms when this culture was    She states she has pain her her lower abdomen and burning in the lower abdomen. She states " "she has the urge to void but cannot during these events.    She is drinking diet Mountain dew daily and tea and very little water.    She has never taken cranberry or probiotics.     She denies issues with vaginal dryness or itching.        Objective     Past Medical History:   Diagnosis Date    Anxiety     Asthma     Coronary arteriosclerosis     Depression     Diabetes mellitus     TYPE 2 - BLOOD GLUCOSE AROUND 100-300    Disease of thyroid gland     Fatigue     Fatty liver     Fibromyalgia     Fibromyositis     GERD (gastroesophageal reflux disease)     Heartburn     History of COVID-19 2020    [place] (hard of hearing)     Hyperlipidemia     Hypertension     Insomnia     Kidney stone     Morbid obesity     Muscle spasm     Pain of both hip joints     Polyphagia(783.6)     PONV (postoperative nausea and vomiting)     Poor vision     Problems with hearing     Seizures     Shortness of breath on exertion     Sinus drainage     Stroke syndrome     \"MINI STROKE\"  left side weakness     Tremor, essential     IN NECK  AND HANDS    Vertigo        Past Surgical History:   Procedure Laterality Date    BREAST BIOPSY Right     CARDIAC CATHETERIZATION      no stents 9/2013 at [facility]    CARPAL TUNNEL RELEASE Bilateral 2009    COLONOSCOPY      2018    COLONOSCOPY N/A 2/18/2022    Procedure: COLONOSCOPY SCREENING WITH POLYPECTOMY;  Surgeon: David Love MD;  Location: [facility] ENDOSCOPY;  Service: Gastroenterology;  Laterality: N/A;  COLON POLYP    CYSTOSCOPY NEPHROURETEROSCOPY Left 6/29/2021    Procedure: CYSTOSCOPY NEPHROURETEROSCOPY;  Surgeon: Mandie Hamilton MD;  Location: [facility] MAIN OR;  Service: Urology;  Laterality: Left;    ENDOSCOPY N/A 9/9/2016    Procedure: ESOPHAGOGASTRODUODENOSCOPY WITH BIOPSY;  Surgeon: Chris Ackerman Jr., MD;  Location: [facility] ENDOSCOPY;  Service:     EXTRACORPOREAL SHOCKWAVE LITHOTRIPSY (ESWL), STENT INSERTION/REMOVAL  09/2013    FOOT SURGERY Left     FRACTURE " SURGERY      left hand    GASTRIC BANDING REMOVAL N/A 11/30/2016    Procedure: LAPAROSCOPIC GASTRIC BANDING AND PORT REMOVAL ;  Surgeon: Chirs Ackerman Jr., MD;  Location: [facility] OR [facility];  Service:     GASTRIC SLEEVE LAPAROSCOPIC N/A 10/2/2017    Procedure: GASTRIC SLEEVE LAPAROSCOPIC revision WITH LYSIS OF ADHESIONS AND HITAL HERNIA REPAIR ;  Surgeon: Chris Ackerman Jr., MD;  Location[facility];  Service:     GASTRIC SLEEVE LAPAROSCOPIC      HAND SURGERY Left     LAPAROSCOPIC CHOLECYSTECTOMY      MASS EXCISION Right     RT FOOT     NOSE SURGERY      TONSILLECTOMY      URETEROSCOPY STENT INSERTION Left 6/29/2021    Procedure: URETEROSCOPY STENT INSERTION;  Surgeon: Mandie Hamilton MD;  Location: [facility] OR;  Service: Urology;  Laterality: Left;         Current Outpatient Medications:     albuterol sulfate  (90 Base) MCG/ACT inhaler, 1 puff As Needed., Disp: , Rfl:     cetirizine (zyrTEC) 10 MG tablet, TAKE 1 TABLET BY MOUTH ONCE DAILY AS NEEDED FOR SNEEZING, ITCHING, RUNNY NOSE, Disp: , Rfl:     clopidogrel (PLAVIX) 75 MG tablet, TAKE 1 TABLET BY MOUTH ONCE DAILY AT NIGHT, Disp: 90 tablet, Rfl: 1    FLUoxetine (PROzac) 20 MG capsule, TAKE 4 CAPSULES BY MOUTH ONCE DAILY, Disp: 360 capsule, Rfl: 0    gabapentin (NEURONTIN) 300 MG capsule, TAKE 1 CAPSULE BY MOUTH THREE TIMES DAILY, Disp: 270 capsule, Rfl: 1    insulin glargine (LANTUS, SEMGLEE) 100 UNIT/ML injection, Inject 4 Units under the skin into the appropriate area as directed 2 (Two) Times a Day., Disp: , Rfl:     Insulin Lispro, 1 Unit Dial, (HumaLOG KwikPen) 100 UNIT/ML solution pen-injector, Inject 2 Units under the skin into the appropriate area as directed 3 (Three) Times a Day With Meals., Disp: , Rfl:     levETIRAcetam (KEPPRA) 500 MG tablet, 6 tablets daily, Disp: , Rfl:     levothyroxine (SYNTHROID, LEVOTHROID) 25 MCG tablet, Take 1 tablet by mouth once daily, Disp: 90 tablet, Rfl: 0    montelukast (SINGULAIR) 10 MG tablet,  Take 1 tablet by mouth once daily, Disp: 90 tablet, Rfl: 3    oxybutynin XL (DITROPAN-XL) 5 MG 24 hr tablet, Take 1 tablet by mouth once daily, Disp: 30 tablet, Rfl: 10    Ozempic, 1 MG/DOSE, 4 MG/3ML solution pen-injector, INJECT 1MG SUBCUTANEOUSLY ONCE A WEEK, Disp: , Rfl:     ProAir Digihaler 108 (90 Base) MCG/ACT aerosol powder , , Disp: , Rfl:     prochlorperazine (COMPAZINE) 5 MG tablet, Take 1 tablet by mouth Every 6 (Six) Hours As Needed., Disp: , Rfl:     RELION PEN NEEDLE 31G/8MM 31G X 8 MM misc, USE 4- 5 TIMES DAILY AS DIRECTED, Disp: , Rfl:     spironolactone (ALDACTONE) 25 MG tablet, Take 1 tablet by mouth once daily, Disp: 90 tablet, Rfl: 0    TiZANidine (Zanaflex) 4 MG capsule, Take 1 capsule by mouth 3 (Three) Times a Day As Needed for Muscle Spasms., Disp: 30 capsule, Rfl: 1    vitamin D (ERGOCALCIFEROL) 1.25 MG (72511 UT) capsule capsule, Take 1 capsule by mouth 1 (One) Time Per Week., Disp: , Rfl:     estradiol (ESTRACE) 0.1 MG/GM vaginal cream, Apply a pea-sized amount inside the vagina and rub in and applied another pea-sized amount around the vestibule and massage and as well as around urethra.  3 times a week at night, Disp: 42.5 g, Rfl: 6    Allergies   Allergen Reactions    Jardiance [Empagliflozin] Anaphylaxis     Went into ketoacidosis    Peanut Oil Anaphylaxis     throat to close     Latex Hives     SKIN BLISTERS    Sulfa Antibiotics Rash    Ace Inhibitors Cough and Other (See Comments)        Family History   Problem Relation Age of Onset    Heart attack Mother     Diabetes Mother     Hypertension Mother     Stroke Mother     Heart attack Father     Diabetes Father     Hypertension Father     Obesity Father         Morbid Obesity    Stroke Father     Heart attack Brother     Cancer Brother         Bladder    Heart attack Maternal Grandmother     Heart attack Maternal Grandfather     Stroke Paternal Grandmother     Heart attack Paternal Grandfather     Malig Hyperthermia Neg Hx     Colon  "cancer Neg Hx        Social History     Socioeconomic History    Marital status: Single    Number of children: 1   Tobacco Use    Smoking status: Former     Packs/day: 2.00     Years: 30.00     Additional pack years: 0.00     Total pack years: 60.00     Types: Cigarettes     Start date:      Quit date:      Years since quittin.9     Passive exposure: Past    Smokeless tobacco: Never    Tobacco comments:     caffeine use   Vaping Use    Vaping Use: Never used   Substance and Sexual Activity    Alcohol use: Not Currently    Drug use: Never    Sexual activity: Defer       Vital Signs:   /73 (BP Location: Left arm, Patient Position: Sitting)   Pulse 69   Resp 12   Ht 162.6 cm (64\")   Wt 74.1 kg (163 lb 6.4 oz)   BMI 28.05 kg/m²      Physical Exam     Result Review :   The following data was reviewed by: JIM Limon on 2023:  Results for orders placed or performed in visit on 23   POC Urinalysis Dipstick, Automated    Specimen: Urine   Result Value Ref Range    Color Yellow Yellow, Straw, Dark Yellow, Adriana    Clarity, UA Clear Clear    Specific Gravity  1.020 1.005 - 1.030    pH, Urine 5.0 5.0 - 8.0    Leukocytes Negative Negative    Nitrite, UA Negative Negative    Protein, POC Negative Negative mg/dL    Glucose, UA Negative Negative mg/dL    Ketones, UA 15 mg/dL (A) Negative    Urobilinogen, UA Normal Normal, 0.2 E.U./dL    Bilirubin Small (1+) (A) Negative    Blood, UA Trace (A) Negative    Lot Number [id number]     Expiration Date              Procedures        Assessment and Plan    Diagnoses and all orders for this visit:    1. Acute cystitis without hematuria (Primary)  -     POC Urinalysis Dipstick, Automated  -     estradiol (ESTRACE) 0.1 MG/GM vaginal cream; Apply a pea-sized amount inside the vagina and rub in and applied another pea-sized amount around the vestibule and massage and as well as around urethra.  3 times a week at night  Dispense: 42.5 g; " Refill: 6    2. Microhematuria      We will start her on estrace cream.     She will continue to abstain from mountain dew.            I spent 10 minutes caring for Vivian on this date of service. This time includes time spent by me in the following activities:reviewing tests, obtaining and/or reviewing a separately obtained history, performing a medically appropriate examination and/or evaluation , counseling and educating the patient/family/caregiver, ordering medications, tests, or procedures, and documenting information in the medical record  Follow Up   Return for Next scheduled follow up.  Patient was given instructions and counseling regarding her condition or for health maintenance advice. Please see specific information pulled into the AVS if appropriate.         This document has been electronically signed by JIM Limon  November 28, 2023 09:25 EST

## 2023-12-04 ENCOUNTER — LAB (OUTPATIENT)
Dept: LAB | Facility: HOSPITAL | Age: 68
End: 2023-12-04
Payer: MEDICARE

## 2023-12-04 DIAGNOSIS — E03.4 ACQUIRED ATROPHY OF THYROID: ICD-10-CM

## 2023-12-04 DIAGNOSIS — K76.0 FATTY LIVER: ICD-10-CM

## 2023-12-04 DIAGNOSIS — E83.19 IRON OVERLOAD: ICD-10-CM

## 2023-12-04 LAB
ALBUMIN SERPL-MCNC: 4.5 G/DL (ref 3.5–5.2)
ALBUMIN/GLOB SERPL: 1.6 G/DL
ALP SERPL-CCNC: 88 U/L (ref 39–117)
ALT SERPL W P-5'-P-CCNC: 64 U/L (ref 1–33)
ANION GAP SERPL CALCULATED.3IONS-SCNC: 8.3 MMOL/L (ref 5–15)
AST SERPL-CCNC: 49 U/L (ref 1–32)
BASOPHILS # BLD AUTO: 0.02 10*3/MM3 (ref 0–0.2)
BASOPHILS NFR BLD AUTO: 0.4 % (ref 0–1.5)
BILIRUB SERPL-MCNC: 0.3 MG/DL (ref 0–1.2)
BUN SERPL-MCNC: 10 MG/DL (ref 8–23)
BUN/CREAT SERPL: 13.9 (ref 7–25)
CALCIUM SPEC-SCNC: 9.5 MG/DL (ref 8.6–10.5)
CHLORIDE SERPL-SCNC: 104 MMOL/L (ref 98–107)
CO2 SERPL-SCNC: 27.7 MMOL/L (ref 22–29)
CREAT SERPL-MCNC: 0.72 MG/DL (ref 0.57–1)
DEPRECATED RDW RBC AUTO: 43.5 FL (ref 37–54)
EGFRCR SERPLBLD CKD-EPI 2021: 91.2 ML/MIN/1.73
EOSINOPHIL # BLD AUTO: 0.09 10*3/MM3 (ref 0–0.4)
EOSINOPHIL NFR BLD AUTO: 1.9 % (ref 0.3–6.2)
ERYTHROCYTE [DISTWIDTH] IN BLOOD BY AUTOMATED COUNT: 14 % (ref 12.3–15.4)
FERRITIN SERPL-MCNC: 182 NG/ML (ref 13–150)
GLOBULIN UR ELPH-MCNC: 2.8 GM/DL
GLUCOSE SERPL-MCNC: 115 MG/DL (ref 65–99)
HCT VFR BLD AUTO: 44.7 % (ref 34–46.6)
HGB BLD-MCNC: 14.9 G/DL (ref 12–15.9)
IMM GRANULOCYTES # BLD AUTO: 0.01 10*3/MM3 (ref 0–0.05)
IMM GRANULOCYTES NFR BLD AUTO: 0.2 % (ref 0–0.5)
IRON 24H UR-MRATE: 83 MCG/DL (ref 37–145)
IRON SATN MFR SERPL: 19 % (ref 20–50)
LYMPHOCYTES # BLD AUTO: 1.5 10*3/MM3 (ref 0.7–3.1)
LYMPHOCYTES NFR BLD AUTO: 32 % (ref 19.6–45.3)
MCH RBC QN AUTO: 28.5 PG (ref 26.6–33)
MCHC RBC AUTO-ENTMCNC: 33.3 G/DL (ref 31.5–35.7)
MCV RBC AUTO: 85.6 FL (ref 79–97)
MONOCYTES # BLD AUTO: 0.37 10*3/MM3 (ref 0.1–0.9)
MONOCYTES NFR BLD AUTO: 7.9 % (ref 5–12)
NEUTROPHILS NFR BLD AUTO: 2.7 10*3/MM3 (ref 1.7–7)
NEUTROPHILS NFR BLD AUTO: 57.6 % (ref 42.7–76)
NRBC BLD AUTO-RTO: 0 /100 WBC (ref 0–0.2)
PLATELET # BLD AUTO: 183 10*3/MM3 (ref 140–450)
PMV BLD AUTO: 10.2 FL (ref 6–12)
POTASSIUM SERPL-SCNC: 4.2 MMOL/L (ref 3.5–5.2)
PROT SERPL-MCNC: 7.3 G/DL (ref 6–8.5)
RBC # BLD AUTO: 5.22 10*6/MM3 (ref 3.77–5.28)
SODIUM SERPL-SCNC: 140 MMOL/L (ref 136–145)
T4 FREE SERPL-MCNC: 1.05 NG/DL (ref 0.93–1.7)
TIBC SERPL-MCNC: 438 MCG/DL (ref 298–536)
TRANSFERRIN SERPL-MCNC: 294 MG/DL (ref 200–360)
TSH SERPL DL<=0.05 MIU/L-ACNC: 1.52 UIU/ML (ref 0.27–4.2)
WBC NRBC COR # BLD AUTO: 4.69 10*3/MM3 (ref 3.4–10.8)

## 2023-12-04 PROCEDURE — 82728 ASSAY OF FERRITIN: CPT

## 2023-12-04 PROCEDURE — 80053 COMPREHEN METABOLIC PANEL: CPT

## 2023-12-04 PROCEDURE — 84443 ASSAY THYROID STIM HORMONE: CPT

## 2023-12-04 PROCEDURE — 85025 COMPLETE CBC W/AUTO DIFF WBC: CPT

## 2023-12-04 PROCEDURE — 84466 ASSAY OF TRANSFERRIN: CPT

## 2023-12-04 PROCEDURE — 83540 ASSAY OF IRON: CPT

## 2023-12-04 PROCEDURE — 36415 COLL VENOUS BLD VENIPUNCTURE: CPT

## 2023-12-04 PROCEDURE — 84439 ASSAY OF FREE THYROXINE: CPT

## 2023-12-06 ENCOUNTER — OFFICE VISIT (OUTPATIENT)
Dept: INTERNAL MEDICINE | Facility: CLINIC | Age: 68
End: 2023-12-06
Payer: MEDICARE

## 2023-12-06 VITALS
OXYGEN SATURATION: 97 % | HEART RATE: 80 BPM | BODY MASS INDEX: 27.69 KG/M2 | SYSTOLIC BLOOD PRESSURE: 130 MMHG | TEMPERATURE: 98.4 F | DIASTOLIC BLOOD PRESSURE: 70 MMHG | HEIGHT: 64 IN | WEIGHT: 162.2 LBS

## 2023-12-06 DIAGNOSIS — I10 ESSENTIAL HYPERTENSION: ICD-10-CM

## 2023-12-06 DIAGNOSIS — E55.9 VITAMIN D DEFICIENCY: ICD-10-CM

## 2023-12-06 DIAGNOSIS — R79.89 ELEVATED LFTS: ICD-10-CM

## 2023-12-06 DIAGNOSIS — E03.4 ACQUIRED ATROPHY OF THYROID: ICD-10-CM

## 2023-12-06 DIAGNOSIS — E11.69 DIABETES MELLITUS TYPE 2 IN OBESE: Primary | ICD-10-CM

## 2023-12-06 DIAGNOSIS — E53.8 VITAMIN B12 DEFICIENCY: ICD-10-CM

## 2023-12-06 DIAGNOSIS — K76.0 FATTY LIVER: ICD-10-CM

## 2023-12-06 DIAGNOSIS — E78.2 MIXED HYPERLIPIDEMIA: ICD-10-CM

## 2023-12-06 DIAGNOSIS — E66.9 DIABETES MELLITUS TYPE 2 IN OBESE: Primary | ICD-10-CM

## 2023-12-06 PROCEDURE — 99214 OFFICE O/P EST MOD 30 MIN: CPT | Performed by: INTERNAL MEDICINE

## 2023-12-06 PROCEDURE — 1160F RVW MEDS BY RX/DR IN RCRD: CPT | Performed by: INTERNAL MEDICINE

## 2023-12-06 PROCEDURE — 3075F SYST BP GE 130 - 139MM HG: CPT | Performed by: INTERNAL MEDICINE

## 2023-12-06 PROCEDURE — 3078F DIAST BP <80 MM HG: CPT | Performed by: INTERNAL MEDICINE

## 2023-12-06 PROCEDURE — 3044F HG A1C LEVEL LT 7.0%: CPT | Performed by: INTERNAL MEDICINE

## 2023-12-06 PROCEDURE — 1159F MED LIST DOCD IN RCRD: CPT | Performed by: INTERNAL MEDICINE

## 2023-12-06 NOTE — ASSESSMENT & PLAN NOTE
Blood pressure is well controlled as of her 12/23 office visit.  She just has low-dose Aldactone on board given history of hypokalemia, BUN is only 10, so certainly stable to continue current plan of care.

## 2023-12-06 NOTE — ASSESSMENT & PLAN NOTE
I thought Endo was going to get her A1c, so we do not have a reading as of her 12/23 office visit.  We will check this with each lab, because it looks like they thought I was going to check it.    Her fasting sugar was 115, but she gets low enough sugars at home sometimes to hesitate backing off on or with holding insulin altogether.  She is on very low doses presently as noted per her list.  She saw endocrinology recently as noted, they elected to continue with moderate dose of Ozempic.    Also of note, patient has at least 32 pounds off since 6/22.
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
## 2023-12-06 NOTE — ASSESSMENT & PLAN NOTE
Patient remains on a drug holiday from pravastatin/atorvastatin due to elevated LFTs.  She does have over 30 pounds off over the past year and a half, will repeat lipids on return to office and make further recommendations at that time based on her LFTs.

## 2023-12-06 NOTE — ASSESSMENT & PLAN NOTE
Patient clinically euthyroid and her TSH is 1.5 as of 12/23, so she can continue with just very low-dose levothyroxine.

## 2023-12-06 NOTE — ASSESSMENT & PLAN NOTE
Patient's LFTs are continuing to improve, she has had excellent weight loss here the past year or so.  Other than earlier this year when her ALT spiked perhaps secondary to pravastatin, she has had a steady trend down and her liver enzymes.  Given consistent weight loss, I think we can defer referral to complex care in regards to FibroScan etc. and continue to monitor her liver enzymes.

## 2023-12-26 RX ORDER — SPIRONOLACTONE 25 MG/1
TABLET ORAL
Qty: 90 TABLET | Refills: 0 | Status: SHIPPED | OUTPATIENT
Start: 2023-12-26

## 2023-12-28 ENCOUNTER — TELEPHONE (OUTPATIENT)
Dept: INTERNAL MEDICINE | Facility: CLINIC | Age: 68
End: 2023-12-28
Payer: MEDICARE

## 2023-12-28 NOTE — TELEPHONE ENCOUNTER
Caller: Vivian Kauffman    Relationship: Self    Best call back number: [phone]     What is the best time to reach you: ANY    Who are you requesting to speak with (clinical staff, provider,  specific staff member): SHREYA     What was the call regarding: PATIENT STATE THAT SHE NEEDS TO DISCUSS REFERRAL WITH SHREYA ABOUT LIVER ENZYMES     Is it okay if the provider responds through MyChart: NO

## 2024-01-04 RX ORDER — FLUOXETINE HYDROCHLORIDE 20 MG/1
80 CAPSULE ORAL DAILY
Qty: 360 CAPSULE | Refills: 0 | Status: SHIPPED | OUTPATIENT
Start: 2024-01-04

## 2024-01-08 ENCOUNTER — OFFICE VISIT (OUTPATIENT)
Dept: GASTROENTEROLOGY | Facility: CLINIC | Age: 69
End: 2024-01-08
Payer: MEDICARE

## 2024-01-08 VITALS
HEART RATE: 81 BPM | HEIGHT: 64 IN | SYSTOLIC BLOOD PRESSURE: 126 MMHG | DIASTOLIC BLOOD PRESSURE: 50 MMHG | WEIGHT: 166.2 LBS | BODY MASS INDEX: 28.38 KG/M2

## 2024-01-08 DIAGNOSIS — E66.3 OVERWEIGHT (BMI 25.0-29.9): ICD-10-CM

## 2024-01-08 DIAGNOSIS — R79.89 ELEVATED LFTS: ICD-10-CM

## 2024-01-08 DIAGNOSIS — K59.00 CONSTIPATION, UNSPECIFIED CONSTIPATION TYPE: ICD-10-CM

## 2024-01-08 DIAGNOSIS — K76.0 FATTY LIVER: Primary | ICD-10-CM

## 2024-01-08 PROCEDURE — 3078F DIAST BP <80 MM HG: CPT | Performed by: NURSE PRACTITIONER

## 2024-01-08 PROCEDURE — 1160F RVW MEDS BY RX/DR IN RCRD: CPT | Performed by: NURSE PRACTITIONER

## 2024-01-08 PROCEDURE — 3074F SYST BP LT 130 MM HG: CPT | Performed by: NURSE PRACTITIONER

## 2024-01-08 PROCEDURE — 99214 OFFICE O/P EST MOD 30 MIN: CPT | Performed by: NURSE PRACTITIONER

## 2024-01-08 PROCEDURE — 1159F MED LIST DOCD IN RCRD: CPT | Performed by: NURSE PRACTITIONER

## 2024-01-08 RX ORDER — ACETAMINOPHEN 500 MG
500 TABLET ORAL EVERY 6 HOURS PRN
COMMUNITY

## 2024-01-08 NOTE — PROGRESS NOTES
"Patient Name: Vivian Kauffman   Visit Date: 01/08/2024   Patient ID: [id number]  Provider: JIM Oliveros    Sex: female  Location:  Location Address:  Location Phone: [address]  [place] [address] [phone]    YOB: 1955  Age: 69 y.o.   Primary Care Provider Shahbaz Acosta MD      Referring Provider: Shahbaz Acosta MD        Chief Complaint  Fatty Liver, Elevated Hepatic Enzymes, Diarrhea (1 episode weekly ), and Constipation (1-2 episodes weekly )    History of Present Illness  Pt has not been seen in the office since 2019. Was having diarrhea alternating with constipation and hx fatty liver noted then.  Pt has been referred today for fatty liver and elev LFTs-- last liver US 6/3/23 -   \" 1. Mild increased echogenicity of the liver compatible with diffuse hepatocellular disease, hepatic steatosis.  Please correlate with liver function test  2. Status post cholecystectomy\"    Liver w/u 2023 is negative.  No ETOH  Pt states she stopped Tylenol and Lipitor d/t elev LFTs   Pt has had gastric sleeve 2017 and has lost 100# total, pt states when she started Ozempic last year she lost 30#  Pt states she normally only eats once/day.   Pt states she does not like coffee  Pt does not have abd pain, states BM's are never regular, may go 2-3 days w no BM, may have diarrhea or constipation when she does have BM.   Pt states at times she has itching in perineal area , feels brought on by wiping either from urination or Bm. Pt states she was given estrogen cream but she has never tried it.     Patient had sigmoid colon zudet-okgfirwjejse-Iw. [facility]ider recommended 10-year recall on screening colonoscopy 2/18/2022  Past Medical History:   Diagnosis Date    Anxiety     Asthma     Coronary arteriosclerosis     Depression     Diabetes mellitus     TYPE 2 - BLOOD GLUCOSE AROUND 100-300    Disease of thyroid gland     Fatigue     Fatty liver     Fibromyalgia     Fibromyositis     GERD " "(gastroesophageal reflux disease)     Heartburn     History of COVID-19 2020    [place] hearing[place]     Hyperlipidemia     Hypertension     Insomnia     Kidney stone     Morbid obesity     Muscle spasm     Pain of both hip joints     Polyphagia(783.6)     PONV (postoperative nausea and vomiting)     Poor vision     Problems with hearing     Seizures     Shortness of breath on exertion     Sinus drainage     Stroke syndrome     \"MINI STROKE\"  left side weakness     Tremor, essential     IN NECK  AND HANDS    Vertigo        Past Surgical History:   Procedure Laterality Date    BREAST BIOPSY Right     CARDIAC CATHETERIZATION      no stents 9/2013 at [facility]    CARPAL TUNNEL RELEASE Bilateral 2009    COLONOSCOPY      2018    COLONOSCOPY N/A 2/18/2022    Procedure: COLONOSCOPY SCREENING WITH POLYPECTOMY;  Surgeon: David Love MD;  Location: [facility] ENDOSCOPY;  Service: Gastroenterology;  Laterality: N/A;  COLON POLYP    CYSTOSCOPY NEPHROURETEROSCOPY Left 6/29/2021    Procedure: CYSTOSCOPY NEPHROURETEROSCOPY;  Surgeon: Mandie Hamilton MD;  Location: [facility] MAIN OR;  Service: Urology;  Laterality: Left;    ENDOSCOPY N/A 9/9/2016    Procedure: ESOPHAGOGASTRODUODENOSCOPY WITH BIOPSY;  Surgeon: Chris Ackerman Jr., MD;  Location: [facility] ENDOSCOPY;  Service:     EXTRACORPOREAL SHOCKWAVE LITHOTRIPSY (ESWL), STENT INSERTION/REMOVAL  09/2013    FOOT SURGERY Left     FRACTURE SURGERY      left hand    GASTRIC BANDING REMOVAL N/A 11/30/2016    Procedure: LAPAROSCOPIC GASTRIC BANDING AND PORT REMOVAL ;  Surgeon: Chris Ackerman Jr., MD;  Location: [facility] OR [facility];  Service:     GASTRIC SLEEVE LAPAROSCOPIC N/A 10/2/2017    Procedure: GASTRIC SLEEVE LAPAROSCOPIC revision WITH LYSIS OF ADHESIONS AND HITAL HERNIA REPAIR ;  Surgeon: Chris Ackerman Jr., MD;  Location: [facility] OR [facility];  Service:     GASTRIC SLEEVE LAPAROSCOPIC      HAND SURGERY Left     LAPAROSCOPIC CHOLECYSTECTOMY      MASS EXCISION " "Right     RT FOOT     NOSE SURGERY      TONSILLECTOMY      URETEROSCOPY STENT INSERTION Left 2021    Procedure: URETEROSCOPY STENT INSERTION;  Surgeon: Mandie Hamilton MD;  Location: [facility] MAIN OR;  Service: Urology;  Laterality: Left;       Allergies   Allergen Reactions    Jardiance [Empagliflozin] Anaphylaxis     Went into ketoacidosis    Peanut Oil Anaphylaxis     throat to close     Latex Hives     SKIN BLISTERS    Sulfa Antibiotics Rash    Ace Inhibitors Cough and Other (See Comments)       Family History   Problem Relation Age of Onset    Heart attack Mother     Diabetes Mother     Hypertension Mother     Stroke Mother     Heart attack Father     Diabetes Father     Hypertension Father     Obesity Father         Morbid Obesity    Stroke Father     Heart attack Brother     Cancer Brother         Bladder    Heart attack Maternal Grandmother     Heart attack Maternal Grandfather     Stroke Paternal Grandmother     Heart attack Paternal Grandfather     Malig Hyperthermia Neg Hx     Colon cancer Neg Hx         Social History     Tobacco Use    Smoking status: Former     Packs/day: 2.00     Years: 30.00     Additional pack years: 0.00     Total pack years: 60.00     Types: Cigarettes     Start date:      Quit date:      Years since quittin.0     Passive exposure: Past    Smokeless tobacco: Never    Tobacco comments:     caffeine use   Vaping Use    Vaping Use: Never used   Substance Use Topics    Alcohol use: Not Currently    Drug use: Never       Objective     Vital Signs:   /50 (BP Location: Right arm, Patient Position: Sitting, Cuff Size: Adult)   Pulse 81   Ht 162.6 cm (64.02\")   Wt 75.4 kg (166 lb 3.2 oz)   BMI 28.51 kg/m²       Physical Exam  Constitutional:       General: The patient is not in acute distress.     Appearance: Normal appearance.   HENT:      Head: Normocephalic and atraumatic.      Nose: Nose normal.   Pulmonary:      Effort: Pulmonary effort is normal. No " respiratory distress.   Abdominal:      General: Abdomen is flat.      Palpations: Abdomen is soft. There is no mass.      Tenderness: There is no abdominal tenderness. There is no guarding.   Musculoskeletal:      Cervical back: Neck supple.      Right lower leg: No edema.      Left lower leg: No edema.   Skin:     General: Skin is warm and dry.   Neurological:      General: No focal deficit present.      Mental Status: The patient is alert and oriented to person, place, and time.      Gait: Gait normal.   Psychiatric:         Mood and Affect: Mood normal.         Speech: Speech normal.         Behavior: Behavior normal.         Thought Content: Thought content normal.     Result Review :   The following data was reviewed by: JIM Oliveros on 01/08/2024:    CBC w/diff          4/14/2023    11:31 6/3/2023    07:51 12/4/2023    08:57   CBC w/Diff   WBC 5.03  4.38  4.69    RBC 5.01  4.91  5.22    Hemoglobin 13.9  13.8  14.9    Hematocrit 42.4  42.2  44.7    MCV 84.6  85.9  85.6    MCH 27.7  28.1  28.5    MCHC 32.8  32.7  33.3    RDW 14.1  13.9  14.0    Platelets 173  154  183    Neutrophil Rel % 61.7  60.3  57.6    Immature Granulocyte Rel % 0.2  0.2  0.2    Lymphocyte Rel % 27.6  25.3  32.0    Monocyte Rel % 8.3  11.0  7.9    Eosinophil Rel % 1.6  2.5  1.9    Basophil Rel % 0.6  0.7  0.4      CMP          6/3/2023    07:51 8/11/2023    10:51 12/4/2023    08:57   CMP   Glucose 172  105  115    BUN 12  13  10    Creatinine 0.74  0.55  0.72    EGFR 88.3  100.0  91.2    Sodium 140  138  140    Potassium 4.0  3.9  4.2    Chloride 104  101  104    Calcium 9.0  9.4  9.5    Total Protein 7.2  6.9  7.3    Albumin 4.3  4.1  4.5    Globulin 2.9  2.8  2.8    Total Bilirubin 0.3  0.4  0.3    Alkaline Phosphatase 118  96  88    AST (SGOT) 66  53  49    ALT (SGPT) 110  80  64    Albumin/Globulin Ratio 1.5  1.5  1.6    BUN/Creatinine Ratio 16.2  23.6  13.9    Anion Gap 9.0  10.0  8.3        Liver Workup   ALPHA -1  ANTITRYPSIN   Date Value Ref Range Status   06/03/2023 175 90 - 200 mg/dL Final     dsDNA   Date Value Ref Range Status   06/03/2023 Negative Negative Final     Expanded MARYSE Screen   Date Value Ref Range Status   06/03/2023 Negative Negative Final     Smooth Muscle Ab   Date Value Ref Range Status   06/03/2023 5 0 - 19 Units Final     Comment:                      Negative                     0 - 19                   Weak positive               20 - 30                   Moderate to strong positive     >30   Actin Antibodies are found in 52-85% of patients with   autoimmune hepatitis or chronic active hepatitis and   in 22% of patients with primary biliary cirrhosis.     Ceruloplasmin   Date Value Ref Range Status   06/03/2023 28 19 - 39 mg/dL Final     Ferritin   Date Value Ref Range Status   12/04/2023 182.00 (H) 13.00 - 150.00 ng/mL Final     Iron   Date Value Ref Range Status   12/04/2023 83 37 - 145 mcg/dL Final     TIBC   Date Value Ref Range Status   12/04/2023 438 298 - 536 mcg/dL Final     Iron Saturation (TSAT)   Date Value Ref Range Status   12/04/2023 19 (L) 20 - 50 % Final     Transferrin   Date Value Ref Range Status   12/04/2023 294 200 - 360 mg/dL Final     Mitochondrial Ab   Date Value Ref Range Status   06/03/2023 <20.0 0.0 - 20.0 Units Final     Comment:                                     Negative    0.0 - 20.0                                  Equivocal  20.1 - 24.9                                  Positive         >24.9  Mitochondrial (M2) Antibodies are found in 90-96% of  patients with primary biliary cirrhosis.     Protime   Date Value Ref Range Status   06/03/2023 13.6 11.8 - 14.9 Seconds Final     INR   Date Value Ref Range Status   06/03/2023 1.03 0.86 - 1.15 Final     ALPHA-FETOPROTEIN   Date Value Ref Range Status   06/03/2023 <2 0 - 8.3 ng/mL Final     Acute HEP Panel   Hepatitis B Surface Ag   Date Value Ref Range Status   06/03/2023 Non-Reactive Non-Reactive Final     Hep A IgM   Date  Value Ref Range Status   06/03/2023 Non-Reactive Non-Reactive Final     Hep B C IgM   Date Value Ref Range Status   06/03/2023 Non-Reactive Non-Reactive Final     Hepatitis C Ab   Date Value Ref Range Status   06/03/2023 Non-Reactive Non-Reactive Final               Assessment and Plan    Diagnoses and all orders for this visit:    1. Fatty liver (Primary)  -     Liver Elastography; Future    2. Overweight (BMI 25.0-29.9)    3. Constipation, unspecified constipation type  Comments:  alternating sometimes with diarrhea.    4. Elevated LFTs              Follow Up   Return for f/u after testing.  Benefiber daily x 7 days, then increase 2 x day.   Protein shakes recommended 2 x day as pt not eating balanced diet.   Discussed with patient importance of weight loss, low-carb diet reviewed, and the benefit of 2-4 c. Coffee/d discussed.  Written information given to patient today.  Check Fibroscan   Try witchhazel wipes, and try cream prescribed as well by her GYN.     Patient was given instructions and counseling regarding her condition or for health maintenance advice. Please see specific information pulled into the AVS if appropriate.

## 2024-01-08 NOTE — PATIENT INSTRUCTIONS
Benefiber daily x 7 days, then increase 2 x day.     Fatty Liver Disease:    The liver converts food into energy, removes toxic material from the blood, makes important proteins, and absorbs necessary vitamins from food. Fatty liver disease occurs when too much fat has built up in your liver cells. Fatty liver disease is also called hepatic steatosis.  In many cases, fatty liver disease does not cause symptoms or problems. It is often diagnosed when tests are being done for other reasons. However, over time, fatty liver can cause inflammation that may lead to more serious liver problems, such as scarring of the liver (cirrhosis) and liver failure.  Fatty liver is associated with insulin resistance, increased body fat, high blood pressure (hypertension), and high cholesterol. These are features of metabolic syndrome and increase your risk for stroke, diabetes, and heart disease.  What are the causes?  This condition may be caused by components of metabolic syndrome:  Obesity.  Insulin resistance.  High cholesterol.  Other causes:  Alcohol abuse.  Poor nutrition.  Cushing syndrome.  Pregnancy.  Certain drugs.  Poisons.  Some viral infections.  What increases the risk?  You are more likely to develop this condition if you:  Abuse alcohol.  Are overweight.  Have diabetes.  Have hepatitis.  Have a high triglyceride level.  Are pregnant.  What are the signs or symptoms?  Fatty liver disease often does not cause symptoms. If symptoms do develop, they can include:  Fatigue and weakness.  Weight loss.  Confusion.  Nausea, vomiting, or abdominal pain.  Yellowing of your skin and the white parts of your eyes (jaundice).  Itchy skin.  How is this diagnosed?  This condition may be diagnosed by:  A physical exam and your medical history.  Blood tests.  Imaging tests, such as an ultrasound, CT scan, or MRI.  A liver biopsy. A small sample of liver tissue is removed using a needle. The sample is then looked at under a  microscope.  How is this treated?  Fatty liver disease is often caused by other health conditions. Treatment for fatty liver may involve medicines and lifestyle changes to manage conditions such as:  Alcoholism.  High cholesterol.  Diabetes.  Being overweight or obese.  Follow these instructions at home:    Do not drink alcohol. If you have trouble quitting, ask your health care provider how to safely quit with the help of medicine or a supervised program. This is important to keep your condition from getting worse.  Eat a healthy diet as told by your health care provider. Ask your health care provider about working with a dietitian to develop an eating plan.  Exercise regularly. This can help you lose weight and control your cholesterol and diabetes. Talk to your health care provider about an exercise plan and which activities are best for you.  Take over-the-counter and prescription medicines only as told by your health care provider.  Keep all follow-up visits. This is important.  Contact a health care provider if:  You have trouble controlling your:  Blood sugar. This is especially important if you have diabetes.  Cholesterol.  Drinking of alcohol.  Get help right away if:  You have abdominal pain.  You have jaundice.  You have nausea and are vomiting.  You vomit blood or material that looks like coffee grounds.  You have stools that are black, tar-like, or bloody.  Summary  Fatty liver disease develops when too much fat builds up in the cells of your liver.  Fatty liver disease often causes no symptoms or problems. However, over time, fatty liver can cause inflammation that may lead to more serious liver problems, such as scarring of the liver (cirrhosis).  You are more likely to develop this condition if you abuse alcohol, are pregnant, are overweight, have diabetes, have hepatitis, or have high triglyceride or cholesterol levels.  Contact your health care provider if you have trouble controlling your blood  sugar, cholesterol, or drinking of alcohol.  This information is not intended to replace advice given to you by your health care provider. Make sure you discuss any questions you have with your health care provider.  Document Revised: 09/30/2021 Document Reviewed: 09/30/2021  [employer] Patient Education © 2023 [employer]

## 2024-01-10 ENCOUNTER — TELEPHONE (OUTPATIENT)
Dept: GASTROENTEROLOGY | Facility: CLINIC | Age: 69
End: 2024-01-10
Payer: MEDICARE

## 2024-01-10 RX ORDER — ZINC OXIDE 216 MG/ML
1 LOTION TOPICAL 2 TIMES DAILY
Qty: 60 EACH | Refills: 5 | Status: SHIPPED | OUTPATIENT
Start: 2024-01-10

## 2024-01-10 NOTE — TELEPHONE ENCOUNTER
Rec'd notification from pharmacy, pt's insurance does not cover nutritional protein shakes, pt will have to get OTC.     Called and left detailed message (okay per VR) with information above

## 2024-01-10 NOTE — TELEPHONE ENCOUNTER
Pt called and left message to verify what powder was recommended at F/U appt as well as see if she can get a prescription for nutritional protein shakes.    Called pt, no answer but left detailed message (okay per VR) that we can try to send in prescription for Protein shakes but may or may not be covered by insurance. I also stated Benefiber was recommended at F/U appt daily for 7 days and then increase to 2x/day.

## 2024-01-17 ENCOUNTER — TELEPHONE (OUTPATIENT)
Dept: OTHER | Facility: HOSPITAL | Age: 69
End: 2024-01-17
Payer: MEDICARE

## 2024-01-17 NOTE — TELEPHONE ENCOUNTER
Left voice message reminding patient of her Fibro Scan appointment on 1/19/24@10:15. Nothing to eat 3 hours prior to appointment.

## 2024-01-18 ENCOUNTER — TELEPHONE (OUTPATIENT)
Dept: GASTROENTEROLOGY | Facility: CLINIC | Age: 69
End: 2024-01-18
Payer: MEDICARE

## 2024-02-22 RX ORDER — LEVOTHYROXINE SODIUM 0.03 MG/1
TABLET ORAL
Qty: 90 TABLET | Refills: 0 | Status: SHIPPED | OUTPATIENT
Start: 2024-02-22

## 2024-03-04 ENCOUNTER — OFFICE VISIT (OUTPATIENT)
Dept: OBSTETRICS AND GYNECOLOGY | Facility: CLINIC | Age: 69
End: 2024-03-04
Payer: MEDICARE

## 2024-03-04 ENCOUNTER — LAB (OUTPATIENT)
Dept: LAB | Facility: HOSPITAL | Age: 69
End: 2024-03-04
Payer: MEDICARE

## 2024-03-04 ENCOUNTER — PATIENT ROUNDING (BHMG ONLY) (OUTPATIENT)
Dept: OBSTETRICS AND GYNECOLOGY | Facility: CLINIC | Age: 69
End: 2024-03-04
Payer: MEDICARE

## 2024-03-04 VITALS
WEIGHT: 157 LBS | HEART RATE: 86 BPM | HEIGHT: 64 IN | SYSTOLIC BLOOD PRESSURE: 133 MMHG | BODY MASS INDEX: 26.8 KG/M2 | DIASTOLIC BLOOD PRESSURE: 77 MMHG

## 2024-03-04 DIAGNOSIS — R79.89 ELEVATED LFTS: ICD-10-CM

## 2024-03-04 DIAGNOSIS — N60.02 SOLITARY CYST OF LEFT BREAST: ICD-10-CM

## 2024-03-04 DIAGNOSIS — E66.9 DIABETES MELLITUS TYPE 2 IN OBESE: ICD-10-CM

## 2024-03-04 DIAGNOSIS — N63.0 BREAST MASS IN FEMALE: ICD-10-CM

## 2024-03-04 DIAGNOSIS — E78.2 MIXED HYPERLIPIDEMIA: ICD-10-CM

## 2024-03-04 DIAGNOSIS — E55.9 VITAMIN D DEFICIENCY: ICD-10-CM

## 2024-03-04 DIAGNOSIS — N81.10 PROLAPSE OF ANTERIOR VAGINAL WALL: Primary | ICD-10-CM

## 2024-03-04 DIAGNOSIS — E53.8 VITAMIN B12 DEFICIENCY: ICD-10-CM

## 2024-03-04 DIAGNOSIS — E11.69 DIABETES MELLITUS TYPE 2 IN OBESE: ICD-10-CM

## 2024-03-04 LAB
25(OH)D3 SERPL-MCNC: 47.1 NG/ML (ref 30–100)
ALBUMIN SERPL-MCNC: 4.6 G/DL (ref 3.5–5.2)
ALBUMIN/GLOB SERPL: 1.6 G/DL
ALP SERPL-CCNC: 86 U/L (ref 39–117)
ALT SERPL W P-5'-P-CCNC: 70 U/L (ref 1–33)
ANION GAP SERPL CALCULATED.3IONS-SCNC: 11 MMOL/L (ref 5–15)
AST SERPL-CCNC: 63 U/L (ref 1–32)
BILIRUB SERPL-MCNC: 0.5 MG/DL (ref 0–1.2)
BUN SERPL-MCNC: 19 MG/DL (ref 8–23)
BUN/CREAT SERPL: 26 (ref 7–25)
CALCIUM SPEC-SCNC: 9.7 MG/DL (ref 8.6–10.5)
CHLORIDE SERPL-SCNC: 101 MMOL/L (ref 98–107)
CHOLEST SERPL-MCNC: 249 MG/DL (ref 0–200)
CO2 SERPL-SCNC: 25 MMOL/L (ref 22–29)
CREAT SERPL-MCNC: 0.73 MG/DL (ref 0.57–1)
EGFRCR SERPLBLD CKD-EPI 2021: 89.2 ML/MIN/1.73
FOLATE SERPL-MCNC: 8.47 NG/ML (ref 4.78–24.2)
GLOBULIN UR ELPH-MCNC: 2.9 GM/DL
GLUCOSE SERPL-MCNC: 144 MG/DL (ref 65–99)
HBA1C MFR BLD: 6.3 % (ref 4.8–5.6)
HDLC SERPL-MCNC: 53 MG/DL (ref 40–60)
LDLC SERPL CALC-MCNC: 174 MG/DL (ref 0–100)
LDLC/HDLC SERPL: 3.23 {RATIO}
POTASSIUM SERPL-SCNC: 4.3 MMOL/L (ref 3.5–5.2)
PROT SERPL-MCNC: 7.5 G/DL (ref 6–8.5)
SODIUM SERPL-SCNC: 137 MMOL/L (ref 136–145)
TRIGL SERPL-MCNC: 123 MG/DL (ref 0–150)
VIT B12 BLD-MCNC: >2000 PG/ML (ref 211–946)
VLDLC SERPL-MCNC: 22 MG/DL (ref 5–40)

## 2024-03-04 PROCEDURE — 83036 HEMOGLOBIN GLYCOSYLATED A1C: CPT

## 2024-03-04 PROCEDURE — 82746 ASSAY OF FOLIC ACID SERUM: CPT

## 2024-03-04 PROCEDURE — 82607 VITAMIN B-12: CPT

## 2024-03-04 PROCEDURE — 36415 COLL VENOUS BLD VENIPUNCTURE: CPT

## 2024-03-04 PROCEDURE — 80053 COMPREHEN METABOLIC PANEL: CPT

## 2024-03-04 PROCEDURE — 3078F DIAST BP <80 MM HG: CPT | Performed by: STUDENT IN AN ORGANIZED HEALTH CARE EDUCATION/TRAINING PROGRAM

## 2024-03-04 PROCEDURE — 80061 LIPID PANEL: CPT

## 2024-03-04 PROCEDURE — 3075F SYST BP GE 130 - 139MM HG: CPT | Performed by: STUDENT IN AN ORGANIZED HEALTH CARE EDUCATION/TRAINING PROGRAM

## 2024-03-04 PROCEDURE — 99203 OFFICE O/P NEW LOW 30 MIN: CPT | Performed by: STUDENT IN AN ORGANIZED HEALTH CARE EDUCATION/TRAINING PROGRAM

## 2024-03-04 PROCEDURE — 82306 VITAMIN D 25 HYDROXY: CPT

## 2024-03-04 RX ORDER — CLOBETASOL PROPIONATE 0.5 MG/G
OINTMENT TOPICAL
COMMUNITY
Start: 2024-02-26

## 2024-03-04 RX ORDER — PRIMIDONE 50 MG/1
TABLET ORAL
COMMUNITY

## 2024-03-04 NOTE — PROGRESS NOTES
New GYN Problem Visit   Chief Complaint   Patient presents with    Follow-up     Complaints of vaginal cyst and left breast lump           HPI  Vivian Kauffman is a 69 y.o. female, , who presents for evaluation for nonpainful vaginal mass and left breast cyst.   She noted left breast cyst approximately 1 day ago.  Slightly uncomfortable.  No skin changes.  Does have annual mammograms ordered by her primary care physician.  Last mammogram in October was normal.  Her main concern today is a nonpainful vaginal mass that she palpates when urinating.  She notices this when she strains.  She reports that she had a cyst removed off her vaginal wall by Dr. Coleman previously.  This does not cause her any pain however is more bothersome knowing that there is something there.  Denies any vaginal bleeding.  She is not sexually active.  She does not endorse bothersome urinary incontinence.  She will occasionally have urge incontinence.  Denies any fecal incontinence.  No history of abnormal Pap smears.  She is a diabetic.  Also takes a water pill.   year:   Current medications:     Additional OB/GYN History   No LMP recorded. Patient is postmenopausal.  Current contraception: contraceptive methods: Post menopausal status  Allergies : Jardiance [empagliflozin], Peanut oil, Latex, Sulfa antibiotics, and Ace inhibitors     The additional following portions of the patient's history were reviewed and updated as appropriate: allergies, current medications, past family history, past medical history, past social history, past surgical history, and problem list.    Review of Systems   Constitutional:  Negative for fatigue and fever.   Respiratory:  Negative for cough and shortness of breath.    Cardiovascular:  Negative for chest pain.   Gastrointestinal:  Negative for abdominal distention and abdominal pain.   Genitourinary:  Positive for breast lump. Negative for difficulty urinating, dysuria, pelvic pressure, vaginal  "discharge and vaginal pain.        Vaginal mass       I have reviewed and agree with the HPI, ROS, and historical information as entered above. Jaylen Ch MD    Objective   /77   Pulse 86   Ht 162.6 cm (64.02\")   Wt 71.2 kg (157 lb)   BMI 26.93 kg/m²     Physical Exam  Vitals and nursing note reviewed. Exam conducted with a chaperone present.   Constitutional:       General: She is not in acute distress.     Appearance: Normal appearance. She is not toxic-appearing.   HENT:      Head: Normocephalic and atraumatic.   Eyes:      Extraocular Movements: Extraocular movements intact.      Conjunctiva/sclera: Conjunctivae normal.   Cardiovascular:      Pulses: Normal pulses.   Pulmonary:      Effort: Pulmonary effort is normal.   Chest:          Comments: Mobile, well-circumscribed 3 mm cyst in left breast approximately 2:00 and 4 cm from nipple.  Abdominal:      General: There is no distension.      Palpations: Abdomen is soft.      Tenderness: There is no abdominal tenderness.   Genitourinary:     General: Normal vulva.      Exam position: Lithotomy position.      Labia:         Right: No tenderness, lesion or injury.         Left: No tenderness, lesion or injury.       Vagina: Prolapsed vaginal walls present.      Cervix: Normal.      Uterus: Normal.       Adnexa:         Right: No mass, tenderness or fullness.          Left: No mass, tenderness or fullness.        Comments: Vaginal atrophy; negative cough stress test  Musculoskeletal:      Cervical back: Normal range of motion.   Skin:     General: Skin is warm and dry.   Neurological:      Mental Status: She is alert and oriented to person, place, and time.   Psychiatric:         Mood and Affect: Mood normal.         Behavior: Behavior normal.         Thought Content: Thought content normal.            Assessment and Plan  Vivian Kauffman is a 69 y.o.  presenting for evaluation of vaginal mass with acute complaint of left breast cyst.  " Well circumcised cyst on left breast.  A diagnostic mammogram will be ordered for evaluation.  In regards to patient's vaginal mass stage I anterior prolapse observed.  No other pathology observed on visualization or palpation.  Discussed with patient management options including conservative, medical, surgical.  At this point time patient would like to continue with conservative measures.  Kegel exercises recommended.  She had been previously prescribed Estrace cream for recurrent urinary tract infections.  Discussed safety profile of medication.  Will follow-up on breast mammogram, otherwise patient to follow-up as need be.    Diagnoses and all orders for this visit:    1. Prolapse of anterior vaginal wall (Primary)  -     Mammo Diagnostic Left With CAD; Future    2. Breast mass in female    3. Solitary cyst of left breast  -     Mammo Diagnostic Left With CAD; Future        Counseling:  Postmenopausal bleeding precautions      She understands the importance of having the above orders performed in a timely fashion.  The risks of not performing them include, but are not limited to, cancer and/or subsequent increase in morbidity and/or mortality.  She is encouraged to review her results online and/or contact or office if she has questions.     Follow Up:  Return if symptoms worsen or fail to improve.        Jaylen Ch MD  03/04/2024

## 2024-03-06 ENCOUNTER — OFFICE VISIT (OUTPATIENT)
Dept: INTERNAL MEDICINE | Facility: CLINIC | Age: 69
End: 2024-03-06
Payer: MEDICARE

## 2024-03-06 VITALS
TEMPERATURE: 97.3 F | BODY MASS INDEX: 26.7 KG/M2 | HEIGHT: 64 IN | RESPIRATION RATE: 18 BRPM | WEIGHT: 156.4 LBS | HEART RATE: 70 BPM | OXYGEN SATURATION: 94 % | SYSTOLIC BLOOD PRESSURE: 122 MMHG | DIASTOLIC BLOOD PRESSURE: 76 MMHG

## 2024-03-06 DIAGNOSIS — K76.0 FATTY LIVER: ICD-10-CM

## 2024-03-06 DIAGNOSIS — G89.29 CHRONIC PAIN WITH DRUG DEPENDENCE: ICD-10-CM

## 2024-03-06 DIAGNOSIS — R93.2 ABNORMAL FINDINGS ON DIAGNOSTIC IMAGING OF LIVER AND BILIARY TRACT: ICD-10-CM

## 2024-03-06 DIAGNOSIS — E66.9 DIABETES MELLITUS TYPE 2 IN OBESE: Primary | ICD-10-CM

## 2024-03-06 DIAGNOSIS — E03.4 ACQUIRED ATROPHY OF THYROID: ICD-10-CM

## 2024-03-06 DIAGNOSIS — E53.8 VITAMIN B12 DEFICIENCY: ICD-10-CM

## 2024-03-06 DIAGNOSIS — I10 ESSENTIAL HYPERTENSION: ICD-10-CM

## 2024-03-06 DIAGNOSIS — E55.9 VITAMIN D DEFICIENCY: ICD-10-CM

## 2024-03-06 DIAGNOSIS — F19.20 CHRONIC PAIN WITH DRUG DEPENDENCE: ICD-10-CM

## 2024-03-06 DIAGNOSIS — E11.69 DIABETES MELLITUS TYPE 2 IN OBESE: Primary | ICD-10-CM

## 2024-03-06 RX ORDER — GABAPENTIN 300 MG/1
CAPSULE ORAL
Qty: 270 CAPSULE | Refills: 1 | Status: SHIPPED | OUTPATIENT
Start: 2024-03-06

## 2024-03-06 RX ORDER — LANOLIN ALCOHOL/MO/W.PET/CERES
1000 CREAM (GRAM) TOPICAL EVERY OTHER DAY
COMMUNITY

## 2024-03-06 NOTE — ASSESSMENT & PLAN NOTE
Patient's B12 level has trended up from 300 to over 2K as of 3/24.  Asked her to backed this down to every other day.

## 2024-03-06 NOTE — ASSESSMENT & PLAN NOTE
Patient is being followed by [facility] for this, she has FibroScan scheduled next month.  LFTs are stable, will get INR and AFP on return to office, they were well normal last year.

## 2024-03-06 NOTE — ASSESSMENT & PLAN NOTE
Blood pressure remains well-controlled as of her 3/24 OV.  She is actually just on low-dose spironolactone, her potassium is fine at 4.3, her GFR is well normal in the 80s, so stable to continue current plan of care.

## 2024-03-06 NOTE — ASSESSMENT & PLAN NOTE
Patient is on weekly prescription strength dosing, level is 47 as of 3/24 office visit, Endo is following this as well, certainly appropriate to continue same.

## 2024-03-06 NOTE — ASSESSMENT & PLAN NOTE
Patient still doing excellent this regards, her A1c was 6.3.    She had some issues with 2 mg dosing, so he left her on the 1 mg, certainly with excellent control, her weight is off almost 40 pounds from her max, she is 186 presently.    Is using very low-dose insulin, she will hold it if she has a low, she keeps a snack with her at all times.  Appears stable to continue current regimen.
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
## 2024-03-07 ENCOUNTER — TELEPHONE (OUTPATIENT)
Dept: OBSTETRICS AND GYNECOLOGY | Facility: CLINIC | Age: 69
End: 2024-03-07
Payer: MEDICARE

## 2024-03-07 DIAGNOSIS — N64.53 RETRACTION OF NIPPLE: ICD-10-CM

## 2024-03-07 DIAGNOSIS — N60.02 SOLITARY CYST OF LEFT BREAST: ICD-10-CM

## 2024-03-07 DIAGNOSIS — N63.0 BREAST MASS IN FEMALE: Primary | ICD-10-CM

## 2024-03-07 NOTE — TELEPHONE ENCOUNTER
Patient called stating she tried to schedule the diagnostic mammogram but was told the order was incorrect. Please sign the attached and notify once signed.    observation

## 2024-03-08 ENCOUNTER — TELEPHONE (OUTPATIENT)
Dept: GASTROENTEROLOGY | Facility: CLINIC | Age: 69
End: 2024-03-08
Payer: MEDICARE

## 2024-03-11 NOTE — TELEPHONE ENCOUNTER
Hub staff attempted to follow warm transfer process and was unsuccessful     Caller: Vivian Kauffman    Relationship to patient: Self    Best call back number: [phone]    Patient is needing: PT IS CALLING BECAUSE SHE NEEDS THE PROCEDURE CODE FOR HER PROCEDURE ON 04/12/2024. SHE WANTS TO MAKE SURE THAT HER INSURANCE WILL COVER THE PROCEDURE. PLEASE GIVE PT A CALL BACK AND IF NOT ABLE TO REACH PT. IT IS OKAY TO LEAVE AN VOICEMAIL.     
Pt presented to office and now has diagnosis code for Fibroscan  
[FreeTextEntry1] : Mr. LAYA DURAND comes in today for a urologic evaluation. Approximately one year ago he noted a penile curvature almost simultaneously with the onset of inability to achieve adequate erections and decreased libido.He denies any trauma or acute psychosocial issues, although he is being treated for depression. He was prescribed Cialis (?dose) without a subjective response.  He know uses Viagra 20mg every other day, with some efficacy. The penile curve is described as right dorsolateral, approximated at 40 degrees. The libido has recently improved. \par \par From his general urologic history, Mr. Durand presents with minimal lower urinary tract symptoms, including nocturia x 1. In 2012 he had one episode of urinary retention requiring catheterization (without an identifiable predisposing factor). \par IPSS: 2/35\par Sono: 118cc PVR; Prostate 82cc\par \par Testosterone: 1/28/21--509.0; \par \par PSAs: 1/18/21--1.90; \par \par

## 2024-03-18 DIAGNOSIS — N39.41 URGE INCONTINENCE: ICD-10-CM

## 2024-03-18 RX ORDER — OXYBUTYNIN CHLORIDE 5 MG/1
5 TABLET, EXTENDED RELEASE ORAL DAILY
Qty: 30 TABLET | Refills: 11 | Status: SHIPPED | OUTPATIENT
Start: 2024-03-18

## 2024-03-21 ENCOUNTER — HOSPITAL ENCOUNTER (OUTPATIENT)
Dept: MAMMOGRAPHY | Facility: HOSPITAL | Age: 69
Discharge: HOME | End: 2024-03-21
Payer: MEDICARE

## 2024-03-21 ENCOUNTER — HOSPITAL ENCOUNTER (OUTPATIENT)
Dept: ULTRASOUND IMAGING | Facility: HOSPITAL | Age: 69
Discharge: HOME | End: 2024-03-21
Payer: MEDICARE

## 2024-03-21 DIAGNOSIS — N60.02 SOLITARY CYST OF LEFT BREAST: ICD-10-CM

## 2024-03-21 DIAGNOSIS — N63.0 BREAST MASS IN FEMALE: ICD-10-CM

## 2024-03-21 DIAGNOSIS — N64.53 RETRACTION OF NIPPLE: ICD-10-CM

## 2024-03-21 PROCEDURE — G0279 TOMOSYNTHESIS, MAMMO: HCPCS

## 2024-03-21 PROCEDURE — 76642 ULTRASOUND BREAST LIMITED: CPT

## 2024-03-21 PROCEDURE — 77065 DX MAMMO INCL CAD UNI: CPT

## 2024-03-25 ENCOUNTER — OFFICE VISIT (OUTPATIENT)
Dept: UROLOGY | Facility: CLINIC | Age: 69
End: 2024-03-25
Payer: MEDICARE

## 2024-03-25 VITALS
DIASTOLIC BLOOD PRESSURE: 67 MMHG | BODY MASS INDEX: 26.8 KG/M2 | SYSTOLIC BLOOD PRESSURE: 118 MMHG | HEIGHT: 64 IN | WEIGHT: 157 LBS

## 2024-03-25 DIAGNOSIS — R31.29 MICROHEMATURIA: Primary | ICD-10-CM

## 2024-03-25 DIAGNOSIS — N32.81 OAB (OVERACTIVE BLADDER): ICD-10-CM

## 2024-03-25 DIAGNOSIS — N30.90 RECURRENT CYSTITIS: ICD-10-CM

## 2024-03-25 LAB
BILIRUB BLD-MCNC: NEGATIVE MG/DL
CLARITY, POC: CLEAR
COLOR UR: YELLOW
EXPIRATION DATE: ABNORMAL
GLUCOSE UR STRIP-MCNC: NEGATIVE MG/DL
KETONES UR QL: NEGATIVE
LEUKOCYTE EST, POC: ABNORMAL
Lab: ABNORMAL
NITRITE UR-MCNC: NEGATIVE MG/ML
PH UR: 5.5 [PH] (ref 5–8)
PROT UR STRIP-MCNC: NEGATIVE MG/DL
RBC # UR STRIP: NEGATIVE /UL
SP GR UR: 1.02 (ref 1–1.03)
UROBILINOGEN UR QL: ABNORMAL

## 2024-03-25 NOTE — PROGRESS NOTES
"Chief Complaint  Hydronephrosis of left kidney    Subjective          Vivian Kauffman presents to [facility] UROLOGY    History of Present Illness    Objective   Vital Signs:   /67   Ht 162.6 cm (64.02\")   Wt 71.2 kg (157 lb)   BMI 26.93 kg/m²       Physical Exam  Vitals and nursing note reviewed.   Constitutional:       Appearance: Normal appearance. She is well-developed.   Pulmonary:      Effort: Pulmonary effort is normal.      Breath sounds: Normal air entry.   Neurological:      Mental Status: She is alert and oriented to person, place, and time.      Motor: Motor function is intact.   Psychiatric:         Mood and Affect: Mood normal.         Behavior: Behavior normal.          Result Review :   The following data was reviewed by: Mandie Hamilton MD on 03/25/2024:    Results for orders placed or performed in visit on 03/25/24   POC Urinalysis Dipstick, Automated    Specimen: Urine   Result Value Ref Range    Color Yellow Yellow, Straw, Dark Yellow, Adriana    Clarity, UA Clear Clear    Specific Gravity  1.020 1.005 - 1.030    pH, Urine 5.5 5.0 - 8.0    Leukocytes Trace (A) Negative    Nitrite, UA Negative Negative    Protein, POC Negative Negative mg/dL    Glucose, UA Negative Negative mg/dL    Ketones, UA Negative Negative    Urobilinogen, UA 0.2 E.U./dL Normal, 0.2 E.U./dL    Bilirubin Negative Negative    Blood, UA Negative Negative    Lot Number [id number]     Expiration Date 122,024               Assessment and Plan    Diagnoses and all orders for this visit:    1. Microhematuria (Primary)  -     POC Urinalysis Dipstick, Automated    2. Recurrent cystitis    3. OAB (overactive bladder)    I refilled her oxybutynin ER to last for 1 year.  She has not had any infections since we saw her last.  She continues on her estradiol cream.  No blood in her urine today.  She will follow-up with Erica Benedict in 1 year for continued surveillance.            Follow Up       No " follow-ups on file.  Patient was given instructions and counseling regarding her condition or for health maintenance advice. Please see specific information pulled into the AVS if appropriate.

## 2024-03-27 RX ORDER — SPIRONOLACTONE 25 MG/1
TABLET ORAL
Qty: 90 TABLET | Refills: 1 | Status: SHIPPED | OUTPATIENT
Start: 2024-03-27

## 2024-04-04 ENCOUNTER — HOSPITAL ENCOUNTER (OUTPATIENT)
Dept: MAMMOGRAPHY | Facility: HOSPITAL | Age: 69
Discharge: HOME | End: 2024-04-04
Payer: MEDICARE

## 2024-04-04 DIAGNOSIS — N63.21 MASS OF UPPER OUTER QUADRANT OF LEFT BREAST: ICD-10-CM

## 2024-04-04 PROCEDURE — 25010000002 LIDOCAINE 1 % SOLUTION: Performed by: STUDENT IN AN ORGANIZED HEALTH CARE EDUCATION/TRAINING PROGRAM

## 2024-04-04 PROCEDURE — A4648 IMPLANTABLE TISSUE MARKER: HCPCS

## 2024-04-04 RX ORDER — LIDOCAINE HYDROCHLORIDE 10 MG/ML
10 INJECTION, SOLUTION INFILTRATION; PERINEURAL ONCE
Status: COMPLETED | OUTPATIENT
Start: 2024-04-04 | End: 2024-04-04

## 2024-04-04 RX ORDER — LIDOCAINE HYDROCHLORIDE AND EPINEPHRINE 10; 10 MG/ML; UG/ML
10 INJECTION, SOLUTION INFILTRATION; PERINEURAL ONCE
Status: COMPLETED | OUTPATIENT
Start: 2024-04-04 | End: 2024-04-04

## 2024-04-04 RX ADMIN — LIDOCAINE HYDROCHLORIDE,EPINEPHRINE BITARTRATE 10 ML: 10; .01 INJECTION, SOLUTION INFILTRATION; PERINEURAL at 10:07

## 2024-04-04 RX ADMIN — LIDOCAINE HYDROCHLORIDE 10 ML: 10 INJECTION, SOLUTION INFILTRATION; PERINEURAL at 10:07

## 2024-04-08 ENCOUNTER — PATIENT OUTREACH (OUTPATIENT)
Dept: ONCOLOGY | Facility: HOSPITAL | Age: 69
End: 2024-04-08
Payer: MEDICARE

## 2024-04-08 ENCOUNTER — TELEPHONE (OUTPATIENT)
Dept: OBSTETRICS AND GYNECOLOGY | Facility: CLINIC | Age: 69
End: 2024-04-08
Payer: MEDICARE

## 2024-04-08 LAB
CYTO UR: NORMAL
LAB AP CASE REPORT: NORMAL
LAB AP CLINICAL INFORMATION: NORMAL
LAB AP SPECIAL STAINS: NORMAL
PATH REPORT.FINAL DX SPEC: NORMAL
PATH REPORT.GROSS SPEC: NORMAL

## 2024-04-08 NOTE — TELEPHONE ENCOUNTER
Caller: Jamari Vivianlin Marshall    Relationship: Self    Best call back number: [phone] CALL ANYTIME.    Caller requesting test results: PATIENT    What test was performed: BREAST BIOPSY    When was the test performed: 04/04/24    Where was the test performed: [facility]    Additional notes: PATIENT IS REQUESTING A CALL BACK TODAY,04/08/24 WITH TEST RESULTS.

## 2024-04-08 NOTE — TELEPHONE ENCOUNTER
Patient contacted and given preliminary report on her breast biopsy. She was advised that once her provider had reviewed and the radiologist reviews we will be in contact with final recommendations.

## 2024-04-09 ENCOUNTER — TELEPHONE (OUTPATIENT)
Dept: OBSTETRICS AND GYNECOLOGY | Facility: CLINIC | Age: 69
End: 2024-04-09
Payer: MEDICARE

## 2024-04-09 NOTE — TELEPHONE ENCOUNTER
Caller: Vivian Kauffman    Relationship to patient: Self    Best call back number: [phone]    Patient is needing: PT ASKING FOR A CALL BACK ABOUT RESULTS OF BIOPSY. PT SPOKE WITH OFFICE YESTERDAY BUT UNSURE OF RESULTS. ASKING FOR CALL BACK

## 2024-04-10 RX ORDER — FLUOXETINE HYDROCHLORIDE 20 MG/1
80 CAPSULE ORAL DAILY
Qty: 360 CAPSULE | Refills: 0 | Status: SHIPPED | OUTPATIENT
Start: 2024-04-10

## 2024-04-12 ENCOUNTER — PROCEDURE VISIT (OUTPATIENT)
Dept: OTHER | Facility: HOSPITAL | Age: 69
End: 2024-04-12
Payer: MEDICARE

## 2024-04-12 DIAGNOSIS — K76.0 FATTY LIVER: ICD-10-CM

## 2024-04-12 PROCEDURE — 91200 LIVER ELASTOGRAPHY: CPT | Performed by: NURSE PRACTITIONER

## 2024-04-16 ENCOUNTER — TELEPHONE (OUTPATIENT)
Dept: GASTROENTEROLOGY | Facility: CLINIC | Age: 69
End: 2024-04-16
Payer: MEDICARE

## 2024-04-16 NOTE — TELEPHONE ENCOUNTER
Pt called for results of her Fibroscan. I explained to pt that it typically takes a week or so for it to be read by a provider and sent to JIM Fox. Pt verbalized frustration and phone call was disconnected.

## 2024-04-16 NOTE — TELEPHONE ENCOUNTER
Called pt, no answer, left message for pt to call back.    Postponing result until tomorrow 4.17.24

## 2024-04-16 NOTE — TELEPHONE ENCOUNTER
----- Message from JIM Oliveros sent at 4/16/2024  3:45 PM EDT -----  Normal/mild liver fat  F2 - mild/mod fibrosis  Recommend following q6 months

## 2024-04-16 NOTE — PROGRESS NOTES
Liver Elastography    Performed by: Jalyn Perez APRN  Authorized by: Diamond Carlos APRN  Ordering Provider: Diamond Carlos APRN    Probe:  M+  Procedure Details:  Procedure: After providing an oral and written explanation of the Fibroscan vibration controlled transient elastography (VTCE) test procedure to the patient. The patient was placed in supine position with right arm in maximum abduction to allow optimal exposure of right lateral abdomen. Patient was briefly assessed, identifying terminus of the xyphoid process and locating an ideal transient elastography testing site, midline and lateral to this point. Patient was instructed to breathe normally and remain stationary during the test process. Pre-measurement data confirmed the transient elastography probe was centered over the liver parenchyma. A series of ten 50 Hz mechanical pulses were applied with controlled application pressure to induce a mechanical shear wave in the liver tissue. For each measurement, the shear wave propagation speed was detected, displayed and converted to its equivalent liver stiffness value in kilopascals. Skin to liver capsules distance and shear wave characteristics were monitored during the entire examination to assure quality data. Median liver stiffness measurement and interquartile range were calculated and displayed in real time. Acquired measurement data was stored and submitted to the provider for review and interpretation. Patient tolerated the procedure well and was discharged without incident.   Clinical Information:     NPO 3 Hours or More: Yes      Actively Drinking: No    Findings:     Median Liver Stiffness Score:  8.7    Interquartile Range (IQR) to Median Ratio:  18    Shanti Stiffness Consistent with:  F2    Current Scan Considered Reliab: Yes      Median Controlled Attenuation Parameter (dB/m):  247    IQR:  14    CAP SCORE:  Normal/mild liver fat

## 2024-04-17 NOTE — TELEPHONE ENCOUNTER
Called pt, no answer, left message for pt to call back.    Postponing result until tomorrow 4.18.24

## 2024-04-19 ENCOUNTER — TELEPHONE (OUTPATIENT)
Dept: GASTROENTEROLOGY | Facility: CLINIC | Age: 69
End: 2024-04-19
Payer: MEDICARE

## 2024-04-19 NOTE — TELEPHONE ENCOUNTER
Called pt, no answer, left message for pt to call back.  As this is the 3rd attempt to reach pt, letter sent

## 2024-04-19 NOTE — TELEPHONE ENCOUNTER
[facility] staff attempted to follow warm transfer process and was unsuccessful     Caller: Vivian Kauffman    Relationship to patient: Self    Best call back number: [phone]    Patient is needing: PATIENT CALLED RETURNING EARL'S PHONE CALL PLEASE CALL BACK

## 2024-04-23 ENCOUNTER — TELEPHONE (OUTPATIENT)
Dept: GASTROENTEROLOGY | Facility: CLINIC | Age: 69
End: 2024-04-23
Payer: MEDICARE

## 2024-04-23 NOTE — TELEPHONE ENCOUNTER
Hub staff attempted to follow warm transfer process and was unsuccessful     Caller: Vivian Kauffman    Relationship to patient: Self    Best call back number: [phone]    Patient is needing: PT CALLED BACK, SAID SHE THINKS SHE'S NOT GETTING EARL'S CALLS BECAUSE SHE MAY HAVE THE NUMBER BLOCKED THINKING IT WAS SPAM. SHE ASKED IF SHE COULD CALL FROM A DIFFERENT NUMBER.

## 2024-04-23 NOTE — TELEPHONE ENCOUNTER
Hub staff attempted to follow warm transfer process and was unsuccessful     Caller: Vivian Kauffman    Relationship to patient: Self    Best call back number: [phone]    Patient is needing: PATIENT CALLED EARL BACK. PLEASE CALL BEFORE 1:30 PATIENT WORKS FORM 1:30-5:30 AND UNABLE TO TALK

## 2024-04-23 NOTE — TELEPHONE ENCOUNTER
Caller: Vivian Kauffman    Relationship: Self    Best call back number: [phone]    What is the best time to reach you: ANY    Who are you requesting to speak with (clinical staff, provider,  specific staff member): ANY    Do you know the name of the person who called: EARL ALEJO MA    What was the call regarding: RESULTS    Is it okay if the provider responds through MyChart: NO, PHONE CALL     244.9

## 2024-04-23 NOTE — TELEPHONE ENCOUNTER
I am unable to call from a different number.  Called pt, no answer, left message for pt to call back.

## 2024-04-23 NOTE — TELEPHONE ENCOUNTER
Called pt, no answer, left message for pt to call back.  Postponing pt call until tomorrow 4.24.24

## 2024-04-24 ENCOUNTER — TELEPHONE (OUTPATIENT)
Dept: GASTROENTEROLOGY | Facility: CLINIC | Age: 69
End: 2024-04-24
Payer: MEDICARE

## 2024-04-24 NOTE — TELEPHONE ENCOUNTER
Hub staff attempted to follow warm transfer process and was unsuccessful     Caller: Vivian Kauffman    Relationship to patient: Self    Best call back number: [phone]    Patient is needing: PT IS RETURNING A MISS CALL FROM MA. PT HAS THE OFFICE BLOCK THINKING THE CALLS WAS A SCAM STANLEY. SO SHE BLOCKED OFFICE. IF OFFICE CALL HER BACK SHE YOU MAY NOT BE ABLE TO GET A HOLD OF HER. SHE HAS BEEN CALLING BACK FOR TWO WEEKS AND NOT ABLE TO GET A HOLD OF THE OFFICE.

## 2024-04-24 NOTE — TELEPHONE ENCOUNTER
"S/W pt, pt stated that she will call back tomorrow. Pt also stated, \" that she doesn't want us calling her between the hours 8am- 5pm because she is at work.   "

## 2024-04-24 NOTE — TELEPHONE ENCOUNTER
Called pt back. Her number goes straight to voicemail. It looks like Monalisa has sent a letter to patient. I advised pt to try to reach us back with questions.

## 2024-05-13 ENCOUNTER — OFFICE VISIT (OUTPATIENT)
Dept: GASTROENTEROLOGY | Facility: CLINIC | Age: 69
End: 2024-05-13
Payer: MEDICARE

## 2024-05-13 VITALS
HEART RATE: 75 BPM | SYSTOLIC BLOOD PRESSURE: 106 MMHG | DIASTOLIC BLOOD PRESSURE: 53 MMHG | HEIGHT: 64 IN | BODY MASS INDEX: 27.01 KG/M2 | WEIGHT: 158.2 LBS

## 2024-05-13 DIAGNOSIS — K76.0 FATTY LIVER: ICD-10-CM

## 2024-05-13 DIAGNOSIS — K59.00 CONSTIPATION, UNSPECIFIED CONSTIPATION TYPE: ICD-10-CM

## 2024-05-13 DIAGNOSIS — E66.3 OVERWEIGHT (BMI 25.0-29.9): ICD-10-CM

## 2024-05-13 DIAGNOSIS — R79.89 ELEVATED LFTS: Primary | ICD-10-CM

## 2024-05-13 DIAGNOSIS — Z13.6 ENCOUNTER FOR SCREENING FOR CARDIOVASCULAR DISORDERS: ICD-10-CM

## 2024-05-13 PROCEDURE — 3078F DIAST BP <80 MM HG: CPT | Performed by: NURSE PRACTITIONER

## 2024-05-13 PROCEDURE — 99214 OFFICE O/P EST MOD 30 MIN: CPT | Performed by: NURSE PRACTITIONER

## 2024-05-13 PROCEDURE — 1159F MED LIST DOCD IN RCRD: CPT | Performed by: NURSE PRACTITIONER

## 2024-05-13 PROCEDURE — 1160F RVW MEDS BY RX/DR IN RCRD: CPT | Performed by: NURSE PRACTITIONER

## 2024-05-13 PROCEDURE — 3074F SYST BP LT 130 MM HG: CPT | Performed by: NURSE PRACTITIONER

## 2024-05-13 NOTE — PROGRESS NOTES
Patient Name: Vivian Kauffman   Visit Date: 05/13/2024   Patient ID: [id number]  Provider: JIM Oliveros    Sex: female  Location:  Location Address:  Location Phone: [phone]  [place] [address] [phone]    YOB: 1955  Age: 69 y.o.   Primary Care Provider Shahbaz Acosta MD      Referring Provider: No ref. provider found        Chief Complaint  Fatty Liver (Follow up )    History of Present Illness    Patient has history of diarrhea alternating with constipation and history of fatty liver.    Patient was referred back to us January 2024 for fatty liver and elevated LFTs.    Liver workup in 2023 negative, no history of alcohol.    Reported history of gastric sleeve in 2017 and lost 100 pounds total- also started Ozempic last year and lost 30 pounds with this; had complaints of itching in perineal area was given an estrogen cream but she had not tried it.    Last colonoscopy 2022 by Dr. Love done for screening-hyperplastic sigmoid colon polyp and recommended 10-year recall.    FibroScan 4/12/2024: F2 and normal to mild liver fat    Pt has lost 8# since being here, has decreased appetite , feels r/t Ozempic, but no other GI c/o  Pt does not like coffee   Pt states she does have diarrhea ,may have 2 days of no BM , and then may have diarrhea. Notices using crystal light may cause diarrhea  No blood in stool or black stool  [place] #6-7 most of the time, also #4 occasionally. If diarrhea may have 4-5 stools/d, no cramping, states bloating all of the time.   States was taken off cholesterol med by PCP, states endocrinology would like her to take  Past Medical History:   Diagnosis Date    Anxiety     Asthma     Coronary arteriosclerosis     Depression     Diabetes mellitus     TYPE 2 - BLOOD GLUCOSE AROUND 100-300    Disease of thyroid gland     Fatigue     Fatty liver     Fibromyalgia     Fibromyositis     GERD (gastroesophageal reflux disease)     Heartburn     History of  "COVID-19 2020    [place] (hard of hearing)     Hyperlipidemia     Hypertension     Insomnia     Kidney stone     Muscle spasm     Pain of both hip joints     Polyphagia(783.6)     PONV (postoperative nausea and vomiting)     Poor vision     Seizures     Shortness of breath on exertion     Sinus drainage     Stroke syndrome     \"MINI STROKE\"  left side weakness     Tremor, essential     IN NECK  AND HANDS    Vertigo        Past Surgical History:   Procedure Laterality Date    BREAST BIOPSY Right     CARDIAC CATHETERIZATION      no stents 9/2013 at [facility]    CARPAL TUNNEL RELEASE Bilateral 2009    COLONOSCOPY      2018    COLONOSCOPY N/A 2/18/2022    Procedure: COLONOSCOPY SCREENING WITH POLYPECTOMY;  Surgeon: David Love MD;  Location: [facility] ENDOSCOPY;  Service: Gastroenterology;  Laterality: N/A;  COLON POLYP    CYSTOSCOPY NEPHROURETEROSCOPY Left 6/29/2021    Procedure: CYSTOSCOPY NEPHROURETEROSCOPY;  Surgeon: Mandie Hamilton MD;  Location: [facility] MAIN OR;  Service: Urology;  Laterality: Left;    ENDOSCOPY N/A 9/9/2016    Procedure: ESOPHAGOGASTRODUODENOSCOPY WITH BIOPSY;  Surgeon: Chris Ackerman Jr., MD;  Location: [facility] ENDOSCOPY;  Service:     EXTRACORPOREAL SHOCKWAVE LITHOTRIPSY (ESWL), STENT INSERTION/REMOVAL  09/2013    FOOT SURGERY Left     FRACTURE SURGERY      left hand    GASTRIC BANDING REMOVAL N/A 11/30/2016    Procedure: LAPAROSCOPIC GASTRIC BANDING AND PORT REMOVAL ;  Surgeon: Chris Ackerman Jr., MD;  Location: [facility] OR [facility];  Service:     GASTRIC SLEEVE LAPAROSCOPIC N/A 10/2/2017    Procedure: GASTRIC SLEEVE LAPAROSCOPIC revision WITH LYSIS OF ADHESIONS AND HITAL HERNIA REPAIR ;  Surgeon: Chris Ackerman Jr., MD;  Location: [facility] OR [facility];  Service:     GASTRIC SLEEVE LAPAROSCOPIC      HAND SURGERY Left     LAPAROSCOPIC CHOLECYSTECTOMY      MASS EXCISION Right     RT FOOT     NOSE SURGERY      TONSILLECTOMY      URETEROSCOPY STENT INSERTION Left 6/29/2021    " "Procedure: URETEROSCOPY STENT INSERTION;  Surgeon: Mandie Hamilton MD;  Location: [facility] MAIN OR;  Service: Urology;  Laterality: Left;       Allergies   Allergen Reactions    Jardiance [Empagliflozin] Anaphylaxis     Went into ketoacidosis    Peanut Oil Anaphylaxis     throat to close     Latex Hives     SKIN BLISTERS    Sulfa Antibiotics Rash    Ace Inhibitors Cough and Other (See Comments)       Family History   Problem Relation Age of Onset    Heart attack Father     Diabetes Father     Hypertension Father     Obesity Father         Morbid Obesity    Stroke Father     Heart attack Mother     Diabetes Mother     Hypertension Mother     Stroke Mother     Heart attack Brother     Cancer Brother         Bladder    Heart attack Paternal Grandfather     Stroke Paternal Grandmother     Heart attack Maternal Grandmother     Heart attack Maternal Grandfather     Malig Hyperthermia Neg Hx     Colon cancer Neg Hx     Breast cancer Neg Hx     Uterine cancer Neg Hx     Ovarian cancer Neg Hx         Social History     Tobacco Use    Smoking status: Former     Current packs/day: 0.00     Average packs/day: 2.0 packs/day for 30.0 years (60.0 ttl pk-yrs)     Types: Cigarettes     Start date:      Quit date:      Years since quittin.3     Passive exposure: Past    Smokeless tobacco: Never    Tobacco comments:     caffeine use   Vaping Use    Vaping status: Never Used   Substance Use Topics    Alcohol use: Not Currently    Drug use: Never       Objective     Vital Signs:   /53 (BP Location: Right arm, Patient Position: Sitting, Cuff Size: Adult)   Pulse 75   Ht 162.6 cm (64.02\")   Wt 71.8 kg (158 lb 3.2 oz)   BMI 27.14 kg/m²       Physical Exam  Constitutional:       General: The patient is not in acute distress.     Appearance: Normal appearance.   HENT:      Head: Normocephalic and atraumatic.      Nose: Nose normal.   Pulmonary:      Effort: Pulmonary effort is normal. No respiratory distress. "   Abdominal:      General: Abdomen is flat.      Palpations: Abdomen is soft. There is no mass.      Tenderness: There is no abdominal tenderness. There is no guarding.   Musculoskeletal:      Cervical back: Neck supple.      Right lower leg: No edema.      Left lower leg: No edema.   Skin:     General: Skin is warm and dry.   Neurological:      General: No focal deficit present.      Mental Status: The patient is alert and oriented to person, place, and time.      Gait: Gait normal.   Psychiatric:         Mood and Affect: Mood normal.         Speech: Speech normal.         Behavior: Behavior normal.         Thought Content: Thought content normal.     Result Review :   The following data was reviewed by: JIM Oliveros on 05/13/2024:    CBC w/diff          6/3/2023    07:51 12/4/2023    08:57   CBC w/Diff   WBC 4.38  4.69    RBC 4.91  5.22    Hemoglobin 13.8  14.9    Hematocrit 42.2  44.7    MCV 85.9  85.6    MCH 28.1  28.5    MCHC 32.7  33.3    RDW 13.9  14.0    Platelets 154  183    Neutrophil Rel % 60.3  57.6    Immature Granulocyte Rel % 0.2  0.2    Lymphocyte Rel % 25.3  32.0    Monocyte Rel % 11.0  7.9    Eosinophil Rel % 2.5  1.9    Basophil Rel % 0.7  0.4      CMP          8/11/2023    10:51 12/4/2023    08:57 3/4/2024    09:53   CMP   Glucose 105  115  144    BUN 13  10  19    Creatinine 0.55  0.72  0.73    EGFR 100.0  91.2  89.2    Sodium 138  140  137    Potassium 3.9  4.2  4.3    Chloride 101  104  101    Calcium 9.4  9.5  9.7    Total Protein 6.9  7.3  7.5    Albumin 4.1  4.5  4.6    Globulin 2.8  2.8  2.9    Total Bilirubin 0.4  0.3  0.5    Alkaline Phosphatase 96  88  86    AST (SGOT) 53  49  63    ALT (SGPT) 80  64  70    Albumin/Globulin Ratio 1.5  1.6  1.6    BUN/Creatinine Ratio 23.6  13.9  26.0    Anion Gap 10.0  8.3  11.0        Liver Workup   ALPHA -1 ANTITRYPSIN   Date Value Ref Range Status   06/03/2023 175 90 - 200 mg/dL Final     dsDNA   Date Value Ref Range Status    06/03/2023 Negative Negative Final     Expanded MARYSE Screen   Date Value Ref Range Status   06/03/2023 Negative Negative Final     Smooth Muscle Ab   Date Value Ref Range Status   06/03/2023 5 0 - 19 Units Final     Comment:                      Negative                     0 - 19                   Weak positive               20 - 30                   Moderate to strong positive     >30   Actin Antibodies are found in 52-85% of patients with   autoimmune hepatitis or chronic active hepatitis and   in 22% of patients with primary biliary cirrhosis.     Ceruloplasmin   Date Value Ref Range Status   06/03/2023 28 19 - 39 mg/dL Final     Ferritin   Date Value Ref Range Status   12/04/2023 182.00 (H) 13.00 - 150.00 ng/mL Final     Iron   Date Value Ref Range Status   12/04/2023 83 37 - 145 mcg/dL Final     TIBC   Date Value Ref Range Status   12/04/2023 438 298 - 536 mcg/dL Final     Iron Saturation (TSAT)   Date Value Ref Range Status   12/04/2023 19 (L) 20 - 50 % Final     Transferrin   Date Value Ref Range Status   12/04/2023 294 200 - 360 mg/dL Final     Mitochondrial Ab   Date Value Ref Range Status   06/03/2023 <20.0 0.0 - 20.0 Units Final     Comment:                                     Negative    0.0 - 20.0                                  Equivocal  20.1 - 24.9                                  Positive         >24.9  Mitochondrial (M2) Antibodies are found in 90-96% of  patients with primary biliary cirrhosis.     Protime   Date Value Ref Range Status   06/03/2023 13.6 11.8 - 14.9 Seconds Final     INR   Date Value Ref Range Status   06/03/2023 1.03 0.86 - 1.15 Final     ALPHA-FETOPROTEIN   Date Value Ref Range Status   06/03/2023 <2 0 - 8.3 ng/mL Final     Acute HEP Panel   Hepatitis B Surface Ag   Date Value Ref Range Status   06/03/2023 Non-Reactive Non-Reactive Final     Hep A IgM   Date Value Ref Range Status   06/03/2023 Non-Reactive Non-Reactive Final     Hep B C IgM   Date Value Ref Range Status    06/03/2023 Non-Reactive Non-Reactive Final     Hepatitis C Ab   Date Value Ref Range Status   06/03/2023 Non-Reactive Non-Reactive Final               Assessment and Plan    Diagnoses and all orders for this visit:    1. Elevated LFTs (Primary)  -     Hepatic Function Panel; Future  -     US Liver; Future  -     BAIRD Fibrosure; Future    2. Fatty liver  -     US Liver; Future  -     BAIRD Fibrosure; Future    3. Overweight (BMI 25.0-29.9)  -     BAIRD Fibrosure; Future    4. Constipation, unspecified constipation type    5. Encounter for screening for cardiovascular disorders  -     BAIRD Fibrosure; Future    Other orders  -     riFAXIMin (Xifaxan) 550 MG tablet; Take 1 tablet by mouth Every 8 (Eight) Hours for 14 days.  Dispense: 42 tablet; Refill: 0              Follow Up   Return in about 3 months (around 8/13/2024).  Stop artifical sweeteners if no improvement then trial Xifaxan. Pt to let us know if cannot afford  Encouraged cont wt loss, but not to go under 1000 susu/day. Encouraged glucerna or meal replacement shake in addition to eating small frequent meals.   Liver US   Repeat LFTs in June, pt has other labs to do then as well for PCP  R/w pt we are not opposed to statin therapy if needed, will cont to monitor labs. LFTs likely elevated r/t fatty liver disease  Will check BAIRD-Fibrosure lab, and if agrees with fibroscan showing F2 or F3 will further discuss with pt new treatment for fatty liver disease, Rezdiffra. I did begin this conversation with her today and rev'd common side effects as well as 1 pt with hepatotoxicity.    Patient was given instructions and counseling regarding her condition or for health maintenance advice. Please see specific information pulled into the AVS if appropriate.

## 2024-05-13 NOTE — PATIENT INSTRUCTIONS
Stop artifical sweeteners if no improvement then trial Xifaxan. Pt to let us know if cannot afford  Encouraged cont wt loss, but not to go under 1000 susu/day. Encouraged glucerna or meal replacement shake in addition to eating small frequent meals.     Fatty Liver Disease    The liver converts food into energy, removes toxic material from the blood, makes important proteins, and absorbs necessary vitamins from food. Fatty liver disease occurs when too much fat has built up in your liver cells. Fatty liver disease is also called hepatic steatosis.  In many cases, fatty liver disease does not cause symptoms or problems. It is often diagnosed when tests are being done for other reasons. However, over time, fatty liver can cause inflammation that may lead to more serious liver problems, such as scarring of the liver (cirrhosis) and liver failure.  Fatty liver is associated with insulin resistance, increased body fat, high blood pressure (hypertension), and high cholesterol. These are features of metabolic syndrome and increase your risk for stroke, diabetes, and heart disease.  What are the causes?  This condition may be caused by components of metabolic syndrome:  Obesity.  Insulin resistance.  High cholesterol.  Other causes:  Alcohol abuse.  Poor nutrition.  Cushing syndrome.  Pregnancy.  Certain drugs.  Poisons.  Some viral infections.  What increases the risk?  You are more likely to develop this condition if you:  Abuse alcohol.  Are overweight.  Have diabetes.  Have hepatitis.  Have a high triglyceride level.  Are pregnant.  What are the signs or symptoms?  Fatty liver disease often does not cause symptoms. If symptoms do develop, they can include:  Fatigue and weakness.  Weight loss.  Confusion.  Nausea, vomiting, or abdominal pain.  Yellowing of your skin and the white parts of your eyes (jaundice).  Itchy skin.  How is this diagnosed?  This condition may be diagnosed by:  A physical exam and your medical  history.  Blood tests.  Imaging tests, such as an ultrasound, CT scan, or MRI.  A liver biopsy. A small sample of liver tissue is removed using a needle. The sample is then looked at under a microscope.  How is this treated?  Fatty liver disease is often caused by other health conditions. Treatment for fatty liver may involve medicines and lifestyle changes to manage conditions such as:  Alcoholism.  High cholesterol.  Diabetes.  Being overweight or obese.  Follow these instructions at home:    Do not drink alcohol. If you have trouble quitting, ask your health care provider how to safely quit with the help of medicine or a supervised program. This is important to keep your condition from getting worse.  Eat a healthy diet as told by your health care provider. Ask your health care provider about working with a dietitian to develop an eating plan.  Exercise regularly. This can help you lose weight and control your cholesterol and diabetes. Talk to your health care provider about an exercise plan and which activities are best for you.  Take over-the-counter and prescription medicines only as told by your health care provider.  Keep all follow-up visits. This is important.  Contact a health care provider if:  You have trouble controlling your:  Blood sugar. This is especially important if you have diabetes.  Cholesterol.  Drinking of alcohol.  Get help right away if:  You have abdominal pain.  You have jaundice.  You have nausea and are vomiting.  You vomit blood or material that looks like coffee grounds.  You have stools that are black, tar-like, or bloody.  Summary  Fatty liver disease develops when too much fat builds up in the cells of your liver.  Fatty liver disease often causes no symptoms or problems. However, over time, fatty liver can cause inflammation that may lead to more serious liver problems, such as scarring of the liver (cirrhosis).  You are more likely to develop this condition if you abuse alcohol,  are pregnant, are overweight, have diabetes, have hepatitis, or have high triglyceride or cholesterol levels.  Contact your health care provider if you have trouble controlling your blood sugar, cholesterol, or drinking of alcohol.  This information is not intended to replace advice given to you by your health care provider. Make sure you discuss any questions you have with your health care provider.  Document Revised: 09/30/2021 Document Reviewed: 09/30/2021  Elsevier Patient Education © 2024 [employer]

## 2024-05-15 ENCOUNTER — TELEPHONE (OUTPATIENT)
Dept: GASTROENTEROLOGY | Facility: CLINIC | Age: 69
End: 2024-05-15
Payer: MEDICARE

## 2024-05-15 NOTE — TELEPHONE ENCOUNTER
Spoke with patient and lantigua agreed to wait for PA.     She stated  has tried to give her this medication before and patients insurance wouldn't approve it so tahts why she was sent to us to try to get that mediation. I told patient I would keep her updated.

## 2024-05-15 NOTE — TELEPHONE ENCOUNTER
Caller Name: Vivian Kauffman      Relationship: Self      Best Contact Number: [phone]      Patient is requesting samples of PATIENT DOES NOT KNOW THE NAME IT IS HER IBS MEDICATION MARIELA PRESCRIBED ON MONDAY      How many days of medication do you have left? PATIENT STATES IT IS TOO COSTLY AND MARIELA TOLD HER IF SHE CAN NOT AFFORD IT TO COME BY THE OFFICE TO GET SAMPLES

## 2024-05-15 NOTE — TELEPHONE ENCOUNTER
Called patient to let her know I did a PA for this medication earlier this morning and it should reduce or take away the cost completely. She asked if she could just get samples instead of going to pharmacy; I let her know it shouldn't be a problem but I would continue the PA just in case. She may need the medication again in the future and we would want her to have access to it. Patient agreed. Please advise on samples and how many. Thank you.

## 2024-05-23 ENCOUNTER — TELEPHONE (OUTPATIENT)
Dept: GASTROENTEROLOGY | Facility: CLINIC | Age: 69
End: 2024-05-23
Payer: MEDICARE

## 2024-05-23 NOTE — TELEPHONE ENCOUNTER
Caller: Vivian Kauffman    Relationship: Self    Best call back number: [phone]     Who are you requesting to speak with (clinical staff, provider,  specific staff member): MARIELA RICKS OR CLINICAL     What was the call regarding: PATIENT CALLED REGARDING NOT BEING ABLE TO GET XIFAXAN AT PHARMACY. PLEASE CALL PATIENT TO DISCUSS.

## 2024-05-24 ENCOUNTER — TELEPHONE (OUTPATIENT)
Dept: INTERNAL MEDICINE | Age: 69
End: 2024-05-24
Payer: MEDICARE

## 2024-05-24 NOTE — TELEPHONE ENCOUNTER
Caller: Vivian Kauffman    Relationship: Self    Best call back number: [phone]    What is the best time to reach you: ANY    Who are you requesting to speak with (clinical staff, provider,  specific staff member): SHREYA     What was the call regarding: PATIENT STATED MD ENCINAS IS HAVING HER DO BLOOD WORK IN THE BEGINNING OF JUNE. SHE STATED SHE SHOULD HAVE AN APPOINTMENT TO SEE HIM AFTER BLOOD WORK, INFORMED PATIENT NO APPOINTMENT HAS BEEN SCHEDULED AND OFFERED TO SCHEDULE FOR HER PATIENT DENIED AND WOULD LIKE TO SPEAK WITH MD ENCINAS STAFF.

## 2024-05-31 ENCOUNTER — LAB (OUTPATIENT)
Dept: LAB | Facility: HOSPITAL | Age: 69
End: 2024-05-31
Payer: MEDICARE

## 2024-05-31 DIAGNOSIS — K76.0 FATTY LIVER: ICD-10-CM

## 2024-05-31 DIAGNOSIS — E03.4 ACQUIRED ATROPHY OF THYROID: ICD-10-CM

## 2024-05-31 DIAGNOSIS — I10 ESSENTIAL HYPERTENSION: ICD-10-CM

## 2024-05-31 DIAGNOSIS — E66.9 TYPE 2 DIABETES MELLITUS WITH OBESITY: ICD-10-CM

## 2024-05-31 DIAGNOSIS — Z13.6 ENCOUNTER FOR SCREENING FOR CARDIOVASCULAR DISORDERS: ICD-10-CM

## 2024-05-31 DIAGNOSIS — E53.8 VITAMIN B12 DEFICIENCY: ICD-10-CM

## 2024-05-31 DIAGNOSIS — R79.89 ELEVATED LFTS: ICD-10-CM

## 2024-05-31 DIAGNOSIS — E11.69 TYPE 2 DIABETES MELLITUS WITH OBESITY: ICD-10-CM

## 2024-05-31 DIAGNOSIS — R93.2 ABNORMAL FINDINGS ON DIAGNOSTIC IMAGING OF LIVER AND BILIARY TRACT: ICD-10-CM

## 2024-05-31 DIAGNOSIS — E66.3 OVERWEIGHT (BMI 25.0-29.9): ICD-10-CM

## 2024-05-31 LAB
ALBUMIN SERPL-MCNC: 4.5 G/DL (ref 3.5–5.2)
ALP SERPL-CCNC: 75 U/L (ref 39–117)
ALPHA-FETOPROTEIN: <2 NG/ML (ref 0–8.3)
ALT SERPL W P-5'-P-CCNC: 50 U/L (ref 1–33)
ANION GAP SERPL CALCULATED.3IONS-SCNC: 11 MMOL/L (ref 5–15)
AST SERPL-CCNC: 38 U/L (ref 1–32)
BASOPHILS # BLD AUTO: 0.02 10*3/MM3 (ref 0–0.2)
BASOPHILS NFR BLD AUTO: 0.4 % (ref 0–1.5)
BILIRUB CONJ SERPL-MCNC: <0.2 MG/DL (ref 0–0.3)
BILIRUB INDIRECT SERPL-MCNC: ABNORMAL MG/DL
BILIRUB SERPL-MCNC: 0.3 MG/DL (ref 0–1.2)
BUN SERPL-MCNC: 12 MG/DL (ref 8–23)
BUN/CREAT SERPL: 18.8 (ref 7–25)
CALCIUM SPEC-SCNC: 9.2 MG/DL (ref 8.6–10.5)
CHLORIDE SERPL-SCNC: 103 MMOL/L (ref 98–107)
CO2 SERPL-SCNC: 27 MMOL/L (ref 22–29)
CREAT SERPL-MCNC: 0.64 MG/DL (ref 0.57–1)
DEPRECATED RDW RBC AUTO: 43.8 FL (ref 37–54)
EGFRCR SERPLBLD CKD-EPI 2021: 95.8 ML/MIN/1.73
EOSINOPHIL # BLD AUTO: 0.08 10*3/MM3 (ref 0–0.4)
EOSINOPHIL NFR BLD AUTO: 1.7 % (ref 0.3–6.2)
ERYTHROCYTE [DISTWIDTH] IN BLOOD BY AUTOMATED COUNT: 13.6 % (ref 12.3–15.4)
FOLATE SERPL-MCNC: 6.72 NG/ML (ref 4.78–24.2)
GLUCOSE SERPL-MCNC: 111 MG/DL (ref 65–99)
HBA1C MFR BLD: 5.8 % (ref 4.8–5.6)
HCT VFR BLD AUTO: 43.6 % (ref 34–46.6)
HGB BLD-MCNC: 14.3 G/DL (ref 12–15.9)
IMM GRANULOCYTES # BLD AUTO: 0.01 10*3/MM3 (ref 0–0.05)
IMM GRANULOCYTES NFR BLD AUTO: 0.2 % (ref 0–0.5)
INR PPP: 0.99 (ref 0.86–1.15)
LYMPHOCYTES # BLD AUTO: 1.35 10*3/MM3 (ref 0.7–3.1)
LYMPHOCYTES NFR BLD AUTO: 28.4 % (ref 19.6–45.3)
MCH RBC QN AUTO: 28.6 PG (ref 26.6–33)
MCHC RBC AUTO-ENTMCNC: 32.8 G/DL (ref 31.5–35.7)
MCV RBC AUTO: 87.2 FL (ref 79–97)
MONOCYTES # BLD AUTO: 0.51 10*3/MM3 (ref 0.1–0.9)
MONOCYTES NFR BLD AUTO: 10.7 % (ref 5–12)
NEUTROPHILS NFR BLD AUTO: 2.79 10*3/MM3 (ref 1.7–7)
NEUTROPHILS NFR BLD AUTO: 58.6 % (ref 42.7–76)
NRBC BLD AUTO-RTO: 0 /100 WBC (ref 0–0.2)
PLATELET # BLD AUTO: 149 10*3/MM3 (ref 140–450)
PMV BLD AUTO: 9.8 FL (ref 6–12)
POTASSIUM SERPL-SCNC: 4.7 MMOL/L (ref 3.5–5.2)
PROT SERPL-MCNC: 7.3 G/DL (ref 6–8.5)
PROTHROMBIN TIME: 13.3 SECONDS (ref 11.8–14.9)
RBC # BLD AUTO: 5 10*6/MM3 (ref 3.77–5.28)
SODIUM SERPL-SCNC: 141 MMOL/L (ref 136–145)
TSH SERPL DL<=0.05 MIU/L-ACNC: 2.07 UIU/ML (ref 0.27–4.2)
VIT B12 BLD-MCNC: 1145 PG/ML (ref 211–946)
WBC NRBC COR # BLD AUTO: 4.76 10*3/MM3 (ref 3.4–10.8)

## 2024-05-31 PROCEDURE — 82607 VITAMIN B-12: CPT

## 2024-05-31 PROCEDURE — 83036 HEMOGLOBIN GLYCOSYLATED A1C: CPT

## 2024-05-31 PROCEDURE — 82746 ASSAY OF FOLIC ACID SERUM: CPT

## 2024-05-31 PROCEDURE — 80076 HEPATIC FUNCTION PANEL: CPT

## 2024-05-31 PROCEDURE — 84478 ASSAY OF TRIGLYCERIDES: CPT

## 2024-05-31 PROCEDURE — 83883 ASSAY NEPHELOMETRY NOT SPEC: CPT

## 2024-05-31 PROCEDURE — 85025 COMPLETE CBC W/AUTO DIFF WBC: CPT

## 2024-05-31 PROCEDURE — 82977 ASSAY OF GGT: CPT

## 2024-05-31 PROCEDURE — 80048 BASIC METABOLIC PNL TOTAL CA: CPT

## 2024-05-31 PROCEDURE — 82172 ASSAY OF APOLIPOPROTEIN: CPT

## 2024-05-31 PROCEDURE — 82947 ASSAY GLUCOSE BLOOD QUANT: CPT

## 2024-05-31 PROCEDURE — 36415 COLL VENOUS BLD VENIPUNCTURE: CPT

## 2024-05-31 PROCEDURE — 82105 ALPHA-FETOPROTEIN SERUM: CPT

## 2024-05-31 PROCEDURE — 83010 ASSAY OF HAPTOGLOBIN QUANT: CPT

## 2024-05-31 PROCEDURE — 82465 ASSAY BLD/SERUM CHOLESTEROL: CPT

## 2024-05-31 PROCEDURE — 85610 PROTHROMBIN TIME: CPT

## 2024-05-31 PROCEDURE — 84443 ASSAY THYROID STIM HORMONE: CPT

## 2024-06-02 ENCOUNTER — HOSPITAL ENCOUNTER (OUTPATIENT)
Dept: ULTRASOUND IMAGING | Facility: HOSPITAL | Age: 69
Discharge: HOME | End: 2024-06-02
Payer: MEDICARE

## 2024-06-02 DIAGNOSIS — R79.89 ELEVATED LFTS: ICD-10-CM

## 2024-06-02 DIAGNOSIS — K76.0 FATTY LIVER: ICD-10-CM

## 2024-06-02 PROCEDURE — 76705 ECHO EXAM OF ABDOMEN: CPT

## 2024-06-03 ENCOUNTER — OFFICE VISIT (OUTPATIENT)
Dept: INTERNAL MEDICINE | Age: 69
End: 2024-06-03
Payer: MEDICARE

## 2024-06-03 VITALS
WEIGHT: 152 LBS | TEMPERATURE: 97.7 F | OXYGEN SATURATION: 98 % | SYSTOLIC BLOOD PRESSURE: 110 MMHG | HEART RATE: 76 BPM | BODY MASS INDEX: 25.95 KG/M2 | HEIGHT: 64 IN | DIASTOLIC BLOOD PRESSURE: 76 MMHG

## 2024-06-03 DIAGNOSIS — E66.9 TYPE 2 DIABETES MELLITUS WITH OBESITY: ICD-10-CM

## 2024-06-03 DIAGNOSIS — E53.8 VITAMIN B12 DEFICIENCY: ICD-10-CM

## 2024-06-03 DIAGNOSIS — K76.0 FATTY LIVER: ICD-10-CM

## 2024-06-03 DIAGNOSIS — E11.69 TYPE 2 DIABETES MELLITUS WITH OBESITY: ICD-10-CM

## 2024-06-03 DIAGNOSIS — I10 ESSENTIAL HYPERTENSION: ICD-10-CM

## 2024-06-03 DIAGNOSIS — Z00.00 MEDICARE ANNUAL WELLNESS VISIT, SUBSEQUENT: Primary | ICD-10-CM

## 2024-06-03 DIAGNOSIS — E03.4 ACQUIRED ATROPHY OF THYROID: ICD-10-CM

## 2024-06-03 DIAGNOSIS — E78.2 MIXED HYPERLIPIDEMIA: ICD-10-CM

## 2024-06-03 PROCEDURE — 1170F FXNL STATUS ASSESSED: CPT | Performed by: INTERNAL MEDICINE

## 2024-06-03 PROCEDURE — 3044F HG A1C LEVEL LT 7.0%: CPT | Performed by: INTERNAL MEDICINE

## 2024-06-03 PROCEDURE — 3074F SYST BP LT 130 MM HG: CPT | Performed by: INTERNAL MEDICINE

## 2024-06-03 PROCEDURE — 1159F MED LIST DOCD IN RCRD: CPT | Performed by: INTERNAL MEDICINE

## 2024-06-03 PROCEDURE — 3078F DIAST BP <80 MM HG: CPT | Performed by: INTERNAL MEDICINE

## 2024-06-03 PROCEDURE — G0439 PPPS, SUBSEQ VISIT: HCPCS | Performed by: INTERNAL MEDICINE

## 2024-06-03 PROCEDURE — 1126F AMNT PAIN NOTED NONE PRSNT: CPT | Performed by: INTERNAL MEDICINE

## 2024-06-03 PROCEDURE — 99214 OFFICE O/P EST MOD 30 MIN: CPT | Performed by: INTERNAL MEDICINE

## 2024-06-03 PROCEDURE — 1160F RVW MEDS BY RX/DR IN RCRD: CPT | Performed by: INTERNAL MEDICINE

## 2024-06-03 RX ORDER — ATORVASTATIN CALCIUM 40 MG/1
40 TABLET, FILM COATED ORAL EVERY OTHER DAY
COMMUNITY

## 2024-06-03 NOTE — PROGRESS NOTES
The ABCs of the Annual Wellness Visit  Subsequent Medicare Wellness Visit    Subjective      Vivian Kauffman is a 69 y.o. female who presents for a Subsequent Medicare Wellness Visit.    The following portions of the patient's history were reviewed and   updated as appropriate: allergies, current medications, past family history, past medical history, past social history, past surgical history, and problem list.    Compared to one year ago, the patient feels her physical   health is worse in regards to increased weakness in general.    Compared to one year ago, the patient feels her mental   health is the same.    Recent Hospitalizations:  She was not admitted to the hospital during the last year.       Current Medical Providers:  Patient Care Team:  Shahbaz Acosta MD as PCP - General (Internal Medicine)  Kia Waters MD as Consulting Physician (Cardiology)  Mandie Hamilton MD as Consulting Physician (Urology)  Erica Montanez APRN as Nurse Practitioner (Urology)  Diamond Carlos APRN as Nurse Practitioner (Nurse Practitioner)    Outpatient Medications Prior to Visit   Medication Sig Dispense Refill    acetaminophen (TYLENOL) 500 MG tablet Take 1 tablet by mouth Every 6 (Six) Hours As Needed for Mild Pain.      albuterol sulfate  (90 Base) MCG/ACT inhaler 1 puff As Needed.      atorvastatin (LIPITOR) 40 MG tablet Take 1 tablet by mouth Every Other Day.      cetirizine (zyrTEC) 10 MG tablet TAKE 1 TABLET BY MOUTH ONCE DAILY AS NEEDED FOR SNEEZING, ITCHING, RUNNY NOSE      clobetasol (TEMOVATE) 0.05 % ointment APPLY TO LESIONS ON SHOULDER 2 TIMES A DAY AS NEEDED.      clopidogrel (PLAVIX) 75 MG tablet TAKE 1 TABLET BY MOUTH ONCE DAILY AT NIGHT 90 tablet 1    estradiol (ESTRACE) 0.1 MG/GM vaginal cream Apply a pea-sized amount inside the vagina and rub in and applied another pea-sized amount around the vestibule and massage and as well as around urethra.  3 times a week at night 42.5 g  6    FLUoxetine (PROzac) 20 MG capsule TAKE 4 CAPSULES BY MOUTH ONCE DAILY 360 capsule 0    gabapentin (NEURONTIN) 300 MG capsule TAKE 1 CAPSULE BY MOUTH THREE TIMES DAILY 270 capsule 1    insulin glargine (LANTUS, SEMGLEE) 100 UNIT/ML injection Inject 4 Units under the skin into the appropriate area as directed 2 (Two) Times a Day.      Insulin Lispro, 1 Unit Dial, (HumaLOG KwikPen) 100 UNIT/ML solution pen-injector Inject 2 Units under the skin into the appropriate area as directed 3 (Three) Times a Day With Meals.      levETIRAcetam (KEPPRA) 500 MG tablet 6 tablets daily      levothyroxine (SYNTHROID, LEVOTHROID) 25 MCG tablet Take 1 tablet by mouth once daily 90 tablet 0    montelukast (SINGULAIR) 10 MG tablet Take 1 tablet by mouth once daily 90 tablet 3    Nutritional Supplements (Nutritional Supplement Plus) liquid Take 1 can by mouth 2 (Two) Times a Day. 60 each 5    oxybutynin XL (DITROPAN-XL) 5 MG 24 hr tablet Take 1 tablet by mouth Daily. 30 tablet 11    Ozempic, 1 MG/DOSE, 4 MG/3ML solution pen-injector INJECT 1MG SUBCUTANEOUSLY ONCE A WEEK      primidone (MYSOLINE) 50 MG tablet if needed.      ProAir Digihaler 108 (90 Base) MCG/ACT aerosol powder        prochlorperazine (COMPAZINE) 5 MG tablet Take 1 tablet by mouth Every 6 (Six) Hours As Needed.      RELION PEN NEEDLE 31G/8MM 31G X 8 MM misc USE 4- 5 TIMES DAILY AS DIRECTED      riFAXIMin (Xifaxan) 550 MG tablet Take 1 tablet by mouth Every 8 (Eight) Hours for 21 doses. 21 tablet 0    spironolactone (ALDACTONE) 25 MG tablet Take 1 tablet by mouth once daily 90 tablet 1    TiZANidine (Zanaflex) 4 MG capsule Take 1 capsule by mouth 3 (Three) Times a Day As Needed for Muscle Spasms. 30 capsule 1    vitamin B-12 (CYANOCOBALAMIN) 1000 MCG tablet Take 1 tablet by mouth Every Other Day.      vitamin D (ERGOCALCIFEROL) 1.25 MG (55684 UT) capsule capsule Take 1 capsule by mouth 1 (One) Time Per Week.       No facility-administered medications prior to visit.  "      No opioid medication identified on active medication list. I have reviewed chart for other potential  high risk medication/s and harmful drug interactions in the elderly.        Aspirin is not on active medication list.  Aspirin use is not indicated based on review of current medical condition/s. Risk of harm outweighs potential benefits.  .    Patient Active Problem List   Diagnosis    Essential hypertension    Mixed hyperlipidemia    Diabetes mellitus type 2 in obese    Fatty liver    Seizure disorder    Acquired atrophy of thyroid    Vitamin D deficiency    Hydronephrosis of left kidney    Vitamin B12 deficiency    Benign essential tremor    Recurrent major depressive disorder, in partial remission    Seasonal allergic rhinitis due to pollen    Microhematuria    Acute low back pain without sciatica    Medicare annual wellness visit, subsequent    Iron overload    Recurrent cystitis    OAB (overactive bladder)     Advance Care Planning   Advance Care Planning     Advance Directive is not on file.  ACP discussion was held with the patient during this visit. Patient does not have an advance directive, information provided.     Objective    Vitals:    06/03/24 1559   BP: 110/76   Pulse: 76   Temp: 97.7 °F (36.5 °C)   TempSrc: Skin   SpO2: 98%   Weight: 68.9 kg (152 lb)   Height: 162.6 cm (64.02\")     Estimated body mass index is 26.08 kg/m² as calculated from the following:    Height as of this encounter: 162.6 cm (64.02\").    Weight as of this encounter: 68.9 kg (152 lb).    BMI is >= 25 and <30. (Overweight) The following options were offered after discussion;: exercise counseling/recommendations and nutrition counseling/recommendations      Does the patient have evidence of cognitive impairment?   No    Lab Results   Component Value Date    HGBA1C 5.80 (H) 05/31/2024          HEALTH RISK ASSESSMENT    Smoking Status:  Social History     Tobacco Use   Smoking Status Former    Current packs/day: 0.00    " Average packs/day: 2.0 packs/day for 30.0 years (60.0 ttl pk-yrs)    Types: Cigarettes    Start date:     Quit date:     Years since quittin.4    Passive exposure: Past   Smokeless Tobacco Never   Tobacco Comments    caffeine use     Alcohol Consumption:  Social History     Substance and Sexual Activity   Alcohol Use Not Currently     Fall Risk Screen:    MEEK Fall Risk Assessment was completed, and patient is at MODERATE risk for falls. Assessment completed on:6/3/2024    Depression Screenin/3/2024     3:57 PM   PHQ-2/PHQ-9 Depression Screening   Little Interest or Pleasure in Doing Things 0-->not at all   Feeling Down, Depressed or Hopeless 0-->not at all   PHQ-9: Brief Depression Severity Measure Score 0       Health Habits and Functional and Cognitive Screenin/3/2024     3:00 PM   Functional & Cognitive Status   Do you have difficulty preparing food and eating? No   Do you have difficulty bathing yourself, getting dressed or grooming yourself? No   Do you have difficulty using the toilet? No   Do you have difficulty moving around from place to place? No   Do you have trouble with steps or getting out of a bed or a chair? No   Current Diet Well Balanced Diet   Dental Exam Up to date   Eye Exam Up to date   Exercise (times per week) 0 times per week   Current Exercises Include No Regular Exercise   Do you need help using the phone?  No   Are you deaf or do you have serious difficulty hearing?  No   Do you need help to go to places out of walking distance? No   Do you need help shopping? No   Do you need help preparing meals?  No   Do you need help with housework?  No   Do you need help with laundry? No   Do you need help taking your medications? No   Do you need help managing money? No   Do you ever drive or ride in a car without wearing a seat belt? No   Have you felt unusual stress, anger or loneliness in the last month? No   Who do you live with? Alone   If you need help, do you  have trouble finding someone available to you? No   Have you been bothered in the last four weeks by sexual problems? No   Do you have difficulty concentrating, remembering or making decisions? No       Age-appropriate Screening Schedule:  Refer to the list below for future screening recommendations based on patient's age, sex and/or medical conditions. Orders for these recommended tests are listed in the plan section. The patient has been provided with a written plan.    Health Maintenance   Topic Date Due    RSV Vaccine - Adults (1 - 1-dose 60+ series) Never done    DIABETIC FOOT EXAM  Never done    URINE MICROALBUMIN  04/14/2024    ANNUAL WELLNESS VISIT  04/17/2024    BMI FOLLOWUP  04/17/2024    DXA SCAN  05/23/2024    COVID-19 Vaccine (8 - 2023-24 season) 09/02/2024 (Originally 1/30/2024)    INFLUENZA VACCINE  08/01/2024    HEMOGLOBIN A1C  11/30/2024    DIABETIC EYE EXAM  02/01/2025    LIPID PANEL  03/04/2025    MAMMOGRAM  03/21/2025    COLORECTAL CANCER SCREENING  02/18/2032    TDAP/TD VACCINES (3 - Td or Tdap) 06/17/2032    HEPATITIS C SCREENING  Completed    Pneumococcal Vaccine 65+  Completed    ZOSTER VACCINE  Completed    PAP SMEAR  Discontinued    LUNG CANCER SCREENING  Discontinued                  CMS Preventative Services Quick Reference  Risk Factors Identified During Encounter:    Fall Risk-High or Moderate: Discussed Fall Prevention in the home    The above risks/problems have been discussed with the patient.  Pertinent information has been shared with the patient in the After Visit Summary.    Diagnoses and all orders for this visit:    1. Medicare annual wellness visit, subsequent (Primary)  Assessment & Plan:  AWV completed 6/24.  Patient remains active and independent, she still is employed.  She has had a fall accidentally, getting out of a bleacher, but no significant trauma.  Patient does not have to use any kind of assistive devices.  No overnight hospitalizations past year.  Information  given regarding advance directive last year.      2. Mixed hyperlipidemia  Assessment & Plan:  LDL trended up from around 60 to 170 off of statin.  We were waiting for LFTs to recover, she is down from around 160 to 50 most recently as of her 6/24 OV.  She was previously on 40 mg of pravastatin a day, she will take this dose every other day until return to office.      3. Essential hypertension  Assessment & Plan:  Blood pressure stable as of her 6/24 OV.  She is just on low-dose spironolactone for ascites, stable to continue same.      4. Diabetes mellitus type 2 in obese  Assessment & Plan:  A1c is down to 5.8 as of her 6/24 OV.  She is down to 152 presently, looks like almost 50 pounds off since being on Ozempic, and was actually in the 250 ballpark at 1 time.    She is weaning down her insulin, just on very low-dose Lantus, she is not using Humalog presently.  She will continue with 1 mg of Ozempic obviously.      5. Acquired atrophy of thyroid  Assessment & Plan:  TSH is 2.1 as of her 6/24 OV, she is just on 25 mcg daily, stable to continue same.      6. Fatty liver  Assessment & Plan:  ALT is finally trended down to around 50 as of her 6/24 OV.  Additionally she had the FibroScan and she was 2 out of a scale of 4, with 4 being cirrhosis.  We will monitor the LFTs after getting her back on a bit lower dose of atorvastatin.          Follow Up:   Next Medicare Wellness visit to be scheduled in 1 year.      An After Visit Summary and PPPS were made available to the patient.

## 2024-06-03 NOTE — ASSESSMENT & PLAN NOTE
ALT is finally trended down to around 50 as of her 6/24 OV.  Additionally she had the FibroScan and she was 2 out of a scale of 4, with 4 being cirrhosis.  We will monitor the LFTs after getting her back on a bit lower dose of atorvastatin.

## 2024-06-03 NOTE — ASSESSMENT & PLAN NOTE
AWV completed 6/24.  Patient remains active and independent, she still is employed.  She has had a fall accidentally, getting out of a bleacher, but no significant trauma.  Patient does not have to use any kind of assistive devices.  No overnight hospitalizations past year.  Information given regarding advance directive last year.
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
## 2024-06-03 NOTE — ASSESSMENT & PLAN NOTE
Blood pressure stable as of her 6/24 OV.  She is just on low-dose spironolactone for ascites, stable to continue same.

## 2024-06-03 NOTE — ASSESSMENT & PLAN NOTE
Patient's B12 level trended up from 300 to over 2000 when we saw her in 3/24.  She thought she was on 500 a week only, but she was actually taken in 5000 a week.  She has been on hold here for couple of months, her level is down to 1200, will have her hold it a little longer, will see what she is in September, and then make further recommendations.

## 2024-06-03 NOTE — ASSESSMENT & PLAN NOTE
LDL trended up from around 60 to 170 off of statin.  We were waiting for LFTs to recover, she is down from around 160 to 50 most recently as of her 6/24 OV.  She was previously on 40 mg of pravastatin a day, she will take this dose every other day until return to office.

## 2024-06-03 NOTE — ASSESSMENT & PLAN NOTE
A1c is down to 5.8 as of her 6/24 OV.  She is down to 152 presently, looks like almost 50 pounds off since being on Ozempic, and was actually in the 250 ballpark at 1 time.    She is weaning down her insulin, just on very low-dose Lantus, she is not using Humalog presently.  She will continue with 1 mg of Ozempic obviously.

## 2024-06-04 ENCOUNTER — TELEPHONE (OUTPATIENT)
Dept: INTERNAL MEDICINE | Age: 69
End: 2024-06-04
Payer: MEDICARE

## 2024-06-04 LAB
A2 MACROGLOB SERPL-MCNC: 494 MG/DL (ref 110–276)
ALT SERPL W P-5'-P-CCNC: 55 IU/L (ref 0–40)
APO A-I SERPL-MCNC: 138 MG/DL (ref 116–209)
AST SERPL W P-5'-P-CCNC: 48 IU/L (ref 0–40)
BILIRUB SERPL-MCNC: 0.2 MG/DL (ref 0–1.2)
CHOLEST SERPL-MCNC: 231 MG/DL (ref 100–199)
FIBROSIS SCORING:: ABNORMAL
FIBROSIS STAGE SERPL QL: ABNORMAL
GGT SERPL-CCNC: 16 IU/L (ref 0–60)
GLUCOSE SERPL-MCNC: 113 MG/DL (ref 70–99)
HAPTOGLOB SERPL-MCNC: 239 MG/DL (ref 37–355)
LABORATORY COMMENT REPORT: ABNORMAL
LIVER FIBR SCORE SERPL CALC.FIBROSURE: 0.38 (ref 0–0.21)
LIVER STEATOSIS GRADE SERPL QL: ABNORMAL
LIVER STEATOSIS SCORE SERPL: 0.5 (ref 0–0.4)
NASH GRADE SERPL QL: ABNORMAL
NASH INTERPRETATION SERPL-IMP: ABNORMAL
NASH SCORE SERPL: 0.72 (ref 0–0.25)
NASH SCORING: ABNORMAL
STEATOSIS SCORING: ABNORMAL
TEST PERFORMANCE INFO SPEC: ABNORMAL
TEST PERFORMANCE INFO SPEC: ABNORMAL
TRIGL SERPL-MCNC: 157 MG/DL (ref 0–149)

## 2024-06-04 NOTE — TELEPHONE ENCOUNTER
Pt wanted you to be aware of her recent labs and Liver ultrasound that Diamond Carlos ordered.   I explained that she would have to comment on them, she just wanted you aware. Thanks.

## 2024-06-05 ENCOUNTER — TELEPHONE (OUTPATIENT)
Dept: GASTROENTEROLOGY | Facility: CLINIC | Age: 69
End: 2024-06-05
Payer: MEDICARE

## 2024-06-05 NOTE — TELEPHONE ENCOUNTER
Called pt, no answer but I left a detailed message that we cannot call from a different line other than the office line. I advised pt to try and see if she has accidentally blocked the office number.

## 2024-06-05 NOTE — TELEPHONE ENCOUNTER
----- Message from Diamondrupali Carlos sent at 6/4/2024  4:47 PM EDT -----  Please notify patient FibroSure shows F1-F2, moderate BAIRD and fatty liver. This agrees with Fibroscan result showing F2.   Patient is a candidate for Rezdiffra, I reviewed this possibility with her at her last office visit, please see if she would like to try?  Please explain the PA process

## 2024-06-05 NOTE — TELEPHONE ENCOUNTER
Caller: CHAITANYA BRANHAM    Relationship: SELF    Best call back number: [phone]    What is the best time to reach you:     Who are you requesting to speak with (clinical staff, provider,  specific staff member): CLINICAL    Do you know the name of the person who called: STEFAN    What was the call regarding: RESULTS, PATIENT SAYS CALL NEEDS TO COME FROM A DIFFERENT LINE OTHER THAN THE ONE THE OFFICE HAS BEEN CALLING FROM    Is it okay if the provider responds through MyChart:

## 2024-06-10 ENCOUNTER — TELEPHONE (OUTPATIENT)
Dept: GASTROENTEROLOGY | Facility: CLINIC | Age: 69
End: 2024-06-10
Payer: MEDICARE

## 2024-06-10 RX ORDER — RESMETIROM 60 MG/1
60 TABLET, COATED ORAL DAILY
Qty: 30 TABLET | Refills: 3 | Status: SHIPPED | OUTPATIENT
Start: 2024-06-10

## 2024-06-10 RX ORDER — CLOPIDOGREL BISULFATE 75 MG/1
TABLET ORAL
Qty: 90 TABLET | Refills: 0 | Status: SHIPPED | OUTPATIENT
Start: 2024-06-10

## 2024-06-10 NOTE — TELEPHONE ENCOUNTER
Rec'd a VM from pt. She states she was taken off of Atorvastatin 40mg about 1 year ago due to elevated LFTs, she was  recently put back on it, but is taking every other day. She is wondering if this dosing is okay for her liver. Please advise.

## 2024-06-10 NOTE — TELEPHONE ENCOUNTER
Yes if a statin has been recommended to her we recommend she continue, we will continue to monitor her liver enzymes and if she does receive her is different we do want to check liver enzymes 2 to 3 weeks after starting

## 2024-06-11 ENCOUNTER — OFFICE VISIT (OUTPATIENT)
Age: 69
End: 2024-06-11
Payer: MEDICARE

## 2024-06-11 VITALS
HEART RATE: 79 BPM | BODY MASS INDEX: 26.22 KG/M2 | DIASTOLIC BLOOD PRESSURE: 70 MMHG | WEIGHT: 153.6 LBS | HEIGHT: 64 IN | OXYGEN SATURATION: 98 % | SYSTOLIC BLOOD PRESSURE: 120 MMHG

## 2024-06-11 DIAGNOSIS — G25.0 BENIGN ESSENTIAL TREMOR: ICD-10-CM

## 2024-06-11 DIAGNOSIS — G45.9 TIA (TRANSIENT ISCHEMIC ATTACK): ICD-10-CM

## 2024-06-11 DIAGNOSIS — I10 ESSENTIAL HYPERTENSION: Primary | ICD-10-CM

## 2024-06-11 DIAGNOSIS — I25.10 CORONARY ARTERY DISEASE INVOLVING NATIVE CORONARY ARTERY OF NATIVE HEART WITHOUT ANGINA PECTORIS: ICD-10-CM

## 2024-06-11 DIAGNOSIS — E78.2 MIXED HYPERLIPIDEMIA: ICD-10-CM

## 2024-06-11 PROCEDURE — 93000 ELECTROCARDIOGRAM COMPLETE: CPT | Performed by: INTERNAL MEDICINE

## 2024-06-11 PROCEDURE — 3074F SYST BP LT 130 MM HG: CPT | Performed by: INTERNAL MEDICINE

## 2024-06-11 PROCEDURE — 1160F RVW MEDS BY RX/DR IN RCRD: CPT | Performed by: INTERNAL MEDICINE

## 2024-06-11 PROCEDURE — 3078F DIAST BP <80 MM HG: CPT | Performed by: INTERNAL MEDICINE

## 2024-06-11 PROCEDURE — 99214 OFFICE O/P EST MOD 30 MIN: CPT | Performed by: INTERNAL MEDICINE

## 2024-06-11 PROCEDURE — 1159F MED LIST DOCD IN RCRD: CPT | Performed by: INTERNAL MEDICINE

## 2024-06-11 NOTE — LETTER
June 11, 2024       No Recipients    Patient: Vivian Kauffman   YOB: 1955   Date of Visit: 6/11/2024       Dear Shahbaz Acosta MD    Vivian Kauffman was in my office today. Below is a copy of my note.    If you have questions, please do not hesitate to call me. I look forward to following Viivan along with you.         Sincerely,        Kia Waters MD        CC:   No Recipients        Subjective:     Encounter Date:06/11/2024      Patient ID: Vivian Kauffman is a 69 y.o. female.    Chief Complaint:  History of Present Illness    The patient is a 69-year-old female with history of nonobstructive coronary artery disease, hypertension, dyslipidemia, diabetes mellitus type 2, prior stroke, seizure disorder, obesity status post lap band, and subsequent removal of the lap band, who presents for followup.      I saw the patient last in 6/2022 at which time she appeared to be doing well from a cardiac standpoint and no changes were made to her management.  She was last seen in the office by JIM Enrique in 6/2023.  She was continuing to do well at that time she had been started on Ozempic prior to that office visit and had lost some weight.         Prior History:  I saw the patient initially for a preoperative evaluation in 8/2015.   She was getting ready to get her lap band removed and was noted to have an abnormal EKG at Dr. Ackerman's office.   She was previously followed by a cardiologist in [place], but was unable to get in with them at the time. Her prior cardiac workup included an evaluation for an abnormal EKG back in 11/2013 at which time she saw Dr. Duarte.  She underwent a stress test that showed anterior ischemia and subsequently underwent a cardiac catheterization that showed 50% stenosis of her proximal LAD, luminal irregularities of her left circumflex, and 50% stenosis of her right coronary artery.  Her echocardiogram showed pulmonary artery pressure of 40 mmHg,  moderate TR, but otherwise unremarkable.   When I saw the patient initially, I compared these findings with her prior EKGs and it showed no change.  She also reported no new symptoms at the time and she was subsequently cleared for surgery.       In 3/2017 she had successfully undergone removal of her lap band.  She denied any symptoms but wanted to proceed with repeat workup to ensure that her nonobstructive coronary artery disease has not progressed.  Proceeded with an echocardiogram that showed normal left ventricular systolic function and wall motion with an estimated ejection fraction of 65-70% and grade 1 diastolic dysfunction.  We attempted to proceed with a treadmill stress test but her baseline EKG abnormalities precluded a diagnostic tests so we proceeded with a perfusion stress test.  This was performed on 3/30/2017 and was negative for ischemia.    ROS      Current Outpatient Medications:   •  acetaminophen (TYLENOL) 500 MG tablet, Take 1 tablet by mouth Every 6 (Six) Hours As Needed for Mild Pain., Disp: , Rfl:   •  albuterol sulfate  (90 Base) MCG/ACT inhaler, 1 puff As Needed., Disp: , Rfl:   •  atorvastatin (LIPITOR) 40 MG tablet, Take 1 tablet by mouth Every Other Day., Disp: , Rfl:   •  cetirizine (zyrTEC) 10 MG tablet, TAKE 1 TABLET BY MOUTH ONCE DAILY AS NEEDED FOR SNEEZING, ITCHING, RUNNY NOSE, Disp: , Rfl:   •  clobetasol (TEMOVATE) 0.05 % ointment, APPLY TO LESIONS ON SHOULDER 2 TIMES A DAY AS NEEDED., Disp: , Rfl:   •  clopidogrel (PLAVIX) 75 MG tablet, TAKE 1 TABLET BY MOUTH ONCE DAILY AT NIGHT, Disp: 90 tablet, Rfl: 0  •  estradiol (ESTRACE) 0.1 MG/GM vaginal cream, Apply a pea-sized amount inside the vagina and rub in and applied another pea-sized amount around the vestibule and massage and as well as around urethra.  3 times a week at night, Disp: 42.5 g, Rfl: 6  •  FLUoxetine (PROzac) 20 MG capsule, TAKE 4 CAPSULES BY MOUTH ONCE DAILY, Disp: 360 capsule, Rfl: 0  •  gabapentin  (NEURONTIN) 300 MG capsule, TAKE 1 CAPSULE BY MOUTH THREE TIMES DAILY, Disp: 270 capsule, Rfl: 1  •  insulin glargine (LANTUS, SEMGLEE) 100 UNIT/ML injection, Inject 4 Units under the skin into the appropriate area as directed 2 (Two) Times a Day., Disp: , Rfl:   •  Insulin Lispro, 1 Unit Dial, (HumaLOG KwikPen) 100 UNIT/ML solution pen-injector, Inject 2 Units under the skin into the appropriate area as directed 3 (Three) Times a Day With Meals., Disp: , Rfl:   •  levETIRAcetam (KEPPRA) 500 MG tablet, 6 tablets daily, Disp: , Rfl:   •  levothyroxine (SYNTHROID, LEVOTHROID) 25 MCG tablet, Take 1 tablet by mouth once daily, Disp: 90 tablet, Rfl: 0  •  montelukast (SINGULAIR) 10 MG tablet, Take 1 tablet by mouth once daily, Disp: 90 tablet, Rfl: 3  •  Nutritional Supplements (Nutritional Supplement Plus) liquid, Take 1 can by mouth 2 (Two) Times a Day., Disp: 60 each, Rfl: 5  •  oxybutynin XL (DITROPAN-XL) 5 MG 24 hr tablet, Take 1 tablet by mouth Daily., Disp: 30 tablet, Rfl: 11  •  Ozempic, 1 MG/DOSE, 4 MG/3ML solution pen-injector, INJECT 1MG SUBCUTANEOUSLY ONCE A WEEK, Disp: , Rfl:   •  primidone (MYSOLINE) 50 MG tablet, if needed., Disp: , Rfl:   •  ProAir Digihaler 108 (90 Base) MCG/ACT aerosol powder , , Disp: , Rfl:   •  prochlorperazine (COMPAZINE) 5 MG tablet, Take 1 tablet by mouth Every 6 (Six) Hours As Needed., Disp: , Rfl:   •  RELION PEN NEEDLE 31G/8MM 31G X 8 MM misc, USE 4- 5 TIMES DAILY AS DIRECTED, Disp: , Rfl:   •  Resmetirom (Rezdiffra) 60 MG tablet, Take 1 tablet by mouth Daily., Disp: 30 tablet, Rfl: 3  •  riFAXIMin (Xifaxan) 550 MG tablet, Take 1 tablet by mouth Every 8 (Eight) Hours for 21 doses., Disp: 21 tablet, Rfl: 0  •  spironolactone (ALDACTONE) 25 MG tablet, Take 1 tablet by mouth once daily, Disp: 90 tablet, Rfl: 1  •  TiZANidine (Zanaflex) 4 MG capsule, Take 1 capsule by mouth 3 (Three) Times a Day As Needed for Muscle Spasms., Disp: 30 capsule, Rfl: 1  •  vitamin B-12  "(CYANOCOBALAMIN) 1000 MCG tablet, Take 1 tablet by mouth Every Other Day., Disp: , Rfl:   •  vitamin D (ERGOCALCIFEROL) 1.25 MG (42753 UT) capsule capsule, Take 1 capsule by mouth 1 (One) Time Per Week., Disp: , Rfl:     Past Medical History:   Diagnosis Date   • Anxiety    • Asthma    • Coronary arteriosclerosis    • Depression    • Diabetes mellitus     TYPE 2 - BLOOD GLUCOSE AROUND 100-300   • Disease of thyroid gland    • Fatigue    • Fatty liver    • Fibromyalgia    • Fibromyositis    • GERD (gastroesophageal reflux disease)    • Heartburn    • History of COVID-19     2020   • [place] (hard of hearing)    • Hyperlipidemia    • Hypertension    • Insomnia    • Kidney stone    • Muscle spasm    • Pain of both hip joints    • Polyphagia(783.6)    • PONV (postoperative nausea and vomiting)    • Poor vision    • Seizures    • Shortness of breath on exertion    • Sinus drainage    • Stroke syndrome     \"MINI STROKE\"  left side weakness    • Tremor, essential     IN NECK  AND HANDS   • Vertigo        Past Surgical History:   Procedure Laterality Date   • BREAST BIOPSY Right    • CARDIAC CATHETERIZATION      no stents 9/2013 at [facility]   • CARPAL TUNNEL RELEASE Bilateral 2009   • COLONOSCOPY      2018   • COLONOSCOPY N/A 2/18/2022    Procedure: COLONOSCOPY SCREENING WITH POLYPECTOMY;  Surgeon: David Love MD;  Location: [facility] ENDOSCOPY;  Service: Gastroenterology;  Laterality: N/A;  COLON POLYP   • CYSTOSCOPY NEPHROURETEROSCOPY Left 6/29/2021    Procedure: CYSTOSCOPY NEPHROURETEROSCOPY;  Surgeon: Mandie Hamilton MD;  Location: [facility] MAIN OR;  Service: Urology;  Laterality: Left;   • ENDOSCOPY N/A 9/9/2016    Procedure: ESOPHAGOGASTRODUODENOSCOPY WITH BIOPSY;  Surgeon: Chris Ackerman Jr., MD;  Location: [facility] ENDOSCOPY;  Service:    • EXTRACORPOREAL SHOCKWAVE LITHOTRIPSY (ESWL), STENT INSERTION/REMOVAL  09/2013   • FOOT SURGERY Left    • FRACTURE SURGERY      left hand   • GASTRIC BANDING " REMOVAL N/A 2016    Procedure: LAPAROSCOPIC GASTRIC BANDING AND PORT REMOVAL ;  Surgeon: Chris Ackerman Jr., MD;  Location: [facility] OR [facility];  Service:    • GASTRIC SLEEVE LAPAROSCOPIC N/A 10/2/2017    Procedure: GASTRIC SLEEVE LAPAROSCOPIC revision WITH LYSIS OF ADHESIONS AND HITAL HERNIA REPAIR ;  Surgeon: Chris Ackerman Jr., MD;  Location: [facility] OR [facility];  Service:    • GASTRIC SLEEVE LAPAROSCOPIC     • HAND SURGERY Left    • LAPAROSCOPIC CHOLECYSTECTOMY     • MASS EXCISION Right     RT FOOT    • NOSE SURGERY     • TONSILLECTOMY     • URETEROSCOPY STENT INSERTION Left 2021    Procedure: URETEROSCOPY STENT INSERTION;  Surgeon: Mandie Hamilton MD;  Location: [facility] OR;  Service: Urology;  Laterality: Left;       Family History   Problem Relation Age of Onset   • Heart attack Father    • Diabetes Father    • Hypertension Father    • Obesity Father         Morbid Obesity   • Stroke Father    • Heart attack Mother    • Diabetes Mother    • Hypertension Mother    • Stroke Mother    • Heart attack Brother    • Cancer Brother         Bladder   • Heart attack Paternal Grandfather    • Stroke Paternal Grandmother    • Heart attack Maternal Grandmother    • Heart attack Maternal Grandfather    • Malig Hyperthermia Neg Hx    • Colon cancer Neg Hx    • Breast cancer Neg Hx    • Uterine cancer Neg Hx    • Ovarian cancer Neg Hx        Social History     Tobacco Use   • Smoking status: Former     Current packs/day: 0.00     Average packs/day: 2.0 packs/day for 30.0 years (60.0 ttl pk-yrs)     Types: Cigarettes     Start date:      Quit date:      Years since quittin.4     Passive exposure: Past   • Smokeless tobacco: Never   • Tobacco comments:     caffeine use   Vaping Use   • Vaping status: Never Used   Substance Use Topics   • Alcohol use: Not Currently   • Drug use: Never       Procedures       Objective:     Visit Vitals  /70 (BP Location: Left arm, Patient Position: Sitting, Cuff  "Size: Adult)   Pulse 79   Ht 162.6 cm (64.02\")   Wt 69.7 kg (153 lb 9.6 oz)   SpO2 98%   BMI 26.35 kg/m²         Physical Exam    Lab Review:       Assessment:          Diagnosis Plan   1. Essential hypertension        2. Mixed hyperlipidemia               Plan:                "

## 2024-06-17 ENCOUNTER — TELEPHONE (OUTPATIENT)
Dept: GASTROENTEROLOGY | Facility: CLINIC | Age: 69
End: 2024-06-17
Payer: MEDICARE

## 2024-06-17 NOTE — TELEPHONE ENCOUNTER
From results provider asked for PA on rezdiffra, submitted pa through cover my meds on June 10th. As of today PA still has not rec'd response.      Patient came in to give income details, set patient up for [facility] support. Patient would like to do patient assistance. Told patient once a response was received we will see the cost if any and then discuss submitting for patient assistance.   Patient also asked for list of side effects, the rep has said she will email me a link and I will call patient to go over.

## 2024-07-05 RX ORDER — FLUOXETINE HYDROCHLORIDE 20 MG/1
80 CAPSULE ORAL DAILY
Qty: 360 CAPSULE | Refills: 0 | Status: SHIPPED | OUTPATIENT
Start: 2024-07-05

## 2024-07-08 ENCOUNTER — HOSPITAL ENCOUNTER (OUTPATIENT)
Dept: ULTRASOUND IMAGING | Facility: HOSPITAL | Age: 69
Discharge: HOME | End: 2024-07-08
Admitting: STUDENT IN AN ORGANIZED HEALTH CARE EDUCATION/TRAINING PROGRAM
Payer: MEDICARE

## 2024-07-08 DIAGNOSIS — N63.21 MASS OF UPPER OUTER QUADRANT OF LEFT BREAST: ICD-10-CM

## 2024-07-08 PROCEDURE — 76642 ULTRASOUND BREAST LIMITED: CPT

## 2024-07-15 ENCOUNTER — PREP FOR SURGERY (OUTPATIENT)
Dept: OTHER | Facility: HOSPITAL | Age: 69
End: 2024-07-15
Payer: MEDICARE

## 2024-07-24 ENCOUNTER — TELEPHONE (OUTPATIENT)
Dept: GASTROENTEROLOGY | Facility: CLINIC | Age: 69
End: 2024-07-24
Payer: MEDICARE

## 2024-07-24 ENCOUNTER — TELEPHONE (OUTPATIENT)
Dept: INTERNAL MEDICINE | Age: 69
End: 2024-07-24

## 2024-07-24 NOTE — TELEPHONE ENCOUNTER
Caller: Vivian Kauffman    Relationship to patient: Self    Best call back number: [phone]     Patient is needing: PATIENT IS REQUESTING A CALL BACK. SHE HAS SOME QUESTIONS ABOUT THE FLU SHOT AND OTHER SHOTS THAT  WANTS HER TO GET. PLEASE CALL AND ADVISE.

## 2024-07-24 NOTE — TELEPHONE ENCOUNTER
Hub staff attempted to follow warm transfer process and was unsuccessful     Caller: [facility]    Relationship to patient: PHARMACY    Best call back number:  [phone]    Patient is needing: PHARMACY IS NEEDING A PA FOR THE MEDICATION REZDIFFRA. PLEASE CALL AND ADVISE.

## 2024-07-25 NOTE — TELEPHONE ENCOUNTER
Spoke with patient. 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 Does she need to go ahead and get her flu shot and a RSV Injection? Does she need any other injections? Please advise.

## 2024-07-25 NOTE — TELEPHONE ENCOUNTER
Flu shot is not available yet, but yes is certainly appropriate for her to get the RSV anytime.  Otherwise up-to-date on her shots.

## 2024-07-26 NOTE — TELEPHONE ENCOUNTER
Patient assistance paperwork was submitted. We have not rec'd a notification if this has been approved. Attempted to contact pt to see if she has rec'd a response, no answer but left a message to contact the office.

## 2024-07-29 ENCOUNTER — TELEPHONE (OUTPATIENT)
Dept: GASTROENTEROLOGY | Facility: CLINIC | Age: 69
End: 2024-07-29
Payer: MEDICARE

## 2024-07-29 NOTE — TELEPHONE ENCOUNTER
Hub staff attempted to follow warm transfer process and was unsuccessful     Caller: GARY WITH [employer]    Relationship to patient:     Best call back number: [phone]    Patient is needing: GARY CALLED TO TOUCH BASE WITH THE OFFICE TO SEE IF THEY HAVE INITIATED A PRIOR AUTH FOR PATIENTS JAD. SHE DID A BENEFITS INVESTIGATION ON 7-11-24 WAS DENIED AND STATES PA REQUIRED AND NEEDS THIS RESOLVED SO SHE CAN GET STATUS AND THEY HAVE TO TAKE NEXT STEPS IF NOT APPROVED

## 2024-07-29 NOTE — TELEPHONE ENCOUNTER
This has been resolved in the previous encounter. All information regarding this matter will be in the original encounter. Thank you.

## 2024-07-29 NOTE — TELEPHONE ENCOUNTER
PA was submitted on June 10th. Patient came in to give missing income info, that they needed filled out and mailed to patient. Faxed info over and waiting for determination. Please follow up in previous encounter that has already been going for this matter. Thank you.

## 2024-08-09 ENCOUNTER — TELEPHONE (OUTPATIENT)
Dept: GASTROENTEROLOGY | Facility: CLINIC | Age: 69
End: 2024-08-09
Payer: MEDICARE

## 2024-08-09 NOTE — TELEPHONE ENCOUNTER
Received VM from patient, requesting a call back.   Called patient, no answer. Left vm and call back number

## 2024-08-12 ENCOUNTER — TELEPHONE (OUTPATIENT)
Dept: GASTROENTEROLOGY | Facility: CLINIC | Age: 69
End: 2024-08-12
Payer: MEDICARE

## 2024-08-12 NOTE — TELEPHONE ENCOUNTER
Called Vivian Kauffman 1955 to confirm their appointment with JIM Fox on 08.13.24 @ 9:00. I was unable to reach the patient to confirm the appointment. The patient has been made aware of our 24 hour cancellation policy. [facility]

## 2024-08-13 ENCOUNTER — OFFICE VISIT (OUTPATIENT)
Dept: GASTROENTEROLOGY | Facility: CLINIC | Age: 69
End: 2024-08-13
Payer: MEDICARE

## 2024-08-13 VITALS
WEIGHT: 154.6 LBS | BODY MASS INDEX: 26.4 KG/M2 | SYSTOLIC BLOOD PRESSURE: 111 MMHG | HEART RATE: 65 BPM | HEIGHT: 64 IN | DIASTOLIC BLOOD PRESSURE: 58 MMHG

## 2024-08-13 DIAGNOSIS — E66.3 OVERWEIGHT (BMI 25.0-29.9): ICD-10-CM

## 2024-08-13 DIAGNOSIS — K58.0 IRRITABLE BOWEL SYNDROME WITH DIARRHEA: ICD-10-CM

## 2024-08-13 DIAGNOSIS — K76.0 FATTY LIVER: Primary | ICD-10-CM

## 2024-08-13 DIAGNOSIS — R94.5 ABNORMAL RESULTS OF LIVER FUNCTION STUDIES: ICD-10-CM

## 2024-08-13 PROCEDURE — 3074F SYST BP LT 130 MM HG: CPT | Performed by: NURSE PRACTITIONER

## 2024-08-13 PROCEDURE — 3078F DIAST BP <80 MM HG: CPT | Performed by: NURSE PRACTITIONER

## 2024-08-13 PROCEDURE — 1160F RVW MEDS BY RX/DR IN RCRD: CPT | Performed by: NURSE PRACTITIONER

## 2024-08-13 PROCEDURE — 99214 OFFICE O/P EST MOD 30 MIN: CPT | Performed by: NURSE PRACTITIONER

## 2024-08-13 PROCEDURE — 1159F MED LIST DOCD IN RCRD: CPT | Performed by: NURSE PRACTITIONER

## 2024-08-13 NOTE — PROGRESS NOTES
Patient Name: Vivian Kauffman   Visit Date: 08/13/2024   Patient ID: [id number]  Provider: JIM Oliveros    Sex: female  Location:  Location Address:  Location Phone: [address]  [place] [address] [phone]    YOB: 1955  Age: 69 y.o.   Primary Care Provider Shahbaz Acosta MD      Referring Provider: No ref. provider found        Chief Complaint  Fatty liver (Follow up )    History of Present Illness    Patient has history of diarrhea alternating with constipation and history of fatty liver.    Patient was referred back to us January 2024 for fatty liver and elevated LFTs.    Liver workup in 2023 negative, no history of alcohol.    Reported history of gastric sleeve in 2017 and lost 100 pounds total- also started Ozempic last year and lost 30 pounds with this  Last colonoscopy 2022 by Dr. Love done for screening-hyperplastic sigmoid colon polyp and recommended 10-year recall.     FibroScan 4/12/2024: F2 and normal to mild liver fat    Patient last seen 5/13/2024 reported decreased appetite related to Ozempic but no other GI complaints.  She had lost 8# since previous visit intermittent diarrhea, Xifaxan was prescribed for this recommended stopping artificial sweeteners    I discussed with patient starting RezChristofer gupta FibroSure 5/31/2024 showed F1-F2, Rezdiffra was sent in    Liver ultrasound 6/2/2024 fatty liver suspect mild to moderate severity    Pt states she took Xifaxan and diarrhea resolved. Pt has started Florajen, she is having daily BM but states she feels a little constipated. Breda #1-2. Still has intermittent bloating.   Pt is trying to get Rezdiffra, she had to complete an application for pt assistance  Drinking premier protein 1-2 day, eating small frequent meals.  She has lost 4# since last visit, still w decreased appetite, thought r/t Ozempic.   No abd pain w meals.   Past Medical History:   Diagnosis Date    Anxiety     Asthma     Coronary  "arteriosclerosis     Depression     Diabetes mellitus     TYPE 2 - BLOOD GLUCOSE AROUND 100-300    Disease of thyroid gland     Fatigue     Fatty liver     Fibromyalgia     Fibromyositis     GERD (gastroesophageal reflux disease)     Heartburn     History of COVID-19 2020    [place] (hard of hearing)     Hyperlipidemia     Hypertension     Insomnia     Kidney stone     Muscle spasm     Pain of both hip joints     Polyphagia(783.6)     PONV (postoperative nausea and vomiting)     Poor vision     Seizures     Shortness of breath on exertion     Sinus drainage     Stroke syndrome     \"MINI STROKE\"  left side weakness     Tremor, essential     IN NECK  AND HANDS    Vertigo        Past Surgical History:   Procedure Laterality Date    BREAST BIOPSY Right     CARDIAC CATHETERIZATION      no stents 9/2013 at [facility]    CARPAL TUNNEL RELEASE Bilateral 2009    COLONOSCOPY      2018    COLONOSCOPY N/A 2/18/2022    Procedure: COLONOSCOPY SCREENING WITH POLYPECTOMY;  Surgeon: David Love MD;  Location: [facility] ENDOSCOPY;  Service: Gastroenterology;  Laterality: N/A;  COLON POLYP    CYSTOSCOPY NEPHROURETEROSCOPY Left 6/29/2021    Procedure: CYSTOSCOPY NEPHROURETEROSCOPY;  Surgeon: Mandie Hamilton MD;  Location: [facility] MAIN OR;  Service: Urology;  Laterality: Left;    ENDOSCOPY N/A 9/9/2016    Procedure: ESOPHAGOGASTRODUODENOSCOPY WITH BIOPSY;  Surgeon: Chris Ackerman Jr., MD;  Location: [facility] ENDOSCOPY;  Service:     EXTRACORPOREAL SHOCKWAVE LITHOTRIPSY (ESWL), STENT INSERTION/REMOVAL  09/2013    FOOT SURGERY Left     FRACTURE SURGERY      left hand    GASTRIC BANDING REMOVAL N/A 11/30/2016    Procedure: LAPAROSCOPIC GASTRIC BANDING AND PORT REMOVAL ;  Surgeon: Chris Ackerman Jr., MD;  Location: [facility] OR [facility];  Service:     GASTRIC SLEEVE LAPAROSCOPIC N/A 10/2/2017    Procedure: GASTRIC SLEEVE LAPAROSCOPIC revision WITH LYSIS OF ADHESIONS AND HITAL HERNIA REPAIR ;  Surgeon: Chris Ackerman " "MD Jaron;  Location:  [facility];  Service:     GASTRIC SLEEVE LAPAROSCOPIC      HAND SURGERY Left     LAPAROSCOPIC CHOLECYSTECTOMY      MASS EXCISION Right     RT FOOT     NOSE SURGERY      TONSILLECTOMY      URETEROSCOPY STENT INSERTION Left 2021    Procedure: URETEROSCOPY STENT INSERTION;  Surgeon: Mandie Hamilton MD;  Location: [facility] MAIN OR;  Service: Urology;  Laterality: Left;       Allergies   Allergen Reactions    Jardiance [Empagliflozin] Anaphylaxis     Went into ketoacidosis    Peanut Oil Anaphylaxis     throat to close     Latex Hives     SKIN BLISTERS    Sulfa Antibiotics Rash    Ace Inhibitors Cough and Other (See Comments)       Family History   Problem Relation Age of Onset    Heart attack Father     Diabetes Father     Hypertension Father     Obesity Father         Morbid Obesity    Stroke Father     Heart attack Mother     Diabetes Mother     Hypertension Mother     Stroke Mother     Heart attack Brother     Cancer Brother         Bladder    Heart attack Paternal Grandfather     Stroke Paternal Grandmother     Heart attack Maternal Grandmother     Heart attack Maternal Grandfather     [place] Hyperthermia Neg Hx     Colon cancer Neg Hx     Breast cancer Neg Hx     Uterine cancer Neg Hx     Ovarian cancer Neg Hx         Social History     Tobacco Use    Smoking status: Former     Current packs/day: 0.00     Average packs/day: 2.0 packs/day for 30.0 years (60.0 ttl pk-yrs)     Types: Cigarettes     Start date:      Quit date:      Years since quittin.6     Passive exposure: Past    Smokeless tobacco: Never    Tobacco comments:     caffeine use   Vaping Use    Vaping status: Never Used   Substance Use Topics    Alcohol use: Not Currently    Drug use: Never       Objective     Vital Signs:   /58 (BP Location: Right arm, Patient Position: Sitting, Cuff Size: Adult)   Pulse 65   Ht 162.6 cm (64.02\")   Wt 70.1 kg (154 lb 9.6 oz)   BMI 26.52 kg/m²       Physical " Exam  Constitutional:       General: The patient is not in acute distress.     Appearance: Normal appearance.   HENT:      Head: Normocephalic and atraumatic.      Nose: Nose normal.   Pulmonary:      Effort: Pulmonary effort is normal. No respiratory distress.   Abdominal:      General: Abdomen is flat.      Palpations: Abdomen is soft. There is no mass.      Tenderness: There is no abdominal tenderness. There is no guarding.   Musculoskeletal:      Cervical back: Neck supple.      Right lower leg: No edema.      Left lower leg: No edema.   Skin:     General: Skin is warm and dry.   Neurological:      General: No focal deficit present.      Mental Status: The patient is alert and oriented to person, place, and time.      Gait: Gait normal.   Psychiatric:         Mood and Affect: Mood normal.         Speech: Speech normal.         Behavior: Behavior normal.         Thought Content: Thought content normal.     Result Review :   The following data was reviewed by: JIM Oliveros on 08/13/2024:    CBC w/diff          12/4/2023    08:57 5/31/2024    06:20   CBC w/Diff   WBC 4.69  4.76    RBC 5.22  5.00    Hemoglobin 14.9  14.3    Hematocrit 44.7  43.6    MCV 85.6  87.2    MCH 28.5  28.6    MCHC 33.3  32.8    RDW 14.0  13.6    Platelets 183  149    Neutrophil Rel % 57.6  58.6    Immature Granulocyte Rel % 0.2  0.2    Lymphocyte Rel % 32.0  28.4    Monocyte Rel % 7.9  10.7    Eosinophil Rel % 1.9  1.7    Basophil Rel % 0.4  0.4      CMP          12/4/2023    08:57 3/4/2024    09:53 5/31/2024    06:20   CMP   Glucose 115  144  111    BUN 10  19  12    Creatinine 0.72  0.73  0.64    EGFR 91.2  89.2  95.8    Sodium 140  137  141    Potassium 4.2  4.3  4.7    Chloride 104  101  103    Calcium 9.5  9.7  9.2    Total Protein 7.3  7.5  7.3    Albumin 4.5  4.6  4.5    Globulin 2.8  2.9     Total Bilirubin 0.3  0.5  0.3    Alkaline Phosphatase 88  86  75    AST (SGOT) 49  63  38    ALT (SGPT) 64  70  50     Albumin/Globulin Ratio 1.6  1.6     BUN/Creatinine Ratio 13.9  26.0  18.8    Anion Gap 8.3  11.0  11.0        Liver Workup   ALPHA -1 ANTITRYPSIN   Date Value Ref Range Status   06/03/2023 175 90 - 200 mg/dL Final     dsDNA   Date Value Ref Range Status   06/03/2023 Negative Negative Final     Expanded MARYSE Screen   Date Value Ref Range Status   06/03/2023 Negative Negative Final     Smooth Muscle Ab   Date Value Ref Range Status   06/03/2023 5 0 - 19 Units Final     Comment:                      Negative                     0 - 19                   Weak positive               20 - 30                   Moderate to strong positive     >30   Actin Antibodies are found in 52-85% of patients with   autoimmune hepatitis or chronic active hepatitis and   in 22% of patients with primary biliary cirrhosis.     Ceruloplasmin   Date Value Ref Range Status   06/03/2023 28 19 - 39 mg/dL Final     Ferritin   Date Value Ref Range Status   12/04/2023 182.00 (H) 13.00 - 150.00 ng/mL Final     Iron   Date Value Ref Range Status   12/04/2023 83 37 - 145 mcg/dL Final     TIBC   Date Value Ref Range Status   12/04/2023 438 298 - 536 mcg/dL Final     Iron Saturation (TSAT)   Date Value Ref Range Status   12/04/2023 19 (L) 20 - 50 % Final     Transferrin   Date Value Ref Range Status   12/04/2023 294 200 - 360 mg/dL Final     Mitochondrial Ab   Date Value Ref Range Status   06/03/2023 <20.0 0.0 - 20.0 Units Final     Comment:                                     Negative    0.0 - 20.0                                  Equivocal  20.1 - 24.9                                  Positive         >24.9  Mitochondrial (M2) Antibodies are found in 90-96% of  patients with primary biliary cirrhosis.     Protime   Date Value Ref Range Status   05/31/2024 13.3 11.8 - 14.9 Seconds Final     INR   Date Value Ref Range Status   05/31/2024 0.99 0.86 - 1.15 Final     ALPHA-FETOPROTEIN   Date Value Ref Range Status   05/31/2024 <2 0 - 8.3 ng/mL Final      Acute HEP Panel   Hepatitis B Surface Ag   Date Value Ref Range Status   06/03/2023 Non-Reactive Non-Reactive Final     Hep A IgM   Date Value Ref Range Status   06/03/2023 Non-Reactive Non-Reactive Final     Hep B C IgM   Date Value Ref Range Status   06/03/2023 Non-Reactive Non-Reactive Final     Hepatitis C Ab   Date Value Ref Range Status   06/03/2023 Non-Reactive Non-Reactive Final               Assessment and Plan    Diagnoses and all orders for this visit:    1. Fatty liver (Primary)  -     US Liver; Future    2. Irritable bowel syndrome with diarrhea  Comments:  resolved with Xifaxan    3. Overweight (BMI 25.0-29.9)    4. Abnormal results of liver function studies              Follow Up   Return in about 4 months (around 12/13/2024), or if symptoms worsen or fail to improve.  Cont low carb diet and weight loss, 2-4 c. Coffee/d  Request pt call if she receives Rezdiffra; Labs 3 weeks after starting Rezdiffra   Liver US in Dec  Cont Leana, pt responded well to Xifaxan    Patient was given instructions and counseling regarding her condition or for health maintenance advice. Please see specific information pulled into the AVS if appropriate.

## 2024-08-15 DIAGNOSIS — J30.1 SEASONAL ALLERGIC RHINITIS DUE TO POLLEN: ICD-10-CM

## 2024-08-15 RX ORDER — MONTELUKAST SODIUM 10 MG/1
TABLET ORAL
Qty: 90 TABLET | Refills: 0 | Status: SHIPPED | OUTPATIENT
Start: 2024-08-15

## 2024-08-16 ENCOUNTER — OFFICE VISIT (OUTPATIENT)
Dept: BARIATRICS/WEIGHT MGMT | Facility: CLINIC | Age: 69
End: 2024-08-16
Payer: MEDICARE

## 2024-08-16 ENCOUNTER — TELEPHONE (OUTPATIENT)
Dept: GASTROENTEROLOGY | Facility: CLINIC | Age: 69
End: 2024-08-16
Payer: MEDICARE

## 2024-08-16 VITALS
BODY MASS INDEX: 26.93 KG/M2 | SYSTOLIC BLOOD PRESSURE: 122 MMHG | HEIGHT: 63 IN | HEART RATE: 70 BPM | DIASTOLIC BLOOD PRESSURE: 65 MMHG | WEIGHT: 152 LBS | TEMPERATURE: 98.1 F

## 2024-08-16 DIAGNOSIS — F19.20 CHRONIC PAIN WITH DRUG DEPENDENCE: ICD-10-CM

## 2024-08-16 DIAGNOSIS — I25.10 CORONARY ARTERY DISEASE INVOLVING NATIVE CORONARY ARTERY OF NATIVE HEART WITHOUT ANGINA PECTORIS: ICD-10-CM

## 2024-08-16 DIAGNOSIS — E11.69 TYPE 2 DIABETES MELLITUS WITH OBESITY: ICD-10-CM

## 2024-08-16 DIAGNOSIS — E66.3 OVERWEIGHT (BMI 25.0-29.9): Primary | ICD-10-CM

## 2024-08-16 DIAGNOSIS — I10 ESSENTIAL HYPERTENSION: ICD-10-CM

## 2024-08-16 DIAGNOSIS — G89.29 CHRONIC PAIN WITH DRUG DEPENDENCE: ICD-10-CM

## 2024-08-16 DIAGNOSIS — E66.9 TYPE 2 DIABETES MELLITUS WITH OBESITY: ICD-10-CM

## 2024-08-16 PROBLEM — Z00.00 MEDICARE ANNUAL WELLNESS VISIT, SUBSEQUENT: Status: RESOLVED | Noted: 2023-04-14 | Resolved: 2024-08-16

## 2024-08-16 PROBLEM — E83.19 IRON OVERLOAD: Status: RESOLVED | Noted: 2023-09-06 | Resolved: 2024-08-16

## 2024-08-16 PROBLEM — E53.8 VITAMIN B12 DEFICIENCY: Status: RESOLVED | Noted: 2021-07-13 | Resolved: 2024-08-16

## 2024-08-16 PROBLEM — E55.9 VITAMIN D DEFICIENCY: Status: RESOLVED | Noted: 2021-04-05 | Resolved: 2024-08-16

## 2024-08-16 RX ORDER — INSULIN GLARGINE 100 [IU]/ML
INJECTION, SOLUTION SUBCUTANEOUS AS NEEDED
COMMUNITY

## 2024-08-16 NOTE — TELEPHONE ENCOUNTER
"Returned patients call and let her know I called about the rezdiffra situation, and explained what I found out. Please see Encounter: \"Rezdiffra\" for all information regarding this matter.   "

## 2024-08-16 NOTE — TELEPHONE ENCOUNTER
Hub staff attempted to follow warm transfer process and was unsuccessful     Caller: CHAITANYA BRANHAM    Relationship to patient: SELF    Best call back number: [phone]    Patient is needing: PT RETURNING CALL TO OFFICE IN REGARDS TO A MEDICATION APPROVAL. PT DOESN'T KNOW THE NAME OF THE PERSON WHO REACHED OUT BUT SAID THEY HAD SOME QUESTIONS ABOUT THE MEDICATION SHE WAS SUPPOSED TO BE TAKING. PT GOES TO WORK AT 2-5:30

## 2024-08-16 NOTE — PROGRESS NOTES
[facility] BARIATRIC SURGERY  [address]  [facility] [address]  [phone]  [address]  [facility] [address]  [phone]  Dept: [phone]  8/16/2024      Vivian Kauffman.  [id number]  [id number]  1955  female      Chief Complaint   Patient presents with    Follow-up     Fup sleeve       [facility] Post-Op Bariatric Surgery:   Vivian Kauffman is status post Laparoscopic Sleeve/HH procedure, performed on 10/2/2017     HPI:       Today's weight is 68.9 kg (152 lb) pounds, today's BMI is Body mass index is 27.34 kg/m²., she has a loss of 16 pounds since the last visit and her weight loss since surgery is 58 pounds. The patient reports a decreased portion size and loss of appetite.    Vivian Kauffman denies nausea, vomiting, reflux and reports that she is doing well. She reports that her endocrinologist got her on ozempic this past year, she has been able to cut her insulin back dramatically and is only taking 1-2 times per month.      Diet and Exercise: Diet history reviewed and discussed with the patient. Weight loss/gains to date discussed with the patient. The patient states they are eating 60 grams of protein per day. She reports eating 3 meals per day, a typical portion size of 1/2 cup, eating 0-1 snacks per day, drinking 5-6 or more 8-oz. glasses of water per day, no carbonated beverage consumption and exercising regularly.     Supplements: OTC MTV with iron, vitamin D, folic acid.     Review of Systems   Constitutional:  Positive for appetite change. Negative for fatigue and unexpected weight change.   HENT: Negative.     Eyes: Negative.    Respiratory: Negative.     Cardiovascular: Negative.  Negative for leg swelling.   Gastrointestinal:  Negative for abdominal distention, abdominal pain, constipation, diarrhea, nausea and vomiting.   Genitourinary:  Negative for difficulty urinating, frequency and urgency.  "  Musculoskeletal:  Negative for back pain.   Skin: Negative.    Psychiatric/Behavioral: Negative.     All other systems reviewed and are negative.      Patient Active Problem List   Diagnosis    Essential hypertension    Mixed hyperlipidemia    Coronary artery disease involving native coronary artery of native heart without angina pectoris    Diabetes mellitus type 2 in obese    Fatty liver    Seizure disorder    Acquired atrophy of thyroid    Hydronephrosis of left kidney    Benign essential tremor    Recurrent major depressive disorder, in partial remission    Seasonal allergic rhinitis due to pollen    Microhematuria    Acute low back pain without sciatica    Recurrent cystitis    OAB (overactive bladder)    TIA (transient ischemic attack)    Overweight (BMI 25.0-29.9)       Past Medical History:   Diagnosis Date    Anxiety     Asthma     Coronary arteriosclerosis     Depression     Diabetes mellitus     TYPE 2 - BLOOD GLUCOSE AROUND 100-300    Disease of thyroid gland     Fatigue     Fatty liver     Fibromyalgia     Fibromyositis     GERD (gastroesophageal reflux disease)     Heartburn     History of COVID-19 2020    [place] (hard of hearing)     Hyperlipidemia     Hypertension     Insomnia     Kidney stone     Muscle spasm     Pain of both hip joints     Polyphagia(783.6)     PONV (postoperative nausea and vomiting)     Poor vision     Seizures     Shortness of breath on exertion     Sinus drainage     Stroke syndrome     \"MINI STROKE\"  left side weakness     Tremor, essential     IN NECK  AND HANDS    Vertigo        The following portions of the patient's history were reviewed and updated as appropriate: allergies, current medications, past family history, past medical history, past social history, past surgical history, and problem list.    Vitals:    08/16/24 0852   BP: 122/65   Pulse: 70   Temp: 98.1 °F (36.7 °C)       Physical Exam  Vitals and nursing note reviewed.   Constitutional:       Appearance: She " is well-developed.   Neck:      Thyroid: No thyromegaly.   Cardiovascular:      Rate and Rhythm: Normal rate.   Pulmonary:      Effort: Pulmonary effort is normal. No respiratory distress.   Abdominal:      Palpations: Abdomen is soft.   Musculoskeletal:         General: No tenderness.   Skin:     General: Skin is warm and dry.   Neurological:      Mental Status: She is alert.   Psychiatric:         Behavior: Behavior normal.         Assessment:   Post-op, the patient is doing well. Her latest hba1c was 5.8. She has a history of vitamin D, B12, and folic acid deficiency. Her last B12 level was elevated but labs have been recently checked and she has no deficiencies currently. Most recent CBC was WNL.     Encounter Diagnoses   Name Primary?    Overweight (BMI 25.0-29.9) Yes    Essential hypertension     Coronary artery disease involving native coronary artery of native heart without angina pectoris     Diabetes mellitus type 2 in obese        Plan:     Encouraged patient to be sure to get plenty of lean protein per day through small frequent meals all with a protein source.   Activity restrictions: none.   Recommended patient be sure to get at least 70 grams of protein per day by eating small, frequent meals all with high lean protein choices. Be sure to limit/cut back on daily carbohydrate intake. Discussed with the patient the recommended amount of water per day to intake- half of body weight in ounces. Reviewed vitamin requirements. Be sure to do routine exercise, 150 minutes per week minimum, including both cardio and strength training.     Instructions / Recommendations: dietary counseling recommended, recommended a daily protein intake of  grams, vitamin supplement(s) recommended, recommended exercising at least 150 minutes per week, behavior modifications recommended and instructed to call the office for concerns, questions, or problems.     The patient was instructed to follow up in 1 year .      Total  time spent during this encounter today was 30 minutes

## 2024-09-03 ENCOUNTER — TELEPHONE (OUTPATIENT)
Dept: INTERNAL MEDICINE | Age: 69
End: 2024-09-03

## 2024-09-03 ENCOUNTER — LAB (OUTPATIENT)
Dept: LAB | Facility: HOSPITAL | Age: 69
End: 2024-09-03
Payer: MEDICARE

## 2024-09-03 DIAGNOSIS — I10 ESSENTIAL HYPERTENSION: ICD-10-CM

## 2024-09-03 DIAGNOSIS — E53.8 VITAMIN B12 DEFICIENCY: ICD-10-CM

## 2024-09-03 DIAGNOSIS — E66.9 TYPE 2 DIABETES MELLITUS WITH OBESITY: ICD-10-CM

## 2024-09-03 DIAGNOSIS — E11.69 TYPE 2 DIABETES MELLITUS WITH OBESITY: ICD-10-CM

## 2024-09-03 DIAGNOSIS — E78.2 MIXED HYPERLIPIDEMIA: ICD-10-CM

## 2024-09-03 LAB
ALBUMIN SERPL-MCNC: 4.7 G/DL (ref 3.5–5.2)
ALBUMIN/GLOB SERPL: 1.5 G/DL
ALP SERPL-CCNC: 89 U/L (ref 39–117)
ALT SERPL W P-5'-P-CCNC: 78 U/L (ref 1–33)
ANION GAP SERPL CALCULATED.3IONS-SCNC: 14.9 MMOL/L (ref 5–15)
AST SERPL-CCNC: 47 U/L (ref 1–32)
BILIRUB SERPL-MCNC: 0.4 MG/DL (ref 0–1.2)
BUN SERPL-MCNC: 19 MG/DL (ref 8–23)
BUN/CREAT SERPL: 27.5 (ref 7–25)
CALCIUM SPEC-SCNC: 10.3 MG/DL (ref 8.6–10.5)
CHLORIDE SERPL-SCNC: 96 MMOL/L (ref 98–107)
CHOLEST SERPL-MCNC: 181 MG/DL (ref 0–200)
CO2 SERPL-SCNC: 28.1 MMOL/L (ref 22–29)
CREAT SERPL-MCNC: 0.69 MG/DL (ref 0.57–1)
EGFRCR SERPLBLD CKD-EPI 2021: 94.1 ML/MIN/1.73
FOLATE SERPL-MCNC: 4.8 NG/ML (ref 4.78–24.2)
GLOBULIN UR ELPH-MCNC: 3.2 GM/DL
GLUCOSE SERPL-MCNC: 176 MG/DL (ref 65–99)
HDLC SERPL-MCNC: 69 MG/DL (ref 40–60)
LDLC SERPL CALC-MCNC: 98 MG/DL (ref 0–100)
LDLC/HDLC SERPL: 1.4 {RATIO}
POTASSIUM SERPL-SCNC: 4.6 MMOL/L (ref 3.5–5.2)
PROT SERPL-MCNC: 7.9 G/DL (ref 6–8.5)
SODIUM SERPL-SCNC: 139 MMOL/L (ref 136–145)
TRIGL SERPL-MCNC: 77 MG/DL (ref 0–150)
VIT B12 BLD-MCNC: 948 PG/ML (ref 211–946)
VLDLC SERPL-MCNC: 14 MG/DL (ref 5–40)

## 2024-09-03 PROCEDURE — 36415 COLL VENOUS BLD VENIPUNCTURE: CPT

## 2024-09-03 PROCEDURE — 80053 COMPREHEN METABOLIC PANEL: CPT

## 2024-09-03 PROCEDURE — 80061 LIPID PANEL: CPT

## 2024-09-03 PROCEDURE — 82607 VITAMIN B-12: CPT

## 2024-09-03 PROCEDURE — 82746 ASSAY OF FOLIC ACID SERUM: CPT

## 2024-09-03 RX ORDER — CLOPIDOGREL BISULFATE 75 MG/1
TABLET ORAL
Qty: 90 TABLET | Refills: 0 | Status: SHIPPED | OUTPATIENT
Start: 2024-09-03

## 2024-09-03 NOTE — TELEPHONE ENCOUNTER
Caller: Vivian Kauffman    Relationship: Self    Best call back number: [phone]     What is the best time to reach you: ANY    Who are you requesting to speak with (clinical staff, provider,  specific staff member): CLINICAL STAFF    What was the call regarding: PATIENT CALLED STATING THAT WHILE AT THE LAB SHE DID NOT DO HER URINE TEST. SHE WOULD LIKE TO KNOW IF SHE CAN COMPLETE IT FRIDAY 9/6/24

## 2024-09-04 DIAGNOSIS — G89.29 CHRONIC PAIN WITH DRUG DEPENDENCE: ICD-10-CM

## 2024-09-04 DIAGNOSIS — F19.20 CHRONIC PAIN WITH DRUG DEPENDENCE: ICD-10-CM

## 2024-09-05 ENCOUNTER — OFFICE VISIT (OUTPATIENT)
Dept: INTERNAL MEDICINE | Age: 69
End: 2024-09-05
Payer: MEDICARE

## 2024-09-05 VITALS
WEIGHT: 141 LBS | SYSTOLIC BLOOD PRESSURE: 128 MMHG | HEART RATE: 73 BPM | TEMPERATURE: 98.2 F | BODY MASS INDEX: 25.36 KG/M2 | OXYGEN SATURATION: 99 % | DIASTOLIC BLOOD PRESSURE: 78 MMHG

## 2024-09-05 DIAGNOSIS — E78.2 MIXED HYPERLIPIDEMIA: Primary | ICD-10-CM

## 2024-09-05 DIAGNOSIS — E53.8 VITAMIN B12 DEFICIENCY: ICD-10-CM

## 2024-09-05 DIAGNOSIS — I10 ESSENTIAL HYPERTENSION: ICD-10-CM

## 2024-09-05 DIAGNOSIS — E66.9 TYPE 2 DIABETES MELLITUS WITH OBESITY: ICD-10-CM

## 2024-09-05 DIAGNOSIS — K76.0 FATTY LIVER: ICD-10-CM

## 2024-09-05 DIAGNOSIS — E03.4 ACQUIRED ATROPHY OF THYROID: ICD-10-CM

## 2024-09-05 DIAGNOSIS — D50.9 IRON DEFICIENCY ANEMIA, UNSPECIFIED IRON DEFICIENCY ANEMIA TYPE: ICD-10-CM

## 2024-09-05 DIAGNOSIS — E11.69 TYPE 2 DIABETES MELLITUS WITH OBESITY: ICD-10-CM

## 2024-09-05 LAB
ALBUMIN UR-MCNC: <1.2 MG/DL
CREAT UR-MCNC: 21.3 MG/DL
MICROALBUMIN/CREAT UR: NORMAL MG/G{CREAT}

## 2024-09-05 PROCEDURE — 3074F SYST BP LT 130 MM HG: CPT | Performed by: INTERNAL MEDICINE

## 2024-09-05 PROCEDURE — G2211 COMPLEX E/M VISIT ADD ON: HCPCS | Performed by: INTERNAL MEDICINE

## 2024-09-05 PROCEDURE — 3044F HG A1C LEVEL LT 7.0%: CPT | Performed by: INTERNAL MEDICINE

## 2024-09-05 PROCEDURE — 82570 ASSAY OF URINE CREATININE: CPT

## 2024-09-05 PROCEDURE — 82043 UR ALBUMIN QUANTITATIVE: CPT

## 2024-09-05 PROCEDURE — 3078F DIAST BP <80 MM HG: CPT | Performed by: INTERNAL MEDICINE

## 2024-09-05 PROCEDURE — 1126F AMNT PAIN NOTED NONE PRSNT: CPT | Performed by: INTERNAL MEDICINE

## 2024-09-05 PROCEDURE — 99214 OFFICE O/P EST MOD 30 MIN: CPT | Performed by: INTERNAL MEDICINE

## 2024-09-05 RX ORDER — GABAPENTIN 300 MG/1
CAPSULE ORAL
Qty: 90 CAPSULE | Refills: 1 | Status: SHIPPED | OUTPATIENT
Start: 2024-09-05

## 2024-09-05 RX ORDER — ATORVASTATIN CALCIUM 40 MG/1
40 TABLET, FILM COATED ORAL EVERY OTHER DAY
Qty: 45 TABLET | Refills: 1 | Status: SHIPPED | OUTPATIENT
Start: 2024-09-05

## 2024-09-05 NOTE — ASSESSMENT & PLAN NOTE
Patient's B12 level is still at the upper end of normal at 950 as of 9/24, she is been off of B12 for several months now, and we will continue as such, repeat labs after the new year.

## 2024-09-05 NOTE — ASSESSMENT & PLAN NOTE
Patient is followed by endocrinology for this, her A1c was less than 6 earlier this summer, she has follow-up with them next month, appreciate their expertise.

## 2024-09-05 NOTE — ASSESSMENT & PLAN NOTE
Patient's LDL went from 70 on average to 170 off of daily statin.  We stopped it when her LFTs were in the 160 ballpark.  They have since recovered, so we resumed 3 time weekly atorvastatin, and her LDL is down to 100 as of her 9/24 OV.  LFTs are still pretty reasonable at 70, patient stable to continue same plan of care.

## 2024-09-05 NOTE — ASSESSMENT & PLAN NOTE
Blood pressure remains well-controlled as of her 9/24 OV and she is just on low-dose spironolactone for ascites, her potassium is 4.6 and her renal functions very normal, certainly stable to continue current treatment.
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
## 2024-09-05 NOTE — ASSESSMENT & PLAN NOTE
ALT is finally trended down to around 50 as of her 6/24 OV. Additionally she had the FibroScan and she was 2 out of a scale of 4, with 4 being cirrhosis. We will monitor the LFTs after getting her back on a bit lower dose of atorvastatin.---> Patient was prescribed Rezdiffra per GI, currently not covered by her insurance, she has some questions about long-term treatment with that/plan of care, unfortunately have to refer her back to GI for this.  Her liver enzymes are just mildly elevated around 70 as of her 9/24 OV.

## 2024-09-23 RX ORDER — SPIRONOLACTONE 25 MG/1
TABLET ORAL
Qty: 90 TABLET | Refills: 0 | Status: SHIPPED | OUTPATIENT
Start: 2024-09-23

## 2024-10-10 ENCOUNTER — TRANSCRIBE ORDERS (OUTPATIENT)
Dept: ADMINISTRATIVE | Facility: HOSPITAL | Age: 69
End: 2024-10-10
Payer: MEDICARE

## 2024-10-10 DIAGNOSIS — E66.9 OBESITY, UNSPECIFIED CLASS, UNSPECIFIED OBESITY TYPE, UNSPECIFIED WHETHER SERIOUS COMORBIDITY PRESENT: Primary | ICD-10-CM

## 2024-10-10 DIAGNOSIS — E03.9 PRIMARY HYPOTHYROIDISM: ICD-10-CM

## 2024-10-10 DIAGNOSIS — E78.2 MIXED HYPERLIPIDEMIA: ICD-10-CM

## 2024-10-10 DIAGNOSIS — E11.69 DIABETES MELLITUS ASSOCIATED WITH HORMONAL ETIOLOGY: ICD-10-CM

## 2024-10-10 DIAGNOSIS — E55.9 VITAMIN D DEFICIENCY: ICD-10-CM

## 2024-10-14 ENCOUNTER — TELEPHONE (OUTPATIENT)
Dept: GASTROENTEROLOGY | Facility: CLINIC | Age: 69
End: 2024-10-14
Payer: MEDICARE

## 2024-10-14 ENCOUNTER — LAB (OUTPATIENT)
Dept: LAB | Facility: HOSPITAL | Age: 69
End: 2024-10-14
Payer: MEDICARE

## 2024-10-14 DIAGNOSIS — E66.9 OBESITY, UNSPECIFIED CLASS, UNSPECIFIED OBESITY TYPE, UNSPECIFIED WHETHER SERIOUS COMORBIDITY PRESENT: ICD-10-CM

## 2024-10-14 DIAGNOSIS — R19.7 DIARRHEA, UNSPECIFIED TYPE: Primary | ICD-10-CM

## 2024-10-14 DIAGNOSIS — E78.2 MIXED HYPERLIPIDEMIA: ICD-10-CM

## 2024-10-14 DIAGNOSIS — E11.69 DIABETES MELLITUS ASSOCIATED WITH HORMONAL ETIOLOGY: ICD-10-CM

## 2024-10-14 DIAGNOSIS — E55.9 VITAMIN D DEFICIENCY: ICD-10-CM

## 2024-10-14 DIAGNOSIS — E03.9 PRIMARY HYPOTHYROIDISM: ICD-10-CM

## 2024-10-14 LAB
25(OH)D3 SERPL-MCNC: 52.9 NG/ML (ref 30–100)
ALBUMIN SERPL-MCNC: 4.5 G/DL (ref 3.5–5.2)
ALBUMIN UR-MCNC: 3.2 MG/DL
ALBUMIN/GLOB SERPL: 1.4 G/DL
ALP SERPL-CCNC: 93 U/L (ref 39–117)
ALT SERPL W P-5'-P-CCNC: 131 U/L (ref 1–33)
ANION GAP SERPL CALCULATED.3IONS-SCNC: 13.6 MMOL/L (ref 5–15)
AST SERPL-CCNC: 81 U/L (ref 1–32)
BILIRUB SERPL-MCNC: 0.5 MG/DL (ref 0–1.2)
BUN SERPL-MCNC: 20 MG/DL (ref 8–23)
BUN/CREAT SERPL: 22.2 (ref 7–25)
CALCIUM SPEC-SCNC: 9.7 MG/DL (ref 8.6–10.5)
CHLORIDE SERPL-SCNC: 100 MMOL/L (ref 98–107)
CHOLEST SERPL-MCNC: 147 MG/DL (ref 0–200)
CO2 SERPL-SCNC: 22.4 MMOL/L (ref 22–29)
CREAT SERPL-MCNC: 0.9 MG/DL (ref 0.57–1)
CREAT UR-MCNC: 160.6 MG/DL
EGFRCR SERPLBLD CKD-EPI 2021: 69.3 ML/MIN/1.73
GLOBULIN UR ELPH-MCNC: 3.3 GM/DL
GLUCOSE SERPL-MCNC: 206 MG/DL (ref 65–99)
HBA1C MFR BLD: 6.4 % (ref 4.8–5.6)
HDLC SERPL-MCNC: 61 MG/DL (ref 40–60)
LDLC SERPL CALC-MCNC: 72 MG/DL (ref 0–100)
LDLC/HDLC SERPL: 1.18 {RATIO}
MICROALBUMIN/CREAT UR: 19.9 MG/G (ref 0–29)
POTASSIUM SERPL-SCNC: 4.1 MMOL/L (ref 3.5–5.2)
PROT SERPL-MCNC: 7.8 G/DL (ref 6–8.5)
SODIUM SERPL-SCNC: 136 MMOL/L (ref 136–145)
TRIGL SERPL-MCNC: 69 MG/DL (ref 0–150)
TSH SERPL DL<=0.05 MIU/L-ACNC: 1.17 UIU/ML (ref 0.27–4.2)
VLDLC SERPL-MCNC: 14 MG/DL (ref 5–40)

## 2024-10-14 PROCEDURE — 82306 VITAMIN D 25 HYDROXY: CPT

## 2024-10-14 PROCEDURE — 82570 ASSAY OF URINE CREATININE: CPT

## 2024-10-14 PROCEDURE — 80061 LIPID PANEL: CPT

## 2024-10-14 PROCEDURE — 84443 ASSAY THYROID STIM HORMONE: CPT

## 2024-10-14 PROCEDURE — 80053 COMPREHEN METABOLIC PANEL: CPT

## 2024-10-14 PROCEDURE — 83036 HEMOGLOBIN GLYCOSYLATED A1C: CPT

## 2024-10-14 PROCEDURE — 36415 COLL VENOUS BLD VENIPUNCTURE: CPT

## 2024-10-14 PROCEDURE — 82043 UR ALBUMIN QUANTITATIVE: CPT

## 2024-10-14 NOTE — TELEPHONE ENCOUNTER
Per JIM Fox pt should complete stool studies. Stool studies ordered. S/w pt she states she will try to  the stool kit today.

## 2024-10-14 NOTE — TELEPHONE ENCOUNTER
Caller: Vivian Kauffman     Relationship: SELF    Best call back number: [phone]    What is your medical concern? LAST TIME PATIENT WAS IN, RAMBO STATED IF SHE HAS TROUBLE WITH DIARRHEA AGAIN TO GIVE THE OFFICE A CALL AND THE OFFICE WOULD CALL AN RX IN FOR DIARRHEA. SHE DOESN'T REMEMBER THE NAME OF THE MEDICATION THAT SHE WAS CALLED IN PREVIOUSLY, BUT SHE STATES IT WAS AN RX FOR TWO WEEKS. PT HAS APPT. 10.17. PLEASE ADVISE. PHARMACY IS [facility] IN [facility].     How long has this issue been going on? OFF AND ON FOR OVER A WEEK.     Is your provider already aware of this issue? YES    Have you been treated for this issue? YES

## 2024-10-14 NOTE — TELEPHONE ENCOUNTER
Called pt, she states she has been dealing with diarrhea most of the week last week, she had 2 episodes of diarrhea during the night last week. She started vomiting and having diarrhea (liquid stool) around 2 am that lasted for 2 hours, she had some weakness and cold sweats last night, but this has subsided. Pt has not had any BM during the day today or any vomiting, she was able to eat a biscuit this morning and has drank 20oz of water so far, will be drinking more fluids. Pt denies feeling lightheaded/dizzy today, no ABD cramping. She mentioned her blood sugar being 233 and is lowering that with her medication, she also has an appt with her diabetes specialist tomorrow morning. I advised pt to report to the ER if her symptoms return of vomiting and diarrhea with fever or weakness, pt is agreeable. She completed labs this morning for another provider, in process. Pt is aware JIM Fox is out of office today and will return tomorrow.

## 2024-10-16 ENCOUNTER — TRANSCRIBE ORDERS (OUTPATIENT)
Dept: OBSTETRICS AND GYNECOLOGY | Facility: CLINIC | Age: 69
End: 2024-10-16
Payer: MEDICARE

## 2024-10-16 DIAGNOSIS — R19.7 DIARRHEA, UNSPECIFIED TYPE: ICD-10-CM

## 2024-10-16 DIAGNOSIS — Z12.31 BREAST CANCER SCREENING BY MAMMOGRAM: Primary | ICD-10-CM

## 2024-10-16 LAB
027 TOXIN: NORMAL
C DIFF TOX GENS STL QL NAA+PROBE: NEGATIVE
LACTOFERRIN STL QL LA: POSITIVE

## 2024-10-16 PROCEDURE — 87505 NFCT AGENT DETECTION GI: CPT | Performed by: NURSE PRACTITIONER

## 2024-10-16 PROCEDURE — 83630 LACTOFERRIN FECAL (QUAL): CPT | Performed by: NURSE PRACTITIONER

## 2024-10-16 PROCEDURE — 87493 C DIFF AMPLIFIED PROBE: CPT | Performed by: NURSE PRACTITIONER

## 2024-10-16 PROCEDURE — 87506 IADNA-DNA/RNA PROBE TQ 6-11: CPT | Performed by: NURSE PRACTITIONER

## 2024-10-17 ENCOUNTER — OFFICE VISIT (OUTPATIENT)
Dept: GASTROENTEROLOGY | Facility: CLINIC | Age: 69
End: 2024-10-17
Payer: MEDICARE

## 2024-10-17 VITALS
HEIGHT: 63 IN | BODY MASS INDEX: 26.12 KG/M2 | HEART RATE: 75 BPM | SYSTOLIC BLOOD PRESSURE: 97 MMHG | DIASTOLIC BLOOD PRESSURE: 72 MMHG | WEIGHT: 147.4 LBS

## 2024-10-17 DIAGNOSIS — K58.0 IRRITABLE BOWEL SYNDROME WITH DIARRHEA: ICD-10-CM

## 2024-10-17 DIAGNOSIS — R79.89 ELEVATED LFTS: ICD-10-CM

## 2024-10-17 DIAGNOSIS — K76.0 FATTY LIVER: Primary | ICD-10-CM

## 2024-10-17 LAB
C COLI+JEJ+UPSA DNA STL QL NAA+NON-PROBE: NOT DETECTED
EC STX1+STX2 GENES STL QL NAA+NON-PROBE: NOT DETECTED
S ENT+BONG DNA STL QL NAA+NON-PROBE: NOT DETECTED
SHIGELLA SP+EIEC IPAH ST NAA+NON-PROBE: NOT DETECTED

## 2024-10-17 RX ORDER — FLUTICASONE PROPIONATE 50 UG/1
1 SPRAY, METERED NASAL DAILY
COMMUNITY
Start: 2024-09-01 | End: 2024-12-30

## 2024-10-17 NOTE — PROGRESS NOTES
Patient Name: Vivian Kauffman   Visit Date: 10/17/2024   Patient ID: [id number]  Provider: JIM Oliveros    Sex: female  Location:  Location Address:  Location Phone: [address]  [place] [address] [phone]    YOB: 1955  Age: 69 y.o.   Primary Care Provider Shahbaz Acosta MD      Referring Provider: No ref. provider found        Chief Complaint  Fatty Liver (Follow up ), Diarrhea (Had an episode on 10.13.24 that lasted 2 hours 10+ Bms during this timeframe ), and Vomiting (Had an episode on 10.13.24 that lasted 2 hours )    History of Present Illness    Patient has history of diarrhea alternating with constipation and history of fatty liver.    Patient was referred back to us January 2024 for fatty liver and elevated LFTs.    Liver workup in 2023 negative, no history of alcohol.    Reported history of gastric sleeve in 2017 and lost 100 pounds total- also started Ozempic last year and lost 30 pounds with this  Last colonoscopy 2022 by Dr. Love done for screening-hyperplastic sigmoid colon polyp and recommended 10-year recall.     FibroScan 4/12/2024: F2 and normal to mild liver fat    5/13/24: Xifaxan prescribed for IBS-D symptoms.  Reported losing 8 pounds-decreased appetite  Rezdiffra for was prescribed also in May 2024 however patient has not received yet    Patient was last seen 8/13/2024 reported Xifaxan resolved diarrhea symptoms she had lost 4 pounds intentionally-decreased appetite with Ozempic    Liver US 6/2/24 IMPRESSION:  Impression:  1. Hepatic steatosis, suspect mild to moderate severity.  2. Cholecystectomy.    Pt states she awoke Sunday into Monday with vomiting and diarrhea x 2 hrs at 2 AM . No fever with it. Pt states she did have a sinus infection the week prior, no antibiotic therapy.   Pt called our office when diarrhea occurred, stool cdiff negative, culture and parasite panel still pending. Did not have BM yesterday or any yet today. No abd pain.  "  Pt still hasn't received Rezdiffra. Pt has lost 7# since last visit   No ETOH - LFTs have increased, denies new medications or herbal supplements   Past Medical History:   Diagnosis Date    Anxiety     Asthma     Coronary arteriosclerosis     Depression     Diabetes mellitus     TYPE 2 - BLOOD GLUCOSE AROUND 100-300    Disease of thyroid gland     Fatigue     Fatty liver     Fibromyalgia     Fibromyositis     GERD (gastroesophageal reflux disease)     Heartburn     History of COVID-19     2020    [place] (hard of hearing)     Hyperlipidemia     Hypertension     Insomnia     Kidney stone     Muscle spasm     Pain of both hip joints     Polyphagia(783.6)     PONV (postoperative nausea and vomiting)     Poor vision     Seizures     Shortness of breath on exertion     Sinus drainage     Stroke syndrome     \"MINI STROKE\"  left side weakness     Tremor, essential     IN NECK  AND HANDS    Vertigo        Past Surgical History:   Procedure Laterality Date    BREAST BIOPSY Right     CARDIAC CATHETERIZATION      no stents 9/2013 at [facility]    CARPAL TUNNEL RELEASE Bilateral 2009    COLONOSCOPY      2018    COLONOSCOPY N/A 2/18/2022    Procedure: COLONOSCOPY SCREENING WITH POLYPECTOMY;  Surgeon: David Love MD;  Location: [facility] ENDOSCOPY;  Service: Gastroenterology;  Laterality: N/A;  COLON POLYP    CYSTOSCOPY NEPHROURETEROSCOPY Left 6/29/2021    Procedure: CYSTOSCOPY NEPHROURETEROSCOPY;  Surgeon: Mandie Hamilton MD;  Location: [facility] MAIN OR;  Service: Urology;  Laterality: Left;    ENDOSCOPY N/A 9/9/2016    Procedure: ESOPHAGOGASTRODUODENOSCOPY WITH BIOPSY;  Surgeon: Chris Ackerman Jr., MD;  Location: [facility] ENDOSCOPY;  Service:     EXTRACORPOREAL SHOCKWAVE LITHOTRIPSY (ESWL), STENT INSERTION/REMOVAL  09/2013    FOOT SURGERY Left     FRACTURE SURGERY      left hand    GASTRIC BANDING REMOVAL N/A 11/30/2016    Procedure: LAPAROSCOPIC GASTRIC BANDING AND PORT REMOVAL ;  Surgeon: Chris Novoa" Meka Salmeron MD;  [facility];  Service:     GASTRIC SLEEVE LAPAROSCOPIC N/A 10/2/2017    Procedure: GASTRIC SLEEVE LAPAROSCOPIC revision WITH LYSIS OF ADHESIONS AND HITAL HERNIA REPAIR ;  Surgeon: Chris Ackerman Jr., MD;  [facility] [place] [facility];  Service:     GASTRIC SLEEVE LAPAROSCOPIC      HAND SURGERY Left     LAPAROSCOPIC CHOLECYSTECTOMY      MASS EXCISION Right     RT FOOT     NOSE SURGERY      TONSILLECTOMY      URETEROSCOPY STENT INSERTION Left 2021    Procedure: URETEROSCOPY STENT INSERTION;  Surgeon: Mandie Hamilton MD;  Location: [facility] OR;  Service: Urology;  Laterality: Left;       Allergies   Allergen Reactions    Jardiance [Empagliflozin] Anaphylaxis     Went into ketoacidosis    Peanut Oil Anaphylaxis     throat to close     Latex Hives     SKIN BLISTERS    Sulfa Antibiotics Rash    Ace Inhibitors Cough and Other (See Comments)       Family History   Problem Relation Age of Onset    Heart attack Father     Diabetes Father     Hypertension Father     Obesity Father         Morbid Obesity    Stroke Father     Heart attack Mother     Diabetes Mother     Hypertension Mother     Stroke Mother     Heart attack Brother     Cancer Brother         Bladder    Heart attack Paternal Grandfather     Stroke Paternal Grandmother     Heart attack Maternal Grandmother     Heart attack Maternal Grandfather     Malig Hyperthermia Neg Hx     Colon cancer Neg Hx     Breast cancer Neg Hx     Uterine cancer Neg Hx     Ovarian cancer Neg Hx         Social History     Tobacco Use    Smoking status: Former     Current packs/day: 0.00     Average packs/day: 2.0 packs/day for 30.0 years (60.0 ttl pk-yrs)     Types: Cigarettes     Start date:      Quit date:      Years since quittin.8     Passive exposure: Past    Smokeless tobacco: Never    Tobacco comments:     caffeine use   Vaping Use    Vaping status: Never Used   Substance Use Topics    Alcohol use: Not Currently    Drug use: Never  "      Objective     Vital Signs:   BP 97/72 (BP Location: Left arm, Patient Position: Sitting, Cuff Size: Adult)   Pulse 75   Ht 158.8 cm (62.52\")   Wt 66.9 kg (147 lb 6.4 oz)   BMI 26.51 kg/m²       Physical Exam  Constitutional:       General: The patient is not in acute distress.     Appearance: Normal appearance.   HENT:      Head: Normocephalic and atraumatic.      Nose: Nose normal.   Pulmonary:      Effort: Pulmonary effort is normal. No respiratory distress.   Abdominal:      General: Abdomen is flat.      Palpations: Abdomen is soft. There is no mass.      Tenderness: There is no abdominal tenderness. There is no guarding.   Musculoskeletal:      Cervical back: Neck supple.      Right lower leg: No edema.      Left lower leg: No edema.   Skin:     General: Skin is warm and dry.   Neurological:      General: No focal deficit present.      Mental Status: The patient is alert and oriented to person, place, and time.      Gait: Gait normal.   Psychiatric:         Mood and Affect: Mood normal.         Speech: Speech normal.         Behavior: Behavior normal.         Thought Content: Thought content normal.     Result Review :   The following data was reviewed by: Diamond Carlos, JIM on 10/17/2024:    CBC w/diff          12/4/2023    08:57 5/31/2024    06:20   CBC w/Diff   WBC 4.69  4.76    RBC 5.22  5.00    Hemoglobin 14.9  14.3    Hematocrit 44.7  43.6    MCV 85.6  87.2    MCH 28.5  28.6    MCHC 33.3  32.8    RDW 14.0  13.6    Platelets 183  149    Neutrophil Rel % 57.6  58.6    Immature Granulocyte Rel % 0.2  0.2    Lymphocyte Rel % 32.0  28.4    Monocyte Rel % 7.9  10.7    Eosinophil Rel % 1.9  1.7    Basophil Rel % 0.4  0.4      CMP          5/31/2024    06:20 9/3/2024    06:34 10/14/2024    10:15   CMP   Glucose 111  176  206    BUN 12  19  20    Creatinine 0.64  0.69  0.90    EGFR 95.8  94.1  69.3    Sodium 141  139  136    Potassium 4.7  4.6  4.1    Chloride 103  96  100    Calcium 9.2  10.3 "  9.7    Total Protein 7.3  7.9  7.8    Albumin 4.5  4.7  4.5    Globulin  3.2  3.3    Total Bilirubin 0.3  0.4  0.5    Alkaline Phosphatase 75  89  93    AST (SGOT) 38  47  81    ALT (SGPT) 50  78  131    Albumin/Globulin Ratio  1.5  1.4    BUN/Creatinine Ratio 18.8  27.5  22.2    Anion Gap 11.0  14.9  13.6        AFP   ALPHA-FETOPROTEIN   Date Value Ref Range Status   05/31/2024 <2 0 - 8.3 ng/mL Final      PT/INR   Protime   Date Value Ref Range Status   05/31/2024 13.3 11.8 - 14.9 Seconds Final     INR   Date Value Ref Range Status   05/31/2024 0.99 0.86 - 1.15 Final               Assessment and Plan    Diagnoses and all orders for this visit:    1. Fatty liver (Primary)  -     Hepatic Function Panel; Future  -     Protime-INR; Future    2. Elevated LFTs  -     Hepatic Function Panel; Future  -     Protime-INR; Future    3. Irritable bowel syndrome with diarrhea              Follow Up   Return in about 6 months (around 4/17/2025).  Repeat LFTs 2 weeks - recent elevation but also done at time pt had n/v/d episode  Increase protein shakes to 2/day - pt only eating once/day  Liver US due December Cont low carb diet and weight loss, 2-4 c. Coffee/d  Will have staff call and check on Rezdiffra again - pt applied for pt assistance  Pt will call if diarrhea occurs again, seems to have been an isolated event, possibly infectious.     Patient was given instructions and counseling regarding her condition or for health maintenance advice. Please see specific information pulled into the AVS if appropriate.

## 2024-10-18 LAB
CRYPTOSP DNA STL QL NAA+NON-PROBE: NOT DETECTED
E HISTOLYT DNA STL QL NAA+NON-PROBE: NOT DETECTED
G LAMBLIA DNA STL QL NAA+NON-PROBE: NOT DETECTED

## 2024-10-29 ENCOUNTER — TELEPHONE (OUTPATIENT)
Dept: GASTROENTEROLOGY | Facility: CLINIC | Age: 69
End: 2024-10-29
Payer: MEDICARE

## 2024-10-29 NOTE — TELEPHONE ENCOUNTER
Hub staff attempted to follow warm transfer process and was unsuccessful     Caller: SHANELL    Relationship to patient:     Best call back number: [phone]    Patient is needing: SHANELL WITH [facility] PATIENT SUPPORT CALLED IN AND WANTS TO KNOW IF APPEAL WAS SUBMITTED FOR PATIENT'S REZDIFFRA MEDICATION. IF SO, SHE WOULD LIKE TO KNOW THE STATUS. PLEASE CALL BACK [phone] SHANELL STATES THAT APPEAL APPROVAL/DENIAL CAN ALSO BE FAXED [phone]

## 2024-10-30 NOTE — TELEPHONE ENCOUNTER
Yared has been approved. I have faxed approval letter to [facility] patient support at [phone].

## 2024-10-31 ENCOUNTER — TELEPHONE (OUTPATIENT)
Dept: GASTROENTEROLOGY | Facility: CLINIC | Age: 69
End: 2024-10-31
Payer: MEDICARE

## 2024-10-31 DIAGNOSIS — F19.20 CHRONIC PAIN WITH DRUG DEPENDENCE: ICD-10-CM

## 2024-10-31 DIAGNOSIS — G89.29 CHRONIC PAIN WITH DRUG DEPENDENCE: ICD-10-CM

## 2024-10-31 NOTE — TELEPHONE ENCOUNTER
Hub staff attempted to follow warm transfer process and was unsuccessful     Caller: CHAITANYA BRANHAM    Relationship to patient: SELF    Best call back number: [phone]     Patient is needing: MS. BRANHAM HAS ORDERS FOR LABS AND IS NEEDING TO KNOW IF SHE NEEDS TO FAST. SHE IS WANTING TO HAVE HER LABS DONE TOMORROW.  PLEASE REVIEW AND ADVISE.    OK TO CALL BEFORE 1:30. OK TO LEAVE A DETAILED VM.

## 2024-11-01 ENCOUNTER — LAB (OUTPATIENT)
Dept: LAB | Facility: HOSPITAL | Age: 69
End: 2024-11-01
Payer: MEDICARE

## 2024-11-01 DIAGNOSIS — K76.0 FATTY LIVER: ICD-10-CM

## 2024-11-01 DIAGNOSIS — R79.89 ELEVATED LFTS: ICD-10-CM

## 2024-11-01 LAB
ALBUMIN SERPL-MCNC: 4.3 G/DL (ref 3.5–5.2)
ALP SERPL-CCNC: 77 U/L (ref 39–117)
ALT SERPL W P-5'-P-CCNC: 83 U/L (ref 1–33)
AST SERPL-CCNC: 79 U/L (ref 1–32)
BILIRUB CONJ SERPL-MCNC: <0.2 MG/DL (ref 0–0.3)
BILIRUB INDIRECT SERPL-MCNC: ABNORMAL MG/DL
BILIRUB SERPL-MCNC: 0.4 MG/DL (ref 0–1.2)
INR PPP: 1.07 (ref 0.86–1.15)
PROT SERPL-MCNC: 7.1 G/DL (ref 6–8.5)
PROTHROMBIN TIME: 14.1 SECONDS (ref 11.8–14.9)

## 2024-11-01 PROCEDURE — 36415 COLL VENOUS BLD VENIPUNCTURE: CPT

## 2024-11-01 PROCEDURE — 80076 HEPATIC FUNCTION PANEL: CPT

## 2024-11-01 PROCEDURE — 85610 PROTHROMBIN TIME: CPT

## 2024-11-01 RX ORDER — LEVETIRACETAM 500 MG/1
TABLET ORAL
OUTPATIENT
Start: 2024-11-01

## 2024-11-01 RX ORDER — GABAPENTIN 300 MG/1
300 CAPSULE ORAL 3 TIMES DAILY
Qty: 90 CAPSULE | Refills: 5 | Status: SHIPPED | OUTPATIENT
Start: 2024-11-01

## 2024-11-01 NOTE — TELEPHONE ENCOUNTER
Caller: Jamari Vivianlin Marshall    Relationship: Self    Best call back number: [phone]     Requested Prescriptions:   Requested Prescriptions     Pending Prescriptions Disp Refills    gabapentin (NEURONTIN) 300 MG capsule [Pharmacy Med Name: Gabapentin 300 MG Oral Capsule] 90 capsule 0     Sig: TAKE 1 CAPSULE BY MOUTH THREE TIMES DAILY    levETIRAcetam (KEPPRA) 500 MG tablet          Pharmacy where request should be sent: [address] [facility] [phone] [facility] [phone]      Last office visit with prescribing clinician: 9/5/2024   Last telemedicine visit with prescribing clinician: Visit date not found   Next office visit with prescribing clinician: 12/11/2024     Additional details provided by patient: PATIENT ONLY HAS ONE CAPSULE LEFT OF GABAPENTIN AND IS NEEDING THE PRESCRIPTION CALLED IN AS SOON AS POSSIBLE. PLEASE ADVISE.    Does the patient have less than a 3 day supply:  [x] Yes  [] No    Would you like a call back once the refill request has been completed: [x] Yes [] No    If the office needs to give you a call back, can they leave a voicemail: [] Yes [] No    Betty Mcadams Rep   11/01/24 09:27 EDT

## 2024-11-06 ENCOUNTER — HOSPITAL ENCOUNTER (OUTPATIENT)
Dept: MAMMOGRAPHY | Facility: HOSPITAL | Age: 69
Discharge: HOME | End: 2024-11-06
Admitting: OBSTETRICS & GYNECOLOGY
Payer: MEDICARE

## 2024-11-06 DIAGNOSIS — Z12.31 BREAST CANCER SCREENING BY MAMMOGRAM: ICD-10-CM

## 2024-11-06 PROCEDURE — 77067 SCR MAMMO BI INCL CAD: CPT

## 2024-11-06 PROCEDURE — 77063 BREAST TOMOSYNTHESIS BI: CPT

## 2024-11-13 DIAGNOSIS — J30.1 SEASONAL ALLERGIC RHINITIS DUE TO POLLEN: ICD-10-CM

## 2024-11-13 RX ORDER — MONTELUKAST SODIUM 10 MG/1
TABLET ORAL
Qty: 90 TABLET | Refills: 0 | Status: SHIPPED | OUTPATIENT
Start: 2024-11-13

## 2024-11-15 ENCOUNTER — TELEPHONE (OUTPATIENT)
Dept: INTERNAL MEDICINE | Age: 69
End: 2024-11-15
Payer: MEDICARE

## 2024-11-15 RX ORDER — PREDNISONE 20 MG/1
20 TABLET ORAL DAILY
Qty: 5 TABLET | Refills: 0 | Status: SHIPPED | OUTPATIENT
Start: 2024-11-15 | End: 2024-11-20

## 2024-11-15 RX ORDER — AMOXICILLIN 875 MG/1
875 TABLET, COATED ORAL 2 TIMES DAILY
Qty: 14 TABLET | Refills: 0 | Status: SHIPPED | OUTPATIENT
Start: 2024-11-15 | End: 2024-11-22

## 2024-11-15 NOTE — TELEPHONE ENCOUNTER
Pt complaining of coughing that started on Tuesday 11/12  Stated she took pearls and flonase for cough that helped her cough some, but now her throat is irritated throat and sore.    Pt stated this morning she coughed up a yellow mucus and her throat feels raw and scratchy.    Pt asked if there was anything she could take to help this, in lieu of office visit. Only wanted to see you.    Please advise.

## 2024-11-15 NOTE — TELEPHONE ENCOUNTER
Per Dr. Acosta pt can have Tarik's Magic Mouth wash, Prednisone 20mg qd for 5 days and Amoxil 875mg bid for 7 days.     I have let pt know of this, she only wants to go to [facility], so she does not want the tarik's Magic Mouthwash.    I have sent the other two medications in.

## 2024-11-25 RX ORDER — LEVOTHYROXINE SODIUM 25 UG/1
TABLET ORAL
Qty: 90 TABLET | Refills: 0 | Status: SHIPPED | OUTPATIENT
Start: 2024-11-25

## 2024-12-02 ENCOUNTER — HOSPITAL ENCOUNTER (OUTPATIENT)
Dept: ULTRASOUND IMAGING | Facility: HOSPITAL | Age: 69
Discharge: HOME | End: 2024-12-02
Admitting: NURSE PRACTITIONER
Payer: MEDICARE

## 2024-12-02 DIAGNOSIS — K76.0 FATTY LIVER: ICD-10-CM

## 2024-12-02 PROCEDURE — 76705 ECHO EXAM OF ABDOMEN: CPT

## 2024-12-02 RX ORDER — CLOPIDOGREL BISULFATE 75 MG/1
TABLET ORAL
Qty: 90 TABLET | Refills: 0 | Status: SHIPPED | OUTPATIENT
Start: 2024-12-02

## 2024-12-06 ENCOUNTER — TELEPHONE (OUTPATIENT)
Dept: GASTROENTEROLOGY | Facility: CLINIC | Age: 69
End: 2024-12-06
Payer: MEDICARE

## 2024-12-06 NOTE — TELEPHONE ENCOUNTER
Hub staff attempted to follow warm transfer process and was unsuccessful     Caller: Vivian Kauffman    Relationship to patient: Self    Best call back number: [phone]    Patient is needing: BEST TO REACH BEFORE 12 (PT HAS TO WORK). PT IS CALLING TO SEE IF LAB ORDERS FOR LIVER ENZYMES HAVE BEEN PUT IN

## 2024-12-09 ENCOUNTER — LAB (OUTPATIENT)
Facility: HOSPITAL | Age: 69
End: 2024-12-09
Payer: MEDICARE

## 2024-12-09 DIAGNOSIS — E03.4 ACQUIRED ATROPHY OF THYROID: ICD-10-CM

## 2024-12-09 LAB — TSH SERPL DL<=0.05 MIU/L-ACNC: 2.82 UIU/ML (ref 0.27–4.2)

## 2024-12-09 PROCEDURE — 84443 ASSAY THYROID STIM HORMONE: CPT

## 2024-12-09 PROCEDURE — 36415 COLL VENOUS BLD VENIPUNCTURE: CPT

## 2024-12-09 NOTE — TELEPHONE ENCOUNTER
S/w pt, she verbalized understanding that there are not any outstanding labs at this time and to keep F/U appt next week

## 2024-12-11 ENCOUNTER — OFFICE VISIT (OUTPATIENT)
Dept: INTERNAL MEDICINE | Age: 69
End: 2024-12-11
Payer: MEDICARE

## 2024-12-11 VITALS
OXYGEN SATURATION: 98 % | HEART RATE: 70 BPM | DIASTOLIC BLOOD PRESSURE: 70 MMHG | BODY MASS INDEX: 25.8 KG/M2 | TEMPERATURE: 96.8 F | SYSTOLIC BLOOD PRESSURE: 122 MMHG | WEIGHT: 145.6 LBS | HEIGHT: 63 IN

## 2024-12-11 DIAGNOSIS — E11.69 TYPE 2 DIABETES MELLITUS WITH OBESITY: Primary | ICD-10-CM

## 2024-12-11 DIAGNOSIS — E55.9 VITAMIN D DEFICIENCY: ICD-10-CM

## 2024-12-11 DIAGNOSIS — E03.4 ACQUIRED ATROPHY OF THYROID: ICD-10-CM

## 2024-12-11 DIAGNOSIS — E78.2 MIXED HYPERLIPIDEMIA: ICD-10-CM

## 2024-12-11 DIAGNOSIS — E53.8 VITAMIN B12 DEFICIENCY: ICD-10-CM

## 2024-12-11 DIAGNOSIS — I10 ESSENTIAL HYPERTENSION: ICD-10-CM

## 2024-12-11 DIAGNOSIS — K76.0 FATTY LIVER: ICD-10-CM

## 2024-12-11 DIAGNOSIS — E83.19 IRON OVERLOAD SYNDROME: ICD-10-CM

## 2024-12-11 DIAGNOSIS — E66.9 TYPE 2 DIABETES MELLITUS WITH OBESITY: Primary | ICD-10-CM

## 2024-12-11 PROBLEM — R31.29 MICROHEMATURIA: Status: RESOLVED | Noted: 2022-03-14 | Resolved: 2024-12-11

## 2024-12-11 PROCEDURE — 3074F SYST BP LT 130 MM HG: CPT | Performed by: INTERNAL MEDICINE

## 2024-12-11 PROCEDURE — 3078F DIAST BP <80 MM HG: CPT | Performed by: INTERNAL MEDICINE

## 2024-12-11 PROCEDURE — 1126F AMNT PAIN NOTED NONE PRSNT: CPT | Performed by: INTERNAL MEDICINE

## 2024-12-11 PROCEDURE — G2211 COMPLEX E/M VISIT ADD ON: HCPCS | Performed by: INTERNAL MEDICINE

## 2024-12-11 PROCEDURE — 3044F HG A1C LEVEL LT 7.0%: CPT | Performed by: INTERNAL MEDICINE

## 2024-12-11 PROCEDURE — 1159F MED LIST DOCD IN RCRD: CPT | Performed by: INTERNAL MEDICINE

## 2024-12-11 PROCEDURE — 99214 OFFICE O/P EST MOD 30 MIN: CPT | Performed by: INTERNAL MEDICINE

## 2024-12-11 PROCEDURE — 1160F RVW MEDS BY RX/DR IN RCRD: CPT | Performed by: INTERNAL MEDICINE

## 2024-12-11 NOTE — ASSESSMENT & PLAN NOTE
Blood pressure remains well-controlled as of her 12/24 OV and she is just on low-dose spironolactone for ascites, her potassium is 4.1 and her renal functions very normal, certainly stable to continue current treatment.

## 2024-12-11 NOTE — ASSESSMENT & PLAN NOTE
LDL has trended down further to 72 as of 12/24 OV, and this is just on every other day moderate dose atorvastatin, LFTs are stable as noted above, so she can continue plan of care.
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
## 2024-12-11 NOTE — ASSESSMENT & PLAN NOTE
Patient is followed by endocrinology for this, her A1c was 6.4 in 10/24, appreciate their expertise.

## 2024-12-11 NOTE — ASSESSMENT & PLAN NOTE
ALT is finally trended down to around 50 as of her 6/24 OV. Additionally she had the FibroScan and she was 2 out of a scale of 4, with 4 being cirrhosis. We will monitor the LFTs after getting her back on a bit lower dose of atorvastatin...Patient was prescribed Rezdiffra per GI, currently not covered by her insurance, she has some questions about long-term treatment with that/plan of care, unfortunately have to refer her back to GI for this.  Her liver enzymes are just mildly elevated around 70 as of her 9/24 OV. ---> ALT holding in the 80's as of 12/24 OV off Rezdiffra and he is being followed closely by GI.

## 2024-12-18 NOTE — PROGRESS NOTES
Patient Name: Vivian Kauffman   Visit Date: 12/19/2024   Patient ID: [id number]  Provider: JIM Oliveros    Sex: female  Location:  Location Address:  Location Phone: [address]  [place] [address] [phone]    YOB: 1955  Age: 69 y.o.   Primary Care Provider Shahbaz Acosta MD      Referring Provider: No ref. provider found        Chief Complaint  Fatty liver (Follow up ), Constipation, and Diarrhea    History of Present Illness  Patient has history of diarrhea alternating with constipation and history of fatty liver.    Patient was referred back to us January 2024 for fatty liver and elevated LFTs.    Liver workup in 2023 negative, no history of alcohol.    Reported history of gastric sleeve in 2017 and lost 100 pounds total- also started Ozempic last year and lost 30 pounds with this  Last colonoscopy 2022 by Dr. Love done for screening-hyperplastic sigmoid colon polyp and recommended 10-year recall.     FibroScan 4/12/2024: F2 and normal to mild liver fat    5/13/24: Xifaxan prescribed for IBS-D symptoms.  Reported losing 8 pounds-decreased appetite    Rezdiffra for was prescribed also in May 2024 however patient has not received yet    Patient was last seen 10/17/2024 - had lost 7#    Liver US 12/2/24: fatty liver     Pt states diarrhea comes and goes, pt states she takes Florajen randomly - states diarrhea occurs maybe once/week. +Abd bloating. No other GI c/o.   Pt's weight is just up 1# since last visit   Pt taking Ozempic still   LFTs have fluctuated.     Past Medical History:   Diagnosis Date    Anxiety     Asthma     Coronary arteriosclerosis     Depression     Diabetes mellitus     TYPE 2 - BLOOD GLUCOSE AROUND 100-300    Disease of thyroid gland     Fatigue     Fatty liver     Fibromyalgia     Fibromyositis     GERD (gastroesophageal reflux disease)     Heartburn     History of COVID-19 2020    [place]     Hyperlipidemia     Hypertension      "Insomnia     Kidney stone     Muscle spasm     Pain of both hip joints     Polyphagia(783.6)     PONV (postoperative nausea and vomiting)     Poor vision     Seizures     Shortness of breath on exertion     Sinus drainage     Stroke syndrome     \"MINI STROKE\"  left side weakness     Tremor, essential     IN NECK  AND HANDS    Vertigo        Past Surgical History:   Procedure Laterality Date    BREAST BIOPSY Right     CARDIAC CATHETERIZATION      no stents 9/2013 at [facility]    CARPAL TUNNEL RELEASE Bilateral 2009    COLONOSCOPY      2018    COLONOSCOPY N/A 2/18/2022    Procedure: COLONOSCOPY SCREENING WITH POLYPECTOMY;  Surgeon: David Love MD;  Location: [facility] ENDOSCOPY;  Service: Gastroenterology;  Laterality: N/A;  COLON POLYP    CYSTOSCOPY NEPHROURETEROSCOPY Left 6/29/2021    Procedure: CYSTOSCOPY NEPHROURETEROSCOPY;  Surgeon: Mandie Hamilton MD;  Location: [facility] MAIN OR;  Service: Urology;  Laterality: Left;    ENDOSCOPY N/A 9/9/2016    Procedure: ESOPHAGOGASTRODUODENOSCOPY WITH BIOPSY;  Surgeon: Chris Ackerman Jr., MD;  Location: [facility] ENDOSCOPY;  Service:     EXTRACORPOREAL SHOCKWAVE LITHOTRIPSY (ESWL), STENT INSERTION/REMOVAL  09/2013    FOOT SURGERY Left     FRACTURE SURGERY      left hand    GASTRIC BANDING REMOVAL N/A 11/30/2016    Procedure: LAPAROSCOPIC GASTRIC BANDING AND PORT REMOVAL ;  Surgeon: Chris Ackerman Jr., MD;  [place] [facility] [place] [facility]  Service:     GASTRIC SLEEVE LAPAROSCOPIC N/A 10/2/2017    Procedure: GASTRIC SLEEVE LAPAROSCOPIC revision WITH LYSIS OF ADHESIONS AND HITAL HERNIA REPAIR ;  Surgeon: Chris Ackerman Jr., MD;  Location: [facility];  Service:     GASTRIC SLEEVE LAPAROSCOPIC      HAND SURGERY Left     LAPAROSCOPIC CHOLECYSTECTOMY      MASS EXCISION Right     RT FOOT     NOSE SURGERY      TONSILLECTOMY      URETEROSCOPY STENT INSERTION Left 6/29/2021    Procedure: URETEROSCOPY STENT INSERTION;  Surgeon: Mandie Hamilton MD;  Location: " "[facility] MAIN OR;  Service: Urology;  Laterality: Left;       Allergies   Allergen Reactions    Jardiance [Empagliflozin] Anaphylaxis     Went into ketoacidosis    Peanut Oil Anaphylaxis     throat to close     Latex Hives     SKIN BLISTERS    Sulfa Antibiotics Rash    Ace Inhibitors Cough and Other (See Comments)       Family History   Problem Relation Age of Onset    Heart attack Father     Diabetes Father     Hypertension Father     Obesity Father         Morbid Obesity    Stroke Father     Heart attack Mother     Diabetes Mother     Hypertension Mother     Stroke Mother     Heart attack Brother     Cancer Brother         Bladder    Heart attack Paternal Grandfather     Stroke Paternal Grandmother     Heart attack Maternal Grandmother     Heart attack Maternal Grandfather     Malig Hyperthermia Neg Hx     Colon cancer Neg Hx     Breast cancer Neg Hx     Uterine cancer Neg Hx     Ovarian cancer Neg Hx         Social History     Tobacco Use    Smoking status: Former     Current packs/day: 0.00     Average packs/day: 2.0 packs/day for 30.0 years (60.0 ttl pk-yrs)     Types: Cigarettes     Start date:      Quit date:      Years since quittin.9     Passive exposure: Past    Smokeless tobacco: Never    Tobacco comments:     caffeine use   Vaping Use    Vaping status: Never Used   Substance Use Topics    Alcohol use: Not Currently    Drug use: Never       Objective     Vital Signs:   /70 (BP Location: Left arm, Patient Position: Sitting, Cuff Size: Adult)   Pulse 72   Ht 158.8 cm (62.52\")   Wt 67.4 kg (148 lb 9.6 oz)   BMI 26.73 kg/m²       Physical Exam  Constitutional:       General: The patient is not in acute distress.     Appearance: Normal appearance.   HENT:      Head: Normocephalic and atraumatic.      Nose: Nose normal.   Pulmonary:      Effort: Pulmonary effort is normal. No respiratory distress.   Abdominal:      General: Abdomen is flat.      Palpations: Abdomen is soft. There is no " mass.      Tenderness: There is no abdominal tenderness. There is no guarding.   Musculoskeletal:      Cervical back: Neck supple.      Right lower leg: No edema.      Left lower leg: No edema.   Skin:     General: Skin is warm and dry.   Neurological:      General: No focal deficit present.      Mental Status: The patient is alert and oriented to person, place, and time.      Gait: Gait normal.   Psychiatric:         Mood and Affect: Mood normal.         Speech: Speech normal.         Behavior: Behavior normal.         Thought Content: Thought content normal.     Result Review :   The following data was reviewed by: JIM Oliveros on 12/19/2024:    CBC w/diff          5/31/2024    06:20   CBC w/Diff   WBC 4.76    RBC 5.00    Hemoglobin 14.3    Hematocrit 43.6    MCV 87.2    MCH 28.6    MCHC 32.8    RDW 13.6    Platelets 149    Neutrophil Rel % 58.6    Immature Granulocyte Rel % 0.2    Lymphocyte Rel % 28.4    Monocyte Rel % 10.7    Eosinophil Rel % 1.7    Basophil Rel % 0.4      CMP          9/3/2024    06:34 10/14/2024    10:15 11/1/2024    12:09   CMP   Glucose 176  206     BUN 19  20     Creatinine 0.69  0.90     EGFR 94.1  69.3     Sodium 139  136     Potassium 4.6  4.1     Chloride 96  100     Calcium 10.3  9.7     Total Protein 7.9  7.8  7.1    Albumin 4.7  4.5  4.3    Globulin 3.2  3.3     Total Bilirubin 0.4  0.5  0.4    Alkaline Phosphatase 89  93  77    AST (SGOT) 47  81  79    ALT (SGPT) 78  131  83    Albumin/Globulin Ratio 1.5  1.4     BUN/Creatinine Ratio 27.5  22.2     Anion Gap 14.9  13.6                     Assessment and Plan    Diagnoses and all orders for this visit:    1. Fatty liver (Primary)  -     CBC (No Diff); Future  -     Hepatic Function Panel; Future  -     Protime-INR; Future  -     US Liver; Future    2. Elevated LFTs  -     CBC (No Diff); Future  -     Hepatic Function Panel; Future  -     Protime-INR; Future  -     US Liver; Future    3. Irritable bowel syndrome with  diarrhea    4. Overweight (BMI 25.0-29.9)              Follow Up   Return in about 6 months (around 6/19/2025).  Xifaxan samples x 7 days  We called and pt is able to receive Rezdiffra she just has to call to schedule shipment. Pt was given this information today.  LFTs in Feb (1 month after starting Rezdiffra)  Liver US in May   Call if any GI sx     Patient was given instructions and counseling regarding her condition or for health maintenance advice. Please see specific information pulled into the AVS if appropriate.

## 2024-12-19 ENCOUNTER — OFFICE VISIT (OUTPATIENT)
Dept: GASTROENTEROLOGY | Facility: CLINIC | Age: 69
End: 2024-12-19
Payer: MEDICARE

## 2024-12-19 VITALS
HEART RATE: 72 BPM | HEIGHT: 63 IN | WEIGHT: 148.6 LBS | SYSTOLIC BLOOD PRESSURE: 135 MMHG | BODY MASS INDEX: 26.33 KG/M2 | DIASTOLIC BLOOD PRESSURE: 70 MMHG

## 2024-12-19 DIAGNOSIS — K76.0 FATTY LIVER: Primary | ICD-10-CM

## 2024-12-19 DIAGNOSIS — K58.0 IRRITABLE BOWEL SYNDROME WITH DIARRHEA: ICD-10-CM

## 2024-12-19 DIAGNOSIS — R79.89 ELEVATED LFTS: ICD-10-CM

## 2024-12-19 DIAGNOSIS — E66.3 OVERWEIGHT (BMI 25.0-29.9): ICD-10-CM

## 2024-12-23 ENCOUNTER — TELEPHONE (OUTPATIENT)
Dept: INTERNAL MEDICINE | Age: 69
End: 2024-12-23
Payer: MEDICARE

## 2024-12-23 RX ORDER — SPIRONOLACTONE 25 MG/1
TABLET ORAL
Qty: 90 TABLET | Refills: 0 | Status: SHIPPED | OUTPATIENT
Start: 2024-12-23

## 2024-12-23 NOTE — TELEPHONE ENCOUNTER
Caller: Vivian Kauffman    Relationship to patient: Self    Best call back number: [phone]    Patient is needing: PATIENT IS REQUESTING A CALL BACK FROM SHREYA. SHE IS NEEDING A LETTER STATING THAT SHE CAN CARRY HER CELL PHONE DUE TO HER HER MEDICAL CONDITIONS. SHE ALSO NEEDS IT TO SAY SHE CAN HAVE HER WATER.     HER LIVER DOCTOR STARTED HER ON TWO NEW MEDICATIONS. THEY'RE XIFAXAN 550 MG TABLETS SHE TAKES ONE THREE TIMES A DAY AND REZDIFFRA 60 MG AND SHE TAKES ONE A DAY. SHE WANTS TO ADD THESE TO HER MEDICATION LIST. PLEASE CALL AND ADVISE.

## 2025-01-09 ENCOUNTER — TELEPHONE (OUTPATIENT)
Dept: INTERNAL MEDICINE | Age: 70
End: 2025-01-09
Payer: MEDICARE

## 2025-01-09 ENCOUNTER — TELEPHONE (OUTPATIENT)
Dept: GASTROENTEROLOGY | Facility: CLINIC | Age: 70
End: 2025-01-09
Payer: MEDICARE

## 2025-01-09 NOTE — TELEPHONE ENCOUNTER
Pt called with questions on refilling the medication. She is aware that there are refills on it and she will need to contact the [facility].     Patient has had some nausea and dizziness in the mornings and episodes of diarrhea since starting the Rezdiffra.

## 2025-01-09 NOTE — TELEPHONE ENCOUNTER
Nausea and diarrhea are expected and should improve in a few weeks, dizziness was also reported.  Is pt ok to try to continue? She can try OTC pepto bismol or adan for nausea

## 2025-01-09 NOTE — TELEPHONE ENCOUNTER
Caller: Vivian Kauffman    Relationship to patient: Self    Best call back number: [phone]    Patient is needing: PATIENT CALLED REQUESTING TO SPEAK WITH SHREYA. PATIENT STATES SHE HAS A QUESTION ABOUT A TEST BUT WOULD NOT GIVE ANY OTHER DETAILS.

## 2025-01-10 NOTE — TELEPHONE ENCOUNTER
I have spoke to pt and she wanted to know when her next colonoscopy was due. I have let her know that it would be due 5 years from 2/2022

## 2025-01-13 NOTE — TELEPHONE ENCOUNTER
S/w pt, she verbalized understanding and states she is fine to continue taking, she will try the OTC pepto bismol or adan if nausea continues

## 2025-01-18 ENCOUNTER — LAB (OUTPATIENT)
Facility: HOSPITAL | Age: 70
End: 2025-01-18
Payer: MEDICARE

## 2025-01-18 DIAGNOSIS — K76.0 FATTY LIVER: ICD-10-CM

## 2025-01-18 DIAGNOSIS — E53.8 VITAMIN B12 DEFICIENCY: ICD-10-CM

## 2025-01-18 DIAGNOSIS — E78.2 MIXED HYPERLIPIDEMIA: ICD-10-CM

## 2025-01-18 DIAGNOSIS — D50.9 IRON DEFICIENCY ANEMIA, UNSPECIFIED IRON DEFICIENCY ANEMIA TYPE: ICD-10-CM

## 2025-01-18 DIAGNOSIS — R79.89 ELEVATED LFTS: ICD-10-CM

## 2025-01-18 DIAGNOSIS — I10 ESSENTIAL HYPERTENSION: ICD-10-CM

## 2025-01-18 LAB
ALBUMIN SERPL-MCNC: 4.2 G/DL (ref 3.5–5.2)
ALBUMIN/GLOB SERPL: 1.4 G/DL
ALP SERPL-CCNC: 87 U/L (ref 39–117)
ALT SERPL W P-5'-P-CCNC: 311 U/L (ref 1–33)
ANION GAP SERPL CALCULATED.3IONS-SCNC: 10 MMOL/L (ref 5–15)
AST SERPL-CCNC: 187 U/L (ref 1–32)
BASOPHILS # BLD AUTO: 0.03 10*3/MM3 (ref 0–0.2)
BASOPHILS NFR BLD AUTO: 0.6 % (ref 0–1.5)
BILIRUB CONJ SERPL-MCNC: 0.2 MG/DL (ref 0–0.3)
BILIRUB SERPL-MCNC: 0.4 MG/DL (ref 0–1.2)
BUN SERPL-MCNC: 15 MG/DL (ref 8–23)
BUN/CREAT SERPL: 23.8 (ref 7–25)
CALCIUM SPEC-SCNC: 9.3 MG/DL (ref 8.6–10.5)
CHLORIDE SERPL-SCNC: 103 MMOL/L (ref 98–107)
CHOLEST SERPL-MCNC: 121 MG/DL (ref 0–200)
CO2 SERPL-SCNC: 26 MMOL/L (ref 22–29)
CREAT SERPL-MCNC: 0.63 MG/DL (ref 0.57–1)
DEPRECATED RDW RBC AUTO: 40.9 FL (ref 37–54)
EGFRCR SERPLBLD CKD-EPI 2021: 95.6 ML/MIN/1.73
EOSINOPHIL # BLD AUTO: 0.1 10*3/MM3 (ref 0–0.4)
EOSINOPHIL NFR BLD AUTO: 2.1 % (ref 0.3–6.2)
ERYTHROCYTE [DISTWIDTH] IN BLOOD BY AUTOMATED COUNT: 13.1 % (ref 12.3–15.4)
FERRITIN SERPL-MCNC: 874 NG/ML (ref 13–150)
FOLATE SERPL-MCNC: 9.96 NG/ML (ref 4.78–24.2)
GLOBULIN UR ELPH-MCNC: 2.9 GM/DL
GLUCOSE SERPL-MCNC: 155 MG/DL (ref 65–99)
HCT VFR BLD AUTO: 41.4 % (ref 34–46.6)
HDLC SERPL-MCNC: 57 MG/DL (ref 40–60)
HGB BLD-MCNC: 13.9 G/DL (ref 12–15.9)
IMM GRANULOCYTES # BLD AUTO: 0.01 10*3/MM3 (ref 0–0.05)
IMM GRANULOCYTES NFR BLD AUTO: 0.2 % (ref 0–0.5)
INR PPP: 1.05 (ref 0.86–1.15)
IRON 24H UR-MRATE: 73 MCG/DL (ref 37–145)
IRON SATN MFR SERPL: 16 % (ref 20–50)
LDLC SERPL CALC-MCNC: 48 MG/DL (ref 0–100)
LDLC/HDLC SERPL: 0.83 {RATIO}
LYMPHOCYTES # BLD AUTO: 1.16 10*3/MM3 (ref 0.7–3.1)
LYMPHOCYTES NFR BLD AUTO: 24.5 % (ref 19.6–45.3)
MCH RBC QN AUTO: 29 PG (ref 26.6–33)
MCHC RBC AUTO-ENTMCNC: 33.6 G/DL (ref 31.5–35.7)
MCV RBC AUTO: 86.3 FL (ref 79–97)
MONOCYTES # BLD AUTO: 0.4 10*3/MM3 (ref 0.1–0.9)
MONOCYTES NFR BLD AUTO: 8.5 % (ref 5–12)
NEUTROPHILS NFR BLD AUTO: 3.03 10*3/MM3 (ref 1.7–7)
NEUTROPHILS NFR BLD AUTO: 64.1 % (ref 42.7–76)
NRBC BLD AUTO-RTO: 0 /100 WBC (ref 0–0.2)
PLATELET # BLD AUTO: 160 10*3/MM3 (ref 140–450)
PMV BLD AUTO: 10.8 FL (ref 6–12)
POTASSIUM SERPL-SCNC: 4.3 MMOL/L (ref 3.5–5.2)
PROT SERPL-MCNC: 7.1 G/DL (ref 6–8.5)
PROTHROMBIN TIME: 13.9 SECONDS (ref 11.8–14.9)
RBC # BLD AUTO: 4.8 10*6/MM3 (ref 3.77–5.28)
SODIUM SERPL-SCNC: 139 MMOL/L (ref 136–145)
TIBC SERPL-MCNC: 448 MCG/DL (ref 298–536)
TRANSFERRIN SERPL-MCNC: 301 MG/DL (ref 200–360)
TRIGL SERPL-MCNC: 84 MG/DL (ref 0–150)
VIT B12 BLD-MCNC: 1126 PG/ML (ref 211–946)
VLDLC SERPL-MCNC: 16 MG/DL (ref 5–40)
WBC NRBC COR # BLD AUTO: 4.73 10*3/MM3 (ref 3.4–10.8)

## 2025-01-18 PROCEDURE — 82607 VITAMIN B-12: CPT

## 2025-01-18 PROCEDURE — 84466 ASSAY OF TRANSFERRIN: CPT

## 2025-01-18 PROCEDURE — 83540 ASSAY OF IRON: CPT

## 2025-01-18 PROCEDURE — 82728 ASSAY OF FERRITIN: CPT

## 2025-01-18 PROCEDURE — 82248 BILIRUBIN DIRECT: CPT

## 2025-01-18 PROCEDURE — 36415 COLL VENOUS BLD VENIPUNCTURE: CPT

## 2025-01-18 PROCEDURE — 80061 LIPID PANEL: CPT

## 2025-01-18 PROCEDURE — 85025 COMPLETE CBC W/AUTO DIFF WBC: CPT

## 2025-01-18 PROCEDURE — 80053 COMPREHEN METABOLIC PANEL: CPT

## 2025-01-18 PROCEDURE — 85610 PROTHROMBIN TIME: CPT

## 2025-01-18 PROCEDURE — 82746 ASSAY OF FOLIC ACID SERUM: CPT

## 2025-01-20 DIAGNOSIS — R94.5 ABNORMAL RESULTS OF LIVER FUNCTION STUDIES: ICD-10-CM

## 2025-01-20 DIAGNOSIS — R79.89 ELEVATED LFTS: Primary | ICD-10-CM

## 2025-01-21 ENCOUNTER — TELEPHONE (OUTPATIENT)
Dept: GASTROENTEROLOGY | Facility: CLINIC | Age: 70
End: 2025-01-21
Payer: MEDICARE

## 2025-01-21 NOTE — TELEPHONE ENCOUNTER
Hub staff attempted to follow warm transfer process and was unsuccessful     Caller: Vivian Kauffman    Relationship to patient: Self    Best call back number:  [phone]    Patient is needing: PT IS RETURNING CALL TO OFFICE, PLEASE REACH BACK OUT TO HER. OK TO LEAVE A DETAILED MESSAGE ON VM. PT IS OFF WORK TODAY AND WILL HAVE HER PHONE WITH HER.

## 2025-01-21 NOTE — TELEPHONE ENCOUNTER
S/w pt, she verbalized understanding and will complete labs next week. Pt will remain off of rezdiffra.

## 2025-01-21 NOTE — TELEPHONE ENCOUNTER
----- Message from Diamond Carlos sent at 1/20/2025  4:14 PM EST -----  Okay, request patient do labs again next week as her liver enzymes were pretty elevated, remain off Rezdiffra

## 2025-01-29 ENCOUNTER — LAB (OUTPATIENT)
Facility: HOSPITAL | Age: 70
End: 2025-01-29
Payer: MEDICARE

## 2025-01-29 ENCOUNTER — TELEPHONE (OUTPATIENT)
Dept: GASTROENTEROLOGY | Facility: CLINIC | Age: 70
End: 2025-01-29
Payer: MEDICARE

## 2025-01-29 DIAGNOSIS — R79.89 ELEVATED LFTS: ICD-10-CM

## 2025-01-29 DIAGNOSIS — R94.5 ABNORMAL RESULTS OF LIVER FUNCTION STUDIES: ICD-10-CM

## 2025-01-29 LAB
ALBUMIN SERPL-MCNC: 4.3 G/DL (ref 3.5–5.2)
ALP SERPL-CCNC: 92 U/L (ref 39–117)
ALT SERPL W P-5'-P-CCNC: 134 U/L (ref 1–33)
AST SERPL-CCNC: 82 U/L (ref 1–32)
BILIRUB CONJ SERPL-MCNC: 0.2 MG/DL (ref 0–0.3)
BILIRUB INDIRECT SERPL-MCNC: 0.2 MG/DL
BILIRUB SERPL-MCNC: 0.4 MG/DL (ref 0–1.2)
INR PPP: 0.99 (ref 0.86–1.15)
PROT SERPL-MCNC: 7.5 G/DL (ref 6–8.5)
PROTHROMBIN TIME: 13.3 SECONDS (ref 11.8–14.9)

## 2025-01-29 PROCEDURE — 85610 PROTHROMBIN TIME: CPT

## 2025-01-29 PROCEDURE — 80076 HEPATIC FUNCTION PANEL: CPT

## 2025-01-29 PROCEDURE — 36415 COLL VENOUS BLD VENIPUNCTURE: CPT

## 2025-01-29 NOTE — TELEPHONE ENCOUNTER
----- Message from Diamond Carlos sent at 1/29/2025 12:34 PM EST -----  Better , repeat in 2 weeks

## 2025-01-30 NOTE — TELEPHONE ENCOUNTER
S/w pt, she verbalized understanding of results and is agreeable to complete labs in 2 weeks. Pt states her PCP also discontinued Atorvastatin.

## 2025-02-06 DIAGNOSIS — J30.1 SEASONAL ALLERGIC RHINITIS DUE TO POLLEN: ICD-10-CM

## 2025-02-06 RX ORDER — MONTELUKAST SODIUM 10 MG/1
TABLET ORAL
Qty: 90 TABLET | Refills: 0 | Status: SHIPPED | OUTPATIENT
Start: 2025-02-06

## 2025-02-12 DIAGNOSIS — N39.41 URGE INCONTINENCE: ICD-10-CM

## 2025-02-12 RX ORDER — OXYBUTYNIN CHLORIDE 5 MG/1
5 TABLET, EXTENDED RELEASE ORAL DAILY
Qty: 30 TABLET | Refills: 0 | Status: SHIPPED | OUTPATIENT
Start: 2025-02-12

## 2025-02-13 ENCOUNTER — LAB (OUTPATIENT)
Facility: HOSPITAL | Age: 70
End: 2025-02-13
Payer: MEDICARE

## 2025-02-13 DIAGNOSIS — R79.89 ELEVATED LFTS: ICD-10-CM

## 2025-02-13 DIAGNOSIS — R94.5 ABNORMAL RESULTS OF LIVER FUNCTION STUDIES: ICD-10-CM

## 2025-02-13 LAB
ALBUMIN SERPL-MCNC: 4.4 G/DL (ref 3.5–5.2)
ALP SERPL-CCNC: 89 U/L (ref 39–117)
ALT SERPL W P-5'-P-CCNC: 81 U/L (ref 1–33)
ANION GAP SERPL CALCULATED.3IONS-SCNC: 13.5 MMOL/L (ref 5–15)
AST SERPL-CCNC: 63 U/L (ref 1–32)
BASOPHILS # BLD AUTO: 0.03 10*3/MM3 (ref 0–0.2)
BASOPHILS NFR BLD AUTO: 0.6 % (ref 0–1.5)
BILIRUB CONJ SERPL-MCNC: 0.2 MG/DL (ref 0–0.3)
BILIRUB INDIRECT SERPL-MCNC: 0.3 MG/DL
BILIRUB SERPL-MCNC: 0.5 MG/DL (ref 0–1.2)
BUN SERPL-MCNC: 13 MG/DL (ref 8–23)
BUN/CREAT SERPL: 20.3 (ref 7–25)
CALCIUM SPEC-SCNC: 9.4 MG/DL (ref 8.6–10.5)
CHLORIDE SERPL-SCNC: 98 MMOL/L (ref 98–107)
CO2 SERPL-SCNC: 24.5 MMOL/L (ref 22–29)
CREAT SERPL-MCNC: 0.64 MG/DL (ref 0.57–1)
DEPRECATED RDW RBC AUTO: 41.3 FL (ref 37–54)
EGFRCR SERPLBLD CKD-EPI 2021: 95.2 ML/MIN/1.73
EOSINOPHIL # BLD AUTO: 0.07 10*3/MM3 (ref 0–0.4)
EOSINOPHIL NFR BLD AUTO: 1.5 % (ref 0.3–6.2)
ERYTHROCYTE [DISTWIDTH] IN BLOOD BY AUTOMATED COUNT: 13.4 % (ref 12.3–15.4)
FERRITIN SERPL-MCNC: 299 NG/ML (ref 13–150)
GLUCOSE SERPL-MCNC: 125 MG/DL (ref 65–99)
HCT VFR BLD AUTO: 43.7 % (ref 34–46.6)
HGB BLD-MCNC: 14.7 G/DL (ref 12–15.9)
IMM GRANULOCYTES # BLD AUTO: 0.01 10*3/MM3 (ref 0–0.05)
IMM GRANULOCYTES NFR BLD AUTO: 0.2 % (ref 0–0.5)
INR PPP: 1 (ref 0.86–1.15)
IRON 24H UR-MRATE: 112 MCG/DL (ref 37–145)
IRON SATN MFR SERPL: 22 % (ref 20–50)
LYMPHOCYTES # BLD AUTO: 1.36 10*3/MM3 (ref 0.7–3.1)
LYMPHOCYTES NFR BLD AUTO: 28.6 % (ref 19.6–45.3)
MCH RBC QN AUTO: 28.7 PG (ref 26.6–33)
MCHC RBC AUTO-ENTMCNC: 33.6 G/DL (ref 31.5–35.7)
MCV RBC AUTO: 85.4 FL (ref 79–97)
MONOCYTES # BLD AUTO: 0.33 10*3/MM3 (ref 0.1–0.9)
MONOCYTES NFR BLD AUTO: 6.9 % (ref 5–12)
NEUTROPHILS NFR BLD AUTO: 2.96 10*3/MM3 (ref 1.7–7)
NEUTROPHILS NFR BLD AUTO: 62.2 % (ref 42.7–76)
NRBC BLD AUTO-RTO: 0 /100 WBC (ref 0–0.2)
PHOSPHATE SERPL-MCNC: 3.3 MG/DL (ref 2.5–4.5)
PLATELET # BLD AUTO: 191 10*3/MM3 (ref 140–450)
PMV BLD AUTO: 10.2 FL (ref 6–12)
POTASSIUM SERPL-SCNC: 4 MMOL/L (ref 3.5–5.2)
PROT SERPL-MCNC: 7.5 G/DL (ref 6–8.5)
PROTHROMBIN TIME: 13.6 SECONDS (ref 11.8–14.9)
RBC # BLD AUTO: 5.12 10*6/MM3 (ref 3.77–5.28)
SODIUM SERPL-SCNC: 136 MMOL/L (ref 136–145)
TIBC SERPL-MCNC: 504 MCG/DL (ref 298–536)
TRANSFERRIN SERPL-MCNC: 338 MG/DL (ref 200–360)
WBC NRBC COR # BLD AUTO: 4.76 10*3/MM3 (ref 3.4–10.8)

## 2025-02-13 PROCEDURE — 84466 ASSAY OF TRANSFERRIN: CPT

## 2025-02-13 PROCEDURE — 84100 ASSAY OF PHOSPHORUS: CPT

## 2025-02-13 PROCEDURE — 80076 HEPATIC FUNCTION PANEL: CPT

## 2025-02-13 PROCEDURE — 80048 BASIC METABOLIC PNL TOTAL CA: CPT

## 2025-02-13 PROCEDURE — 83540 ASSAY OF IRON: CPT

## 2025-02-13 PROCEDURE — 85025 COMPLETE CBC W/AUTO DIFF WBC: CPT

## 2025-02-13 PROCEDURE — 85610 PROTHROMBIN TIME: CPT

## 2025-02-13 PROCEDURE — 82728 ASSAY OF FERRITIN: CPT

## 2025-02-13 PROCEDURE — 36415 COLL VENOUS BLD VENIPUNCTURE: CPT

## 2025-02-18 RX ORDER — LEVOTHYROXINE SODIUM 25 UG/1
TABLET ORAL
Qty: 90 TABLET | Refills: 0 | Status: SHIPPED | OUTPATIENT
Start: 2025-02-18

## 2025-03-10 DIAGNOSIS — N39.41 URGE INCONTINENCE: ICD-10-CM

## 2025-03-10 RX ORDER — OXYBUTYNIN CHLORIDE 5 MG/1
5 TABLET, EXTENDED RELEASE ORAL DAILY
Qty: 30 TABLET | Refills: 0 | Status: SHIPPED | OUTPATIENT
Start: 2025-03-10

## 2025-03-12 RX ORDER — CLOPIDOGREL BISULFATE 75 MG/1
75 TABLET ORAL NIGHTLY
Qty: 90 TABLET | Refills: 1 | Status: SHIPPED | OUTPATIENT
Start: 2025-03-12

## 2025-03-12 NOTE — TELEPHONE ENCOUNTER
Caller:Vivian Kauffman     Relationship: SELF    Best call back number: [phone]    Requested Prescriptions:   Requested Prescriptions     Pending Prescriptions Disp Refills    clopidogrel (PLAVIX) 75 MG tablet [Pharmacy Med Name: Clopidogrel Bisulfate 75 MG Oral Tablet] 90 tablet 0     Sig: TAKE 1 TABLET BY MOUTH ONCE DAILY AT NIGHT        Pharmacy where request should be sent: [address] [facility] [phone] [facility] [phone] [facility]     Last office visit with prescribing clinician: 12/11/2024   Last telemedicine visit with prescribing clinician: Visit date not found   Next office visit with prescribing clinician: 6/11/2025     Additional details provided by patient:   CALLER STATED THAT SHE IS OUT OF MEDICATION     Does the patient have less than a 3 day supply:  [x] Yes  [] No    Betty Escudero   03/12/25 13:02 EDT

## 2025-03-24 RX ORDER — SPIRONOLACTONE 25 MG/1
25 TABLET ORAL DAILY
Qty: 90 TABLET | Refills: 0 | Status: SHIPPED | OUTPATIENT
Start: 2025-03-24

## 2025-03-27 ENCOUNTER — OFFICE VISIT (OUTPATIENT)
Dept: UROLOGY | Age: 70
End: 2025-03-27
Payer: MEDICARE

## 2025-03-27 VITALS — WEIGHT: 137 LBS | HEIGHT: 63 IN | BODY MASS INDEX: 24.27 KG/M2

## 2025-03-27 DIAGNOSIS — N39.41 URGE INCONTINENCE: Primary | ICD-10-CM

## 2025-03-27 PROBLEM — F33.41 RECURRENT MAJOR DEPRESSIVE DISORDER, IN PARTIAL REMISSION: Status: RESOLVED | Noted: 2021-11-15 | Resolved: 2025-03-27

## 2025-03-27 PROBLEM — M54.50 ACUTE LOW BACK PAIN WITHOUT SCIATICA: Status: RESOLVED | Noted: 2023-04-05 | Resolved: 2025-03-27

## 2025-03-27 LAB
BILIRUB BLD-MCNC: NEGATIVE MG/DL
CLARITY, POC: CLEAR
COLOR UR: YELLOW
EXPIRATION DATE: NORMAL
GLUCOSE UR STRIP-MCNC: NEGATIVE MG/DL
KETONES UR QL: NEGATIVE
LEUKOCYTE EST, POC: NEGATIVE
Lab: NORMAL
NITRITE UR-MCNC: NEGATIVE MG/ML
PH UR: 5.5 [PH] (ref 5–8)
PROT UR STRIP-MCNC: NEGATIVE MG/DL
RBC # UR STRIP: NEGATIVE /UL
SP GR UR: 1 (ref 1–1.03)
URINE VOLUME: 0
UROBILINOGEN UR QL: NORMAL

## 2025-03-27 NOTE — PROGRESS NOTES
"Chief Complaint: Follow-up (Patient presents today for a follow up on cystitits , microhematuria. She denies any urinary issues or symptoms at this moment. )    Subjective         History of Present Illness  Vivian Kauffman is a 70 y.o. female presents to [facility]OLOG[facility] to be seen for annual f/u.    He is seen no blood in her urine in the last year.    No UTIs since we last saw her last year.    Remains on oxybutynin for OAB symptoms.    No side effects from this that she can tell.    She states she has so me issues with short term memory loss.    Gfr 95.2 2/2025          Previous:     The patient states she has had no Utis since we last saw her.     She was taking a cranberry supplement but stopped when it ran out.     She didn't want to do estrace at last visit.     She continues on oxybutynin.     She stopped diet mountain dew and she is only drinking water.       Previous:     The patient is an established patient with Dr. Mandie Hamilton she was previously seen by  in March of this year for hydronephrosis.  The patient had reported \"every now and then feeling like she has an infection\".       Per 's last note she had a positive urine culture in 2022 and was treated with antibiotics.     In 6/22 the patient had called 's office with complaints of cloudy urine with blood clots and abdominal fullness and cramping.  The patient had been seen apparently by another provider and had a urine culture performed.  It does not appear that the urine culture was ever performed however given that her \"urine test showed a UTI\" she was treated with ciprofloxacin.     She was seen at [facility] on 7/25/2023 with complaints of cloudy and dark brown urine.  She was given cephalexin based on urinalysis at that visit.  Her urinalysis revealed nitrite positive urine 30 mg/dL protein 15 mg/dL of ketones 1 mg/dL bilirubin and 2+ blood. Her urine culture was positive for Klebsiella " "pneumoniae greater than 100,000 colony-forming units per milliliter that was resistant to ampicillin.     The patient had a urine culture performed on 8/12/2023 which was negative for any bacterial growth. She was not having any symptoms when this culture was     She states she has pain her her lower abdomen and burning in the lower abdomen. She states she has the urge to void but cannot during these events.     She is drinking diet Mountain dew daily and tea and very little water.     She has never taken cranberry or probiotics.      She denies issues with vaginal dryness or itching.      Objective     Past Medical History:   Diagnosis Date    Anxiety     Asthma     Coronary arteriosclerosis     Depression     Diabetes mellitus     TYPE 2 - BLOOD GLUCOSE AROUND 100-300    Disease of thyroid gland     Fatigue     Fatty liver     Fibromyalgia     Fibromyositis     GERD (gastroesophageal reflux disease)     Heartburn     History of COVID-19 2020    [place] (hard of hearing)     Hyperlipidemia     Hypertension     Insomnia     Kidney stone     Muscle spasm     Pain of both hip joints     Polyphagia(783.6)     PONV (postoperative nausea and vomiting)     Poor vision     Seizures     Shortness of breath on exertion     Sinus drainage     Stroke syndrome     \"MINI STROKE\"  left side weakness     Tremor, essential     IN NECK  AND HANDS    Vertigo        Past Surgical History:   Procedure Laterality Date    BREAST BIOPSY Right     CARDIAC CATHETERIZATION      no stents 9/2013 at [facility]    CARPAL TUNNEL RELEASE Bilateral 2009    COLONOSCOPY      2018    COLONOSCOPY N/A 2/18/2022    Procedure: COLONOSCOPY SCREENING WITH POLYPECTOMY;  Surgeon: David Love MD;  Location: [facility] ENDOSCOPY;  Service: Gastroenterology;  Laterality: N/A;  COLON POLYP    CYSTOSCOPY NEPHROURETEROSCOPY Left 6/29/2021    Procedure: CYSTOSCOPY NEPHROURETEROSCOPY;  Surgeon: Mandie Hamilton MD;  Location: [facility] MAIN OR;  " Service: Urology;  Laterality: Left;    ENDOSCOPY N/A 9/9/2016    Procedure: ESOPHAGOGASTRODUODENOSCOPY WITH BIOPSY;  Surgeon: Chris Ackerman Jr., MD;  Location: [facility] ENDOSCOPY;  Service:     EXTRACORPOREAL SHOCKWAVE LITHOTRIPSY (ESWL), STENT INSERTION/REMOVAL  09/2013    FOOT SURGERY Left     FRACTURE SURGERY      left hand    GASTRIC BANDING REMOVAL N/A 11/30/2016    Procedure: LAPAROSCOPIC GASTRIC BANDING AND PORT REMOVAL ;  Surgeon: Chris Ackerman Jr., MD;  Location: [facility] OR OSC;  Service:     GASTRIC SLEEVE LAPAROSCOPIC N/A 10/2/2017    Procedure: GASTRIC SLEEVE LAPAROSCOPIC revision WITH LYSIS OF ADHESIONS AND HITAL HERNIA REPAIR ;  Surgeon: Chris Ackerman Jr., MD;  Location: [facility] OR [facility];  Service:     GASTRIC SLEEVE LAPAROSCOPIC      HAND SURGERY Left     LAPAROSCOPIC CHOLECYSTECTOMY      MASS EXCISION Right     RT FOOT     NOSE SURGERY      TONSILLECTOMY      URETEROSCOPY STENT INSERTION Left 6/29/2021    Procedure: URETEROSCOPY STENT INSERTION;  Surgeon: Mandie Hamilton MD;  Location: [facility] OR;  Service: Urology;  Laterality: Left;         Current Outpatient Medications:     acetaminophen (TYLENOL) 500 MG tablet, Take 1 tablet by mouth Every 6 (Six) Hours As Needed for Mild Pain., Disp: , Rfl:     albuterol sulfate  (90 Base) MCG/ACT inhaler, 1 puff As Needed., Disp: , Rfl:     atorvastatin (LIPITOR) 40 MG tablet, Take 1 tablet by mouth Every Other Day., Disp: 45 tablet, Rfl: 1    cetirizine (zyrTEC) 10 MG tablet, TAKE 1 TABLET BY MOUTH ONCE DAILY AS NEEDED FOR SNEEZING, ITCHING, RUNNY NOSE, Disp: , Rfl:     clobetasol (TEMOVATE) 0.05 % ointment, APPLY TO LESIONS ON SHOULDER 2 TIMES A DAY AS NEEDED., Disp: , Rfl:     clopidogrel (PLAVIX) 75 MG tablet, TAKE 1 TABLET BY MOUTH ONCE DAILY AT NIGHT, Disp: 90 tablet, Rfl: 1    estradiol (ESTRACE) 0.1 MG/GM vaginal cream, Apply a pea-sized amount inside the vagina and rub in and applied another pea-sized amount around the  vestibule and massage and as well as around urethra.  3 times a week at night, Disp: 42.5 g, Rfl: 6    FLUoxetine (PROzac) 20 MG capsule, TAKE 4 CAPSULES BY MOUTH ONCE DAILY, Disp: 360 capsule, Rfl: 0    gabapentin (NEURONTIN) 300 MG capsule, TAKE 1 CAPSULE BY MOUTH THREE TIMES DAILY, Disp: 90 capsule, Rfl: 5    insulin glargine (LANTUS, SEMGLEE) 100 UNIT/ML injection, Inject  under the skin into the appropriate area as directed As Needed. As neededd, Disp: , Rfl:     Insulin Lispro, 1 Unit Dial, (HumaLOG KwikPen) 100 UNIT/ML solution pen-injector, Inject 2 Units under the skin into the appropriate area as directed As Needed. As needed, Disp: , Rfl:     levETIRAcetam (KEPPRA) 500 MG tablet, 6 tablets daily, Disp: , Rfl:     levothyroxine (SYNTHROID, LEVOTHROID) 25 MCG tablet, Take 1 tablet by mouth once daily, Disp: 90 tablet, Rfl: 0    montelukast (SINGULAIR) 10 MG tablet, Take 1 tablet by mouth once daily, Disp: 90 tablet, Rfl: 0    Nutritional Supplements (Nutritional Supplement Plus) liquid, Take 1 can by mouth 2 (Two) Times a Day., Disp: 60 each, Rfl: 5    oxybutynin XL (DITROPAN-XL) 5 MG 24 hr tablet, Take 1 tablet by mouth once daily, Disp: 30 tablet, Rfl: 0    Ozempic, 1 MG/DOSE, 4 MG/3ML solution pen-injector, , Disp: , Rfl:     primidone (MYSOLINE) 50 MG tablet, if needed., Disp: , Rfl:     ProAir Digihaler 108 (90 Base) MCG/ACT aerosol powder , , Disp: , Rfl:     Probiotic Product (FLORAJEN3 PO), Take  by mouth., Disp: , Rfl:     prochlorperazine (COMPAZINE) 5 MG tablet, Take 1 tablet by mouth Every 6 (Six) Hours As Needed., Disp: , Rfl:     RELION PEN NEEDLE 31G/8MM 31G X 8 MM misc, USE 4- 5 TIMES DAILY AS DIRECTED, Disp: , Rfl:     Resmetirom (Rezdiffra) 60 MG tablet, Take 1 tablet by mouth Daily., Disp: 30 tablet, Rfl: 3    spironolactone (ALDACTONE) 25 MG tablet, Take 1 tablet by mouth once daily, Disp: 90 tablet, Rfl: 0    vitamin D (ERGOCALCIFEROL) 1.25 MG (20796 UT) capsule capsule, Take 1 capsule  "by mouth 1 (One) Time Per Week., Disp: , Rfl:     fluticasone (FLONASE) 50 MCG/ACT nasal spray, Administer 1 spray into the nostril(s) as directed by provider Daily., Disp: , Rfl:     Allergies   Allergen Reactions    Jardiance [Empagliflozin] Anaphylaxis     Went into ketoacidosis    Peanut Oil Anaphylaxis     throat to close     Latex Hives     SKIN BLISTERS    Sulfa Antibiotics Rash    Ace Inhibitors Cough and Other (See Comments)        Family History   Problem Relation Age of Onset    Heart attack Father     Diabetes Father     Hypertension Father     Obesity Father         Morbid Obesity    Stroke Father     Heart attack Mother     Diabetes Mother     Hypertension Mother     Stroke Mother     Heart attack Brother     Cancer Brother         Bladder    Heart attack Paternal Grandfather     Stroke Paternal Grandmother     Heart attack Maternal Grandmother     Heart attack Maternal Grandfather     Malig Hyperthermia Neg Hx     Colon cancer Neg Hx     Breast cancer Neg Hx     Uterine cancer Neg Hx     Ovarian cancer Neg Hx        Social History     Socioeconomic History    Marital status: Single    Number of children: 1   Tobacco Use    Smoking status: Former     Current packs/day: 0.00     Average packs/day: 2.0 packs/day for 30.0 years (60.0 ttl pk-yrs)     Types: Cigarettes     Start date:      Quit date:      Years since quittin.2     Passive exposure: Past    Smokeless tobacco: Never    Tobacco comments:     caffeine use   Vaping Use    Vaping status: Never Used   Substance and Sexual Activity    Alcohol use: Not Currently    Drug use: Never    Sexual activity: Not Currently     Partners: Male     Birth control/protection: Post-menopausal       Vital Signs:   Ht 158.8 cm (62.52\")   Wt 62.1 kg (137 lb)   BMI 24.64 kg/m²      Physical Exam     Result Review :   The following data was reviewed by: JIM Limon on 2025:  Results for orders placed or performed in visit on 25 "   Bladder Scan    Collection Time: 03/27/25 10:54 AM   Result Value Ref Range    Urine Volume 0    POC Urinalysis Dipstick, Automated    Collection Time: 03/27/25 11:01 AM    Specimen: Urine   Result Value Ref Range    Color Yellow Yellow, Straw, Dark Yellow, [place]    Clarity, UA Clear Clear    Specific Gravity  1.005 1.005 - 1.030    pH, Urine 5.5 5.0 - 8.0    Leukocytes Negative Negative    Nitrite, UA Negative Negative    Protein, POC Negative Negative mg/dL    Glucose, UA Negative Negative mg/dL    Ketones, UA Negative Negative    Urobilinogen, UA 0.2 E.U./dL Normal, 0.2 E.U./dL    Bilirubin Negative Negative    Blood, UA Negative Negative    Lot Number [id number]     Expiration Date 3/2,026         Bladder Scan interpretation 03/27/2025    Estimation of residual urine via BVI 3000 Verathon Bladder Scan  MA/nurse performing: jolene eid ma  Residual Urine: 0 ml  Indication: Urge incontinence   Position: Supine  Examination: Incremental scanning of the suprapubic area using 2.0 MHz transducer using copious amounts of acoustic gel.   Findings: An anechoic area was demonstrated which represented the bladder, with measurement of residual urine as noted. I inspected this myself. In that the residual urine was stable or insignificant, refer to plan for treatment and plan necessary at this time.          Procedures        Assessment and Plan    Diagnoses and all orders for this visit:    1. Urge incontinence (Primary)  -     Bladder Scan  -     POC Urinalysis Dipstick, Automated       We discussed her medication today discussed anticholinergic medications have been shown to result in acute impairment of working memory, attention, and psychomotor speed.  Additionally recent evidence suggests that there long-term use may be associated with global cognitive impairment.  As such I believe that the use  anticholinergic medications should be avoided as the risks outweigh the benefits.  DOI: 10.  1001/J AMA intern  [email or web address]    She will stop the oxybutynin and call with any worsening OAB symptoms.     We may consider gemtesa if symptoms return.          I spent 10 minutes caring for Vivian on this date of service. This time includes time spent by me in the following activities:reviewing tests, obtaining and/or reviewing a separately obtained history, performing a medically appropriate examination and/or evaluation , counseling and educating the patient/family/caregiver, ordering medications, tests, or procedures, and documenting information in the medical record  Follow Up   Return in about 3 months (around 6/27/2025) for f/u oab .  Patient was given instructions and counseling regarding her condition or for health maintenance advice. Please see specific information pulled into the AVS if appropriate.         This document has been electronically signed by JIM Limon  March 27, 2025 11:20 EDT

## 2025-03-31 ENCOUNTER — TELEPHONE (OUTPATIENT)
Dept: GASTROENTEROLOGY | Facility: CLINIC | Age: 70
End: 2025-03-31

## 2025-03-31 ENCOUNTER — OFFICE VISIT (OUTPATIENT)
Dept: GASTROENTEROLOGY | Facility: CLINIC | Age: 70
End: 2025-03-31
Payer: MEDICARE

## 2025-03-31 VITALS
SYSTOLIC BLOOD PRESSURE: 117 MMHG | HEIGHT: 63 IN | HEART RATE: 70 BPM | WEIGHT: 143 LBS | DIASTOLIC BLOOD PRESSURE: 45 MMHG | BODY MASS INDEX: 25.34 KG/M2

## 2025-03-31 DIAGNOSIS — K58.0 IRRITABLE BOWEL SYNDROME WITH DIARRHEA: ICD-10-CM

## 2025-03-31 DIAGNOSIS — G89.29 CHRONIC PAIN WITH DRUG DEPENDENCE: ICD-10-CM

## 2025-03-31 DIAGNOSIS — F19.20 CHRONIC PAIN WITH DRUG DEPENDENCE: ICD-10-CM

## 2025-03-31 DIAGNOSIS — R79.89 ELEVATED LFTS: ICD-10-CM

## 2025-03-31 DIAGNOSIS — K76.0 FATTY LIVER: Primary | ICD-10-CM

## 2025-03-31 PROCEDURE — 3078F DIAST BP <80 MM HG: CPT | Performed by: NURSE PRACTITIONER

## 2025-03-31 PROCEDURE — 99214 OFFICE O/P EST MOD 30 MIN: CPT | Performed by: NURSE PRACTITIONER

## 2025-03-31 PROCEDURE — G2211 COMPLEX E/M VISIT ADD ON: HCPCS | Performed by: NURSE PRACTITIONER

## 2025-03-31 PROCEDURE — 1159F MED LIST DOCD IN RCRD: CPT | Performed by: NURSE PRACTITIONER

## 2025-03-31 PROCEDURE — 1160F RVW MEDS BY RX/DR IN RCRD: CPT | Performed by: NURSE PRACTITIONER

## 2025-03-31 PROCEDURE — 3074F SYST BP LT 130 MM HG: CPT | Performed by: NURSE PRACTITIONER

## 2025-03-31 RX ORDER — GABAPENTIN 300 MG/1
300 CAPSULE ORAL 3 TIMES DAILY
Qty: 90 CAPSULE | Refills: 5 | Status: SHIPPED | OUTPATIENT
Start: 2025-03-31

## 2025-03-31 NOTE — PROGRESS NOTES
Patient Name: Vivian Kauffman   Visit Date: 03/31/2025   Patient ID: [id number]  Provider: JIM Oliveros    Sex: female  Location:  Location Address:  Location Phone: [phone]  [place] [address] [phone]    YOB: 1955  Age: 70 y.o.   Primary Care Provider Shahbaz Acosta MD      Referring Provider: No ref. provider found        Chief Complaint  Fatty Liver (Follow up, -5#)    History of Present Illness  Patient has history of diarrhea alternating with constipation and history of fatty liver.    Patient was referred back to us January 2024 for fatty liver and elevated LFTs.    Liver workup in 2023 negative, no history of alcohol.    Reported history of gastric sleeve in 2017 and lost 100 pounds total- also started Ozempic last year and lost 30 pounds with this  Last colonoscopy 2022 by Dr. Love done for screening-hyperplastic sigmoid colon polyp and recommended 10-year recall.     FibroScan 4/12/2024: F2 and normal to mild liver fat     5/13/24: Xifaxan prescribed for IBS-D symptoms.  Reported losing 8 pounds-decreased appetite     Rezdiffra for was prescribed also in May 2024 however patient has not received yet     Patient was last seen 10/17/2024 - had lost 7#     Liver US 12/2/24: fatty liver     Patient last seen 12/19/2024 reported having intermittent diarrhea about once per week with bloating.  Xifaxan was prescribed-samples given for 7 days.    Patient received Rezdiffra however liver enzymes increased in January to ,  so she was advised to discontinue, also she was unable to afford it. She rec'd 1 month for free.  History of Present Illness  The patient presents for evaluation of irritable bowel syndrome and elevated liver enzymes.    Patient improved after Xifaxan treatment, she then started Florajen as directed. Since starting Florajen, she has not experienced any episodes of diarrhea or bloating    She has been adhering to a low carbohydrate,  "low sugar diet, she hasn't been drinking coffee as often.     MEDICATIONS  Current: Florajen  Discontinued: Xifaxan, Rezdiffra      Past Medical History:   Diagnosis Date    Anxiety     Asthma     Coronary arteriosclerosis     Depression     Diabetes mellitus     TYPE 2 - BLOOD GLUCOSE AROUND 100-300    Disease of thyroid gland     Fatigue     Fatty liver     Fibromyalgia     Fibromyositis     GERD (gastroesophageal reflux disease)     Heartburn     History of COVID-19 2020    [place] (hard of hearing)     Hyperlipidemia     Hypertension     Insomnia     Kidney stone     Muscle spasm     Pain of both hip joints     Polyphagia(783.6)     PONV (postoperative nausea and vomiting)     Poor vision     Seizures     Shortness of breath on exertion     Sinus drainage     Stroke syndrome     \"MINI STROKE\"  left side weakness     Tremor, essential     IN NECK  AND HANDS    Vertigo        Past Surgical History:   Procedure Laterality Date    BREAST BIOPSY Right     CARDIAC CATHETERIZATION      no stents 9/2013 at [facility]    CARPAL TUNNEL RELEASE Bilateral 2009    COLONOSCOPY      2018    COLONOSCOPY N/A 2/18/2022    Procedure: COLONOSCOPY SCREENING WITH POLYPECTOMY;  Surgeon: David Love MD;  Location: [facility] ENDOSCOPY;  Service: Gastroenterology;  Laterality: N/A;  COLON POLYP    CYSTOSCOPY NEPHROURETEROSCOPY Left 6/29/2021    Procedure: CYSTOSCOPY NEPHROURETEROSCOPY;  Surgeon: Mandie Hamilton MD;  Location: [facility] MAIN OR;  Service: Urology;  Laterality: Left;    ENDOSCOPY N/A 9/9/2016    Procedure: ESOPHAGOGASTRODUODENOSCOPY WITH BIOPSY;  Surgeon: Chris Ackerman Jr., MD;  Location: [facility] ENDOSCOPY;  Service:     EXTRACORPOREAL SHOCKWAVE LITHOTRIPSY (ESWL), STENT INSERTION/REMOVAL  09/2013    FOOT SURGERY Left     FRACTURE SURGERY      left hand    GASTRIC BANDING REMOVAL N/A 11/30/2016    Procedure: LAPAROSCOPIC GASTRIC BANDING AND PORT REMOVAL ;  Surgeon: Chris Ackerman Jr., MD;  " Location:  [facility];  Service:     GASTRIC SLEEVE LAPAROSCOPIC N/A 10/2/2017    Procedure: GASTRIC SLEEVE LAPAROSCOPIC revision WITH LYSIS OF ADHESIONS AND HITAL HERNIA REPAIR ;  Surgeon: Chris Ackerman Jr., MD;  [facility];  Service:     GASTRIC SLEEVE LAPAROSCOPIC      HAND SURGERY Left     LAPAROSCOPIC CHOLECYSTECTOMY      MASS EXCISION Right     RT FOOT     NOSE SURGERY      TONSILLECTOMY      URETEROSCOPY STENT INSERTION Left 2021    Procedure: URETEROSCOPY STENT INSERTION;  Surgeon: Mandie Hamilton MD;  [facility] MA[facility] OR;  Service: Urology;  Laterality: Left;       Allergies   Allergen Reactions    Jardiance [Empagliflozin] Anaphylaxis     Went into ketoacidosis    Peanut Oil Anaphylaxis     throat to close     Latex Hives     SKIN BLISTERS    Sulfa Antibiotics Rash    Ace Inhibitors Cough and Other (See Comments)       Family History   Problem Relation Age of Onset    Heart attack Father     Diabetes Father     Hypertension Father     Obesity Father         Morbid Obesity    Stroke Father     Heart attack Mother     Diabetes Mother     Hypertension Mother     Stroke Mother     Heart attack Brother     Cancer Brother         Bladder    Heart attack Paternal Grandfather     Stroke Paternal Grandmother     Heart attack Maternal Grandmother     Heart attack Maternal Grandfather     Malig Hyperthermia Neg Hx     Colon cancer Neg Hx     Breast cancer Neg Hx     Uterine cancer Neg Hx     Ovarian cancer Neg Hx         Social History     Tobacco Use    Smoking status: Former     Current packs/day: 0.00     Average packs/day: 2.0 packs/day for 30.0 years (60.0 ttl pk-yrs)     Types: Cigarettes     Start date:      Quit date:      Years since quittin.2     Passive exposure: Past    Smokeless tobacco: Never    Tobacco comments:     caffeine use   Vaping Use    Vaping status: Never Used   Substance Use Topics    Alcohol use: Not Currently    Drug use: Never       Objective  "    Vital Signs:   /45 (BP Location: Left arm, Patient Position: Sitting, Cuff Size: Adult)   Pulse 70   Ht 158.8 cm (62.52\")   Wt 64.9 kg (143 lb)   BMI 25.72 kg/m²       Physical Exam  Constitutional:       General: The patient is not in acute distress.     Appearance: Normal appearance.   HENT:      Head: Normocephalic and atraumatic.      Nose: Nose normal.   Pulmonary:      Effort: Pulmonary effort is normal. No respiratory distress.   Abdominal:      General: Abdomen is flat.      Palpations: Abdomen is soft. There is no mass.      Tenderness: There is no abdominal tenderness. There is no guarding.   Musculoskeletal:      Cervical back: Neck supple.      Right lower leg: No edema.      Left lower leg: No edema.   Skin:     General: Skin is warm and dry.   Neurological:      General: No focal deficit present.      Mental Status: The patient is alert and oriented to person, place, and time.      Gait: Gait normal.   Psychiatric:         Mood and Affect: Mood normal.         Speech: Speech normal.         Behavior: Behavior normal.         Thought Content: Thought content normal.     Result Review :   The following data was reviewed by: JIM Oliveros on 03/31/2025:    CBC w/diff          5/31/2024    06:20 1/18/2025    07:24 2/13/2025    10:10   CBC w/Diff   WBC 4.76  4.73  4.76    RBC 5.00  4.80  5.12    Hemoglobin 14.3  13.9  14.7    Hematocrit 43.6  41.4  43.7    MCV 87.2  86.3  85.4    MCH 28.6  29.0  28.7    MCHC 32.8  33.6  33.6    RDW 13.6  13.1  13.4    Platelets 149  160  191    Neutrophil Rel % 58.6  64.1  62.2    Immature Granulocyte Rel % 0.2  0.2  0.2    Lymphocyte Rel % 28.4  24.5  28.6    Monocyte Rel % 10.7  8.5  6.9    Eosinophil Rel % 1.7  2.1  1.5    Basophil Rel % 0.4  0.6  0.6      CMP          1/18/2025    07:24 1/29/2025    08:43 2/13/2025    10:10   CMP   Glucose 155   125    BUN 15   13    Creatinine 0.63   0.64    EGFR 95.6   95.2    Sodium 139   136  "   Potassium 4.3   4.0    Chloride 103   98    Calcium 9.3   9.4    Total Protein 7.1  7.5  7.5    Albumin 4.2  4.3  4.4    Globulin 2.9      Total Bilirubin 0.4  0.4  0.5    Alkaline Phosphatase 87  92  89    AST (SGOT) 187  82  63    ALT (SGPT) 311  134  81    Albumin/Globulin Ratio 1.4      BUN/Creatinine Ratio 23.8   20.3    Anion Gap 10.0   13.5        AFP   ALPHA-FETOPROTEIN   Date Value Ref Range Status   05/31/2024 <2 0 - 8.3 ng/mL Final      PT/INR   Protime   Date Value Ref Range Status   02/13/2025 13.6 11.8 - 14.9 Seconds Final     INR   Date Value Ref Range Status   02/13/2025 1.00 0.86 - 1.15 Final               Assessment and Plan    Diagnoses and all orders for this visit:    1. Fatty liver (Primary)  -     CBC (No Diff); Future  -     Hepatic Function Panel; Future  -     Protime-INR; Future    2. Elevated LFTs  -     CBC (No Diff); Future  -     Hepatic Function Panel; Future  -     Protime-INR; Future    3. Irritable bowel syndrome with diarrhea  Comments:  stable          Assessment & Plan  1. Irritable Bowel Syndrome.  She was given Xifaxan samples for a week, which she took and found effective. She has been using Florajen and reports no further diarrhea or bloating. She is advised to continue with Florajen to maintain symptom control. If she experiences any recurrence of abdominal discomfort or pain, she should contact the office immediately.    2. Elevated Liver Enzymes.  Her liver enzymes were elevated but have shown a decrease over time. In February, her liver enzymes were down to 63 and 81. She is advised to follow a low carbohydrate, low sugar diet and to drink coffee, which is beneficial for the liver. A re-evaluation of her liver enzymes is scheduled for May 2025. An ultrasound of the liver is planned for June 2025, but it is currently scheduled for May 1, 2025. She may reschedule it to June if preferred.    Follow-up  The patient will follow up in 6 months.            Follow Up   Return  in about 6 months (around 9/30/2025).    Patient or patient representative verbalized consent for the use of Ambient Listening during the visit with  JIM Oliveros for chart documentation. 3/31/2025  09:43 EDT    Patient was given instructions and counseling regarding her condition or for health maintenance advice. Please see specific information pulled into the AVS if appropriate.

## 2025-03-31 NOTE — TELEPHONE ENCOUNTER
Called Vivian Kauffman 1955 to confirm their appointment with JIM Fox on 03.31.25 @ 9:30. I was unable to reach the patient to confirm the appointment. The patient has been made aware of our 24 hour cancellation policy. [facility]

## 2025-04-03 ENCOUNTER — PATIENT ROUNDING (BHMG ONLY) (OUTPATIENT)
Dept: GASTROENTEROLOGY | Facility: CLINIC | Age: 70
End: 2025-04-03
Payer: MEDICARE

## 2025-04-03 NOTE — PROGRESS NOTES
4/3/2025      Hello, may I speak with Vivian Kauffman     My name is Gavino. I am calling from [facility]. I show that you had a recent visit with JIM Fox.    Before we get started may I verify your date of birth? 1955    I am calling to officially welcome you to our practice and ask about your recent visit. Is this a good time to talk? No, not a good time to talk per pt.     Tell me about your visit with us. What things went well?    We strive to ensure that we protect your safety and privacy. Is there anything we could have done to improve this during your visit?        We're always looking for ways to make our patients' experiences even better. Do you have recommendations on ways we may improve?    Overall were you satisfied with your first visit to our practice?    I appreciate you taking the time to speak with me today. Is there anything else I can do for you?    I am glad to hear that you had a very good visit and I appreciate you taking the time to provide feedback on this call. We would greatly appreciate you filling out a survey if you receive one in the mail, email or text. This is a great opportunity to provide any additional feedback that you may think of after this call as well.       Thank you, and have a great day.

## 2025-04-07 DIAGNOSIS — N39.41 URGE INCONTINENCE: ICD-10-CM

## 2025-04-07 RX ORDER — OXYBUTYNIN CHLORIDE 5 MG/1
5 TABLET, EXTENDED RELEASE ORAL DAILY
Qty: 30 TABLET | Refills: 0 | Status: SHIPPED | OUTPATIENT
Start: 2025-04-07

## 2025-04-24 ENCOUNTER — TELEPHONE (OUTPATIENT)
Age: 70
End: 2025-04-24
Payer: MEDICARE

## 2025-04-24 NOTE — TELEPHONE ENCOUNTER
Certainly fine for patient getting back on B12, cannot recall if she was on monthly IM dosing of her daily oral, either is fine with me.    In regards to the ear being red, see if she can get in with Taylor tomorrow, looks like she has some openings between 1130 and 130.

## 2025-04-24 NOTE — TELEPHONE ENCOUNTER
Pt saw Neurologist at [facility] and they are wanting pt to go back on her Vitamin B12    And they looked in her ear and it is very red and they are wanting her to be referred to an ENT.     Please advise.

## 2025-04-25 ENCOUNTER — OFFICE VISIT (OUTPATIENT)
Age: 70
End: 2025-04-25
Payer: MEDICARE

## 2025-04-25 VITALS
HEART RATE: 67 BPM | DIASTOLIC BLOOD PRESSURE: 60 MMHG | HEIGHT: 63 IN | BODY MASS INDEX: 25.53 KG/M2 | WEIGHT: 144.1 LBS | SYSTOLIC BLOOD PRESSURE: 110 MMHG | OXYGEN SATURATION: 99 %

## 2025-04-25 DIAGNOSIS — H61.23 IMPACTED CERUMEN, BILATERAL: ICD-10-CM

## 2025-04-25 DIAGNOSIS — H61.22 IMPACTED CERUMEN OF LEFT EAR: ICD-10-CM

## 2025-04-25 DIAGNOSIS — E66.9 TYPE 2 DIABETES MELLITUS WITH OBESITY: ICD-10-CM

## 2025-04-25 DIAGNOSIS — E78.2 MIXED HYPERLIPIDEMIA: ICD-10-CM

## 2025-04-25 DIAGNOSIS — E11.69 TYPE 2 DIABETES MELLITUS WITH OBESITY: ICD-10-CM

## 2025-04-25 DIAGNOSIS — I25.10 CORONARY ARTERY DISEASE INVOLVING NATIVE CORONARY ARTERY OF NATIVE HEART WITHOUT ANGINA PECTORIS: ICD-10-CM

## 2025-04-25 DIAGNOSIS — I10 ESSENTIAL HYPERTENSION: Primary | ICD-10-CM

## 2025-04-25 RX ORDER — FLUTICASONE PROPIONATE 50 MCG
1 SPRAY, SUSPENSION (ML) NASAL DAILY
Qty: 9.9 G | Refills: 5 | Status: SHIPPED | OUTPATIENT
Start: 2025-04-25 | End: 2025-11-22

## 2025-04-25 NOTE — PATIENT INSTRUCTIONS
Use the ear drops 2 x a week till clear then once a month  Use flonase daily  Do not use q tips   Rto if worsen or persist

## 2025-04-25 NOTE — PROGRESS NOTES
"Chief Complaint  Cerumen Impaction (Pt was seen yesterday by neuro and told her that her ear is impacted with wax L ear is the worse. )    Subjective      Vivian Kauffman is a 70 y.o. female who presents to [facility]     History of Present Illness  The patient presents for cerumen impaction bilateral ears.     She had a follow-up appointment with her neurologist yesterday as she has a history of CVA and seizures.  She was informed that she has impacted cerumen  in the left ear canal by her neurologist on 04/24/2025.  And mild impaction on the right.  She was instructed to see ENT your primary care to have her ears cleaned out.  She  admits to using Q-tips for ear cleaning daily and having decreased hearing bilaterally.  She denies pain but does complain of significant decrease in auditory acuity  with an estimated 50% loss and a sensation of fullness or pressure in her ears.  She does use Flonase as needed. Allergy injections are received from an ENT specialist.       Cerumen impaction removed per provider bilateral- upon exam today she does have quite a bit of cerumen in the left ear large amount removed with stylette however use water to flush until canal was 80% clear tympanic membrane was visible dull no erythema right small amount of cerumen noted removed with stylette per provider tolerated procedures well.  Instructed not to use Q-tips only washcloth in warm water.  Will provide with Debrox drops to use twice a week for the next several weeks and then twice a month.  She should use Flonase 2 sprays each nostril once a day as appears to have eustachian tube dysfunction any worsening of the symptoms instructed to see her ENT for further treatment.             Objective   Vital Signs:   Vitals:    04/25/25 1138   BP: 110/60   Pulse: 67   SpO2: 99%   Weight: 65.4 kg (144 lb 1.6 oz)   Height: 158.8 cm (62.52\")     Body mass index is 25.92 kg/m².    Wt Readings from Last " "3 Encounters:   04/25/25 65.4 kg (144 lb 1.6 oz)   03/31/25 64.9 kg (143 lb)   03/27/25 62.1 kg (137 lb)     BP Readings from Last 3 Encounters:   04/25/25 110/60   03/31/25 117/45   12/19/24 135/70       Health Maintenance   Topic Date Due    DIABETIC FOOT EXAM  Never done    DXA SCAN  05/23/2024    COVID-19 Vaccine (9 - 2024-25 season) 03/30/2025    HEMOGLOBIN A1C  04/15/2025    ANNUAL WELLNESS VISIT  06/03/2025    INFLUENZA VACCINE  07/01/2025    URINE MICROALBUMIN-CREATININE RATIO (uACR)  10/14/2025    MAMMOGRAM  11/06/2025    LIPID PANEL  01/18/2026    DIABETIC EYE EXAM  03/03/2026    COLORECTAL CANCER SCREENING  02/18/2032    TDAP/TD VACCINES (3 - Td or Tdap) 06/17/2032    HEPATITIS C SCREENING  Completed    Pneumococcal Vaccine 50+  Completed    ZOSTER VACCINE  Completed    LUNG CANCER SCREENING  Discontinued       Past Medical History:   Diagnosis Date    Anxiety     Asthma     Coronary arteriosclerosis     Depression     Diabetes mellitus     TYPE 2 - BLOOD GLUCOSE AROUND 100-300    Disease of thyroid gland     Fatigue     Fatty liver     Fibromyalgia     Fibromyositis     GERD (gastroesophageal reflux disease)     Heartburn     History of COVID-19     2020    [place] (hard of hearing)     Hyperlipidemia     Hypertension     Insomnia     Kidney stone     Muscle spasm     Pain of both hip joints     Polyphagia(783.6)     PONV (postoperative nausea and vomiting)     Poor vision     Seizures     Shortness of breath on exertion     Sinus drainage     Stroke syndrome     \"MINI STROKE\"  left side weakness     Tremor, essential     IN NECK  AND HANDS    Vertigo      Past Surgical History:   Procedure Laterality Date    BREAST BIOPSY Right     CARDIAC CATHETERIZATION      no stents 9/2013 at [facility]    CARPAL TUNNEL RELEASE Bilateral 2009    COLONOSCOPY      2018    COLONOSCOPY N/A 2/18/2022    Procedure: COLONOSCOPY SCREENING WITH POLYPECTOMY;  Surgeon: David Love MD;  Location: [facility]" ENDOSCOPY;  Service: Gastroenterology;  Laterality: N/A;  COLON POLYP    CYSTOSCOPY NEPHROURETEROSCOPY Left 6/29/2021    Procedure: CYSTOSCOPY NEPHROURETEROSCOPY;  Surgeon: Mandie Hamilton MD;  Location: [facility] MAIN OR;  Service: Urology;  Laterality: Left;    ENDOSCOPY N/A 9/9/2016    Procedure: ESOPHAGOGASTRODUODENOSCOPY WITH BIOPSY;  Surgeon: Chris Ackerman Jr., MD;  Location: [facility] ENDOSCOPY;  Service:     EXTRACORPOREAL SHOCKWAVE LITHOTRIPSY (ESWL), STENT INSERTION/REMOVAL  09/2013    FOOT SURGERY Left     FRACTURE SURGERY      left hand    GASTRIC BANDING REMOVAL N/A 11/30/2016    Procedure: LAPAROSCOPIC GASTRIC BANDING AND PORT REMOVAL ;  Surgeon: Chris Ackerman Jr., MD;  Location: [facility] OR [facility];  Service:     GASTRIC SLEEVE LAPAROSCOPIC N/A 10/2/2017    Procedure: GASTRIC SLEEVE LAPAROSCOPIC revision WITH LYSIS OF ADHESIONS AND HITAL HERNIA REPAIR ;  Surgeon: Chris Ackerman Jr., MD;  Location: [facility];  Service:     GASTRIC SLEEVE LAPAROSCOPIC      HAND SURGERY Left     LAPAROSCOPIC CHOLECYSTECTOMY      MASS EXCISION Right     RT FOOT     NOSE SURGERY      TONSILLECTOMY      URETEROSCOPY STENT INSERTION Left 6/29/2021    Procedure: URETEROSCOPY STENT INSERTION;  Surgeon: Mandie Hamilton MD;  Location: [facility] MAIN OR;  Service: Urology;  Laterality: Left;     Family History   Problem Relation Age of Onset    Heart attack Father     Diabetes Father     Hypertension Father     Obesity Father         Morbid Obesity    Stroke Father     Heart attack Mother     Diabetes Mother     Hypertension Mother     Stroke Mother     Heart attack Brother     Cancer Brother         Bladder    Heart attack Paternal Grandfather     Stroke Paternal Grandmother     Heart attack Maternal Grandmother     Heart attack Maternal Grandfather     Malig Hyperthermia Neg Hx     Colon cancer Neg Hx     Breast cancer Neg Hx     Uterine cancer Neg Hx     Ovarian cancer Neg Hx      Social History     Socioeconomic  History    Marital status: Single    Number of children: 1   Tobacco Use    Smoking status: Former     Current packs/day: 0.00     Average packs/day: 2.0 packs/day for 30.0 years (60.0 ttl pk-yrs)     Types: Cigarettes     Start date:      Quit date:      Years since quittin.3     Passive exposure: Past    Smokeless tobacco: Never    Tobacco comments:     caffeine use   Vaping Use    Vaping status: Never Used   Substance and Sexual Activity    Alcohol use: Not Currently    Drug use: Never    Sexual activity: Not Currently     Partners: Male     Birth control/protection: Post-menopausal       Current Outpatient Medications   Medication Instructions    acetaminophen (TYLENOL) 500 mg, Every 6 Hours PRN    albuterol sulfate  (90 Base) MCG/ACT inhaler 1 puff, As Needed    atorvastatin (LIPITOR) 40 mg, Oral, Every Other Day    carbamide peroxide (Debrox) 6.5 % otic solution Apply 5 drops to each ear 2 times a week to help prevent ear wax impaction    cetirizine (zyrTEC) 10 MG tablet TAKE 1 TABLET BY MOUTH ONCE DAILY AS NEEDED FOR SNEEZING, ITCHING, RUNNY NOSE    clobetasol (TEMOVATE) 0.05 % ointment APPLY TO LESIONS ON SHOULDER 2 TIMES A DAY AS NEEDED.    clopidogrel (PLAVIX) 75 mg, Oral, Nightly    FLUoxetine (PROZAC) 80 mg, Oral, Daily    fluticasone (FLONASE) 50 MCG/ACT nasal spray 1 spray, Nasal, Daily    gabapentin (NEURONTIN) 300 mg, Oral, 3 times daily    insulin glargine (LANTUS, SEMGLEE) 100 UNIT/ML injection As Needed    Insulin Lispro (1 Unit Dial) (HUMALOG KWIKPEN) 2 Units, As Needed    levETIRAcetam (KEPPRA) 500 MG tablet 6 tablets daily    levothyroxine (SYNTHROID, LEVOTHROID) 25 MCG tablet Take 1 tablet by mouth once daily    montelukast (SINGULAIR) 10 MG tablet Take 1 tablet by mouth once daily    Nutritional Supplements (Nutritional Supplement Plus) liquid 1 can, Oral, 2 Times Daily    oxybutynin XL (DITROPAN-XL) 5 mg, Oral, Daily    Ozempic, 1 MG/DOSE, 4 MG/3ML solution pen-injector      primidone (MYSOLINE) 50 MG tablet if needed.    ProAir Digihaler 108 (90 Base) MCG/ACT aerosol powder  No dose, route, or frequency recorded.    Probiotic Product (FLORAJEN3 PO) Take  by mouth.    prochlorperazine (COMPAZINE) 5 mg, Every 6 Hours PRN    RELION PEN NEEDLE 31G/8MM 31G X 8 MM misc USE 4- 5 TIMES DAILY AS DIRECTED    spironolactone (ALDACTONE) 25 mg, Oral, Daily    vitamin D (ERGOCALCIFEROL) 50,000 Units, Weekly       Medications Discontinued During This Encounter   Medication Reason    fluticasone (FLONASE) 50 MCG/ACT nasal spray Reorder        Physical Exam  Constitutional:       Appearance: Normal appearance.   HENT:      Head: Normocephalic.      Ears:      Comments: Cerumen impaction removed per provider bilateral- upon exam today she does have quite a bit of cerumen in the left ear large amount removed with stylette however use water to flush until canal was 80% clear tympanic membrane was visible dull no erythema right small amount of cerumen noted removed with stylette per provider tolerated procedures well.  Instructed not to use Q-tips only washcloth in warm water.  Will provide with Debrox drops to use twice a week for the next several weeks and then twice a month.  She should use Flonase 2 sprays each nostril once a day as appears to have eustachian tube dysfunction any worsening of the symptoms instructed to see her ENT for further treatment.       Nose: Nose normal.      Mouth/Throat:      Mouth: Mucous membranes are moist.   Eyes:      Conjunctiva/sclera: Conjunctivae normal.      Pupils: Pupils are equal, round, and reactive to light.   Cardiovascular:      Rate and Rhythm: Normal rate and regular rhythm.   Pulmonary:      Effort: Pulmonary effort is normal.      Breath sounds: Normal breath sounds.   Abdominal:      General: Bowel sounds are normal.      Palpations: Abdomen is soft.   Musculoskeletal:         General: Normal range of motion.      Cervical back: Normal range of motion.    Skin:     General: Skin is warm and dry.   Neurological:      General: No focal deficit present.      Mental Status: She is alert and oriented to person, place, and time.   Psychiatric:         Mood and Affect: Mood normal.         Behavior: Behavior normal.         Thought Content: Thought content normal.          Physical Exam  Ears: Fluid present behind the ear, impacted earwax in both ears, left ear worse than right.       Result Review :  The following data was reviewed by: JIM Tiwari on 04/25/2025:    Labs  Office Visit on 03/27/2025   Component Date Value Ref Range Status    Urine Volume 03/27/2025 0   Final    Color 03/27/2025 Yellow  Yellow, Straw, Dark Yellow, Adriana Final    Clarity, UA 03/27/2025 Clear  Clear Final    Specific Gravity  03/27/2025 1.005  1.005 - 1.030 Final    pH, Urine 03/27/2025 5.5  5.0 - 8.0 Final    Leukocytes 03/27/2025 Negative  Negative Final    Nitrite, UA 03/27/2025 Negative  Negative Final    Protein, POC 03/27/2025 Negative  Negative mg/dL Final    Glucose, UA 03/27/2025 Negative  Negative mg/dL Final    Ketones, UA 03/27/2025 Negative  Negative Final    Urobilinogen, UA 03/27/2025 0.2 E.U./dL  Normal, 0.2 E.U./dL Final    Bilirubin 03/27/2025 Negative  Negative Final    Blood, UA 03/27/2025 Negative  Negative Final    Lot Number 03/27/2025 [id number]   Final    Expiration Date 03/27/2025 3/2,026   Final       Imaging  No Images in the past 120 days found..    Results         Procedures       ASSESSMENT & PLAN  Diagnoses and all orders for this visit:    1. Essential hypertension (Primary)    2. Mixed hyperlipidemia    3. Coronary artery disease involving native coronary artery of native heart without angina pectoris    4. Diabetes mellitus type 2 in obese    5. Impacted cerumen, bilateral  -     carbamide peroxide (Debrox) 6.5 % otic solution; Apply 5 drops to each ear 2 times a week to help prevent ear wax impaction  Dispense: 15 mL; Refill: 3  -      fluticasone (FLONASE) 50 MCG/ACT nasal spray; Administer 1 spray into the nostril(s) as directed by provider Daily for 211 days.  Dispense: 9.9 g; Refill: 5         Assessment & Plan  1. Impacted cerumen.  -Cerumen impaction on the left small amount of cerumen on the right removed per provider    Cerumen impaction removed per provider bilateral- upon exam today she does have quite a bit of cerumen in the left ear large amount removed with stylette however use water to flush until canal was 80% clear tympanic membrane was visible dull no erythema right small amount of cerumen noted removed with stylette per provider tolerated procedures well.  Instructed not to use Q-tips only washcloth in warm water.  Will provide with Debrox drops to use twice a week for the next several weeks and then twice a month.  Canal and tympanic membrane intact without erythema bilateral tympanic membrane dull with presence of possible fluid suggesting ETD she should use Flonase 2 sprays each nostril once a day as appears to have eustachian tube dysfunction any worsening of the symptoms instructed to see her ENT for further treatment.    - Advised against the use of Q-tips for ear cleaning.  - Prescription for Debrox drops provided, with instructions to use them twice weekly until the cerumen is cleared, and subsequently once a month.  - Advised to use Flonase nasal spray, administering 2 sprays in each nostril daily. Encouraged to discuss ear condition with allergist during next visit.      PROCEDURE  Procedure: Earwax removal, left ear    All questions were answered and agreement to proceed was given after the following Pre-Procedure details were reviewed:  - Risks and Benefits: Discussed potential discomfort during the procedure and the benefit of improved hearing.  - Alternative Options: Referral to ENT for further management.  - Side effects: Possible soreness and temporary discomfort.  - Consent: Verbal consent  obtained.    Intra-Procedure:  - Site Preparation: Placement of protective covering to keep the patient dry.    Post-Procedure:  - Tolerance Level: Patient tolerated the procedure well with minor discomfort.  - Home Care Instructions: Use Flonase nasal spray daily, avoid using Q-tips, and apply Debrox ear drops twice a week until clear, then once a month.                  FOLLOW UP  No follow-ups on file.  Patient was given instructions and counseling regarding her condition or for health maintenance advice. Please see specific information pulled into the AVS if appropriate.     Patient or patient representative verbalized consent for the use of Ambient Listening during the visit with  JIM Tiwari for chart documentation. 4/25/2025  13:49 EDT    JIM Tiwari  04/25/25  13:51 EDT

## 2025-05-05 ENCOUNTER — TELEPHONE (OUTPATIENT)
Age: 70
End: 2025-05-05

## 2025-05-05 NOTE — TELEPHONE ENCOUNTER
Caller: Vivian Kauffman    Relationship: Self    Best call back number: [phone]     What is the best time to reach you: ANY    Who are you requesting to speak with (clinical staff, provider,  specific staff member): SHREYA    What was the call regarding: PATIENT CALLED WANTING TO SPEAK DIRECTLY TO SHREYA ABOUT HER RECENT VISIT

## 2025-05-06 DIAGNOSIS — J30.1 SEASONAL ALLERGIC RHINITIS DUE TO POLLEN: ICD-10-CM

## 2025-05-06 RX ORDER — MONTELUKAST SODIUM 10 MG/1
10 TABLET ORAL DAILY
Qty: 90 TABLET | Refills: 1 | Status: SHIPPED | OUTPATIENT
Start: 2025-05-06

## 2025-05-08 ENCOUNTER — HOSPITAL ENCOUNTER (OUTPATIENT)
Dept: GENERAL RADIOLOGY | Facility: HOSPITAL | Age: 70
Discharge: HOME | End: 2025-05-08
Admitting: INTERNAL MEDICINE
Payer: MEDICARE

## 2025-05-08 ENCOUNTER — OFFICE VISIT (OUTPATIENT)
Age: 70
End: 2025-05-08
Payer: MEDICARE

## 2025-05-08 VITALS
WEIGHT: 141.8 LBS | HEIGHT: 63 IN | SYSTOLIC BLOOD PRESSURE: 108 MMHG | BODY MASS INDEX: 25.12 KG/M2 | OXYGEN SATURATION: 92 % | TEMPERATURE: 98.8 F | HEART RATE: 90 BPM | DIASTOLIC BLOOD PRESSURE: 64 MMHG

## 2025-05-08 DIAGNOSIS — J45.909 ACUTE ASTHMATIC BRONCHITIS: ICD-10-CM

## 2025-05-08 DIAGNOSIS — E66.9 TYPE 2 DIABETES MELLITUS WITH OBESITY: ICD-10-CM

## 2025-05-08 DIAGNOSIS — I10 ESSENTIAL HYPERTENSION: ICD-10-CM

## 2025-05-08 DIAGNOSIS — J45.909 ACUTE ASTHMATIC BRONCHITIS: Primary | ICD-10-CM

## 2025-05-08 DIAGNOSIS — E55.9 VITAMIN D DEFICIENCY: ICD-10-CM

## 2025-05-08 DIAGNOSIS — E11.69 TYPE 2 DIABETES MELLITUS WITH OBESITY: ICD-10-CM

## 2025-05-08 PROBLEM — J01.00 ACUTE NON-RECURRENT MAXILLARY SINUSITIS: Status: ACTIVE | Noted: 2025-05-08

## 2025-05-08 PROCEDURE — 71046 X-RAY EXAM CHEST 2 VIEWS: CPT

## 2025-05-08 RX ORDER — ALBUTEROL SULFATE 90 UG/1
1 INHALANT RESPIRATORY (INHALATION) AS NEEDED
Qty: 18 G | Refills: 1 | Status: SHIPPED | OUTPATIENT
Start: 2025-05-08 | End: 2025-05-12 | Stop reason: SDUPTHER

## 2025-05-08 RX ORDER — PREDNISONE 20 MG/1
TABLET ORAL
Qty: 20 TABLET | Refills: 0 | Status: SHIPPED | OUTPATIENT
Start: 2025-05-08

## 2025-05-08 RX ORDER — AZITHROMYCIN 250 MG/1
TABLET, FILM COATED ORAL
Qty: 6 TABLET | Refills: 0 | Status: SHIPPED | OUTPATIENT
Start: 2025-05-08

## 2025-05-08 RX ORDER — CEFDINIR 300 MG/1
300 CAPSULE ORAL 2 TIMES DAILY
Qty: 20 CAPSULE | Refills: 0 | Status: SHIPPED | OUTPATIENT
Start: 2025-05-08

## 2025-05-08 NOTE — ASSESSMENT & PLAN NOTE
Blood pressure remains well-controlled as of her 5/25 OV and she is just on low-dose spironolactone for ascites, certainly stable to continue current treatment.

## 2025-05-08 NOTE — ASSESSMENT & PLAN NOTE
Patient just saw Dr. Tolbert recently as of her 5/25 urgent visit, her A1c was only 5.9, she basically just on 1 mg of semaglutide, she takes just a few units of Lantus and has not needed any Humalog recently.  Certainly excellent control, appreciate his expertise.

## 2025-05-08 NOTE — ASSESSMENT & PLAN NOTE
Patient's vitamin D levels up from 50 to 90 as of her 5/25 urgent visit.  These labs were reviewed at the time of her visit.  She is on prescription strength once a week, discussed with her to back it off to every other week now.

## 2025-05-08 NOTE — ASSESSMENT & PLAN NOTE
Sounds like patient had an initial viral illness about 2 weeks ago, she tested negative for COVID and flu, was felt to have ear infections, so was given a round of Amoxil.    Her symptoms are persisting, she has diffuse rhonchi and bronchospasm on exam, will get a chest x-ray, and treat her aggressively for bronchitis.    She is a diabetic, but she has excellent control, so think we need to give some more steroids.  Will get her another albuterol inhaler as well.
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
## 2025-05-09 ENCOUNTER — LAB (OUTPATIENT)
Dept: LAB | Facility: HOSPITAL | Age: 70
End: 2025-05-09
Payer: MEDICARE

## 2025-05-09 DIAGNOSIS — J45.909 ACUTE ASTHMATIC BRONCHITIS: ICD-10-CM

## 2025-05-09 PROCEDURE — 87070 CULTURE OTHR SPECIMN AEROBIC: CPT

## 2025-05-09 PROCEDURE — 87205 SMEAR GRAM STAIN: CPT

## 2025-05-11 LAB
BACTERIA SPEC RESP CULT: NORMAL
GRAM STN SPEC: NORMAL

## 2025-05-12 ENCOUNTER — TELEPHONE (OUTPATIENT)
Age: 70
End: 2025-05-12
Payer: MEDICARE

## 2025-05-12 RX ORDER — ALBUTEROL SULFATE 90 UG/1
1 INHALANT RESPIRATORY (INHALATION) AS NEEDED
Qty: 18 G | Refills: 1 | Status: SHIPPED | OUTPATIENT
Start: 2025-05-12

## 2025-05-12 NOTE — TELEPHONE ENCOUNTER
Pt called back and I give her the message she understood . Told her to call in a few days if she felt like she was not any better.

## 2025-05-12 NOTE — TELEPHONE ENCOUNTER
Hub staff attempted to follow warm transfer process and was unsuccessful     Caller: Vivian Kauffman    Relationship to patient: Self    Best call back number: [phone]    Patient is needing: PATIENT IS CALLING BACK.

## 2025-05-12 NOTE — TELEPHONE ENCOUNTER
Please let patient know that the chest x-ray showed up possible pneumonia in her left lower lobe.  The antibiotics we gave her should treat a pneumonia.  The sputum culture just grew out normal gilberto.    As regards to her sugars, no we do not have any machine that can read her Dexcom.  She should have already lowered the dose of the steroids, so her sugars should be coming down.  This is not going to cause any issues in the long-term.  If her sugars are still concerningly elevated, over 300 tomorrow, she can just start taking half a dose of the prescribed prednisone dose.    She will need a repeat chest x-ray in a month to follow-up the pneumonia, please place order.

## 2025-05-14 NOTE — TELEPHONE ENCOUNTER
Pt states that she is still coughing, but nothing is coming up, she states at night her face gets really red and she feels hot, but she doesn't have a fever. She has been sleeping in the recliner.   She is wanting to know if she should try taking some Mucinex. Please advise.

## 2025-05-14 NOTE — TELEPHONE ENCOUNTER
Yes, she should certainly add Mucinex 600 mg twice a day.  Also please make sure that she is using the albuterol inhaler at least 4 times a day.  Appointment early next week if symptoms persisting.

## 2025-05-15 ENCOUNTER — TELEPHONE (OUTPATIENT)
Age: 70
End: 2025-05-15

## 2025-05-15 NOTE — TELEPHONE ENCOUNTER
Caller: Vivian Kauffman    Relationship: Self    Best call back number: [phone]     What form or medical record are you requesting: WORK EXCUSE    Who is requesting this form or medical record from you: EMPLOYER    How would you like to receive the form or medical records (pick-up, mail, fax):  CALL WHEN READY    Timeframe paperwork needed: TODAY 5.15.25    Additional notes: PATIENT IS ASKING FOR A WORK EXCUSE TO COVER HER FROM 5.12.25 -5.16.25 PATIENT STATES SHE IS STILL SICK AND CANNOT GO IN.    PATIENT IS ASKING IF SHE CAN GET A CALL WHEN THE NOTE IS READY AND HER SON IN LAW WILL COME PICK IT UP

## 2025-05-19 ENCOUNTER — OFFICE VISIT (OUTPATIENT)
Age: 70
End: 2025-05-19
Payer: MEDICARE

## 2025-05-19 VITALS
BODY MASS INDEX: 24.59 KG/M2 | HEIGHT: 63 IN | HEART RATE: 77 BPM | DIASTOLIC BLOOD PRESSURE: 80 MMHG | OXYGEN SATURATION: 98 % | SYSTOLIC BLOOD PRESSURE: 100 MMHG | WEIGHT: 138.8 LBS | TEMPERATURE: 98.6 F

## 2025-05-19 DIAGNOSIS — J45.909 ACUTE ASTHMATIC BRONCHITIS: Primary | ICD-10-CM

## 2025-05-19 DIAGNOSIS — J18.9 PNEUMONIA OF LEFT LOWER LOBE DUE TO INFECTIOUS ORGANISM: ICD-10-CM

## 2025-05-19 DIAGNOSIS — I10 ESSENTIAL HYPERTENSION: ICD-10-CM

## 2025-05-19 PROCEDURE — G2211 COMPLEX E/M VISIT ADD ON: HCPCS | Performed by: INTERNAL MEDICINE

## 2025-05-19 PROCEDURE — 1160F RVW MEDS BY RX/DR IN RCRD: CPT | Performed by: INTERNAL MEDICINE

## 2025-05-19 PROCEDURE — 3079F DIAST BP 80-89 MM HG: CPT | Performed by: INTERNAL MEDICINE

## 2025-05-19 PROCEDURE — 1126F AMNT PAIN NOTED NONE PRSNT: CPT | Performed by: INTERNAL MEDICINE

## 2025-05-19 PROCEDURE — 99214 OFFICE O/P EST MOD 30 MIN: CPT | Performed by: INTERNAL MEDICINE

## 2025-05-19 PROCEDURE — 3074F SYST BP LT 130 MM HG: CPT | Performed by: INTERNAL MEDICINE

## 2025-05-19 PROCEDURE — 1159F MED LIST DOCD IN RCRD: CPT | Performed by: INTERNAL MEDICINE

## 2025-05-19 RX ORDER — LEVOTHYROXINE SODIUM 25 UG/1
25 TABLET ORAL DAILY
Qty: 90 TABLET | Refills: 1 | Status: SHIPPED | OUTPATIENT
Start: 2025-05-19

## 2025-05-19 RX ORDER — FLUTICASONE PROPIONATE AND SALMETEROL 250; 50 UG/1; UG/1
1 POWDER RESPIRATORY (INHALATION)
Qty: 60 EACH | Refills: 1 | Status: SHIPPED | OUTPATIENT
Start: 2025-05-19 | End: 2025-05-21 | Stop reason: SINTOL

## 2025-05-19 RX ORDER — LEVOFLOXACIN 750 MG/1
750 TABLET, FILM COATED ORAL DAILY
Qty: 7 TABLET | Refills: 0 | Status: SHIPPED | OUTPATIENT
Start: 2025-05-19

## 2025-05-19 NOTE — ASSESSMENT & PLAN NOTE
Blood pressure remains well-controlled as of her second 5/25 OV and she is just on low-dose spironolactone for ascites, certainly stable to continue current treatment.
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
## 2025-05-19 NOTE — ASSESSMENT & PLAN NOTE
This is based on her chest x-ray, her sputum culture grew out normal gilberto.    We treated her with 2 antibiotics initially, she was given cefdinir and Zithromax.    She is afebrile and her sats are in the mid to upper 90s on room air.    Symptoms are worse with deep breath.    ---> Switching to Levaquin and adding Advair as of her 5/25 OV.

## 2025-05-19 NOTE — PATIENT INSTRUCTIONS
1.  I sent Advair over to your pharmacy, recall its 1 inhalation twice a day then you rinse.    2.  You also be taken a new antibiotic, its once a day for a week.    3.  You need to get a repeat chest x-ray, looks like you have an appointment on June 11, you can just do it a day or 2 before your office visit.

## 2025-05-19 NOTE — ASSESSMENT & PLAN NOTE
Patient's bronchospasm is improving somewhat as of her second 5/25 urgent visit.  She had to slow down on the prednisone due to significant hyperglycemia.  Almost completed the taper.  She has Singulair on board as well as albuterol.    We will see about getting her a steroid inhaler at this time, discussed with patient she needs to rinse her mouth out after using it.

## 2025-05-21 ENCOUNTER — TELEPHONE (OUTPATIENT)
Age: 70
End: 2025-05-21
Payer: MEDICARE

## 2025-05-21 RX ORDER — FLUTICASONE PROPIONATE 110 UG/1
2 AEROSOL, METERED RESPIRATORY (INHALATION)
Qty: 12 G | Refills: 2 | Status: SHIPPED | OUTPATIENT
Start: 2025-05-21

## 2025-05-21 NOTE — TELEPHONE ENCOUNTER
Caller: Vivian Kauffman    Relationship: Self    Best call back number: [phone]     Which medication are you concerned about: NEW INHALER ADVAIR OR NEW ANTIBIOTIC LEVOFLOXACIN 750 MG     Who prescribed you this medication: DR ENCINAS    When did you start taking this medication: 5/19/25    What are your concerns: SHAKING STARED ON 5/20/25 AND HAPPENED AGAIN TODAY    How long have you had these concerns: ONE  DAY

## 2025-05-21 NOTE — TELEPHONE ENCOUNTER
It is secondary to Advair, the long-acting albuterol component of it more specifically.    We were using this since the oral prednisone bumped her sugars up so much.  Let her know that I sent over a new inhaler that just has the steroid component, so she will not have any shaking with it.  Remind her that she does need to rinse after using it.  She will do 2 puffs twice a day.

## 2025-05-28 ENCOUNTER — OFFICE VISIT (OUTPATIENT)
Age: 70
End: 2025-05-28
Payer: MEDICARE

## 2025-05-28 VITALS
DIASTOLIC BLOOD PRESSURE: 75 MMHG | OXYGEN SATURATION: 98 % | HEART RATE: 74 BPM | WEIGHT: 139 LBS | SYSTOLIC BLOOD PRESSURE: 129 MMHG | BODY MASS INDEX: 24.63 KG/M2 | TEMPERATURE: 97.2 F | HEIGHT: 63 IN

## 2025-05-28 DIAGNOSIS — J18.9 PNEUMONIA OF LEFT LOWER LOBE DUE TO INFECTIOUS ORGANISM: Primary | ICD-10-CM

## 2025-05-28 DIAGNOSIS — I10 ESSENTIAL HYPERTENSION: ICD-10-CM

## 2025-05-28 DIAGNOSIS — J45.909 ACUTE ASTHMATIC BRONCHITIS: ICD-10-CM

## 2025-05-28 RX ORDER — BENZONATATE 100 MG/1
200 CAPSULE ORAL 3 TIMES DAILY PRN
Qty: 60 CAPSULE | Refills: 1 | Status: SHIPPED | OUTPATIENT
Start: 2025-05-28

## 2025-05-28 RX ORDER — LEVOFLOXACIN 750 MG/1
750 TABLET, FILM COATED ORAL DAILY
Qty: 7 TABLET | Refills: 0 | Status: SHIPPED | OUTPATIENT
Start: 2025-05-28

## 2025-05-28 NOTE — ASSESSMENT & PLAN NOTE
Patient is being seen again for this on 5/28/2025.  We saw her about 10 days ago, switched her over to Advair since she had markedly elevated blood sugars on the prednisone, above 500, but she was challenged with 60 mg of prednisone initially.  Unfortunately she did not tolerate the Advair either, she had tremors, so she is presently on Flovent    Patient completed antibiotic treatment with cefdinir and a Z-Hood.    Patient still having significant cough, but no fevers, and her oxygen saturation is 98% on room air at rest.    ---> On exam today patient does have some scattered rhonchi and persistent left basilar crackles, but no significant wheezing.  Will continue with the Flovent HFA, she has as needed albuterol available as well.  Additionally she is on Singulair and will get back on her Zyrtec.
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
## 2025-05-28 NOTE — ASSESSMENT & PLAN NOTE
Patient was initially treated with cefdinir and Zithromax, this was 5/8/2025.  She was then given a week of Levaquin on 5/21/2025.    She is being seen now on 5/28/2025, has persistent left basilar Rales, persistent cough despite improvement in her bronchospasm.    She is not getting any sputum up and out, we need to get a chest CT at this time.  Will go ahead and give her another week of the Levaquin while waiting for the chest CT results.   Attending Only

## 2025-05-28 NOTE — ASSESSMENT & PLAN NOTE
Blood pressure remains well-controlled as of her third 5/25 OV and she is just on low-dose spironolactone for ascites, certainly stable to continue current treatment.

## 2025-05-29 ENCOUNTER — HOSPITAL ENCOUNTER (OUTPATIENT)
Dept: CT IMAGING | Facility: HOSPITAL | Age: 70
Discharge: HOME | End: 2025-05-29
Admitting: INTERNAL MEDICINE
Payer: MEDICARE

## 2025-05-29 DIAGNOSIS — J18.9 PNEUMONIA OF LEFT LOWER LOBE DUE TO INFECTIOUS ORGANISM: ICD-10-CM

## 2025-05-29 PROCEDURE — 71250 CT THORAX DX C-: CPT

## 2025-05-30 ENCOUNTER — RESULTS FOLLOW-UP (OUTPATIENT)
Age: 70
End: 2025-05-30
Payer: MEDICARE

## 2025-05-30 DIAGNOSIS — J47.0 BRONCHIECTASIS WITH ACUTE LOWER RESPIRATORY INFECTION: Primary | ICD-10-CM

## 2025-05-30 DIAGNOSIS — N39.41 URGE INCONTINENCE: ICD-10-CM

## 2025-05-30 DIAGNOSIS — R93.89 ABNORMAL CT OF THE CHEST: ICD-10-CM

## 2025-05-30 RX ORDER — OXYBUTYNIN CHLORIDE 5 MG/1
5 TABLET, EXTENDED RELEASE ORAL DAILY
Qty: 30 TABLET | Refills: 0 | Status: SHIPPED | OUTPATIENT
Start: 2025-05-30

## 2025-06-04 ENCOUNTER — HOSPITAL ENCOUNTER (OUTPATIENT)
Dept: ULTRASOUND IMAGING | Facility: HOSPITAL | Age: 70
Discharge: HOME | End: 2025-06-04
Admitting: NURSE PRACTITIONER
Payer: MEDICARE

## 2025-06-04 ENCOUNTER — TELEPHONE (OUTPATIENT)
Dept: CARDIOLOGY | Age: 70
End: 2025-06-04

## 2025-06-04 ENCOUNTER — TELEPHONE (OUTPATIENT)
Age: 70
End: 2025-06-04

## 2025-06-04 DIAGNOSIS — R79.89 ELEVATED LFTS: ICD-10-CM

## 2025-06-04 DIAGNOSIS — K76.0 FATTY LIVER: ICD-10-CM

## 2025-06-04 PROCEDURE — 76705 ECHO EXAM OF ABDOMEN: CPT

## 2025-06-04 NOTE — TELEPHONE ENCOUNTER
Caller: Vivian Kauffman    Relationship: Self    Best call back number:[phone]    Who is your current provider: DR MARROQUIN    Is your current provider offboarding? NO    Who would you like your new provider to be: DR SYED    What are your reasons for transferring care: PATIENT'S BROTHER SEES DR SYED AND EASE OF CARE    Additional notes:

## 2025-06-04 NOTE — TELEPHONE ENCOUNTER
Caller: Vivian Kauffman    Relationship: Self    Best call back number:[phone]    Who is your current provider: DR MARROQUIN    Is your current provider offboarding? NO    Who would you like your new provider to be: DR SYED    What are your reasons for transferring care: PATIENT'S BROTHER SEES DR SYED AND EASE OF CARE    Additional notes: PLEASE CALL AND ADVISE ONCE TRANSFERRED TO SCHEDULE

## 2025-06-05 ENCOUNTER — TELEPHONE (OUTPATIENT)
Age: 70
End: 2025-06-05
Payer: MEDICARE

## 2025-06-05 RX ORDER — LEVOFLOXACIN 500 MG/1
500 TABLET, FILM COATED ORAL DAILY
Qty: 7 TABLET | Refills: 0 | Status: SHIPPED | OUTPATIENT
Start: 2025-06-05

## 2025-06-05 NOTE — TELEPHONE ENCOUNTER
Pt isn't going to be able to see pulmonary until 6/17, she has finished her antibiotic, she didn't know if she should have another round or if she needed to take mucinex, she states that she is still coughing up infection.       Also she has an routine appt with you on 6/11 with fasting labs, do you want to push that out or have her keep it?

## 2025-06-06 ENCOUNTER — LAB (OUTPATIENT)
Facility: HOSPITAL | Age: 70
End: 2025-06-06
Payer: MEDICARE

## 2025-06-06 DIAGNOSIS — E66.9 TYPE 2 DIABETES MELLITUS WITH OBESITY: ICD-10-CM

## 2025-06-06 DIAGNOSIS — I10 ESSENTIAL HYPERTENSION: ICD-10-CM

## 2025-06-06 DIAGNOSIS — E53.8 VITAMIN B12 DEFICIENCY: ICD-10-CM

## 2025-06-06 DIAGNOSIS — E78.2 MIXED HYPERLIPIDEMIA: ICD-10-CM

## 2025-06-06 DIAGNOSIS — R79.89 ELEVATED LFTS: ICD-10-CM

## 2025-06-06 DIAGNOSIS — E03.4 ACQUIRED ATROPHY OF THYROID: ICD-10-CM

## 2025-06-06 DIAGNOSIS — E83.19 IRON OVERLOAD SYNDROME: ICD-10-CM

## 2025-06-06 DIAGNOSIS — E11.69 TYPE 2 DIABETES MELLITUS WITH OBESITY: ICD-10-CM

## 2025-06-06 DIAGNOSIS — K76.0 FATTY LIVER: ICD-10-CM

## 2025-06-06 DIAGNOSIS — E55.9 VITAMIN D DEFICIENCY: ICD-10-CM

## 2025-06-06 LAB
25(OH)D3 SERPL-MCNC: 47 NG/ML (ref 30–100)
ALBUMIN SERPL-MCNC: 4.2 G/DL (ref 3.5–5.2)
ALBUMIN SERPL-MCNC: 4.2 G/DL (ref 3.5–5.2)
ALBUMIN/GLOB SERPL: 1.4 G/DL
ALP SERPL-CCNC: 81 U/L (ref 39–117)
ALP SERPL-CCNC: 82 U/L (ref 39–117)
ALT SERPL W P-5'-P-CCNC: 29 U/L (ref 1–33)
ALT SERPL W P-5'-P-CCNC: 29 U/L (ref 1–33)
ANION GAP SERPL CALCULATED.3IONS-SCNC: 11 MMOL/L (ref 5–15)
AST SERPL-CCNC: 42 U/L (ref 1–32)
AST SERPL-CCNC: 43 U/L (ref 1–32)
BASOPHILS # BLD AUTO: 0.02 10*3/MM3 (ref 0–0.2)
BASOPHILS NFR BLD AUTO: 0.4 % (ref 0–1.5)
BILIRUB CONJ SERPL-MCNC: 0.2 MG/DL (ref 0–0.3)
BILIRUB INDIRECT SERPL-MCNC: 0.2 MG/DL
BILIRUB SERPL-MCNC: 0.4 MG/DL (ref 0–1.2)
BILIRUB SERPL-MCNC: 0.4 MG/DL (ref 0–1.2)
BUN SERPL-MCNC: 13 MG/DL (ref 8–23)
BUN/CREAT SERPL: 20 (ref 7–25)
CALCIUM SPEC-SCNC: 9.7 MG/DL (ref 8.6–10.5)
CHLORIDE SERPL-SCNC: 101 MMOL/L (ref 98–107)
CHOLEST SERPL-MCNC: 147 MG/DL (ref 0–200)
CO2 SERPL-SCNC: 26 MMOL/L (ref 22–29)
CREAT SERPL-MCNC: 0.65 MG/DL (ref 0.57–1)
DEPRECATED RDW RBC AUTO: 43.9 FL (ref 37–54)
DEPRECATED RDW RBC AUTO: 44 FL (ref 37–54)
EGFRCR SERPLBLD CKD-EPI 2021: 94.9 ML/MIN/1.73
EOSINOPHIL # BLD AUTO: 0.06 10*3/MM3 (ref 0–0.4)
EOSINOPHIL NFR BLD AUTO: 1.2 % (ref 0.3–6.2)
ERYTHROCYTE [DISTWIDTH] IN BLOOD BY AUTOMATED COUNT: 13.9 % (ref 12.3–15.4)
ERYTHROCYTE [DISTWIDTH] IN BLOOD BY AUTOMATED COUNT: 14 % (ref 12.3–15.4)
FERRITIN SERPL-MCNC: 224 NG/ML (ref 13–150)
FOLATE SERPL-MCNC: 12.5 NG/ML (ref 4.78–24.2)
GLOBULIN UR ELPH-MCNC: 3.1 GM/DL
GLUCOSE SERPL-MCNC: 152 MG/DL (ref 65–99)
HBA1C MFR BLD: 6.7 % (ref 4.8–5.6)
HCT VFR BLD AUTO: 43.6 % (ref 34–46.6)
HCT VFR BLD AUTO: 43.8 % (ref 34–46.6)
HDLC SERPL-MCNC: 53 MG/DL (ref 40–60)
HGB BLD-MCNC: 14.1 G/DL (ref 12–15.9)
HGB BLD-MCNC: 14.2 G/DL (ref 12–15.9)
IMM GRANULOCYTES # BLD AUTO: 0.02 10*3/MM3 (ref 0–0.05)
IMM GRANULOCYTES NFR BLD AUTO: 0.4 % (ref 0–0.5)
INR PPP: 1.04 (ref 0.86–1.15)
IRON 24H UR-MRATE: 85 MCG/DL (ref 37–145)
IRON SATN MFR SERPL: 18 % (ref 20–50)
LDLC SERPL CALC-MCNC: 74 MG/DL (ref 0–100)
LDLC/HDLC SERPL: 1.35 {RATIO}
LYMPHOCYTES # BLD AUTO: 1.17 10*3/MM3 (ref 0.7–3.1)
LYMPHOCYTES NFR BLD AUTO: 22.7 % (ref 19.6–45.3)
MCH RBC QN AUTO: 28.3 PG (ref 26.6–33)
MCH RBC QN AUTO: 28.6 PG (ref 26.6–33)
MCHC RBC AUTO-ENTMCNC: 32.2 G/DL (ref 31.5–35.7)
MCHC RBC AUTO-ENTMCNC: 32.6 G/DL (ref 31.5–35.7)
MCV RBC AUTO: 87.7 FL (ref 79–97)
MCV RBC AUTO: 87.8 FL (ref 79–97)
MONOCYTES # BLD AUTO: 0.39 10*3/MM3 (ref 0.1–0.9)
MONOCYTES NFR BLD AUTO: 7.6 % (ref 5–12)
NEUTROPHILS NFR BLD AUTO: 3.49 10*3/MM3 (ref 1.7–7)
NEUTROPHILS NFR BLD AUTO: 67.7 % (ref 42.7–76)
NRBC BLD AUTO-RTO: 0 /100 WBC (ref 0–0.2)
PLATELET # BLD AUTO: 173 10*3/MM3 (ref 140–450)
PLATELET # BLD AUTO: 178 10*3/MM3 (ref 140–450)
PMV BLD AUTO: 10.2 FL (ref 6–12)
PMV BLD AUTO: 10.6 FL (ref 6–12)
POTASSIUM SERPL-SCNC: 4.4 MMOL/L (ref 3.5–5.2)
PROT SERPL-MCNC: 7.3 G/DL (ref 6–8.5)
PROT SERPL-MCNC: 7.5 G/DL (ref 6–8.5)
PROTHROMBIN TIME: 14.1 SECONDS (ref 11.8–14.9)
RBC # BLD AUTO: 4.97 10*6/MM3 (ref 3.77–5.28)
RBC # BLD AUTO: 4.99 10*6/MM3 (ref 3.77–5.28)
SODIUM SERPL-SCNC: 138 MMOL/L (ref 136–145)
TIBC SERPL-MCNC: 471 MCG/DL (ref 298–536)
TRANSFERRIN SERPL-MCNC: 316 MG/DL (ref 200–360)
TRIGL SERPL-MCNC: 111 MG/DL (ref 0–150)
TSH SERPL DL<=0.05 MIU/L-ACNC: 2.25 UIU/ML (ref 0.27–4.2)
VIT B12 BLD-MCNC: 721 PG/ML (ref 211–946)
VLDLC SERPL-MCNC: 20 MG/DL (ref 5–40)
WBC NRBC COR # BLD AUTO: 5.08 10*3/MM3 (ref 3.4–10.8)
WBC NRBC COR # BLD AUTO: 5.15 10*3/MM3 (ref 3.4–10.8)

## 2025-06-06 PROCEDURE — 84443 ASSAY THYROID STIM HORMONE: CPT

## 2025-06-06 PROCEDURE — 82728 ASSAY OF FERRITIN: CPT

## 2025-06-06 PROCEDURE — 36415 COLL VENOUS BLD VENIPUNCTURE: CPT

## 2025-06-06 PROCEDURE — 85027 COMPLETE CBC AUTOMATED: CPT

## 2025-06-06 PROCEDURE — 82248 BILIRUBIN DIRECT: CPT

## 2025-06-06 PROCEDURE — 84466 ASSAY OF TRANSFERRIN: CPT

## 2025-06-06 PROCEDURE — 80061 LIPID PANEL: CPT

## 2025-06-06 PROCEDURE — 82306 VITAMIN D 25 HYDROXY: CPT

## 2025-06-06 PROCEDURE — 85025 COMPLETE CBC W/AUTO DIFF WBC: CPT

## 2025-06-06 PROCEDURE — 83036 HEMOGLOBIN GLYCOSYLATED A1C: CPT

## 2025-06-06 PROCEDURE — 80053 COMPREHEN METABOLIC PANEL: CPT

## 2025-06-06 PROCEDURE — 83540 ASSAY OF IRON: CPT

## 2025-06-06 PROCEDURE — 82746 ASSAY OF FOLIC ACID SERUM: CPT

## 2025-06-06 PROCEDURE — 82607 VITAMIN B-12: CPT

## 2025-06-06 PROCEDURE — 85610 PROTHROMBIN TIME: CPT

## 2025-06-06 NOTE — TELEPHONE ENCOUNTER
Caller: Vivian Kauffman    Relationship to patient: Self    Best call back number: [phone]    Patient is needing: PT RETURNING CALL TO FOLLOW UP ON STATUS OF TRANSFER OF CARE. PT STATES THEY WORK M-F NEXT WEEK: MONDAY THEY WORK 7-10:30, T-F THEY WORK 8:00-1:30. PT STATES MAY LEAVE VOICEMAIL IF NEEDED.

## 2025-06-11 ENCOUNTER — OFFICE VISIT (OUTPATIENT)
Age: 70
End: 2025-06-11
Payer: MEDICARE

## 2025-06-11 VITALS
WEIGHT: 142.4 LBS | BODY MASS INDEX: 25.23 KG/M2 | DIASTOLIC BLOOD PRESSURE: 64 MMHG | HEART RATE: 84 BPM | HEIGHT: 63 IN | SYSTOLIC BLOOD PRESSURE: 98 MMHG | OXYGEN SATURATION: 97 %

## 2025-06-11 DIAGNOSIS — J18.9 PNEUMONIA OF LEFT LOWER LOBE DUE TO INFECTIOUS ORGANISM: ICD-10-CM

## 2025-06-11 DIAGNOSIS — E03.4 ACQUIRED ATROPHY OF THYROID: ICD-10-CM

## 2025-06-11 DIAGNOSIS — R79.89 ELEVATED FERRITIN: ICD-10-CM

## 2025-06-11 DIAGNOSIS — Z00.00 MEDICARE ANNUAL WELLNESS VISIT, SUBSEQUENT: ICD-10-CM

## 2025-06-11 DIAGNOSIS — I10 ESSENTIAL HYPERTENSION: Primary | ICD-10-CM

## 2025-06-11 DIAGNOSIS — E66.9 TYPE 2 DIABETES MELLITUS WITH OBESITY: ICD-10-CM

## 2025-06-11 DIAGNOSIS — E55.9 VITAMIN D DEFICIENCY: ICD-10-CM

## 2025-06-11 DIAGNOSIS — K76.0 FATTY LIVER: ICD-10-CM

## 2025-06-11 DIAGNOSIS — E11.69 TYPE 2 DIABETES MELLITUS WITH OBESITY: ICD-10-CM

## 2025-06-11 DIAGNOSIS — E78.2 MIXED HYPERLIPIDEMIA: ICD-10-CM
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
## 2025-06-11 NOTE — ASSESSMENT & PLAN NOTE
This was in the 800 ballpark, it is trended down to 220 as of her 6/25 OV.  Her hemoglobin is not elevated nor is her iron saturation.  This may just be related to the recent pneumonia.  Will follow-up on this on return to office.    Orders:    CBC & Differential; Future    Ferritin; Future    Iron Profile w/o Ferritin; Future

## 2025-06-11 NOTE — ASSESSMENT & PLAN NOTE
Shonda teas are fairly normal, the AST is just barely elevated at 43 as of her 6/25 OV.      She is followed by GI for this, she has upcoming appointment.    Recall that she was on Rezdiffra previously.

## 2025-06-11 NOTE — ASSESSMENT & PLAN NOTE
AWV completed 6/25.  Patient remains active and independent, she still is employed.  No recent falls.  Patient does not have to use any kind of assistive devices.  No overnight hospitalizations past year.  Information given regarding advance directive previously.

## 2025-06-11 NOTE — ASSESSMENT & PLAN NOTE
We never grew out an organism, but her symptoms persisted, she did not respond to cefdinir and Zithromax, and she is now on her 2nd or 3rd week of Levaquin.  Seems like she is doing much better, but there are some changes on the chest CT that we would like pulmonology to follow-up on, and she has appointment with them next week.

## 2025-06-11 NOTE — ASSESSMENT & PLAN NOTE
His LDL of 74 is at goal for primary prevention in a diabetic, she is on moderate dose atorvastatin, just takes this dose every other day, her LFTs are stable, continue same.    Orders:    Comprehensive Metabolic Panel; Future    Lipid Panel; Future

## 2025-06-11 NOTE — PROGRESS NOTES
Subjective   The ABCs of the Annual Wellness Visit  Medicare Wellness Visit      Vivian Kauffman is a 70 y.o. patient who presents for a Medicare Wellness Visit.    The following portions of the patient's history were reviewed and   updated as appropriate: allergies, current medications, past family history, past medical history, past social history, past surgical history, and problem list.    Compared to one year ago, the patient's physical   health is worse in regards to issues with persistent left lower lobe infiltrate.  Compared to one year ago, the patient's mental   health is the same.    Recent Hospitalizations:  She was not admitted to the hospital during the last year.     Current Medical Providers:  Patient Care Team:  Shahbaz Acosta MD as PCP - General (Internal Medicine)  Kia Waters MD as Consulting Physician (Cardiology)  Mandie Hamilton MD as Consulting Physician (Urology)  Erica Montanez APRN as Nurse Practitioner (Urology)  Diamond Carlos APRN as Nurse Practitioner (Nurse Practitioner)  Cristi Byrd MD as Consulting Physician (Endocrinology)  Naomy Roman PA as Physician Assistant (Pulmonary Disease)    Outpatient Medications Prior to Visit   Medication Sig Dispense Refill    albuterol sulfate  (90 Base) MCG/ACT inhaler Inhale 1 puff As Needed for Wheezing or Shortness of Air. 18 g 1    atorvastatin (LIPITOR) 40 MG tablet Take 1 tablet by mouth Every Other Day. 45 tablet 1    benzonatate (Tessalon Perles) 100 MG capsule Take 2 capsules by mouth 3 (Three) Times a Day As Needed for Cough. 60 capsule 1    cetirizine (zyrTEC) 10 MG tablet TAKE 1 TABLET BY MOUTH ONCE DAILY AS NEEDED FOR SNEEZING, ITCHING, RUNNY NOSE      clobetasol (TEMOVATE) 0.05 % ointment APPLY TO LESIONS ON SHOULDER 2 TIMES A DAY AS NEEDED.      clopidogrel (PLAVIX) 75 MG tablet TAKE 1 TABLET BY MOUTH ONCE DAILY AT NIGHT 90 tablet 1    FLUoxetine (PROzac) 20 MG capsule TAKE 4  CAPSULES BY MOUTH ONCE DAILY 360 capsule 0    fluticasone (FLONASE) 50 MCG/ACT nasal spray Administer 1 spray into the nostril(s) as directed by provider Daily for 211 days. 9.9 g 5    fluticasone (FLOVENT HFA) 110 MCG/ACT inhaler Inhale 2 puffs 2 (Two) Times a Day. 12 g 2    gabapentin (NEURONTIN) 300 MG capsule TAKE 1 CAPSULE BY MOUTH THREE TIMES DAILY 90 capsule 5    insulin glargine (LANTUS, SEMGLEE) 100 UNIT/ML injection Inject  under the skin into the appropriate area as directed As Needed. As neededd      Insulin Lispro, 1 Unit Dial, (HumaLOG KwikPen) 100 UNIT/ML solution pen-injector Inject 2 Units under the skin into the appropriate area as directed As Needed. As needed      levETIRAcetam (KEPPRA) 500 MG tablet 6 tablets daily      levothyroxine (SYNTHROID, LEVOTHROID) 25 MCG tablet Take 1 tablet by mouth once daily 90 tablet 1    montelukast (SINGULAIR) 10 MG tablet Take 1 tablet by mouth once daily 90 tablet 1    Nutritional Supplements (Nutritional Supplement Plus) liquid Take 1 can by mouth 2 (Two) Times a Day. 60 each 5    oxybutynin XL (DITROPAN-XL) 5 MG 24 hr tablet Take 1 tablet by mouth once daily 30 tablet 0    Ozempic, 1 MG/DOSE, 4 MG/3ML solution pen-injector       primidone (MYSOLINE) 50 MG tablet if needed.      Probiotic Product (FLORAJEN3 PO) Take  by mouth.      prochlorperazine (COMPAZINE) 5 MG tablet Take 1 tablet by mouth Every 6 (Six) Hours As Needed.      RELION PEN NEEDLE 31G/8MM 31G X 8 MM misc USE 4- 5 TIMES DAILY AS DIRECTED      spironolactone (ALDACTONE) 25 MG tablet Take 1 tablet by mouth once daily 90 tablet 0    vitamin D (ERGOCALCIFEROL) 1.25 MG (54522 UT) capsule capsule Take 1 capsule by mouth 1 (One) Time Per Week. (Patient taking differently: Take 1 capsule by mouth 1 (One) Time Per Week. Pt states taking every 2 weeks now)      acetaminophen (TYLENOL) 500 MG tablet Take 1 tablet by mouth Every 6 (Six) Hours As Needed for Mild Pain.      levoFLOXacin (LEVAQUIN) 500 MG  "tablet Take 1 tablet by mouth Daily. 7 tablet 0     No facility-administered medications prior to visit.     No opioid medication identified on active medication list. I have reviewed chart for other potential  high risk medication/s and harmful drug interactions in the elderly.      Aspirin is not on active medication list.  Aspirin use is not indicated based on review of current medical condition/s. Risk of harm outweighs potential benefits.  .    Patient Active Problem List   Diagnosis    Essential hypertension    Mixed hyperlipidemia    Coronary artery disease involving native coronary artery of native heart without angina pectoris    Diabetes mellitus type 2 in obese    Fatty liver    Seizure disorder    Acquired atrophy of thyroid    Vitamin D deficiency    Hydronephrosis of left kidney    Vitamin B12 deficiency    Benign essential tremor    Seasonal allergic rhinitis due to pollen    Medicare annual wellness visit, subsequent    Recurrent cystitis    OAB (overactive bladder)    TIA (transient ischemic attack)    Overweight (BMI 25.0-29.9)    Acute asthmatic bronchitis    Pneumonia of left lower lobe due to infectious organism    Elevated ferritin     Advance Care Planning Advance Directive is not on file.  ACP discussion was held with the patient during this visit. Patient does not have an advance directive, information provided.            Objective   Vitals:    06/11/25 1405   BP: 98/64   Pulse: 84   SpO2: 97%   Weight: 64.6 kg (142 lb 6.4 oz)   Height: 158.8 cm (62.52\")   PainSc: 0-No pain       Estimated body mass index is 25.61 kg/m² as calculated from the following:    Height as of this encounter: 158.8 cm (62.52\").    Weight as of this encounter: 64.6 kg (142 lb 6.4 oz).    BMI is >= 25 and <30. (Overweight) The following options were offered after discussion;: exercise counseling/recommendations and nutrition counseling/recommendations           Does the patient have evidence of cognitive impairment? " No  Lab Results   Component Value Date    TRIG 111 2025    HDL 53 2025    LDL 74 2025    VLDL 20 2025    HGBA1C 6.70 (H) 2025                                                                                               Health  Risk Assessment    Smoking Status:  Social History     Tobacco Use   Smoking Status Former    Current packs/day: 0.00    Average packs/day: 2.0 packs/day for 30.0 years (60.0 ttl pk-yrs)    Types: Cigarettes    Start date:     Quit date:     Years since quittin.4    Passive exposure: Past   Smokeless Tobacco Never   Tobacco Comments    caffeine use     Alcohol Consumption:  Social History     Substance and Sexual Activity   Alcohol Use Not Currently       Fall Risk Screen  STEADI Fall Risk Assessment was completed, and patient is at LOW risk for falls.Assessment completed on:2025    Depression Screening   Little interest or pleasure in doing things? Not at all   Feeling down, depressed, or hopeless? Not at all   PHQ-2 Total Score 0      Health Habits and Functional and Cognitive Screenin/11/2025     2:04 PM   Functional & Cognitive Status   Do you have difficulty preparing food and eating? No   Do you have difficulty bathing yourself, getting dressed or grooming yourself? No   Do you have difficulty using the toilet? No   Do you have difficulty moving around from place to place? No   Do you have trouble with steps or getting out of a bed or a chair? No   Current Diet Well Balanced Diet   Dental Exam Up to date   Eye Exam Up to date   Exercise (times per week) 0 times per week   Current Exercises Include No Regular Exercise   Do you need help using the phone?  No   Are you deaf or do you have serious difficulty hearing?  No   Do you need help to go to places out of walking distance? No   Do you need help shopping? No   Do you need help preparing meals?  No   Do you need help with housework?  No   Do you need help with laundry? No   Do  you need help taking your medications? No   Do you need help managing money? No   Do you ever drive or ride in a car without wearing a seat belt? No   Have you felt unusual stress, anger or loneliness in the last month? No   Who do you live with? Alone   If you need help, do you have trouble finding someone available to you? No   Have you been bothered in the last four weeks by sexual problems? No   Do you have difficulty concentrating, remembering or making decisions? No           Age-appropriate Screening Schedule:  Refer to the list below for future screening recommendations based on patient's age, sex and/or medical conditions. Orders for these recommended tests are listed in the plan section. The patient has been provided with a written plan.    Health Maintenance List  Health Maintenance   Topic Date Due    DXA SCAN  05/23/2024    COVID-19 Vaccine (9 - 2024-25 season) 06/11/2025 (Originally 3/30/2025)    DIABETIC FOOT EXAM  06/30/2025 (Originally 1/7/1965)    INFLUENZA VACCINE  07/01/2025    MAMMOGRAM  11/06/2025    HEMOGLOBIN A1C  12/06/2025    DIABETIC EYE EXAM  03/03/2026    URINE MICROALBUMIN-CREATININE RATIO (uACR)  05/01/2026    LIPID PANEL  06/06/2026    ANNUAL WELLNESS VISIT  06/11/2026    COLORECTAL CANCER SCREENING  02/18/2032    TDAP/TD VACCINES (3 - Td or Tdap) 06/17/2032    HEPATITIS C SCREENING  Completed    Pneumococcal Vaccine 50+  Completed    ZOSTER VACCINE  Completed    LUNG CANCER SCREENING  Discontinued                                                                                                                                                CMS Preventative Services Quick Reference  Risk Factors Identified During Encounter  None Identified    The above risks/problems have been discussed with the patient.  Pertinent information has been shared with the patient in the After Visit Summary.  An After Visit Summary and PPPS were made available to the patient.    Follow Up:   Next Medicare  Wellness visit to be scheduled in 1 year.     Assessment & Plan  Essential hypertension  Patient's blood pressure was on the low side as of 6/25, but she is only on low-dose Aldactone for ascites, she is more active presently, is not having any issues with dizziness, so no adjustments are needed.         Diabetes mellitus type 2 in obese  Patient's A1c is only 6.7 as of her 6/25 OV.  She saw Dr. Tolbert just last month, she is maintained on moderate dose semaglutide and just low-dose Lantus and as needed Humalog.  She has the continuous glucose monitor, there is been no recent hypoglycemic episodes, certainly appropriate to continue same.    Orders:    Hemoglobin A1c; Future    Acquired atrophy of thyroid  Still just on low-dose 25 mcg levothyroxine, her TSH is 2.2 as of her 6/25 OV, so stable to continue same.    Orders:    TSH; Future    Mixed hyperlipidemia   His LDL of 74 is at goal for primary prevention in a diabetic, she is on moderate dose atorvastatin, just takes this dose every other day, her LFTs are stable, continue same.    Orders:    Comprehensive Metabolic Panel; Future    Lipid Panel; Future    Vitamin D deficiency  Patient vitamin D shot up from 50 to 90, so we lowered her to every other week, and she is 47 presently, so continue same.    Orders:    Vitamin D,25-Hydroxy; Future    Pneumonia of left lower lobe due to infectious organism  We never grew out an organism, but her symptoms persisted, she did not respond to cefdinir and Zithromax, and she is now on her 2nd or 3rd week of Levaquin.  Seems like she is doing much better, but there are some changes on the chest CT that we would like pulmonology to follow-up on, and she has appointment with them next week.         Elevated ferritin  This was in the 800 ballpark, it is trended down to 220 as of her 6/25 OV.  Her hemoglobin is not elevated nor is her iron saturation.  This may just be related to the recent pneumonia.  Will follow-up on this on return  to office.    Orders:    CBC & Differential; Future    Ferritin; Future    Iron Profile w/o Ferritin; Future    Fatty liver  Shonda teas are fairly normal, the AST is just barely elevated at 43 as of her 6/25 OV.      She is followed by GI for this, she has upcoming appointment.    Recall that she was on Rezdiffra previously.             Medicare annual wellness visit, subsequent  AWV completed 6/25.  Patient remains active and independent, she still is employed.  No recent falls.  Patient does not have to use any kind of assistive devices.  No overnight hospitalizations past year.  Information given regarding advance directive previously.              Follow Up:   Return in about 6 months (around 12/11/2025).

## 2025-06-11 NOTE — ASSESSMENT & PLAN NOTE
Patient's A1c is only 6.7 as of her 6/25 OV.  She saw Dr. Tolbert just last month, she is maintained on moderate dose semaglutide and just low-dose Lantus and as needed Humalog.  She has the continuous glucose monitor, there is been no recent hypoglycemic episodes, certainly appropriate to continue same.    Orders:    Hemoglobin A1c; Future

## 2025-06-11 NOTE — ASSESSMENT & PLAN NOTE
Patient vitamin D shot up from 50 to 90, so we lowered her to every other week, and she is 47 presently, so continue same.    Orders:    Vitamin D,25-Hydroxy; Future

## 2025-06-11 NOTE — ASSESSMENT & PLAN NOTE
Still just on low-dose 25 mcg levothyroxine, her TSH is 2.2 as of her 6/25 OV, so stable to continue same.    Orders:    TSH; Future

## 2025-06-11 NOTE — ASSESSMENT & PLAN NOTE
Patient's blood pressure was on the low side as of 6/25, but she is only on low-dose Aldactone for ascites, she is more active presently, is not having any issues with dizziness, so no adjustments are needed.

## 2025-06-14 NOTE — PROGRESS NOTES
Primary Care Provider  Shahbaz Acosta MD   Referring Provider  Shahbaz Acosta MD    Patient Complaint  Establish Care, Abnormal Imaging, and Wheezing      Subjective     Vivian Kauffman is a 70 y.o. female with pertinent past medical history of bronchiectasis with recent pneumonia who presents as a new patient to [facility] for evaluation of abnormal CT of chest as well as follow up after pneumonia course    Patient does not feel short of breath.  But she feels she can't get her deep breaths.  Patient states she is without cough.  She feels there is a tightness in there when she breathes  Patient denies fever/chills now - but when treated for pneumonia she was with fever and chills and congested with cough but couldn't get anything up.    Patient was treated with antibiotic.  Patient ended up with 4 rounds of antibiotics.   Patient has also had follow up Ct scan due to lack of improvement which showed mucous plugging in distal airways  No weight loss since being sick.  Patient does take Ozempic for A1c  Patient states she gets treated for URI that goes into pneumonia per her yearly since hospitalization in [place]  Patient feels better but is not back to normal.   She is fatigued and feels like she can't get the phlegm up.  She was trying to sleep in the recliner but her neck got hurting  So now she is with 3 pillows under her in bed      Patient has been hospitalized for pneumonia in the past - 5 years ago.  At [facility]   Patient is a former smoker, quit in 1994  With second hand smoker exposure as a child  No wood burning stove  No environmental toxins or chemicals  Currently works part time at [employer].  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   She does not want to a HAYES or a LABA         At present time patient denies dyspnea, wheezing, headaches, chest pain, weight loss or hemoptysis. Patient denies fevers, chills and night sweats. Vivian Kauffman is able to perform ADLs.       I have personally reviewed the review of systems, past family, social, medical and surgical histories; and agree with their findings.      Family History   Problem Relation Age of Onset    Heart attack Father     Diabetes Father     Hypertension Father     Obesity Father         Morbid Obesity    Stroke Father     Heart attack Mother     Diabetes Mother     Hypertension Mother     Stroke Mother     Heart attack Brother     Cancer Brother         Bladder    Heart attack Paternal Grandfather     Stroke Paternal Grandmother     Heart attack Maternal Grandmother     Heart attack Maternal Grandfather     Malig Hyperthermia Neg Hx     Colon cancer Neg Hx     Breast cancer Neg Hx     Uterine cancer Neg Hx     Ovarian cancer Neg Hx         Social History     Socioeconomic History    Marital status: Single    Number of children: 1   Tobacco Use    Smoking status: Former     Current packs/day: 0.00     Average packs/day: 2.0 packs/day for 30.0 years (60.0 ttl pk-yrs)     Types: Cigarettes     Start date:      Quit date:      Years since quittin.4     Passive exposure: Past    Smokeless tobacco: Never    Tobacco comments:     caffeine use   Vaping Use    Vaping status: Never Used   Substance and Sexual Activity    Alcohol use: Not Currently    Drug use: Never    Sexual activity: Not Currently     Partners: Male     Birth control/protection: Post-menopausal        Past Medical History:   Diagnosis Date    Anxiety     Asthma     Coronary arteriosclerosis     Depression  "    Diabetes mellitus     TYPE 2 - BLOOD GLUCOSE AROUND 100-300    Disease of thyroid gland     Fatigue     Fatty liver     Fibromyalgia     Fibromyositis     GERD (gastroesophageal reflux disease)     Heartburn     History of COVID-19     2020    [place] (hard of hearing)     Hyperlipidemia     Hypertension     Insomnia     Kidney stone     Muscle spasm     Pain of both hip joints     Polyphagia(783.6)     PONV (postoperative nausea and vomiting)     Poor vision     Seizures     Shortness of breath on exertion     Sinus drainage     Stroke syndrome     \"MINI STROKE\"  left side weakness     Tremor, essential     IN NECK  AND HANDS    Vertigo         Immunization History   Administered Date(s) Administered    ABRYSVO (RSV, 60+ or pregnant women 32-36 wks) 09/28/2024    Arexvy (RSV, Adults 60+ yrs) 09/28/2024    COVID-19 (MODERNA) 12YRS+ (SPIKEVAX) 09/30/2023    COVID-19 (MODERNA) 1st,2nd,3rd Dose Monovalent 02/08/2021, 03/08/2021, 10/26/2021    COVID-19 (MODERNA) BIVALENT 12+YRS 03/13/2023    COVID-19 (MODERNA) Monovalent Original Booster 05/22/2022, 05/23/2022    COVID-19 (PFIZER) 12YRS+ (COMIRNATY) 09/30/2024    Flu Vaccine Quad PF >36MO 08/25/2016, 09/21/2017, 08/29/2018, 09/09/2019    Fluzone (or Fluarix & Flulaval for VFC) >6mos 08/25/2016, 09/21/2017, 08/29/2018, 09/09/2019    Fluzone High-Dose 65+YRS 08/18/2020, 09/14/2021, 09/02/2022, 09/30/2024    Fluzone High-Dose 65+yrs 09/02/2022, 09/30/2023    Hep A / Hep B 04/19/2018    Hepatitis A 04/25/2018    Pneumococcal Conjugate 13-Valent (PCV13) 12/08/2015    Pneumococcal Conjugate 20-Valent (PCV20) 09/02/2022    Pneumococcal Polysaccharide (PPSV23) 12/07/2017, 10/31/2019    Shingrix 02/10/2023, 05/31/2023    Td, Not Adsorbed 06/16/2022    Tdap 06/16/2022, 06/17/2022    influenza Split 08/28/2014, 08/29/2016       Allergies   Allergen Reactions    Jardiance [Empagliflozin] Anaphylaxis     Went into ketoacidosis    Peanut Oil Anaphylaxis     throat to close     Latex " Hives     SKIN BLISTERS    Serevent [Salmeterol] Palpitations     Patient had shaking, tremors, etc. secondary to long-acting beta agonist.    Sulfa Antibiotics Rash    Ace Inhibitors Cough and Other (See Comments)          Current Outpatient Medications:     albuterol sulfate  (90 Base) MCG/ACT inhaler, Inhale 1 puff As Needed for Wheezing or Shortness of Air., Disp: 18 g, Rfl: 1    atorvastatin (LIPITOR) 40 MG tablet, Take 1 tablet by mouth Every Other Day., Disp: 45 tablet, Rfl: 1    benzonatate (Tessalon Perles) 100 MG capsule, Take 2 capsules by mouth 3 (Three) Times a Day As Needed for Cough., Disp: 60 capsule, Rfl: 1    cetirizine (zyrTEC) 10 MG tablet, TAKE 1 TABLET BY MOUTH ONCE DAILY AS NEEDED FOR SNEEZING, ITCHING, RUNNY NOSE, Disp: , Rfl:     clobetasol (TEMOVATE) 0.05 % ointment, APPLY TO LESIONS ON SHOULDER 2 TIMES A DAY AS NEEDED., Disp: , Rfl:     clopidogrel (PLAVIX) 75 MG tablet, TAKE 1 TABLET BY MOUTH ONCE DAILY AT NIGHT, Disp: 90 tablet, Rfl: 1    FLUoxetine (PROzac) 20 MG capsule, TAKE 4 CAPSULES BY MOUTH ONCE DAILY, Disp: 360 capsule, Rfl: 0    fluticasone (FLONASE) 50 MCG/ACT nasal spray, Administer 1 spray into the nostril(s) as directed by provider Daily for 211 days., Disp: 9.9 g, Rfl: 5    fluticasone (FLOVENT HFA) 110 MCG/ACT inhaler, Inhale 2 puffs 2 (Two) Times a Day., Disp: 12 g, Rfl: 2    gabapentin (NEURONTIN) 300 MG capsule, TAKE 1 CAPSULE BY MOUTH THREE TIMES DAILY, Disp: 90 capsule, Rfl: 5    insulin glargine (LANTUS, SEMGLEE) 100 UNIT/ML injection, Inject  under the skin into the appropriate area as directed As Needed. As neededd, Disp: , Rfl:     Insulin Lispro, 1 Unit Dial, (HumaLOG KwikPen) 100 UNIT/ML solution pen-injector, Inject 2 Units under the skin into the appropriate area as directed As Needed. As needed, Disp: , Rfl:     levETIRAcetam (KEPPRA) 500 MG tablet, 6 tablets daily, Disp: , Rfl:     levothyroxine (SYNTHROID, LEVOTHROID) 25 MCG tablet, Take 1 tablet by  "mouth once daily, Disp: 90 tablet, Rfl: 1    montelukast (SINGULAIR) 10 MG tablet, Take 1 tablet by mouth once daily, Disp: 90 tablet, Rfl: 1    Nutritional Supplements (Nutritional Supplement Plus) liquid, Take 1 can by mouth 2 (Two) Times a Day., Disp: 60 each, Rfl: 5    oxybutynin XL (DITROPAN-XL) 5 MG 24 hr tablet, Take 1 tablet by mouth once daily, Disp: 30 tablet, Rfl: 0    Ozempic, 1 MG/DOSE, 4 MG/3ML solution pen-injector, , Disp: , Rfl:     primidone (MYSOLINE) 50 MG tablet, if needed., Disp: , Rfl:     Probiotic Product (FLORAJEN3 PO), Take  by mouth., Disp: , Rfl:     prochlorperazine (COMPAZINE) 5 MG tablet, Take 1 tablet by mouth Every 6 (Six) Hours As Needed., Disp: , Rfl:     RELION PEN NEEDLE 31G/8MM 31G X 8 MM misc, USE 4- 5 TIMES DAILY AS DIRECTED, Disp: , Rfl:     spironolactone (ALDACTONE) 25 MG tablet, Take 1 tablet by mouth once daily, Disp: 90 tablet, Rfl: 0       Objective       Physical Exam  Vital Signs:   /62 (BP Location: Right arm, Patient Position: Sitting, Cuff Size: Adult)   Pulse 74   Temp 97.7 °F (36.5 °C) (Temporal)   Resp 18   Ht 158.8 cm (62.52\")   Wt 61.2 kg (135 lb)   SpO2 98% Comment: Room air  BMI 24.28 kg/m²   Body mass index is 24.28 kg/m².  Vital signs and BMI reviewed  General: WDWN, Alert, NAD.    HEENT: ATNC.  MMM  Chest:  good aeration, clear to auscultation bilaterally, no increased work of breathing, normal symmetric chest wall rise bilaterally  CV: RRR, no MGR  Extremities:  no clubbing, no edema  Neuro:  Awake, conversant, answering questions appropriately          Results Review  I have personally reviewed the prior office notes, hospital records, labs, and diagnostics.  [x]  CBC w/ Diff obtained 6/6/2025.  Without eosinophilia  [x]  CMP obtained 6/6/2025 without hypercarbia  [x]  CT of Chest obtained 5/29/2025.  Left lower lobe bronchiectasis with bronchial wall thickening and areas of mucous plugging.  There is associated left lower lobe " consolidation likely representing pneumonia.  Aspiration or impaired airway clearance may be the underlying etiology.  Postsurgical changes of prior gastric sleeve procedure with small hiatal hernia    [x]  2D echo performed 3/20/2017 grade 1 diastolic dysfunction in range of 66 to 70%      CMP          1/29/2025    08:43 2/13/2025    10:10 6/6/2025    07:38   CMP   Glucose  125  152    BUN  13  13.0    Creatinine  0.64  0.65    EGFR  95.2  94.9    Sodium  136  138    Potassium  4.0  4.4    Chloride  98  101    Calcium  9.4  9.7    Total Protein 7.5  7.5  7.3     7.5    Albumin 4.3  4.4  4.2     4.2    Globulin   3.1    Total Bilirubin 0.4  0.5  0.4     0.4    Alkaline Phosphatase 92  89  81     82    AST (SGOT) 82  63  43     42    ALT (SGPT) 134  81  29     29    Albumin/Globulin Ratio   1.4    BUN/Creatinine Ratio  20.3  20.0    Anion Gap  13.5  11.0      CBC          1/18/2025    07:24 2/13/2025    10:10 6/6/2025    07:38   CBC   WBC 4.73  4.76  5.15     5.08    RBC 4.80  5.12  4.99     4.97    Hemoglobin 13.9  14.7  14.1     14.2    Hematocrit 41.4  43.7  43.8     43.6    MCV 86.3  85.4  87.8     87.7    MCH 29.0  28.7  28.3     28.6    MCHC 33.6  33.6  32.2     32.6    RDW 13.1  13.4  13.9     14.0    Platelets 160  191  178     173      CBC w/diff          1/18/2025    07:24 2/13/2025    10:10 6/6/2025    07:38   CBC w/Diff   WBC 4.73  4.76  5.15     5.08    RBC 4.80  5.12  4.99     4.97    Hemoglobin 13.9  14.7  14.1     14.2    Hematocrit 41.4  43.7  43.8     43.6    MCV 86.3  85.4  87.8     87.7    MCH 29.0  28.7  28.3     28.6    MCHC 33.6  33.6  32.2     32.6    RDW 13.1  13.4  13.9     14.0    Platelets 160  191  178     173    Neutrophil Rel % 64.1  62.2  67.7    Immature Granulocyte Rel % 0.2  0.2  0.4    Lymphocyte Rel % 24.5  28.6  22.7    Monocyte Rel % 8.5  6.9  7.6    Eosinophil Rel % 2.1  1.5  1.2    Basophil Rel % 0.6  0.6  0.4        Assessment         Patient Active Problem List   Diagnosis     Essential hypertension    Mixed hyperlipidemia    Coronary artery disease involving native coronary artery of native heart without angina pectoris    Diabetes mellitus type 2 in obese    Fatty liver    Seizure disorder    Acquired atrophy of thyroid    Vitamin D deficiency    Hydronephrosis of left kidney    Vitamin B12 deficiency    Benign essential tremor    Seasonal allergic rhinitis due to pollen    Medicare annual wellness visit, subsequent    Recurrent cystitis    OAB (overactive bladder)    TIA (transient ischemic attack)    Overweight (BMI 25.0-29.9)    Acute asthmatic bronchitis    Pneumonia of left lower lobe due to infectious organism    Elevated ferritin    Bronchiectasis with acute exacerbation    Pneumonia due to infectious organism    Former smoker    Abnormal CT of the chest       Diagnoses and all orders for this visit:    1. Bronchiectasis with acute exacerbation (Primary)  -     Case Request; Standing  -     Follow Anesthesia Guidlines / Standing Orders; Standing  -     Follow Anesthesia Guidelines / Protocol; Future  -     Case Request  -     Oscillating Positive Expiratory Pressure (OPEP); Future    2. Pneumonia due to infectious organism, unspecified laterality, unspecified part of lung  -     Case Request; Standing  -     Follow Anesthesia Guidlines / Standing Orders; Standing  -     Follow Anesthesia Guidelines / Protocol; Future  -     Case Request  -     Oscillating Positive Expiratory Pressure (OPEP); Future    3. Former smoker  -     Case Request; Standing  -     Follow Anesthesia Guidlines / Standing Orders; Standing  -     Follow Anesthesia Guidelines / Protocol; Future  -     Case Request  -     Oscillating Positive Expiratory Pressure (OPEP); Future    4. Abnormal CT of the chest  -     Case Request; Standing  -     Follow Anesthesia Guidlines / Standing Orders; Standing  -     Follow Anesthesia Guidelines / Protocol; Future  -     Case Request  -     Oscillating Positive  Expiratory Pressure (OPEP); Future       Plan         CT of chest reviewed with patient.  Patient is with bronchiectasis.  Patient has left lower lobe bronchiectasis with bronchial wall thickening and areas of mucous plugging.  Patient is also with left lower lobe consolidation.  Patient has been on for outpatient antibiotic regimens without complete resolution.  Patient is still feeling like she cannot get a deep breath in.  And when she coughs she has difficulty getting anything up.  Given the abnormal CT of the chest.  Discussed with patient the possibility of bronchoscopy if with no improvement. Discussed bronchoscopy with BAL, brushing, biopsy and airway inspection. All risks and benefits discussed. Risks including pneumonia, pneumothorax, resp depression, bleeding, hemothorax, and that it could be fatal as well were explained. Alternatives and options discussed. He understands and is agreeable for the procedure.  Patient will be scheduled with Dr. Terrell  Patient was sent home with flutter valve.  Encouraged her to use that as multiple times throughout the day.  This should help with her breaking some of the mucus loose and hopefully will be able to expectorate.  She will follow-up in office in 2 weeks to discuss if she notices any significant improvement  If without improvement with flutter valve.  Can discuss proceeding forward with chest vest.  Would ultimately like to start nebulizers as far as treatments.  However patient has had intolerance with albuterol.  Patient was on albuterol before.  She did not tolerate it secondary to tremors.  Then she tried Advair and did not tolerate that either secondary to tremors.  Her PCP started her on Flovent.  She currently wants to continue this as she tolerates it without difficulty.  She did not want to start albuterol back  CBC and chemistry were reviewed.  Patient is without eosinophilia or hypercarbia.  Former smoker.      Smoking status:  reports that she quit  smoking about 30 years ago. Her smoking use included cigarettes. She started smoking about 53 years ago. She has a 60 pack-year smoking history. She has been exposed to tobacco smoke. She has never used smokeless tobacco.    Vaccination status: Reviewed and up-to-date  Immunization History   Administered Date(s) Administered    ABRYSVO (RSV, 60+ or pregnant women 32-36 wks) 09/28/2024    Arexvy (RSV, Adults 60+ yrs) 09/28/2024    COVID-19 (MODERNA) 12YRS+ (SPIKEVAX) 09/30/2023    COVID-19 (MODERNA) 1st,2nd,3rd Dose Monovalent 02/08/2021, 03/08/2021, 10/26/2021    COVID-19 (MODERNA) BIVALENT 12+YRS 03/13/2023    COVID-19 (MODERNA) Monovalent Original Booster 05/22/2022, 05/23/2022    COVID-19 (PFIZER) 12YRS+ (COMIRNATY) 09/30/2024    Flu Vaccine Quad PF >36MO 08/25/2016, 09/21/2017, 08/29/2018, 09/09/2019    Fluzone (or Fluarix & Flulaval for VFC) >6mos 08/25/2016, 09/21/2017, 08/29/2018, 09/09/2019    Fluzone High-Dose 65+YRS 08/18/2020, 09/14/2021, 09/02/2022, 09/30/2024    Fluzone High-Dose 65+yrs 09/02/2022, 09/30/2023    Hep A / Hep B 04/19/2018    Hepatitis A 04/25/2018    Pneumococcal Conjugate 13-Valent (PCV13) 12/08/2015    Pneumococcal Conjugate 20-Valent (PCV20) 09/02/2022    Pneumococcal Polysaccharide (PPSV23) 12/07/2017, 10/31/2019    Shingrix 02/10/2023, 05/31/2023    Td, Not Adsorbed 06/16/2022    Tdap 06/16/2022, 06/17/2022    influenza Split 08/28/2014, 08/29/2016        Medications personally reviewed    Follow Up  Will follow-up after bronchoscopy to discuss results  Patient will discuss if flutter valve is affected.  Patient may benefit from PFT to evaluate lung function      Patient was given instructions and counseling regarding her condition or for health maintenance advice. Please see specific information pulled into the AVS if appropriate.           KEY Garcia  06/17/25  16:21 EDT

## 2025-06-17 ENCOUNTER — OFFICE VISIT (OUTPATIENT)
Dept: PULMONOLOGY | Facility: CLINIC | Age: 70
End: 2025-06-17
Payer: MEDICARE

## 2025-06-17 ENCOUNTER — TELEPHONE (OUTPATIENT)
Dept: CARDIOLOGY | Age: 70
End: 2025-06-17

## 2025-06-17 ENCOUNTER — TELEPHONE (OUTPATIENT)
Dept: PULMONOLOGY | Facility: CLINIC | Age: 70
End: 2025-06-17

## 2025-06-17 VITALS
SYSTOLIC BLOOD PRESSURE: 110 MMHG | HEART RATE: 74 BPM | BODY MASS INDEX: 23.92 KG/M2 | OXYGEN SATURATION: 98 % | WEIGHT: 135 LBS | TEMPERATURE: 97.7 F | HEIGHT: 63 IN | RESPIRATION RATE: 18 BRPM | DIASTOLIC BLOOD PRESSURE: 62 MMHG

## 2025-06-17 DIAGNOSIS — R93.89 ABNORMAL CT OF THE CHEST: ICD-10-CM

## 2025-06-17 DIAGNOSIS — J47.1 BRONCHIECTASIS WITH ACUTE EXACERBATION: Primary | ICD-10-CM

## 2025-06-17 DIAGNOSIS — J18.9 PNEUMONIA DUE TO INFECTIOUS ORGANISM, UNSPECIFIED LATERALITY, UNSPECIFIED PART OF LUNG: ICD-10-CM

## 2025-06-17 DIAGNOSIS — Z87.891 FORMER SMOKER: ICD-10-CM

## 2025-06-17 PROCEDURE — 3074F SYST BP LT 130 MM HG: CPT | Performed by: PHYSICIAN ASSISTANT

## 2025-06-17 PROCEDURE — 99204 OFFICE O/P NEW MOD 45 MIN: CPT | Performed by: PHYSICIAN ASSISTANT

## 2025-06-17 PROCEDURE — 3078F DIAST BP <80 MM HG: CPT | Performed by: PHYSICIAN ASSISTANT

## 2025-06-17 NOTE — TELEPHONE ENCOUNTER
Caller: Vivian Kauffman    Relationship to patient: Self    Best call back number: [phone] (home)       Patient is needing: TO LET LIZZ KNOW HER INHALER IS-  FLUTICAS  MCG AERTRA  2 PUFFS TWICE DAILY 5/22/2025

## 2025-06-17 NOTE — TELEPHONE ENCOUNTER
6/17/25 Okay to transfer patient from Dr. Waters to Dr. Chirinos. Called patient and left a voicemail about scheduling with Dr. Chirinos.    Thanks,  Huma

## 2025-06-17 NOTE — TELEPHONE ENCOUNTER
Caller: Vivian Kauffman    Relationship: Self    Best call back number:     PATIENT IS WANTING TO GO AHEAD AND SCHEDULE AN APPT WITH DR. SYED - TRANSFER OF CARE WAS SENT 6.4 AND PATIENT HAS STILL NOT BEEN SCHEDULED TO SEE HIM.    SHE WOULD LIKE TO COME ON THE WEEK OF JULY 28TH AS SHE IS OFF WORK

## 2025-06-18 NOTE — TELEPHONE ENCOUNTER
Caller: Vivian Kauffman    Relationship: Self    Best call back number: [phone]    What was the call regarding: PT IS RETURNING A MISSED CALL AND ASKED IF WE COULD CALL HER BACK BECAUSE SHE IS OFF OF WORK NOW.

## 2025-06-19 ENCOUNTER — OFFICE VISIT (OUTPATIENT)
Dept: GASTROENTEROLOGY | Facility: CLINIC | Age: 70
End: 2025-06-19
Payer: MEDICARE

## 2025-06-19 VITALS
SYSTOLIC BLOOD PRESSURE: 118 MMHG | HEIGHT: 63 IN | HEART RATE: 79 BPM | DIASTOLIC BLOOD PRESSURE: 54 MMHG | BODY MASS INDEX: 24.98 KG/M2 | WEIGHT: 141 LBS

## 2025-06-19 DIAGNOSIS — K76.0 FATTY LIVER: Primary | ICD-10-CM

## 2025-06-19 DIAGNOSIS — R79.89 ELEVATED LFTS: ICD-10-CM

## 2025-06-19 NOTE — PRE-PROCEDURE INSTRUCTIONS
IMPORTANT INSTRUCTIONS - PRE-ADMISSION TESTING  DO NOT EAT OR CHEW anything after midnight the night before your procedure.    You may have CLEAR liquids up to _2__ hours prior to ARRIVAL time.   Take the following medications the morning of your procedure with JUST A SIP OF WATER:  _____LEVOTHYROXINE, OXYBUTYNIN, KEPPRA, PROZAC __________________________________________________________________________________________________________________________________________________________________________________    DO NOT BRING your medications to the hospital with you, UNLESS something has changed since your PRE-Admission Testing appointment.  Hold all vitamins, supplements, and NSAIDS (Non- steroidal anti-inflammatory meds) for one week prior to surgery (you MAY take Tylenol or Acetaminophen).  If you are diabetic, check your blood sugar the morning of your procedure. If it is less than 70 or if you are feeling symptomatic, call the following number for further instructions: [phone]  Use your inhalers/nebulizers as usual, the morning of your procedure. BRING YOUR INHALERS with you.   Bring your CPAP or BIPAP to hospital, ONLY IF YOU WILL BE SPENDING THE NIGHT.   Make sure you have a ride home and have someone who will stay with you the day of your procedure after you go home.  If you have any questions, please call your Pre-Admission Testing NurseASAF____ at [phone]____.   Per anesthesia request, do not smoke for 24 hours before your procedure or as instructed by your surgeon.

## 2025-06-19 NOTE — TELEPHONE ENCOUNTER
PT MISSED A CALL FROM THE OFFICE. ASSUMING ITS ABOUT THIS ENCOUNTER, HOWEVER NO NOTES. PLEASE CALL BACK IF NEEDED.

## 2025-06-19 NOTE — TELEPHONE ENCOUNTER
See below.    She is calling back in regards to your voicemail from 6/17/25, documented in the 6/4/25 Telephone message.    Thanks,  Antonietta

## 2025-06-19 NOTE — PROGRESS NOTES
Patient Name: Vivian Kauffman   Visit Date: 06/19/2025   Patient ID: [id number]  Provider: JIM Oliveros    Sex: female  Location:  Location Address:  Location Phone: [phone]  [place] [address] [phone]    YOB: 1955  Age: 70 y.o.   Primary Care Provider Shahbaz Acosta MD      Referring Provider: No ref. provider found        Chief Complaint  fatty liver and Irritable Bowel Syndrome    History of Present Illness  Patient has history of diarrhea alternating with constipation and history of fatty liver.    Patient was referred back to us January 2024 for fatty liver and elevated LFTs.    Liver workup in 2023 negative, no history of alcohol.    Reported history of gastric sleeve in 2017 and lost 100 pounds total- also started Ozempic last year and lost 30 pounds with this  Last colonoscopy 2022 by Dr. Love done for screening-hyperplastic sigmoid colon polyp and recommended 10-year recall.    FibroScan 4/12/2024: F2 and normal to mild liver fat     5/13/24: Xifaxan prescribed for IBS-D symptoms.  Reported losing 8 pounds-decreased appetite     Patient was last seen 3/31/2025, patient did receive Rezdiffra however liver enzymes increased to ,  in Jan so she was advised to discontinue and she also could no longer afford it.  She reported Xifaxan helped her symptoms and she started Florajen    Last ultrasound the liver 6/4/2025: Hepatic steatosis no acute finding  6/6/2025: INR 1.0, AST 42, ALT 29, CBC unremarkable    History of Present Illness  The patient presents for evaluation of fatty liver and pneumonia.    She reports a stable abdominal condition, with no instances of diarrhea or bloating. She has been utilizing florajen as part of her treatment regimen, taking one dose nightly before sleep. She does not experience any hematochezia, melena, or abdominal pain. Her dietary intake is sufficient in protein, and she makes a conscious effort to limit her  "consumption of sweets and carbohydrates, trying to drink coffee.  She continues her Ozempic treatment.    She has been diagnosed with pneumonia for 6 weeks. She has been referred to a pulmonologist. She is scheduled to see the pulmonologist on Monday for a bronchoscopy. She was prescribed 4 different antibiotics. She was also put on steroids and used a steroid inhaler during her pneumonia treatment.    PAST SURGICAL HISTORY: Colonoscopy in 2022.    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  Diet: Consuming a sugar-free coffee drink with oat milk and creamer over ice.  Coffee/Tea/Caffeine-containing Drinks: Drinking instant coffee mixed with hot water, lactate, white chocolate, caramel, oat milk, and creamer over ice.      Past Medical History:   Diagnosis Date    Anxiety     Asthma     Coronary arteriosclerosis     Depression     Diabetes mellitus     TYPE 2 - BLOOD GLUCOSE AROUND 100-300    Disease of thyroid gland     Fatigue     Fatty liver     Fibromyalgia     Fibromyositis     GERD (gastroesophageal reflux disease)     Heartburn     History of COVID-19 2020    [place] De[place] (hard of hearing)     Hyperlipidemia     Hypertension     Insomnia     Kidney stone     Muscle spasm     Pain of both hip joints     Polyphagia(783.6)     PONV (postoperative nausea and vomiting)     Poor vision     Seizures     Shortness of breath on exertion     Sinus drainage     Stroke syndrome     \"MINI STROKE\"  left side weakness     Tremor, essential     IN NECK  AND HANDS    Vertigo        Past Surgical History:   Procedure Laterality Date    BREAST BIOPSY Right     CARDIAC CATHETERIZATION      no stents 9/2013 at [facility]    CARPAL TUNNEL RELEASE Bilateral 2009    COLONOSCOPY      2018    COLONOSCOPY N/A 2/18/2022    Procedure: COLONOSCOPY SCREENING WITH POLYPECTOMY;  Surgeon: David Love MD;  Location: [facility] ENDOSCOPY;  Service: Gastroenterology;  " Laterality: N/A;  COLON POLYP    CYSTOSCOPY NEPHROURETEROSCOPY Left 6/29/2021    Procedure: CYSTOSCOPY NEPHROURETEROSCOPY;  Surgeon: Mandie Hamilton MD;  Location: [facility] MAIN OR;  Service: Urology;  Laterality: Left;    ENDOSCOPY N/A 9/9/2016    Procedure: ESOPHAGOGASTRODUODENOSCOPY WITH BIOPSY;  Surgeon: Chris Ackerman Jr., MD;  Location: [facility] ENDOSCOPY;  Service:     EXTRACORPOREAL SHOCKWAVE LITHOTRIPSY (ESWL), STENT INSERTION/REMOVAL  09/2013    FOOT SURGERY Left     FRACTURE SURGERY      left hand    GASTRIC BANDING REMOVAL N/A 11/30/2016    Procedure: LAPAROSCOPIC GASTRIC BANDING AND PORT REMOVAL ;  Surgeon: Chris Ackerman Jr., MD;  Location: [facility] OR [facility];  Service:     GASTRIC SLEEVE LAPAROSCOPIC N/A 10/2/2017    Procedure: GASTRIC SLEEVE LAPAROSCOPIC revision WITH LYSIS OF ADHESIONS AND HITAL HERNIA REPAIR ;  Surgeon: Chris Ackerman Jr., MD;  [place] [facility] [place] [facility] [place] [facility]:     GASTRIC SLEEVE LAPAROSCOPIC      HAND SURGERY Left     LAPAROSCOPIC CHOLECYSTECTOMY      MASS EXCISION Right     RT FOOT     NOSE SURGERY      TONSILLECTOMY      URETEROSCOPY STENT INSERTION Left 6/29/2021    Procedure: URETEROSCOPY STENT INSERTION;  Surgeon: Mandie Hamilton MD;  Location: [facility] MAIN OR;  Service: Urology;  Laterality: Left;       Allergies   Allergen Reactions    Jardiance [Empagliflozin] Anaphylaxis     Went into ketoacidosis    Peanut Oil Anaphylaxis     throat to close     Latex Hives     SKIN BLISTERS    Serevent [Salmeterol] Palpitations     Patient had shaking, tremors, etc. secondary to long-acting beta agonist.    Sulfa Antibiotics Rash    Ace Inhibitors Cough and Other (See Comments)       Family History   Problem Relation Age of Onset    Heart attack Father     Diabetes Father     Hypertension Father     Obesity Father         Morbid Obesity    Stroke Father     Heart attack Mother     Diabetes Mother     Hypertension Mother     Stroke Mother     Heart attack Brother     Cancer  "Brother         Bladder    Heart attack Paternal Grandfather     Stroke Paternal Grandmother     Heart attack Maternal Grandmother     Heart attack Maternal Grandfather     Malig Hyperthermia Neg Hx     Colon cancer Neg Hx     Breast cancer Neg Hx     Uterine cancer Neg Hx     Ovarian cancer Neg Hx         Social History     Tobacco Use    Smoking status: Former     Current packs/day: 0.00     Average packs/day: 2.0 packs/day for 30.0 years (60.0 ttl pk-yrs)     Types: Cigarettes     Start date:      Quit date:      Years since quittin.4     Passive exposure: Past    Smokeless tobacco: Never    Tobacco comments:     caffeine use   Vaping Use    Vaping status: Never Used   Substance Use Topics    Alcohol use: Not Currently    Drug use: Never       Objective     Vital Signs:   /54 (BP Location: Left arm, Patient Position: Sitting, Cuff Size: Adult)   Pulse 79   Ht 160 cm (63\")   Wt 64 kg (141 lb)   BMI 24.98 kg/m²       Physical Exam  Constitutional:       General: The patient is not in acute distress.     Appearance: Normal appearance.   HENT:      Head: Normocephalic and atraumatic.      Nose: Nose normal.   Pulmonary:      Effort: Pulmonary effort is normal. No respiratory distress.   Abdominal:      General: Abdomen is flat.      Palpations: Abdomen is soft. There is no mass.      Tenderness: There is no abdominal tenderness. There is no guarding.   Musculoskeletal:      Cervical back: Neck supple.      Right lower leg: No edema.      Left lower leg: No edema.   Skin:     General: Skin is warm and dry.   Neurological:      General: No focal deficit present.      Mental Status: The patient is alert and oriented to person, place, and time.      Gait: Gait normal.   Psychiatric:         Mood and Affect: Mood normal.         Speech: Speech normal.         Behavior: Behavior normal.         Thought Content: Thought content normal.     Result Review :   The following data was reviewed by: Diamond " JIM Napier on 06/19/2025:    CBC w/diff          1/18/2025    07:24 2/13/2025    10:10 6/6/2025    07:38   CBC w/Diff   WBC 4.73  4.76  5.15     5.08    RBC 4.80  5.12  4.99     4.97    Hemoglobin 13.9  14.7  14.1     14.2    Hematocrit 41.4  43.7  43.8     43.6    MCV 86.3  85.4  87.8     87.7    MCH 29.0  28.7  28.3     28.6    MCHC 33.6  33.6  32.2     32.6    RDW 13.1  13.4  13.9     14.0    Platelets 160  191  178     173    Neutrophil Rel % 64.1  62.2  67.7    Immature Granulocyte Rel % 0.2  0.2  0.4    Lymphocyte Rel % 24.5  28.6  22.7    Monocyte Rel % 8.5  6.9  7.6    Eosinophil Rel % 2.1  1.5  1.2    Basophil Rel % 0.6  0.6  0.4      CMP          1/29/2025    08:43 2/13/2025    10:10 6/6/2025    07:38   CMP   Glucose  125  152    BUN  13  13.0    Creatinine  0.64  0.65    EGFR  95.2  94.9    Sodium  136  138    Potassium  4.0  4.4    Chloride  98  101    Calcium  9.4  9.7    Total Protein 7.5  7.5  7.3     7.5    Albumin 4.3  4.4  4.2     4.2    Globulin   3.1    Total Bilirubin 0.4  0.5  0.4     0.4    Alkaline Phosphatase 92  89  81     82    AST (SGOT) 82  63  43     42    ALT (SGPT) 134  81  29     29    Albumin/Globulin Ratio   1.4    BUN/Creatinine Ratio  20.3  20.0    Anion Gap  13.5  11.0                    Assessment and Plan    Diagnoses and all orders for this visit:    1. Fatty liver (Primary)  -     CBC (No Diff); Future  -     Hepatic Function Panel; Future  -     Protime-INR; Future  -     US Liver; Future    2. Elevated LFTs  -     CBC (No Diff); Future  -     Hepatic Function Panel; Future  -     Protime-INR; Future  -     US Liver; Future          Assessment & Plan  1. Fatty liver:  - Continue current medication regimen, including Florajen, especially considering recent antibiotic use.  - Repeat US and labs in 6 months.        Follow-up:  - December 2025.            Follow Up   Return in about 6 months (around 12/19/2025).    Patient or patient representative verbalized consent  for the use of Ambient Listening during the visit with  JIM Oliveros for chart documentation. 6/19/2025  14:13 EDT    Patient was given instructions and counseling regarding her condition or for health maintenance advice. Please see specific information pulled into the AVS if appropriate.

## 2025-06-20 NOTE — TELEPHONE ENCOUNTER
6/20/25 Okay to transfer patient from Dr. Waters to Dr. Chirinos. Called patient for a second time and left a voicemail about scheduling with Dr. Chirinos.     Thanks,  Huma

## 2025-06-20 NOTE — TELEPHONE ENCOUNTER
6/20/25 Okay to transfer patient from Dr. Waters to Dr. Chirinos. I called patient for the first time about scheduling on 6-17-25. Called patient for a second time and left a voicemail about scheduling with Dr. Chirinos.     Thanks,  Huma

## 2025-06-23 ENCOUNTER — ANESTHESIA EVENT (OUTPATIENT)
Dept: PERIOP | Facility: HOSPITAL | Age: 70
End: 2025-06-23
Payer: MEDICARE

## 2025-06-23 ENCOUNTER — HOSPITAL ENCOUNTER (OUTPATIENT)
Facility: HOSPITAL | Age: 70
Setting detail: HOSPITAL OUTPATIENT SURGERY
Discharge: HOME | End: 2025-06-23
Attending: INTERNAL MEDICINE | Admitting: INTERNAL MEDICINE
Payer: MEDICARE

## 2025-06-23 ENCOUNTER — ANESTHESIA (OUTPATIENT)
Dept: PERIOP | Facility: HOSPITAL | Age: 70
End: 2025-06-23
Payer: MEDICARE

## 2025-06-23 VITALS
HEART RATE: 67 BPM | RESPIRATION RATE: 16 BRPM | BODY MASS INDEX: 24.88 KG/M2 | HEIGHT: 63 IN | SYSTOLIC BLOOD PRESSURE: 105 MMHG | OXYGEN SATURATION: 96 % | DIASTOLIC BLOOD PRESSURE: 57 MMHG | TEMPERATURE: 97.8 F | WEIGHT: 140.43 LBS

## 2025-06-23 DIAGNOSIS — R93.89 ABNORMAL CT OF THE CHEST: ICD-10-CM

## 2025-06-23 DIAGNOSIS — J47.1 BRONCHIECTASIS WITH ACUTE EXACERBATION: ICD-10-CM

## 2025-06-23 DIAGNOSIS — J18.9 PNEUMONIA DUE TO INFECTIOUS ORGANISM, UNSPECIFIED LATERALITY, UNSPECIFIED PART OF LUNG: ICD-10-CM

## 2025-06-23 DIAGNOSIS — Z87.891 FORMER SMOKER: ICD-10-CM

## 2025-06-23 LAB
ACB CMPLX DNA BAL NAA+NON-PRB-NCNCRNG: NOT DETECTED
BLACTX-M ISLT/SPM QL: NOT DETECTED
BLAIMP ISLT/SPM QL: NOT DETECTED
BLAKPC ISLT/SPM QL: NOT DETECTED
BLAOXA-48-LIKE ISLT/SPM QL: NOT DETECTED
BLAVIM ISLT/SPM QL: NOT DETECTED
C PNEUM DNA NPH QL NAA+NON-PROBE: NOT DETECTED
CILIATED BAL QL: 1 %
E CLOAC COMP DNA BAL NAA+NON-PRB-NCNCRNG: DETECTED
E COLI DNA BAL NAA+NON-PRB-NCNCRNG: NOT DETECTED
FLUAV SUBTYP SPEC NAA+PROBE: NOT DETECTED
FLUBV RNA ISLT QL NAA+PROBE: NOT DETECTED
GLUCOSE BLDC GLUCOMTR-MCNC: 105 MG/DL (ref 70–99)
GLUCOSE BLDC GLUCOMTR-MCNC: 110 MG/DL (ref 70–99)
GP B STREP DNA BAL NAA+NON-PRB-NCNCRNG: NOT DETECTED
HADV DNA SPEC NAA+PROBE: NOT DETECTED
HAEM INFLU DNA BAL NAA+NON-PRB-NCNCRNG: NOT DETECTED
HCOV RNA LOWER RESP QL NAA+NON-PROBE: NOT DETECTED
HMPV RNA NPH QL NAA+NON-PROBE: NOT DETECTED
HPIV RNA LOWER RESP QL NAA+NON-PROBE: NOT DETECTED
K AEROGENES DNA BAL NAA+NON-PRB-NCNCRNG: NOT DETECTED
K OXYTOCA DNA BAL NAA+NON-PRB-NCNCRNG: NOT DETECTED
K PNEU GRP DNA BAL NAA+NON-PRB-NCNCRNG: NOT DETECTED
L PNEUMO DNA LOWER RESP QL NAA+NON-PROBE: NOT DETECTED
LYMPHOCYTES NFR FLD MANUAL: 3 %
M CATARRHALIS DNA BAL NAA+NON-PRB-NCNCRNG: NOT DETECTED
M PNEUMO IGG SER IA-ACNC: NOT DETECTED
MACROPHAGE FLUID %: 7 %
MECA+MECC ISLT/SPM QL: ABNORMAL
NDM GENE: NOT DETECTED
NEUTROPHILS NFR FLD MANUAL: 89 %
P AERUGINOSA DNA BAL NAA+NON-PRB-NCNCRNG: NOT DETECTED
PROTEUS SP DNA BAL NAA+NON-PRB-NCNCRNG: NOT DETECTED
RHINOVIRUS RNA SPEC NAA+PROBE: NOT DETECTED
RSV RNA NPH QL NAA+NON-PROBE: NOT DETECTED
S AUREUS DNA BAL NAA+NON-PRB-NCNCRNG: NOT DETECTED
S MARCESCENS DNA BAL NAA+NON-PRB-NCNCRNG: NOT DETECTED
S PNEUM DNA BAL NAA+NON-PRB-NCNCRNG: NOT DETECTED
S PYO DNA BAL NAA+NON-PRB-NCNCRNG: NOT DETECTED
VISUAL PRESENCE OF BLOOD: NORMAL

## 2025-06-23 PROCEDURE — 25010000002 PROPOFOL 10 MG/ML EMULSION

## 2025-06-23 PROCEDURE — 87070 CULTURE OTHR SPECIMN AEROBIC: CPT | Performed by: INTERNAL MEDICINE

## 2025-06-23 PROCEDURE — 87206 SMEAR FLUORESCENT/ACID STAI: CPT | Performed by: INTERNAL MEDICINE

## 2025-06-23 PROCEDURE — 25010000002 LIDOCAINE HCL (PF) 4 % SOLUTION: Performed by: INTERNAL MEDICINE

## 2025-06-23 PROCEDURE — 87116 MYCOBACTERIA CULTURE: CPT | Performed by: INTERNAL MEDICINE

## 2025-06-23 PROCEDURE — 87205 SMEAR GRAM STAIN: CPT | Performed by: INTERNAL MEDICINE

## 2025-06-23 PROCEDURE — 25010000002 MIDAZOLAM PER 1MG: Performed by: ANESTHESIOLOGY

## 2025-06-23 PROCEDURE — 89051 BODY FLUID CELL COUNT: CPT | Performed by: INTERNAL MEDICINE

## 2025-06-23 PROCEDURE — 25010000002 LIDOCAINE HCL (PF) 4 % SOLUTION

## 2025-06-23 PROCEDURE — 87633 RESP VIRUS 12-25 TARGETS: CPT | Performed by: INTERNAL MEDICINE

## 2025-06-23 PROCEDURE — 31624 DX BRONCHOSCOPE/LAVAGE: CPT | Performed by: INTERNAL MEDICINE

## 2025-06-23 PROCEDURE — 25010000002 LIDOCAINE PF 2% 2 % SOLUTION

## 2025-06-23 PROCEDURE — 82948 REAGENT STRIP/BLOOD GLUCOSE: CPT

## 2025-06-23 PROCEDURE — 87102 FUNGUS ISOLATION CULTURE: CPT | Performed by: INTERNAL MEDICINE

## 2025-06-23 PROCEDURE — 31645 BRNCHSC W/THER ASPIR 1ST: CPT | Performed by: INTERNAL MEDICINE

## 2025-06-23 PROCEDURE — 87106 FUNGI IDENTIFICATION YEAST: CPT | Performed by: INTERNAL MEDICINE

## 2025-06-23 PROCEDURE — 25810000003 LACTATED RINGERS PER 1000 ML: Performed by: ANESTHESIOLOGY

## 2025-06-23 PROCEDURE — 82948 REAGENT STRIP/BLOOD GLUCOSE: CPT | Performed by: ANESTHESIOLOGY

## 2025-06-23 PROCEDURE — 87071 CULTURE AEROBIC QUANT OTHER: CPT | Performed by: INTERNAL MEDICINE

## 2025-06-23 PROCEDURE — 88108 CYTOPATH CONCENTRATE TECH: CPT | Performed by: INTERNAL MEDICINE

## 2025-06-23 RX ORDER — LIDOCAINE HYDROCHLORIDE 40 MG/ML
INJECTION, SOLUTION RETROBULBAR AS NEEDED
Status: DISCONTINUED | OUTPATIENT
Start: 2025-06-23 | End: 2025-06-23 | Stop reason: HOSPADM

## 2025-06-23 RX ORDER — MAGNESIUM HYDROXIDE 1200 MG/15ML
LIQUID ORAL AS NEEDED
Status: DISCONTINUED | OUTPATIENT
Start: 2025-06-23 | End: 2025-06-23 | Stop reason: HOSPADM

## 2025-06-23 RX ORDER — SODIUM CHLORIDE, SODIUM LACTATE, POTASSIUM CHLORIDE, CALCIUM CHLORIDE 600; 310; 30; 20 MG/100ML; MG/100ML; MG/100ML; MG/100ML
9 INJECTION, SOLUTION INTRAVENOUS CONTINUOUS PRN
Status: DISCONTINUED | OUTPATIENT
Start: 2025-06-23 | End: 2025-06-23 | Stop reason: HOSPADM

## 2025-06-23 RX ORDER — LIDOCAINE HYDROCHLORIDE 40 MG/ML
INJECTION, SOLUTION RETROBULBAR AS NEEDED
Status: DISCONTINUED | OUTPATIENT
Start: 2025-06-23 | End: 2025-06-23 | Stop reason: SURG

## 2025-06-23 RX ORDER — PROMETHAZINE HYDROCHLORIDE 25 MG/1
25 TABLET ORAL ONCE AS NEEDED
Status: DISCONTINUED | OUTPATIENT
Start: 2025-06-23 | End: 2025-06-23 | Stop reason: HOSPADM

## 2025-06-23 RX ORDER — LIDOCAINE HYDROCHLORIDE 20 MG/ML
INJECTION, SOLUTION EPIDURAL; INFILTRATION; INTRACAUDAL; PERINEURAL AS NEEDED
Status: DISCONTINUED | OUTPATIENT
Start: 2025-06-23 | End: 2025-06-23 | Stop reason: SURG

## 2025-06-23 RX ORDER — ONDANSETRON 2 MG/ML
4 INJECTION INTRAMUSCULAR; INTRAVENOUS ONCE AS NEEDED
Status: DISCONTINUED | OUTPATIENT
Start: 2025-06-23 | End: 2025-06-23 | Stop reason: HOSPADM

## 2025-06-23 RX ORDER — SPIRONOLACTONE 25 MG/1
25 TABLET ORAL DAILY
Qty: 90 TABLET | Refills: 0 | Status: SHIPPED | OUTPATIENT
Start: 2025-06-23 | End: 2025-06-30

## 2025-06-23 RX ORDER — SCOPOLAMINE 1 MG/3D
1 PATCH, EXTENDED RELEASE TRANSDERMAL ONCE
Status: DISCONTINUED | OUTPATIENT
Start: 2025-06-23 | End: 2025-06-23 | Stop reason: HOSPADM

## 2025-06-23 RX ORDER — OXYCODONE HYDROCHLORIDE 5 MG/1
5 TABLET ORAL
Refills: 0 | Status: DISCONTINUED | OUTPATIENT
Start: 2025-06-23 | End: 2025-06-23 | Stop reason: HOSPADM

## 2025-06-23 RX ORDER — MIDAZOLAM HYDROCHLORIDE 2 MG/2ML
0.5 INJECTION, SOLUTION INTRAMUSCULAR; INTRAVENOUS ONCE
Status: COMPLETED | OUTPATIENT
Start: 2025-06-23 | End: 2025-06-23

## 2025-06-23 RX ORDER — PROMETHAZINE HYDROCHLORIDE 25 MG/1
25 SUPPOSITORY RECTAL ONCE AS NEEDED
Status: DISCONTINUED | OUTPATIENT
Start: 2025-06-23 | End: 2025-06-23 | Stop reason: HOSPADM

## 2025-06-23 RX ORDER — PROPOFOL 10 MG/ML
VIAL (ML) INTRAVENOUS AS NEEDED
Status: DISCONTINUED | OUTPATIENT
Start: 2025-06-23 | End: 2025-06-23 | Stop reason: SURG

## 2025-06-23 RX ADMIN — SODIUM CHLORIDE, POTASSIUM CHLORIDE, SODIUM LACTATE AND CALCIUM CHLORIDE 9 ML/HR: 600; 310; 30; 20 INJECTION, SOLUTION INTRAVENOUS at 10:10

## 2025-06-23 RX ADMIN — PROPOFOL 175 MCG/KG/MIN: 10 INJECTION, EMULSION INTRAVENOUS at 11:50

## 2025-06-23 RX ADMIN — MIDAZOLAM HYDROCHLORIDE 0.5 MG: 1 INJECTION, SOLUTION INTRAMUSCULAR; INTRAVENOUS at 11:43

## 2025-06-23 RX ADMIN — PROPOFOL 50 MG: 10 INJECTION, EMULSION INTRAVENOUS at 11:50

## 2025-06-23 RX ADMIN — LIDOCAINE HYDROCHLORIDE 3 ML: 40 INJECTION, SOLUTION RETROBULBAR; TOPICAL at 11:48

## 2025-06-23 RX ADMIN — OXYCODONE HYDROCHLORIDE 5 MG: 5 TABLET ORAL at 12:37

## 2025-06-23 RX ADMIN — LIDOCAINE HYDROCHLORIDE 60 MG: 20 INJECTION, SOLUTION EPIDURAL; INFILTRATION; INTRACAUDAL; PERINEURAL at 11:50

## 2025-06-23 RX ADMIN — SCOPOLAMINE 1 PATCH: 1.5 PATCH, EXTENDED RELEASE TRANSDERMAL at 10:14

## 2025-06-23 NOTE — OP NOTE
Bronchoscopy Procedure Note    Procedure:  Bronchoscopy with mucous plug removal  Bronchoscopy with bronchoalveolar lavage left lower lobe  Bronchoscopy with bronchial washings tracheobronchial tree  Bronchoscopy with airway inspection    Pre-Operative Diagnosis: Left lower lobe bronchiectasis, persistent bronchitis, mucous plugging    Post-Operative Diagnosis: Left lower lobe mucous plugging, indurated airway    Indication:  Left lower lobe bronchiectasis, persistent bronchitis, mucous plugging    Anesthesia: MAC anesthesia    Procedure Details: Patient was consented for the procedure with all risks and benefits of the procedure explained in detail. Patient was given the opportunity to ask questions and all concerns were answered.    The bronchoscope was inserted into the main airway via the mouth. An anatomical survey was done of the main airways and the subsegmental bronchus. The findings are reported below.  Significant mucus plugging was seen in the left lower lobe bronchial tube.  Some indurated airway was seen as well.  Mucous plugging was suctioned in several attempts.  Also, bilious secretions were seen around trachea, concerning for recurrent and persistent reflux.  A bronchoalveolar lavage was performed using 60 mL aliquots of normal saline x 2 instilled into the airways then aspirated back from left lower lobe of lung with 50 mL serosanguineous fluid in return.  Bronchial washing was obtained from entire tracheobronchial tree.    Findings:  Significant mucus plugging was seen in the left lower lobe bronchial tube.  Some indurated airway was seen as well.  Mucous plugging was suctioned in several attempts.   Also, bilious secretions were seen around trachea, concerning for recurrent and persistent reflux.  Friable mucosa, easy bleeding with suction  Scattered purulent secretions    Estimated Blood Loss: Insignificant    Specimens:  BAL left lower lobe  Bronchial washings tracheobronchial  tree    Complications: None; patient tolerated the procedure well.    Disposition: To home once stable in recovery.    Patient tolerated the procedure well.      Electronically signed by Carlos Enrique Terrell MD, 6/23/2025, 12:18 EDT.

## 2025-06-23 NOTE — ANESTHESIA POSTPROCEDURE EVALUATION
Patient: Vivian Kauffman    Procedure Summary       Date: 06/23/25 Room / Location: [facility] OR 01 / [facility] MAIN OR    Anesthesia Start: 1146 Anesthesia Stop: 1214    Procedure: BRONCHOSCOPY WITH BAL AND WASHINGS (Bronchus) Diagnosis:       Bronchiectasis with acute exacerbation      Pneumonia due to infectious organism, unspecified laterality, unspecified part of lung      Former smoker      Abnormal CT of the chest      (Bronchiectasis with acute exacerbation [J47.1])      (Pneumonia due to infectious organism, unspecified laterality, unspecified part of lung [J18.9])      (Former smoker [Z87.891])      (Abnormal CT of the chest [R93.89])    Surgeons: Carlos Enrique Terrell MD Provider: Louie Moss MD    Anesthesia Type: general ASA Status: 4            Anesthesia Type: general    Vitals  Vitals Value Taken Time   /54 06/23/25 12:44   Temp 36.5 °C (97.7 °F) 06/23/25 12:10   Pulse 70 06/23/25 12:45   Resp 16 06/23/25 12:30   SpO2 97 % 06/23/25 12:45   Vitals shown include unfiled device data.    Post Anesthesia Care and Evaluation    Patient location during evaluation: bedside  Patient participation: complete - patient participated  Level of consciousness: awake    Airway patency: patent  PONV Status: none  Cardiovascular status: acceptable  Respiratory status: acceptable  Hydration status: acceptable

## 2025-06-23 NOTE — ANESTHESIA PREPROCEDURE EVALUATION
Anesthesia Evaluation     Patient summary reviewed and Nursing notes reviewed   history of anesthetic complications:  PONV  NPO Solid Status: > 8 hours  NPO Liquid Status: > 2 hours           Airway   Mallampati: II  TM distance: >3 FB  Neck ROM: full  No difficulty expected  Dental    (+) upper dentures and lower dentures    Pulmonary - normal exam    breath sounds clear to auscultation  (+) asthma,shortness of breath  Cardiovascular - normal exam    Rhythm: regular  Rate: normal    (+) hypertension, CAD, hyperlipidemia      Neuro/Psych  (+) seizures, CVA, dizziness/light headedness, numbness, psychiatric history Anxiety and Depression  GI/Hepatic/Renal/Endo    (+) obesity, GERD, liver disease fatty liver disease, renal disease-, diabetes mellitus, thyroid problem hypothyroidism    Musculoskeletal     (+) myalgias  Abdominal    Substance History - negative use     OB/GYN negative ob/gyn ROS         Other   arthritis,                       Anesthesia Plan    ASA 4     general     (Ozempic, 1 MG/DOSE, LAST DOSE 6/8/25    )  intravenous induction     Anesthetic plan, risks, benefits, and alternatives have been provided, discussed and informed consent has been obtained with: patient.  Pre-procedure education provided      CODE STATUS:

## 2025-06-25 LAB
BACTERIA SPEC AEROBE CULT: NORMAL
BACTERIA SPEC RESP CULT: NORMAL
CYTO UR: NORMAL
GRAM STN SPEC: NORMAL
GRAM STN SPEC: NORMAL
LAB AP CASE REPORT: NORMAL
LAB AP CLINICAL INFORMATION: NORMAL
PATH REPORT.FINAL DX SPEC: NORMAL
PATH REPORT.GROSS SPEC: NORMAL

## 2025-06-28 PROBLEM — J15.69 PNEUMONIA DUE TO ENTEROBACTER CLOACAE: Status: ACTIVE | Noted: 2025-06-28

## 2025-06-28 PROBLEM — K21.9 GASTRIC REFLUX WITH ASPIRATION: Status: ACTIVE | Noted: 2025-06-28

## 2025-06-28 PROBLEM — T17.918A GASTRIC REFLUX WITH ASPIRATION: Status: ACTIVE | Noted: 2025-06-28

## 2025-06-28 NOTE — PROGRESS NOTES
Primary Care Provider  Shahbaz Acosta MD   Referring Provider  No ref. provider found    Patient Complaint  Follow-up (2 Weeks)      Subjective     Vivian Kauffman is a 70 y.o. female with pertinent past medical history of bronchiectasis with recent pneumonia who presents as a new patient to [facility] for follow-up after bronchoscopy        Patient had bronchoscopy performed on June 23.  Findings were with bilious secretions and mucous plugging    Today patient presents in follow-up she states that she feels like she hears rattling in her chest again.  Patient states she was unaware of bronchiectasis findings.  She is aware that she has a hiatal hernia.  She has had previous bariatric surgery.  I discussed the results and findings of the bilious secretions in trachea.  She is not on any H2 blockers or PPIs currently.  Patient was just on multiple antibiotic treatments for pneumonia.  Patient did have Enterococcus cloacae on pneumonia panel at 10 to the 5 copies.  Respiratory culture and BAL without growth.  Given multiple repeated antibiotics we will hold off on antibiotic coverage for now.  Did tell patient that if her symptoms worsen she can reach out and we will start something.  Of note patient is allergic to Bactrim.          Previous documentation that led to bronchoscopy:  Patient does not feel short of breath.  But she feels she can't get her deep breaths.  Patient states she is without cough.  She feels there is a tightness in there when she breathes  Patient denies fever/chills now - but when treated for pneumonia she was with fever and chills and congested with cough but couldn't get anything up.    Patient was treated with antibiotic.  Patient ended up with 4 rounds of antibiotics.   Patient has also had follow up Ct scan due to lack of improvement which showed mucous plugging in distal airways  No weight loss since being sick.  Patient does  take Ozempic for A1c  Patient states she gets treated for URI that goes into pneumonia per her yearly since hospitalization in [place]  Patient feels better but is not back to normal.   She is fatigued and feels like she can't get the phlegm up.  She was trying to sleep in the recliner but her neck got hurting  So now she is with 3 pillows under her in bed      Patient has been hospitalized for pneumonia in the past - 5 years ago.  At [facility]  Patient is a former smoker, quit in 1994  With second hand smoker exposure as a child  No wood burning stove  No environmental toxins or chemicals  Currently works part time at [employer].  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   She does not want to a HAYES or a LABA         At present time patient denies dyspnea, wheezing, headaches, chest pain, weight loss or hemoptysis. Patient denies fevers, chills and night sweats. Vivian Kauffman is able to perform ADLs.       I have personally reviewed the review of systems, past family, social, medical and surgical histories; and agree with their findings.      Family History   Problem Relation Age of Onset    Heart attack Father     Diabetes Father     Hypertension Father     Obesity Father         Morbid Obesity    Stroke Father     Heart attack Mother     Diabetes Mother     Hypertension Mother     Stroke Mother     Heart attack Brother     Cancer Brother         Bladder    Heart attack Paternal Grandfather     Stroke Paternal Grandmother     Heart attack Maternal Grandmother     Heart attack Maternal Grandfather     Bonnie  "Hyperthermia Neg Hx     Colon cancer Neg Hx     Breast cancer Neg Hx     Uterine cancer Neg Hx     Ovarian cancer Neg Hx         Social History     Socioeconomic History    Marital status: Single    Number of children: 1   Tobacco Use    Smoking status: Former     Current packs/day: 0.00     Average packs/day: 2.0 packs/day for 30.0 years (60.0 ttl pk-yrs)     Types: Cigarettes     Start date:      Quit date:      Years since quittin.5     Passive exposure: Past    Smokeless tobacco: Never    Tobacco comments:     caffeine use   Vaping Use    Vaping status: Never Used   Substance and Sexual Activity    Alcohol use: Not Currently    Drug use: Never    Sexual activity: Not Currently     Partners: Male     Birth control/protection: Post-menopausal        Past Medical History:   Diagnosis Date    Anxiety     Asthma     Coronary arteriosclerosis     Depression     Diabetes mellitus     TYPE 2 - BLOOD GLUCOSE AROUND 100-300    Disease of thyroid gland     Fatigue     Fatty liver     Fibromyalgia     Fibromyositis     GERD (gastroesophageal reflux disease)     Heartburn     Hiatal hernia     History of COVID-19         [place] (hard of hearing)     Hyperlipidemia     Hypertension     Insomnia     Kidney stone     Muscle spasm     Pain of both hip joints     Polyphagia(783.6)     PONV (postoperative nausea and vomiting)     Poor vision     Seizures     CONTROLLED WITH MEDS. LAST SZ COUPLE YEARS AGO    Shortness of breath on exertion     Sinus drainage     Stroke syndrome     \"MINI STROKE\"  left side weakness     Tremor, essential     IN NECK  AND HANDS    Vertigo         Immunization History   Administered Date(s) Administered    ABRYSVO (RSV, 60+ or pregnant women 32-36 wks) 2024    Arexvy (RSV, Adults 60+ yrs) 2024    COVID-19 (MODERNA) 12YRS+ (SPIKEVAX) 2023    COVID-19 (MODERNA) 1st,2nd,3rd Dose Monovalent 2021, 2021, 10/26/2021    COVID-19 (MODERNA) BIVALENT 12+YRS 2023 "    COVID-19 (MODERNA) Monovalent Original Booster 05/22/2022, 05/23/2022    COVID-19 (PFIZER) 12YRS+ (COMIRNATY) 09/30/2024    Flu Vaccine Quad PF >36MO 08/25/2016, 09/21/2017, 08/29/2018, 09/09/2019    Fluzone (or Fluarix & Flulaval for VFC) >6mos 08/25/2016, 09/21/2017, 08/29/2018, 09/09/2019    Fluzone High-Dose 65+YRS 08/18/2020, 09/14/2021, 09/02/2022, 09/30/2024    Fluzone High-Dose 65+yrs 09/02/2022, 09/30/2023    Hep A / Hep B 04/19/2018    Hepatitis A 04/25/2018    Pneumococcal Conjugate 13-Valent (PCV13) 12/08/2015    Pneumococcal Conjugate 20-Valent (PCV20) 09/02/2022    Pneumococcal Polysaccharide (PPSV23) 12/07/2017, 10/31/2019    Shingrix 02/10/2023, 05/31/2023    Td, Not Adsorbed 06/16/2022    Tdap 06/16/2022, 06/17/2022    influenza Split 08/28/2014, 08/29/2016       Allergies   Allergen Reactions    Jardiance [Empagliflozin] Anaphylaxis     Went into ketoacidosis    Peanut Oil Anaphylaxis     throat to close     Latex Hives     SKIN BLISTERS    Serevent [Salmeterol] Palpitations     Patient had shaking, tremors, etc. secondary to long-acting beta agonist.    Sulfa Antibiotics Rash    Ace Inhibitors Cough and Other (See Comments)          Current Outpatient Medications:     albuterol sulfate  (90 Base) MCG/ACT inhaler, Inhale 1 puff As Needed for Wheezing or Shortness of Air., Disp: 18 g, Rfl: 1    atorvastatin (LIPITOR) 40 MG tablet, Take 1 tablet by mouth Every Other Day., Disp: 45 tablet, Rfl: 1    benzonatate (Tessalon Perles) 100 MG capsule, Take 2 capsules by mouth 3 (Three) Times a Day As Needed for Cough., Disp: 60 capsule, Rfl: 1    cetirizine (zyrTEC) 10 MG tablet, Take 1 tablet by mouth Every Night., Disp: , Rfl:     clobetasol (TEMOVATE) 0.05 % ointment, APPLY TO LESIONS ON SHOULDER 2 TIMES A DAY AS NEEDED., Disp: , Rfl:     clopidogrel (PLAVIX) 75 MG tablet, TAKE 1 TABLET BY MOUTH ONCE DAILY AT NIGHT, Disp: 90 tablet, Rfl: 1    FLUoxetine (PROzac) 20 MG capsule, TAKE 4 CAPSULES BY  MOUTH ONCE DAILY, Disp: 360 capsule, Rfl: 0    fluticasone (FLONASE) 50 MCG/ACT nasal spray, Administer 1 spray into the nostril(s) as directed by provider Daily for 211 days., Disp: 9.9 g, Rfl: 5    fluticasone (FLOVENT HFA) 110 MCG/ACT inhaler, Inhale 2 puffs 2 (Two) Times a Day., Disp: 12 g, Rfl: 2    gabapentin (NEURONTIN) 300 MG capsule, TAKE 1 CAPSULE BY MOUTH THREE TIMES DAILY, Disp: 90 capsule, Rfl: 5    insulin glargine (LANTUS, SEMGLEE) 100 UNIT/ML injection, Inject  under the skin into the appropriate area as directed As Needed. As neededd, Disp: , Rfl:     Insulin Lispro, 1 Unit Dial, (HumaLOG KwikPen) 100 UNIT/ML solution pen-injector, Inject 2 Units under the skin into the appropriate area as directed As Needed. As needed, Disp: , Rfl:     levETIRAcetam (KEPPRA) 500 MG tablet, Take  by mouth 2 (Two) Times a Day. 2 TABS IN AM, 4 AT NIGHT, Disp: , Rfl:     levothyroxine (SYNTHROID, LEVOTHROID) 25 MCG tablet, Take 1 tablet by mouth once daily, Disp: 90 tablet, Rfl: 1    montelukast (SINGULAIR) 10 MG tablet, Take 1 tablet by mouth once daily, Disp: 90 tablet, Rfl: 1    Nutritional Supplements (Nutritional Supplement Plus) liquid, Take 1 can by mouth 2 (Two) Times a Day., Disp: 60 each, Rfl: 5    oxybutynin XL (DITROPAN-XL) 5 MG 24 hr tablet, Take 1 tablet by mouth once daily, Disp: 30 tablet, Rfl: 0    Ozempic, 1 MG/DOSE, 4 MG/3ML solution pen-injector, , Disp: , Rfl:     Probiotic Product (FLORAJEN3 PO), Take  by mouth., Disp: , Rfl:     prochlorperazine (COMPAZINE) 5 MG tablet, Take 1 tablet by mouth Every 6 (Six) Hours As Needed., Disp: , Rfl:     RELION PEN NEEDLE 31G/8MM 31G X 8 MM misc, USE 4- 5 TIMES DAILY AS DIRECTED, Disp: , Rfl:     spironolactone (ALDACTONE) 25 MG tablet, Take 1 tablet by mouth once daily, Disp: 90 tablet, Rfl: 3    famotidine (Pepcid) 20 MG tablet, Take 1 tablet by mouth 2 (Two) Times a Day., Disp: 180 tablet, Rfl: 3       Objective       Physical Exam  Vital Signs:   /69  "(BP Location: Left arm, Patient Position: Sitting, Cuff Size: Adult)   Pulse 87   Temp 97.7 °F (36.5 °C) (Temporal)   Resp 18   Ht 160 cm (63\")   Wt 63.5 kg (140 lb)   SpO2 97% Comment: Room air  BMI 24.80 kg/m²   Body mass index is 24.8 kg/m².  Vital signs and BMI reviewed  General: WDWN, Alert, NAD.    HEENT: ATNC.  MMM  Chest:  good aeration, clear to auscultation bilaterally, no increased work of breathing, normal symmetric chest wall rise bilaterally  CV: RRR, no MGR  Extremities:  no clubbing, no edema  Neuro:  Awake, conversant, answering questions appropriately          Results Review  I have personally reviewed the prior office notes, hospital records, labs, and diagnostics.  [x]  CBC w/ Diff obtained 6/6/2025.  Without eosinophilia  [x]  CMP obtained 6/6/2025 without hypercarbia  [x]  CT of Chest obtained 5/29/2025.  Left lower lobe bronchiectasis with bronchial wall thickening and areas of mucous plugging.  There is associated left lower lobe consolidation likely representing pneumonia.  Aspiration or impaired airway clearance may be the underlying etiology.  Postsurgical changes of prior gastric sleeve procedure with small hiatal hernia    [x]  2D echo performed 3/20/2017 grade 1 diastolic dysfunction in range of 66 to 70%    [x] Bronch results were with AFB, BAL and fungal culture all NGTD.  Pneumonia panel was positive for Enterobacter cloacae        CMP          1/29/2025    08:43 2/13/2025    10:10 6/6/2025    07:38   CMP   Glucose  125  152    BUN  13  13.0    Creatinine  0.64  0.65    EGFR  95.2  94.9    Sodium  136  138    Potassium  4.0  4.4    Chloride  98  101    Calcium  9.4  9.7    Total Protein 7.5  7.5  7.3     7.5    Albumin 4.3  4.4  4.2     4.2    Globulin   3.1    Total Bilirubin 0.4  0.5  0.4     0.4    Alkaline Phosphatase 92  89  81     82    AST (SGOT) 82  63  43     42    ALT (SGPT) 134  81  29     29    Albumin/Globulin Ratio   1.4    BUN/Creatinine Ratio  20.3  20.0  "   Anion Gap  13.5  11.0      CBC          1/18/2025    07:24 2/13/2025    10:10 6/6/2025    07:38   CBC   WBC 4.73  4.76  5.15     5.08    RBC 4.80  5.12  4.99     4.97    Hemoglobin 13.9  14.7  14.1     14.2    Hematocrit 41.4  43.7  43.8     43.6    MCV 86.3  85.4  87.8     87.7    MCH 29.0  28.7  28.3     28.6    MCHC 33.6  33.6  32.2     32.6    RDW 13.1  13.4  13.9     14.0    Platelets 160  191  178     173      CBC w/diff          1/18/2025    07:24 2/13/2025    10:10 6/6/2025    07:38   CBC w/Diff   WBC 4.73  4.76  5.15     5.08    RBC 4.80  5.12  4.99     4.97    Hemoglobin 13.9  14.7  14.1     14.2    Hematocrit 41.4  43.7  43.8     43.6    MCV 86.3  85.4  87.8     87.7    MCH 29.0  28.7  28.3     28.6    MCHC 33.6  33.6  32.2     32.6    RDW 13.1  13.4  13.9     14.0    Platelets 160  191  178     173    Neutrophil Rel % 64.1  62.2  67.7    Immature Granulocyte Rel % 0.2  0.2  0.4    Lymphocyte Rel % 24.5  28.6  22.7    Monocyte Rel % 8.5  6.9  7.6    Eosinophil Rel % 2.1  1.5  1.2    Basophil Rel % 0.6  0.6  0.4        Assessment         Patient Active Problem List   Diagnosis    Essential hypertension    Mixed hyperlipidemia    Coronary artery disease involving native coronary artery of native heart without angina pectoris    Diabetes mellitus type 2 in obese    Fatty liver    Seizure disorder    Acquired atrophy of thyroid    Vitamin D deficiency    Hydronephrosis of left kidney    Vitamin B12 deficiency    Benign essential tremor    Seasonal allergic rhinitis due to pollen    Medicare annual wellness visit, subsequent    Recurrent cystitis    OAB (overactive bladder)    TIA (transient ischemic attack)    Overweight (BMI 25.0-29.9)    Acute asthmatic bronchitis    Pneumonia of left lower lobe due to infectious organism    Elevated ferritin    Bronchiectasis with acute exacerbation    Pneumonia due to infectious organism    Former smoker    Abnormal CT of the chest    Gastric reflux with aspiration     Pneumonia due to Enterobacter cloacae       Diagnoses and all orders for this visit:    1. Pneumonia due to Enterobacter cloacae (Primary)    2. Gastric reflux with aspiration  -     Ambulatory Referral to Bariatric Surgery    3. Bronchiectasis without complication    4. Former smoker    5. Abnormal CT of the chest    Other orders  -     famotidine (Pepcid) 20 MG tablet; Take 1 tablet by mouth 2 (Two) Times a Day.  Dispense: 180 tablet; Refill: 3         Plan        Bronchoscopy results were reviewed with patient.  Patient was with significant mucous plugging in the left lower lobe with some indurated airway seen as well.  As far as the knees plugging it took multiple attempts in order to resolve.  Also patient was with noted finding of bilious secretions seen around trachea concerning for recurrent and persistent reflux.  Pneumonia panel was positive for Enterobacter cloacae.  Patient states she was on at least 3-4 rounds of antibiotics prior to seeing me.  Will hold off on starting antibiotic coverage currently.  If her symptoms worsen she is to notify the office and we will start.  Patient is already established with GI.  Will see if we can get patient in for an earlier appointment given abnormal bronchoscopy with bilious secretions noted in trachea.  Patient previous bariatric surgery, will have her follow up with her surgeon for further evaluation  Patient is to continue to use flutter valve to help with airway clearance.  Encouraged her to use that as multiple times throughout the day.  This should help with her breaking some of the mucus loose and hopefully will be able to expectorate. IIf without improvement with flutter valve.  Can discuss proceeding forward with chest vest.  Would ultimately like to start nebulizers as far as treatments.  However patient has had intolerance with albuterol.  Currently patient would like to hold off on starting chest vest.  She does not feel like she would tolerate this very  well given her hiatal hernia and recurrent aspiration  Patient was on albuterol before.  She did not tolerate it secondary to tremors.  Then she tried Advair and did not tolerate that either secondary to tremors.  Her PCP started her on Flovent.  She currently wants to continue this as she tolerates it without difficulty.  She did not want to start albuterol back  CBC and chemistry were reviewed.  Patient is without eosinophilia or hypercarbia.  Former smoker.    Follow-up in 3 to 4 months    Smoking status:  reports that she quit smoking about 30 years ago. Her smoking use included cigarettes. She started smoking about 53 years ago. She has a 60 pack-year smoking history. She has been exposed to tobacco smoke. She has never used smokeless tobacco.    Vaccination status: Reviewed and up-to-date  Immunization History   Administered Date(s) Administered    ABRYSVO (RSV, 60+ or pregnant women 32-36 wks) 09/28/2024    Arexvy (RSV, Adults 60+ yrs) 09/28/2024    COVID-19 (MODERNA) 12YRS+ (SPIKEVAX) 09/30/2023    COVID-19 (MODERNA) 1st,2nd,3rd Dose Monovalent 02/08/2021, 03/08/2021, 10/26/2021    COVID-19 (MODERNA) BIVALENT 12+YRS 03/13/2023    COVID-19 (MODERNA) Monovalent Original Booster 05/22/2022, 05/23/2022    COVID-19 (PFIZER) 12YRS+ (COMIRNATY) 09/30/2024    Flu Vaccine Quad PF >36MO 08/25/2016, 09/21/2017, 08/29/2018, 09/09/2019    Fluzone (or Fluarix & Flulaval for VFC) >6mos 08/25/2016, 09/21/2017, 08/29/2018, 09/09/2019    Fluzone High-Dose 65+YRS 08/18/2020, 09/14/2021, 09/02/2022, 09/30/2024    Fluzone High-Dose 65+yrs 09/02/2022, 09/30/2023    Hep A / Hep B 04/19/2018    Hepatitis A 04/25/2018    Pneumococcal Conjugate 13-Valent (PCV13) 12/08/2015    Pneumococcal Conjugate 20-Valent (PCV20) 09/02/2022    Pneumococcal Polysaccharide (PPSV23) 12/07/2017, 10/31/2019    Shingrix 02/10/2023, 05/31/2023    Td, Not Adsorbed 06/16/2022    Tdap 06/16/2022, 06/17/2022    influenza Split 08/28/2014, 08/29/2016         Medications personally reviewed    Follow Up  Patient will follow-up in 3 months for follow-up after bariatric surgery evaluation to discuss recurrent aspiration      Patient was given instructions and counseling regarding her condition or for health maintenance advice. Please see specific information pulled into the AVS if appropriate.           KEY Garcia  06/30/25  15:04 EDT

## 2025-06-30 ENCOUNTER — OFFICE VISIT (OUTPATIENT)
Dept: PULMONOLOGY | Facility: CLINIC | Age: 70
End: 2025-06-30
Payer: MEDICARE

## 2025-06-30 VITALS
WEIGHT: 140 LBS | SYSTOLIC BLOOD PRESSURE: 117 MMHG | HEART RATE: 87 BPM | TEMPERATURE: 97.7 F | OXYGEN SATURATION: 97 % | BODY MASS INDEX: 24.8 KG/M2 | DIASTOLIC BLOOD PRESSURE: 69 MMHG | RESPIRATION RATE: 18 BRPM | HEIGHT: 63 IN

## 2025-06-30 DIAGNOSIS — T17.918A GASTRIC REFLUX WITH ASPIRATION: ICD-10-CM

## 2025-06-30 DIAGNOSIS — J15.69: Primary | ICD-10-CM

## 2025-06-30 DIAGNOSIS — R93.89 ABNORMAL CT OF THE CHEST: ICD-10-CM

## 2025-06-30 DIAGNOSIS — N39.41 URGE INCONTINENCE: ICD-10-CM

## 2025-06-30 DIAGNOSIS — J47.9 BRONCHIECTASIS WITHOUT COMPLICATION: ICD-10-CM

## 2025-06-30 DIAGNOSIS — Z87.891 FORMER SMOKER: ICD-10-CM

## 2025-06-30 DIAGNOSIS — K21.9 GASTRIC REFLUX WITH ASPIRATION: ICD-10-CM

## 2025-06-30 RX ORDER — FAMOTIDINE 20 MG/1
20 TABLET, FILM COATED ORAL 2 TIMES DAILY
Qty: 180 TABLET | Refills: 3 | Status: SHIPPED | OUTPATIENT
Start: 2025-06-30

## 2025-06-30 RX ORDER — OXYBUTYNIN CHLORIDE 5 MG/1
5 TABLET, EXTENDED RELEASE ORAL DAILY
Qty: 30 TABLET | Refills: 0 | Status: SHIPPED | OUTPATIENT
Start: 2025-06-30

## 2025-06-30 RX ORDER — SPIRONOLACTONE 25 MG/1
25 TABLET ORAL DAILY
Qty: 90 TABLET | Refills: 3 | Status: SHIPPED | OUTPATIENT
Start: 2025-06-30

## 2025-07-05 LAB — FUNGUS WND CULT: ABNORMAL

## 2025-07-10 DIAGNOSIS — J47.1 BRONCHIECTASIS WITH ACUTE EXACERBATION: ICD-10-CM

## 2025-07-10 DIAGNOSIS — J18.9 PNEUMONIA OF LEFT LOWER LOBE DUE TO INFECTIOUS ORGANISM: Primary | ICD-10-CM

## 2025-07-20 LAB — FUNGUS WND CULT: ABNORMAL

## 2025-07-21 ENCOUNTER — TELEPHONE (OUTPATIENT)
Dept: BARIATRICS/WEIGHT MGMT | Facility: CLINIC | Age: 70
End: 2025-07-21
Payer: MEDICARE

## 2025-07-21 NOTE — TELEPHONE ENCOUNTER
PATIENT CALL IN REGARDING AN UGI SUPPOSED TO BE SCHEDULED AND SHE HASN'T RECEIVED A PHONE CALL TO SCHEDULE IT. SHE IS STATING SHE IS SUPPOSED TO HAVE IT DONE BEFORE HER APPT ON 7/31 WITH DR LEAVITT. PATIENT WAS NOT HAPPY.

## 2025-07-23 ENCOUNTER — TELEPHONE (OUTPATIENT)
Dept: BARIATRICS/WEIGHT MGMT | Facility: CLINIC | Age: 70
End: 2025-07-23
Payer: MEDICARE

## 2025-07-23 ENCOUNTER — PREP FOR SURGERY (OUTPATIENT)
Dept: OTHER | Facility: HOSPITAL | Age: 70
End: 2025-07-23
Payer: MEDICARE

## 2025-07-23 DIAGNOSIS — K21.9 GASTROESOPHAGEAL REFLUX DISEASE, UNSPECIFIED WHETHER ESOPHAGITIS PRESENT: Primary | ICD-10-CM

## 2025-07-23 RX ORDER — SODIUM CHLORIDE 0.9 % (FLUSH) 0.9 %
10 SYRINGE (ML) INJECTION AS NEEDED
OUTPATIENT
Start: 2025-07-23

## 2025-07-23 RX ORDER — SODIUM CHLORIDE 9 MG/ML
40 INJECTION, SOLUTION INTRAVENOUS AS NEEDED
OUTPATIENT
Start: 2025-07-23

## 2025-07-23 RX ORDER — SODIUM CHLORIDE 0.9 % (FLUSH) 0.9 %
10 SYRINGE (ML) INJECTION EVERY 12 HOURS SCHEDULED
OUTPATIENT
Start: 2025-07-23

## 2025-07-23 NOTE — TELEPHONE ENCOUNTER
Discussed EGD instructions with patient, Patient voiced understanding of the following information:  Procedure date and time.  Procedure Location.  Nothing to eat or drink after midnight.  Bring a  with them on the day of the procedure that is 18 or older so that they can stay with them the entire time.  Blood Pressure Medication: Take as usual on the day of the procedure with a small sip of water.  Phentermine: Hold for 2 weeks prior to your procedure.  Diabetic/ Weight loss Injections: Stop weekly injections a week prior / Stop daily injection the week before.  Insulin: Take half of the usual dose of your nighttime long-acting Insulin the night prior to your procedure. Do not take Insulin the morning of your procedure.   Oral Diabetic Medications: If your medication is twice daily, hold the evening dose the night prior to the procedure. Hold all oral diabetic medications the morning of your procedure. Remember to check your blood sugar frequently the day before and morning of your procedure.  Anticoagulants/Blood Thinners: Always discuss with your prescribing physician as to when or how long it is safe to hold any of the following medications prior to your procedure. Coumadin, Plavix, Xarelto, Effient, Pradaxa, Arixtra, Lovenox , Eliquis, Brilinta, and Aspirin.

## 2025-07-24 ENCOUNTER — TELEPHONE (OUTPATIENT)
Dept: BARIATRICS/WEIGHT MGMT | Facility: CLINIC | Age: 70
End: 2025-07-24
Payer: MEDICARE

## 2025-07-25 ENCOUNTER — TELEPHONE (OUTPATIENT)
Age: 70
End: 2025-07-25
Payer: MEDICARE

## 2025-07-25 RX ORDER — ATORVASTATIN CALCIUM 40 MG/1
40 TABLET, FILM COATED ORAL
Qty: 45 TABLET | Refills: 0 | Status: SHIPPED | OUTPATIENT
Start: 2025-07-25

## 2025-07-25 NOTE — TELEPHONE ENCOUNTER
She should hold it for 4 to 5 days, it looks like her scope is on the 29th, so she needs to stay off of it from now going forward.  Tell her to get back on it as soon as they say it is safe, hopefully the evening after the scope.

## 2025-07-28 ENCOUNTER — OFFICE VISIT (OUTPATIENT)
Age: 70
End: 2025-07-28
Payer: MEDICARE

## 2025-07-28 VITALS
OXYGEN SATURATION: 99 % | WEIGHT: 139 LBS | BODY MASS INDEX: 24.63 KG/M2 | DIASTOLIC BLOOD PRESSURE: 64 MMHG | HEART RATE: 61 BPM | SYSTOLIC BLOOD PRESSURE: 134 MMHG | HEIGHT: 63 IN

## 2025-07-28 DIAGNOSIS — I25.10 CORONARY ARTERY DISEASE INVOLVING NATIVE CORONARY ARTERY OF NATIVE HEART WITHOUT ANGINA PECTORIS: ICD-10-CM

## 2025-07-28 DIAGNOSIS — E78.2 MIXED HYPERLIPIDEMIA: ICD-10-CM

## 2025-07-28 DIAGNOSIS — I10 ESSENTIAL HYPERTENSION: Primary | ICD-10-CM

## 2025-07-28 DIAGNOSIS — G25.0 BENIGN ESSENTIAL TREMOR: ICD-10-CM

## 2025-07-28 DIAGNOSIS — G45.9 TIA (TRANSIENT ISCHEMIC ATTACK): ICD-10-CM

## 2025-07-28 PROCEDURE — 1159F MED LIST DOCD IN RCRD: CPT

## 2025-07-28 PROCEDURE — 1160F RVW MEDS BY RX/DR IN RCRD: CPT

## 2025-07-28 PROCEDURE — 3078F DIAST BP <80 MM HG: CPT

## 2025-07-28 PROCEDURE — 99214 OFFICE O/P EST MOD 30 MIN: CPT

## 2025-07-28 PROCEDURE — 93000 ELECTROCARDIOGRAM COMPLETE: CPT

## 2025-07-28 PROCEDURE — 3075F SYST BP GE 130 - 139MM HG: CPT

## 2025-07-28 NOTE — PROGRESS NOTES
[facility] Follow Up Office Note     Encounter Date:25  Patient:Vivian Kauffman  :1955  MRN:[id number]      Chief Complaint:   Chief Complaint   Patient presents with    Follow-up         History of Presenting Illness:      Vivian Kauffman is a 70 y.o. female  that follows with  and previously by Dr. Waters and is new to me today. They have a medical history of nonobstructive coronary artery disease, hypertension, dyslipidemia, diabetes mellitus type 2, prior stroke, seizure disorder, obesity status post lap band, and subsequent removal of the lap band. They are here today for follow up.      Patient initially establish care with Dr. Waters in  for preoperative evaluation.  She had scheduled removal of Lap-Band and was noted to have abnormal EKG.  Previously followed by cardiologist from [place] but unable to get an appointment with them in time.  Her cardiac workup included evaluation for abnormal EKG.  She underwent stress testing which demonstrated anterior ischemia.  Subsequently she underwent cardiac catheterization that demonstrated 50% stenosis of the proximal LAD, luminal irregularities of her left circumflex, and 50% stenosis of her right coronary artery.  Echocardiogram demonstrated pulmonary artery pressure 48 mmHg, and moderate tricuspid regurgitation.  3/2017 she underwent successful removal of her lap band.  Repeat echocardiogram demonstrated normal LV systolic function and wall motion with an estimated ejection fraction with grade 1 diastolic dysfunction.  Patient was ordered to undergo treadmill stress testing but based on EKG abnormalities precluded a diagnostic test so proceeded for perfusion stress testing which was negative for ischemia.    She was last seen by Dr. Waters on 2024.  At that time she was doing well but continues to struggle with tremors of both her head and hands and was regularly receiving Botox injections from [facility] for  this.    Patient reports today for follow-up.  Overall she has been doing well from a cardiac standpoint.  She denies chest pain, shortness of breath, peripheral edema.  Does have some occasional shortness of breath but due to allergies and asthma.  Additionally notices occasional palpitations.  She reports some dizziness but associates this with her vertigo.  Additionally reports consistent fatigue.  Cardiac blood pressure well-controlled.  EKG unchanged.  She currently has plans to undergo esophagogastroduodenoscopy tomorrow.  She is currently holding her Plavix for this.    Review of Systems:  Review of Systems   Constitutional: Positive for malaise/fatigue.   HENT: Negative.     Eyes: Negative.    Cardiovascular:  Negative for chest pain, dyspnea on exertion, irregular heartbeat, leg swelling, orthopnea, palpitations and syncope.   Respiratory:  Negative for cough, shortness of breath and snoring.    Hematologic/Lymphatic: Negative.    Skin: Negative.    Musculoskeletal: Negative.    Gastrointestinal: Negative.    Genitourinary: Negative.    Neurological:  Positive for dizziness. Negative for headaches and light-headedness.   Psychiatric/Behavioral: Negative.         Current Outpatient Medications on File Prior to Visit   Medication Sig Dispense Refill    albuterol sulfate  (90 Base) MCG/ACT inhaler Inhale 1 puff As Needed for Wheezing or Shortness of Air. 18 g 1    atorvastatin (LIPITOR) 40 MG tablet TAKE 1 TABLET BY MOUTH EVERY OTHER DAY 45 tablet 0    benzonatate (Tessalon Perles) 100 MG capsule Take 2 capsules by mouth 3 (Three) Times a Day As Needed for Cough. 60 capsule 1    cetirizine (zyrTEC) 10 MG tablet Take 1 tablet by mouth Every Night.      clobetasol (TEMOVATE) 0.05 % ointment APPLY TO LESIONS ON SHOULDER 2 TIMES A DAY AS NEEDED.      clopidogrel (PLAVIX) 75 MG tablet TAKE 1 TABLET BY MOUTH ONCE DAILY AT NIGHT 90 tablet 1    famotidine (Pepcid) 20 MG tablet Take 1 tablet by mouth 2 (Two)  Times a Day. 180 tablet 3    FLUoxetine (PROzac) 20 MG capsule TAKE 4 CAPSULES BY MOUTH ONCE DAILY 360 capsule 0    fluticasone (FLONASE) 50 MCG/ACT nasal spray Administer 1 spray into the nostril(s) as directed by provider Daily for 211 days. 9.9 g 5    fluticasone (FLOVENT HFA) 110 MCG/ACT inhaler Inhale 2 puffs 2 (Two) Times a Day. 12 g 2    gabapentin (NEURONTIN) 300 MG capsule TAKE 1 CAPSULE BY MOUTH THREE TIMES DAILY 90 capsule 5    insulin glargine (LANTUS, SEMGLEE) 100 UNIT/ML injection Inject  under the skin into the appropriate area as directed As Needed. As neededd      Insulin Lispro, 1 Unit Dial, (HumaLOG KwikPen) 100 UNIT/ML solution pen-injector Inject 2 Units under the skin into the appropriate area as directed As Needed. As needed      levETIRAcetam (KEPPRA) 500 MG tablet Take  by mouth 2 (Two) Times a Day. 2 TABS IN AM, 4 AT NIGHT      levothyroxine (SYNTHROID, LEVOTHROID) 25 MCG tablet Take 1 tablet by mouth once daily 90 tablet 1    montelukast (SINGULAIR) 10 MG tablet Take 1 tablet by mouth once daily 90 tablet 1    Nutritional Supplements (Nutritional Supplement Plus) liquid Take 1 can by mouth 2 (Two) Times a Day. 60 each 5    oxybutynin XL (DITROPAN-XL) 5 MG 24 hr tablet Take 1 tablet by mouth once daily 30 tablet 0    Ozempic, 1 MG/DOSE, 4 MG/3ML solution pen-injector       Probiotic Product (FLORAJEN3 PO) Take  by mouth.      prochlorperazine (COMPAZINE) 5 MG tablet Take 1 tablet by mouth Every 6 (Six) Hours As Needed.      RELION PEN NEEDLE 31G/8MM 31G X 8 MM misc USE 4- 5 TIMES DAILY AS DIRECTED      spironolactone (ALDACTONE) 25 MG tablet Take 1 tablet by mouth once daily 90 tablet 3     No current facility-administered medications on file prior to visit.       Allergies   Allergen Reactions    Jardiance [Empagliflozin] Anaphylaxis     Went into ketoacidosis    Peanut Oil Anaphylaxis     throat to close     Latex Hives     SKIN BLISTERS    Serevent [Salmeterol] Palpitations     Patient  "had shaking, tremors, etc. secondary to long-acting beta agonist.    Sulfa Antibiotics Rash    Ace Inhibitors Cough and Other (See Comments)       Past Medical History:   Diagnosis Date    Anxiety     Asthma     Coronary arteriosclerosis     Depression     Diabetes mellitus     TYPE 2 - BLOOD GLUCOSE AROUND 100-300    Disease of thyroid gland     Fatigue     Fatty liver     Fibromyalgia     Fibromyositis     GERD (gastroesophageal reflux disease)     Heartburn     Hiatal hernia     History of COVID-19 2020    [place] (hard of hearing)     Hyperlipidemia     Hypertension     Insomnia     Kidney stone     Muscle spasm     Pain of both hip joints     Polyphagia(783.6)     PONV (postoperative nausea and vomiting)     Poor vision     Seizures     CONTROLLED WITH MEDS. LAST SZ COUPLE YEARS AGO    Shortness of breath on exertion     Sinus drainage     Stroke syndrome     \"MINI STROKE\"  left side weakness     Tremor, essential     IN NECK  AND HANDS    Vertigo        Past Surgical History:   Procedure Laterality Date    BREAST BIOPSY Right     BRONCHOSCOPY N/A 6/23/2025    Procedure: BRONCHOSCOPY WITH BAL AND WASHINGS;  Surgeon: Carlos Enrique Terrell MD;  Location: [facility] MAIN OR;  Service: Pulmonary;  Laterality: N/A;    CARDIAC CATHETERIZATION      no stents 9/2013 at [facility]    CARPAL TUNNEL RELEASE Bilateral 2009    COLONOSCOPY      2018    COLONOSCOPY N/A 2/18/2022    Procedure: COLONOSCOPY SCREENING WITH POLYPECTOMY;  Surgeon: David Love MD;  Location: [facility] ENDOSCOPY;  Service: Gastroenterology;  Laterality: N/A;  COLON POLYP    CYSTOSCOPY NEPHROURETEROSCOPY Left 6/29/2021    Procedure: CYSTOSCOPY NEPHROURETEROSCOPY;  Surgeon: Mandie Hamilton MD;  Location: [facility] MAIN OR;  Service: Urology;  Laterality: Left;    ENDOSCOPY N/A 9/9/2016    Procedure: ESOPHAGOGASTRODUODENOSCOPY WITH BIOPSY;  Surgeon: Chris Ackerman Jr., MD;  Location: [facility] ENDOSCOPY;  Service:     EXTRACORPOREAL SHOCKWAVE " LITHOTRIPSY (ESWL), STENT INSERTION/REMOVAL  2013    FOOT SURGERY Left     FRACTURE SURGERY      left hand    GASTRIC BANDING REMOVAL N/A 2016    Procedure: LAPAROSCOPIC GASTRIC BANDING AND PORT REMOVAL ;  Surgeon: Chris Ackerman Jr., MD;  Location: [facility];  Service:     GASTRIC SLEEVE LAPAROSCOPIC N/A 10/2/2017    Procedure: GASTRIC SLEEVE LAPAROSCOPIC revision WITH LYSIS OF ADHESIONS AND HITAL HERNIA REPAIR ;  Surgeon: Chris Ackerman Jr., MD;  Location: [facility];  Service:     GASTRIC SLEEVE LAPAROSCOPIC      HAND SURGERY Left     LAPAROSCOPIC CHOLECYSTECTOMY      MASS EXCISION Right     RT FOOT     NOSE SURGERY      TONSILLECTOMY      URETEROSCOPY STENT INSERTION Left 2021    Procedure: URETEROSCOPY STENT INSERTION;  Surgeon: Mandie Hamilton MD;  Location: [facility];  Service: Urology;  Laterality: Left;       Social History     Socioeconomic History    Marital status: Single    Number of children: 1   Tobacco Use    Smoking status: Former     Current packs/day: 0.00     Average packs/day: 2.0 packs/day for 30.0 years (60.0 ttl pk-yrs)     Types: Cigarettes     Start date:      Quit date:      Years since quittin.5     Passive exposure: Past    Smokeless tobacco: Never    Tobacco comments:     caffeine use   Vaping Use    Vaping status: Never Used   Substance and Sexual Activity    Alcohol use: Not Currently    Drug use: Never    Sexual activity: Not Currently     Partners: Male     Birth control/protection: Post-menopausal       Family History   Problem Relation Age of Onset    Heart attack Father     Diabetes Father     Hypertension Father     Obesity Father         Morbid Obesity    Stroke Father     Heart attack Mother     Diabetes Mother     Hypertension Mother     Stroke Mother     Heart attack Brother     Cancer Brother         Bladder    Heart attack Paternal Grandfather     Stroke Paternal Grandmother     Heart attack Maternal Grandmother     Heart  "attack Maternal Grandfather     [place] Hyperthermia Neg Hx     Colon cancer Neg Hx     Breast cancer Neg Hx     Uterine cancer Neg Hx     Ovarian cancer Neg Hx        The following portions of the patient's history were reviewed and updated as appropriate: allergies, current medications, past family history, past medical history, past social history, past surgical history and problem list.       Objective:       Vitals:    07/28/25 0932   BP: 134/64   BP Location: Left arm   Patient Position: Sitting   Cuff Size: Adult   Pulse: 61   SpO2: 99%   Weight: 63 kg (139 lb)   Height: 160 cm (63\")         Physical Exam  Vitals and nursing note reviewed.   Constitutional:       Appearance: She is normal weight.   Eyes:      Extraocular Movements: Extraocular movements intact.      Pupils: Pupils are equal, round, and reactive to light.   Cardiovascular:      Rate and Rhythm: Normal rate and regular rhythm.      Chest Wall: PMI is not displaced. No thrill.      Pulses: Normal pulses.      Heart sounds: Normal heart sounds.   Pulmonary:      Effort: Pulmonary effort is normal.      Breath sounds: Normal breath sounds.   Musculoskeletal:      Cervical back: Normal range of motion.      Right lower leg: No edema.      Left lower leg: No edema.   Skin:     General: Skin is warm and dry.   Neurological:      General: No focal deficit present.      Mental Status: She is alert and oriented to person, place, and time. Mental status is at baseline.      Coordination: Coordination abnormal.   Psychiatric:         Mood and Affect: Mood normal.         Behavior: Behavior normal.         Thought Content: Thought content normal.         Judgment: Judgment normal.               Lab Results   Component Value Date     06/06/2025     02/13/2025    K 4.4 06/06/2025    K 4.0 02/13/2025     06/06/2025    CL 98 02/13/2025    CO2 26.0 06/06/2025    CO2 24.5 02/13/2025    BUN 13.0 06/06/2025    BUN 13 02/13/2025    CREATININE 0.65 " 06/06/2025    CREATININE 0.64 02/13/2025    EGFRIFNONA 75 02/21/2022    EGFRIFNONA 106 11/17/2021    GLUCOSE 152 (H) 06/06/2025    GLUCOSE 125 (H) 02/13/2025    CALCIUM 9.7 06/06/2025    CALCIUM 9.4 02/13/2025    ALBUMIN 4.2 06/06/2025    ALBUMIN 4.2 06/06/2025    BILITOT 0.4 06/06/2025    BILITOT 0.4 06/06/2025    AST 43 (H) 06/06/2025    AST 42 (H) 06/06/2025    ALT 29 06/06/2025    ALT 29 06/06/2025     Lab Results   Component Value Date    WBC 5.15 06/06/2025    WBC 5.08 06/06/2025    HGB 14.1 06/06/2025    HGB 14.2 06/06/2025    HCT 43.8 06/06/2025    HCT 43.6 06/06/2025    MCV 87.8 06/06/2025    MCV 87.7 06/06/2025     06/06/2025     06/06/2025     Lab Results   Component Value Date    CHOL 147 06/06/2025    CHOL 121 01/18/2025    TRIG 111 06/06/2025    TRIG 84 01/18/2025    HDL 53 06/06/2025    HDL 57 01/18/2025    LDL 74 06/06/2025    LDL 48 01/18/2025     Lab Results   Component Value Date    BNP 51 12/05/2020    BNP 61 02/15/2020     Lab Results   Component Value Date    CKTOTAL 35 06/03/2023    TROPONINT <0.01 12/05/2020     Lab Results   Component Value Date    TSH 2.250 06/06/2025    TSH 1.52 05/01/2025           ECG 12 Lead    Date/Time: 7/28/2025 10:00 AM  Performed by: Raymundo Ward APRN    Authorized by: Raymundo Ward APRN  Comparison: compared with previous ECG from 6/11/2025  Similar to previous ECG  Rhythm: sinus rhythm  Rate: normal  BPM: 61  Conduction: conduction normal  ST Segments: ST segments normal  T Waves: T waves normal  Other: no other findings             Assessment:         Diagnoses and all orders for this visit:    1. Essential hypertension (Primary)    2. Mixed hyperlipidemia  Overview:  Patient's LDL went from 70 on average to 170 off of daily statin. We stopped it when her LFTs were in the 160 ballpark. They have since recovered, so we resumed 3 time weekly atorvastatin.      3. Benign essential tremor    4. TIA (transient ischemic attack)    5. Coronary  artery disease involving native coronary artery of native heart without angina pectoris  -     ECG 12 Lead            Plan:       Vivian Kauffman is a 70 y.o. female with medical history of nonobstructive coronary artery disease, hypertension, dyslipidemia, diabetes mellitus type 2, prior stroke, seizure disorder, obesity status post lap band, and subsequent removal of the lap band. They are here today for follow up.  Overall patient is doing well from a cardiac standpoint.  Heart rate and blood pressure well-controlled.  EKG remains unchanged from previous.  Will continue patient on current medication regiment.    Nonobstructive coronary artery disease  Found on cardiac catheterization 2013  Last stress test 2017 no evidence of ischemia  Denies angina  Continue Plavix  Continue atorvastatin 40 mg    Hypertension  Blood pressure controlled on current medications  Continue current regimen    Hyperlipidemia  Goal LDL less than 70  Managed by  and endocrinology  Continue atorvastatin Q48 hours    History of TIA  Continue Plavix    Hepatic steatosis  Followed by gastroenterology    Yasmine cain  Follows with Dr. Card   Receives Botox injections.      Follow-up with JIM Delgado  [facility]  07/28/25  10:20 EDT

## 2025-07-29 ENCOUNTER — TELEPHONE (OUTPATIENT)
Dept: BARIATRICS/WEIGHT MGMT | Facility: CLINIC | Age: 70
End: 2025-07-29

## 2025-07-29 ENCOUNTER — HOSPITAL ENCOUNTER (OUTPATIENT)
Facility: HOSPITAL | Age: 70
Setting detail: HOSPITAL OUTPATIENT SURGERY
Discharge: HOME | End: 2025-07-29
Attending: SURGERY | Admitting: SURGERY
Payer: MEDICARE

## 2025-07-29 ENCOUNTER — ANESTHESIA (OUTPATIENT)
Dept: GASTROENTEROLOGY | Facility: HOSPITAL | Age: 70
End: 2025-07-29
Payer: MEDICARE

## 2025-07-29 ENCOUNTER — ANESTHESIA EVENT (OUTPATIENT)
Dept: GASTROENTEROLOGY | Facility: HOSPITAL | Age: 70
End: 2025-07-29
Payer: MEDICARE

## 2025-07-29 VITALS
WEIGHT: 138 LBS | RESPIRATION RATE: 16 BRPM | HEART RATE: 60 BPM | HEIGHT: 63 IN | SYSTOLIC BLOOD PRESSURE: 120 MMHG | OXYGEN SATURATION: 98 % | DIASTOLIC BLOOD PRESSURE: 59 MMHG | BODY MASS INDEX: 24.45 KG/M2

## 2025-07-29 DIAGNOSIS — K21.9 GASTROESOPHAGEAL REFLUX DISEASE, UNSPECIFIED WHETHER ESOPHAGITIS PRESENT: ICD-10-CM

## 2025-07-29 PROBLEM — K21.00 GASTROESOPHAGEAL REFLUX DISEASE WITH ESOPHAGITIS WITHOUT HEMORRHAGE: Status: ACTIVE | Noted: 2025-07-29

## 2025-07-29 LAB — GLUCOSE BLDC GLUCOMTR-MCNC: 87 MG/DL (ref 70–130)

## 2025-07-29 PROCEDURE — 25010000002 PROPOFOL 10 MG/ML EMULSION

## 2025-07-29 PROCEDURE — 25810000003 LACTATED RINGERS PER 1000 ML

## 2025-07-29 PROCEDURE — 88305 TISSUE EXAM BY PATHOLOGIST: CPT | Performed by: SURGERY

## 2025-07-29 PROCEDURE — 82948 REAGENT STRIP/BLOOD GLUCOSE: CPT

## 2025-07-29 PROCEDURE — 25010000002 LIDOCAINE 2% SOLUTION

## 2025-07-29 RX ORDER — PANTOPRAZOLE SODIUM 40 MG/1
40 TABLET, DELAYED RELEASE ORAL DAILY
Qty: 30 TABLET | Refills: 5 | Status: SHIPPED | OUTPATIENT
Start: 2025-07-29

## 2025-07-29 RX ORDER — PROPOFOL 10 MG/ML
VIAL (ML) INTRAVENOUS AS NEEDED
Status: DISCONTINUED | OUTPATIENT
Start: 2025-07-29 | End: 2025-07-29 | Stop reason: SURG

## 2025-07-29 RX ORDER — SODIUM CHLORIDE 9 MG/ML
30 INJECTION, SOLUTION INTRAVENOUS CONTINUOUS PRN
Status: DISCONTINUED | OUTPATIENT
Start: 2025-07-29 | End: 2025-07-29 | Stop reason: HOSPADM

## 2025-07-29 RX ORDER — SODIUM CHLORIDE 0.9 % (FLUSH) 0.9 %
10 SYRINGE (ML) INJECTION AS NEEDED
Status: DISCONTINUED | OUTPATIENT
Start: 2025-07-29 | End: 2025-07-29 | Stop reason: HOSPADM

## 2025-07-29 RX ORDER — SODIUM CHLORIDE 0.9 % (FLUSH) 0.9 %
10 SYRINGE (ML) INJECTION EVERY 12 HOURS SCHEDULED
Status: DISCONTINUED | OUTPATIENT
Start: 2025-07-29 | End: 2025-07-29 | Stop reason: HOSPADM

## 2025-07-29 RX ORDER — SODIUM CHLORIDE, SODIUM LACTATE, POTASSIUM CHLORIDE, CALCIUM CHLORIDE 600; 310; 30; 20 MG/100ML; MG/100ML; MG/100ML; MG/100ML
INJECTION, SOLUTION INTRAVENOUS CONTINUOUS PRN
Status: DISCONTINUED | OUTPATIENT
Start: 2025-07-29 | End: 2025-07-29 | Stop reason: SURG

## 2025-07-29 RX ORDER — SUCRALFATE 1 G/1
1 TABLET ORAL 3 TIMES DAILY
Qty: 90 TABLET | Refills: 1 | Status: SHIPPED | OUTPATIENT
Start: 2025-07-29

## 2025-07-29 RX ORDER — SODIUM CHLORIDE 9 MG/ML
40 INJECTION, SOLUTION INTRAVENOUS AS NEEDED
Status: DISCONTINUED | OUTPATIENT
Start: 2025-07-29 | End: 2025-07-29 | Stop reason: HOSPADM

## 2025-07-29 RX ORDER — LIDOCAINE HYDROCHLORIDE 20 MG/ML
INJECTION, SOLUTION INFILTRATION; PERINEURAL AS NEEDED
Status: DISCONTINUED | OUTPATIENT
Start: 2025-07-29 | End: 2025-07-29 | Stop reason: SURG

## 2025-07-29 RX ADMIN — SODIUM CHLORIDE, POTASSIUM CHLORIDE, SODIUM LACTATE AND CALCIUM CHLORIDE: 600; 310; 30; 20 INJECTION, SOLUTION INTRAVENOUS at 13:22

## 2025-07-29 RX ADMIN — SODIUM CHLORIDE 30 ML/HR: 9 INJECTION, SOLUTION INTRAVENOUS at 12:42

## 2025-07-29 RX ADMIN — SODIUM CHLORIDE 40 ML: 9 INJECTION, SOLUTION INTRAVENOUS at 12:41

## 2025-07-29 RX ADMIN — PROPOFOL 100 MG: 10 INJECTION, EMULSION INTRAVENOUS at 13:26

## 2025-07-29 RX ADMIN — LIDOCAINE HYDROCHLORIDE 60 MG: 20 INJECTION, SOLUTION INFILTRATION; PERINEURAL at 13:26

## 2025-07-29 NOTE — H&P
"  Patient Care Team:  Shahbaz Acosta MD as PCP - General (Internal Medicine)  Kia Waters MD as Consulting Physician (Cardiology)  Mandie Hamilton MD as Consulting Physician (Urology)  Erica Montanez APRN as Nurse Practitioner (Urology)  Diamond Carlos APRN as Nurse Practitioner (Nurse Practitioner)  Cristi Byrd MD as Consulting Physician (Endocrinology)  Naomy Roman PA as Physician Assistant (Pulmonary Disease)    Chief complaint GERD with history of hiatal hernia    Subjective     Patient is a 70 y.o. female status post sleeve gastrectomy and hiatal hernia conversion from previous Lap-Band with complaints of reflux.  Patient presents for further evaluation.      Review of Systems   Pertinent items are noted in HPI and no changes since last visit.    Past Medical History:   Diagnosis Date    Anxiety     Asthma     Coronary arteriosclerosis     Depression     Diabetes mellitus     TYPE 2 - BLOOD GLUCOSE AROUND 100-300    Disease of thyroid gland     Fatigue     Fatty liver     Fibromyalgia     Fibromyositis     GERD (gastroesophageal reflux disease)     Heartburn     Hiatal hernia     History of COVID-19 2020    [place] (hard of hearing)     Hyperlipidemia     Hypertension     Insomnia     Kidney stone     Muscle spasm     Pain of both hip joints     Polyphagia(783.6)     PONV (postoperative nausea and vomiting)     Poor vision     Seizures     CONTROLLED WITH MEDS. LAST SZ COUPLE YEARS AGO    Shortness of breath on exertion     Sinus drainage     Stroke syndrome     \"MINI STROKE\"  left side weakness     Tremor, essential     IN NECK  AND HANDS    Vertigo      Past Surgical History:   Procedure Laterality Date    BREAST BIOPSY Right     BRONCHOSCOPY N/A 6/23/2025    Procedure: BRONCHOSCOPY WITH BAL AND WASHINGS;  Surgeon: Carlos Enrique Terrell MD;  Location: [facility];  Service: Pulmonary;  Laterality: N/A;    CARDIAC CATHETERIZATION      no stents 9/2013 at [facility]    CARPAL TUNNEL RELEASE Bilateral 2009    COLONOSCOPY      2018    COLONOSCOPY N/A 2/18/2022    Procedure: COLONOSCOPY SCREENING WITH POLYPECTOMY;  Surgeon: David Love MD;  Location: [facility] ENDOSCOPY;  Service: Gastroenterology;  Laterality: N/A;  COLON POLYP    CYSTOSCOPY NEPHROURETEROSCOPY Left 6/29/2021    Procedure: CYSTOSCOPY NEPHROURETEROSCOPY;  Surgeon: Mandie Hamilton MD;  Location: [facility] MAIN OR;  Service: Urology;  Laterality: Left;    ENDOSCOPY N/A 9/9/2016    Procedure: ESOPHAGOGASTRODUODENOSCOPY WITH BIOPSY;  Surgeon: Chris Ackerman Jr., MD;  Location: [facility] ENDOSCOPY;  Service:     EXTRACORPOREAL SHOCKWAVE LITHOTRIPSY (ESWL), STENT INSERTION/REMOVAL  09/2013    FOOT SURGERY Left     FRACTURE SURGERY      left hand    GASTRIC BANDING REMOVAL N/A 11/30/2016    Procedure: LAPAROSCOPIC GASTRIC BANDING AND PORT REMOVAL ;  Surgeon: Chris Ackerman Jr., MD;  Location: [facility] OR [facility];  Service:     GASTRIC SLEEVE LAPAROSCOPIC N/A 10/2/2017    Procedure: GASTRIC SLEEVE LAPAROSCOPIC revision WITH LYSIS OF ADHESIONS AND HITAL HERNIA REPAIR ;  Surgeon: Chris Ackerman Jr., MD;  [place] [facility] [place] [facility]  [place]:     GASTRIC SLEEVE LAPAROSCOPIC      HAND SURGERY Left     LAPAROSCOPIC CHOLECYSTECTOMY      MASS EXCISION Right     RT FOOT     NOSE SURGERY      TONSILLECTOMY      URETEROSCOPY STENT INSERTION Left 6/29/2021    Procedure: URETEROSCOPY STENT INSERTION;  Surgeon: Mandie Hamilton MD;  Location: [facility] MAIN OR;  Service: Urology;  Laterality: Left;     Family History   Problem Relation Age of Onset    Heart attack Father     Diabetes Father     Hypertension Father     Obesity Father         Morbid Obesity    Stroke Father     Heart attack Mother     Diabetes Mother     Hypertension Mother     Stroke Mother     Heart attack Brother     Cancer Brother         Bladder    Heart attack Paternal Grandfather     Stroke Paternal Grandmother     Heart attack Maternal Grandmother      Heart attack Maternal Grandfather     [place] Hyperthermia Neg Hx     Colon cancer Neg Hx     Breast cancer Neg Hx     Uterine cancer Neg Hx     Ovarian cancer Neg Hx      Social History     Tobacco Use    Smoking status: Former     Current packs/day: 0.00     Average packs/day: 2.0 packs/day for 30.0 years (60.0 ttl pk-yrs)     Types: Cigarettes     Start date:      Quit date:      Years since quittin.5     Passive exposure: Past    Smokeless tobacco: Never    Tobacco comments:     caffeine use   Vaping Use    Vaping status: Never Used   Substance Use Topics    Alcohol use: Not Currently    Drug use: Never     Medications Prior to Admission   Medication Sig Dispense Refill Last Dose/Taking    albuterol sulfate  (90 Base) MCG/ACT inhaler Inhale 1 puff As Needed for Wheezing or Shortness of Air. 18 g 1     atorvastatin (LIPITOR) 40 MG tablet TAKE 1 TABLET BY MOUTH EVERY OTHER DAY 45 tablet 0     benzonatate (Tessalon Perles) 100 MG capsule Take 2 capsules by mouth 3 (Three) Times a Day As Needed for Cough. 60 capsule 1     cetirizine (zyrTEC) 10 MG tablet Take 1 tablet by mouth Every Night.       clobetasol (TEMOVATE) 0.05 % ointment APPLY TO LESIONS ON SHOULDER 2 TIMES A DAY AS NEEDED.       clopidogrel (PLAVIX) 75 MG tablet TAKE 1 TABLET BY MOUTH ONCE DAILY AT NIGHT 90 tablet 1     famotidine (Pepcid) 20 MG tablet Take 1 tablet by mouth 2 (Two) Times a Day. 180 tablet 3     FLUoxetine (PROzac) 20 MG capsule TAKE 4 CAPSULES BY MOUTH ONCE DAILY 360 capsule 0     fluticasone (FLONASE) 50 MCG/ACT nasal spray Administer 1 spray into the nostril(s) as directed by provider Daily for 211 days. 9.9 g 5     fluticasone (FLOVENT HFA) 110 MCG/ACT inhaler Inhale 2 puffs 2 (Two) Times a Day. 12 g 2     gabapentin (NEURONTIN) 300 MG capsule TAKE 1 CAPSULE BY MOUTH THREE TIMES DAILY 90 capsule 5     insulin glargine (LANTUS, SEMGLEE) 100 UNIT/ML injection Inject  under the skin into the appropriate area as  directed As Needed. As neededd       Insulin Lispro, 1 Unit Dial, (HumaLOG KwikPen) 100 UNIT/ML solution pen-injector Inject 2 Units under the skin into the appropriate area as directed As Needed. As needed       levETIRAcetam (KEPPRA) 500 MG tablet Take  by mouth 2 (Two) Times a Day. 2 TABS IN AM, 4 AT NIGHT       levothyroxine (SYNTHROID, LEVOTHROID) 25 MCG tablet Take 1 tablet by mouth once daily 90 tablet 1     montelukast (SINGULAIR) 10 MG tablet Take 1 tablet by mouth once daily 90 tablet 1     Nutritional Supplements (Nutritional Supplement Plus) liquid Take 1 can by mouth 2 (Two) Times a Day. 60 each 5     oxybutynin XL (DITROPAN-XL) 5 MG 24 hr tablet Take 1 tablet by mouth once daily 30 tablet 0     Ozempic, 1 MG/DOSE, 4 MG/3ML solution pen-injector        Probiotic Product (FLORAJEN3 PO) Take  by mouth.       prochlorperazine (COMPAZINE) 5 MG tablet Take 1 tablet by mouth Every 6 (Six) Hours As Needed.       RELION PEN NEEDLE 31G/8MM 31G X 8 MM misc USE 4- 5 TIMES DAILY AS DIRECTED       spironolactone (ALDACTONE) 25 MG tablet Take 1 tablet by mouth once daily 90 tablet 3      Allergies:  Jardiance [empagliflozin], Peanut oil, Latex, Serevent [salmeterol], Sulfa antibiotics, and Ace inhibitors    Vital Signs  See PreOp record            Physical Exam:   Heart: RR  Lungs: CTA B  Abd: soft and NT/ND  Ext: no clubbing, cyanosis    Results Review:    I have reviewed the patient's clinical results      Gastroesophageal reflux disease      The risks and benefits of the procedure were discussed with the patient in detail and all questions were answered.  Possibility of perforation, bleeding, aspiration, anoxic brain injury, respiratory and/or cardiac arrest and death were discussed.  Consent will be signed and witnessed..     Chris Ackerman MD  07/29/25  11:19 EDT  Time: Approximately 15 minutes was spent with the patient.  All of the patients questions were answered.

## 2025-07-29 NOTE — ANESTHESIA POSTPROCEDURE EVALUATION
Patient: Vivian Kauffman    Procedure Summary       Date: 07/29/25 Room / Location:  [facility]    Anesthesia Start: 1322 Anesthesia Stop: 1334    Procedure: ESOPHAGOGASTRODUODENOSCOPY WITH COLD BIOPSY (Esophagus) Diagnosis:       Gastroesophageal reflux disease, unspecified whether esophagitis present      (Gastroesophageal reflux disease, unspecified whether esophagitis present [K21.9])    Surgeons: Chris Ackerman Jr., MD Provider: Rancho Garcia MD    Anesthesia Type: MAC ASA Status: 4            Anesthesia Type: MAC    Vitals  Vitals Value Taken Time   /59 07/29/25 13:43   Temp     Pulse 62 07/29/25 13:48   Resp 16 07/29/25 13:41   SpO2 100 % 07/29/25 13:48   Vitals shown include unfiled device data.        Post Anesthesia Care and Evaluation    Patient location during evaluation: PACU  Patient participation: complete - patient participated  Level of consciousness: awake and alert  Pain management: adequate    Airway patency: patent  Anesthetic complications: No anesthetic complications    Cardiovascular status: acceptable  Respiratory status: acceptable  Hydration status: acceptable    Comments: --------------------            07/29/25               1341     --------------------   BP:       112/59     Pulse:      66       Resp:       16       SpO2:      99%      --------------------

## 2025-07-29 NOTE — OP NOTE
Surgeon: Chris Ackerman Jr., M.D.    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  Patient is on PPI.    Procedure:     The procedure, indications, preparation and potential complications were explained to the patient, who indicated understanding and signed the corresponding consent forms.  The patient was identified, taken to the endoscopy suite, and placed on the left side down decubitus position.  The patient underwent a MAC anesthesia and was appropriately monitored through the case by the anesthesia personnel with continuous pulse oximetry, blood pressure, and cardiac monitoring.  A bite block was placed.  After adequate IV sedation and using a transoral technique a lubed flexible endoscope was placed in the hypopharynx and advanced to the second portion of the duodenum without difficulty. The scope was then withdrawn back into the antrum of the stomach.  Cold forcep biopsies of the antrum were taken to rule out Helicobacter pylori.  The scope was retroflexed noting the body, fundus and cardia.  The scope was then withdrawn back into the esophagus after decompressing the stomach.  The Z line was noted and GE junction measured from the incisors.  The scope was then completely withdrawn.  The patient tolerated the procedure well and left the endoscopy suite in stable condition.  The findings are listed below.    Duodenum: Unremarkable  Antrum: Patchy erythema  Body/Fundus: Consistent with gastric sleeve without stricture or dilatation  Cardia: Probable small defect  Esophagus: Z-line irregular with 2 areas of erythema extending proximally greater than 5  mm.  Multiple cold forcep biopsies taken to rule out Jenkins's.  No active bleeding noted    Recommendations:     Continue with PPI and will add Carafate and await biopsy results and follow-up in the office.  Will obtain upper GI for further evaluation of the hiatus to determine procedure.

## 2025-07-29 NOTE — ANESTHESIA PREPROCEDURE EVALUATION
Anesthesia Evaluation     Patient summary reviewed and Nursing notes reviewed   history of anesthetic complications:  PONV               Airway   Mallampati: II  Dental      Pulmonary    (+) a smoker Former, asthma,shortness of breath  Cardiovascular     ECG reviewed  PT is on anticoagulation therapy  Rhythm: regular  Rate: normal    (+) hypertension, CAD, hyperlipidemia      Neuro/Psych  (+) seizures, TIA, CVA, dizziness/light headedness, psychiatric history Anxiety and Depression  GI/Hepatic/Renal/Endo    (+) hiatal hernia, GERD, liver disease fatty liver disease, renal disease- stones, diabetes mellitus using insulin, thyroid problem     Musculoskeletal     (+) myalgias  Abdominal    Substance History - negative use     OB/GYN negative ob/gyn ROS         Other                    Anesthesia Plan    ASA 4     MAC     (ASA 3.5  Heavy past smoking history  Ozempic last injected 7/21/25    )  intravenous induction     Anesthetic plan, risks, benefits, and alternatives have been provided, discussed and informed consent has been obtained with: patient.    CODE STATUS:

## 2025-07-30 ENCOUNTER — TELEPHONE (OUTPATIENT)
Dept: BARIATRICS/WEIGHT MGMT | Facility: CLINIC | Age: 70
End: 2025-07-30
Payer: MEDICARE

## 2025-07-30 LAB
CYTO UR: NORMAL
LAB AP CASE REPORT: NORMAL
PATH REPORT.FINAL DX SPEC: NORMAL
PATH REPORT.GROSS SPEC: NORMAL

## 2025-07-30 NOTE — TELEPHONE ENCOUNTER
Called patient to make sure she was aware of this. Gave her centralized scheduling phone number and told her to call them to schedule the UGI if she doesn't hear from them soon. Patient is to call our office back once UGI is scheduled and we will schedule her a follow up with Dr. Ackerman. Patient had no additional questions at this time.    ----- Message from Chris Ackerman sent at 7/30/2025  1:56 PM EDT -----  Regarding: RE: UGI  Yes. I told her that and to follow up with me. Thx!  ----- Message -----  From: Palak Rodriguez CMA  Sent: 7/30/2025   8:27 AM EDT  To: Chris Ackerman Jr., MD  Subject: RE: UGI                                          Post her EGD from yesterday, does she still need the UGI?  ----- Message -----  From: Chris Ackerman Jr., MD  Sent: 7/25/2025   9:26 AM EDT  To: Palak Rodriguez CMA  Subject: RE: UGI                                          We will see what upper endoscopy shows and then I can cancel upper GI if need be.  Thank you  ----- Message -----  From: Palak Rodriguez CMA  Sent: 7/24/2025   3:24 PM EDT  To: Chris Ackerman Jr., MD; Mandie Robison#  Subject: RE: UGI                                          This was sent to scheduling on 7/10 from the MA workque. Looks like she is also scheduled for an GD on 7/29. Does she need both?  ----- Message -----  From: Mandie Robison APRN  Sent: 7/23/2025   9:30 AM EDT  To: Chris Ackerman Jr., MD; Palak Rodriguez, #  Subject: UGI                                              It was ordered on 7/10. I don't see anything in communications about anyone from scheduling trying to call her. We can give her scheduling's number but we need to make sure this has been shunted from the MA work queue to scheduling? She was admitted for aspiration pneumonia secondary to nocturnal/silent aspiration due to a HH that likely needs repair.  ----- Message -----  From: Nimo Martinez RegSched Rep  Sent: 7/21/2025   2:46 PM EDT  To: Chris  Bright Ackerman Jr., MD; Mandie Robison#    Patient called in asking about an UGI that was to be scheduled. She hasn't received a call to schedule it. She is stating this is supposed to be completed before her appt with Dr. Ackerman on 7/31. I have sent Tj message to follow up with this. She asked me to inform Susi about her not being scheduled for that currently.

## 2025-07-30 NOTE — TELEPHONE ENCOUNTER
PT CALLED IN STATING [facility] DID NOT FIND THE ORDER ADDED TO HER CHART TO SCHEDULE AN UGI. I INFORMED HER IT WAS UNDER REFERRALS DATED 7/10/25. I INSTRUCTED HER TO CALL GENERAL[facility] SCHEDULING BACK AND LET THEM KNOW TO LOOK UNDER REFERRALS DATED 7/10/25. SHE IS AWARE AND IS GOING TO CALL THEM BACK. I TOLD HER TO CONTACT US BACK IF SHE HAS FURTHER TROUBLE GETTING SCHEDULED

## 2025-07-31 ENCOUNTER — TELEPHONE (OUTPATIENT)
Dept: CARDIOLOGY | Age: 70
End: 2025-07-31

## 2025-07-31 DIAGNOSIS — N39.41 URGE INCONTINENCE: ICD-10-CM

## 2025-07-31 RX ORDER — OXYBUTYNIN CHLORIDE 5 MG/1
5 TABLET, EXTENDED RELEASE ORAL DAILY
Qty: 30 TABLET | Refills: 0 | Status: SHIPPED | OUTPATIENT
Start: 2025-07-31

## 2025-08-04 LAB
MYCOBACTERIUM SPEC CULT: NORMAL
NIGHT BLUE STAIN TISS: NORMAL
NIGHT BLUE STAIN TISS: NORMAL

## 2025-08-08 ENCOUNTER — APPOINTMENT (OUTPATIENT)
Dept: GENERAL RADIOLOGY | Facility: HOSPITAL | Age: 70
End: 2025-08-08
Payer: MEDICARE

## 2025-08-08 ENCOUNTER — HOSPITAL ENCOUNTER (EMERGENCY)
Facility: HOSPITAL | Age: 70
Discharge: HOME | End: 2025-08-08
Attending: EMERGENCY MEDICINE
Payer: MEDICARE

## 2025-08-08 VITALS
HEIGHT: 63 IN | DIASTOLIC BLOOD PRESSURE: 56 MMHG | RESPIRATION RATE: 18 BRPM | HEART RATE: 89 BPM | OXYGEN SATURATION: 97 % | BODY MASS INDEX: 24.34 KG/M2 | WEIGHT: 137.35 LBS | SYSTOLIC BLOOD PRESSURE: 129 MMHG | TEMPERATURE: 98.8 F

## 2025-08-08 DIAGNOSIS — J18.9 PNEUMONIA DUE TO INFECTIOUS ORGANISM, UNSPECIFIED LATERALITY, UNSPECIFIED PART OF LUNG: Primary | ICD-10-CM

## 2025-08-08 LAB
ALBUMIN SERPL-MCNC: 4.1 G/DL (ref 3.5–5.2)
ALBUMIN/GLOB SERPL: 1.1 G/DL
ALP SERPL-CCNC: 74 U/L (ref 39–117)
ALT SERPL W P-5'-P-CCNC: 97 U/L (ref 1–33)
ANION GAP SERPL CALCULATED.3IONS-SCNC: 14.5 MMOL/L (ref 5–15)
AST SERPL-CCNC: 75 U/L (ref 1–32)
BACTERIA UR QL AUTO: ABNORMAL /HPF
BASOPHILS # BLD AUTO: 0.02 10*3/MM3 (ref 0–0.2)
BASOPHILS NFR BLD AUTO: 0.3 % (ref 0–1.5)
BILIRUB SERPL-MCNC: 0.6 MG/DL (ref 0–1.2)
BILIRUB UR QL STRIP: ABNORMAL
BUN SERPL-MCNC: 10.3 MG/DL (ref 8–23)
BUN/CREAT SERPL: 18.7 (ref 7–25)
CALCIUM SPEC-SCNC: 9.6 MG/DL (ref 8.6–10.5)
CHLORIDE SERPL-SCNC: 98 MMOL/L (ref 98–107)
CLARITY UR: CLEAR
CO2 SERPL-SCNC: 23.5 MMOL/L (ref 22–29)
COLOR UR: YELLOW
CREAT SERPL-MCNC: 0.55 MG/DL (ref 0.57–1)
D-LACTATE SERPL-SCNC: 1.9 MMOL/L (ref 0.5–2)
DEPRECATED RDW RBC AUTO: 42.1 FL (ref 37–54)
EGFRCR SERPLBLD CKD-EPI 2021: 98.7 ML/MIN/1.73
EOSINOPHIL # BLD AUTO: 0.05 10*3/MM3 (ref 0–0.4)
EOSINOPHIL NFR BLD AUTO: 0.7 % (ref 0.3–6.2)
ERYTHROCYTE [DISTWIDTH] IN BLOOD BY AUTOMATED COUNT: 13.4 % (ref 12.3–15.4)
FLUAV RNA RESP QL NAA+PROBE: NOT DETECTED
FLUBV RNA NPH QL NAA+NON-PROBE: NOT DETECTED
GLOBULIN UR ELPH-MCNC: 3.7 GM/DL
GLUCOSE SERPL-MCNC: 168 MG/DL (ref 65–99)
GLUCOSE UR STRIP-MCNC: NEGATIVE MG/DL
HCT VFR BLD AUTO: 38.6 % (ref 34–46.6)
HGB BLD-MCNC: 13 G/DL (ref 12–15.9)
HGB UR QL STRIP.AUTO: ABNORMAL
HOLD SPECIMEN: NORMAL
HOLD SPECIMEN: NORMAL
HYALINE CASTS UR QL AUTO: ABNORMAL /LPF
IMM GRANULOCYTES # BLD AUTO: 0.03 10*3/MM3 (ref 0–0.05)
IMM GRANULOCYTES NFR BLD AUTO: 0.4 % (ref 0–0.5)
KETONES UR QL STRIP: ABNORMAL
LEUKOCYTE ESTERASE UR QL STRIP.AUTO: NEGATIVE
LIPASE SERPL-CCNC: 31 U/L (ref 13–60)
LYMPHOCYTES # BLD AUTO: 0.77 10*3/MM3 (ref 0.7–3.1)
LYMPHOCYTES NFR BLD AUTO: 11 % (ref 19.6–45.3)
MCH RBC QN AUTO: 28.8 PG (ref 26.6–33)
MCHC RBC AUTO-ENTMCNC: 33.7 G/DL (ref 31.5–35.7)
MCV RBC AUTO: 85.4 FL (ref 79–97)
MONOCYTES # BLD AUTO: 0.78 10*3/MM3 (ref 0.1–0.9)
MONOCYTES NFR BLD AUTO: 11.1 % (ref 5–12)
NEUTROPHILS NFR BLD AUTO: 5.38 10*3/MM3 (ref 1.7–7)
NEUTROPHILS NFR BLD AUTO: 76.5 % (ref 42.7–76)
NITRITE UR QL STRIP: NEGATIVE
NRBC BLD AUTO-RTO: 0 /100 WBC (ref 0–0.2)
PH UR STRIP.AUTO: 6 [PH] (ref 5–8)
PLATELET # BLD AUTO: 169 10*3/MM3 (ref 140–450)
PMV BLD AUTO: 9.7 FL (ref 6–12)
POTASSIUM SERPL-SCNC: 3.9 MMOL/L (ref 3.5–5.2)
PROT SERPL-MCNC: 7.8 G/DL (ref 6–8.5)
PROT UR QL STRIP: ABNORMAL
RBC # BLD AUTO: 4.52 10*6/MM3 (ref 3.77–5.28)
RBC # UR STRIP: ABNORMAL /HPF
REF LAB TEST METHOD: ABNORMAL
RSV RNA RESP QL NAA+PROBE: NOT DETECTED
SARS-COV-2 RNA RESP QL NAA+PROBE: NOT DETECTED
SODIUM SERPL-SCNC: 136 MMOL/L (ref 136–145)
SP GR UR STRIP: 1.02 (ref 1–1.03)
SQUAMOUS #/AREA URNS HPF: ABNORMAL /HPF
UROBILINOGEN UR QL STRIP: ABNORMAL
WBC # UR STRIP: ABNORMAL /HPF
WBC NRBC COR # BLD AUTO: 7.03 10*3/MM3 (ref 3.4–10.8)
WHOLE BLOOD HOLD COAG: NORMAL
WHOLE BLOOD HOLD SPECIMEN: NORMAL

## 2025-08-08 PROCEDURE — 25010000002 CEFTRIAXONE PER 250 MG

## 2025-08-08 PROCEDURE — 71045 X-RAY EXAM CHEST 1 VIEW: CPT

## 2025-08-08 PROCEDURE — 80053 COMPREHEN METABOLIC PANEL: CPT | Performed by: EMERGENCY MEDICINE

## 2025-08-08 PROCEDURE — 83605 ASSAY OF LACTIC ACID: CPT

## 2025-08-08 PROCEDURE — 96365 THER/PROPH/DIAG IV INF INIT: CPT

## 2025-08-08 PROCEDURE — 85025 COMPLETE CBC W/AUTO DIFF WBC: CPT

## 2025-08-08 PROCEDURE — 83690 ASSAY OF LIPASE: CPT | Performed by: EMERGENCY MEDICINE

## 2025-08-08 PROCEDURE — 99283 EMERGENCY DEPT VISIT LOW MDM: CPT

## 2025-08-08 PROCEDURE — 25810000003 SODIUM CHLORIDE 0.9 % SOLUTION

## 2025-08-08 PROCEDURE — 87637 SARSCOV2&INF A&B&RSV AMP PRB: CPT | Performed by: NURSE PRACTITIONER

## 2025-08-08 PROCEDURE — 81001 URINALYSIS AUTO W/SCOPE: CPT | Performed by: EMERGENCY MEDICINE

## 2025-08-08 RX ORDER — SODIUM CHLORIDE 0.9 % (FLUSH) 0.9 %
10 SYRINGE (ML) INJECTION AS NEEDED
Status: DISCONTINUED | OUTPATIENT
Start: 2025-08-08 | End: 2025-08-08 | Stop reason: HOSPADM

## 2025-08-08 RX ORDER — AZITHROMYCIN 250 MG/1
TABLET, FILM COATED ORAL
Qty: 6 TABLET | Refills: 0 | Status: SHIPPED | OUTPATIENT
Start: 2025-08-08 | End: 2025-08-13

## 2025-08-08 RX ADMIN — CEFTRIAXONE SODIUM 1000 MG: 1 INJECTION, POWDER, FOR SOLUTION INTRAMUSCULAR; INTRAVENOUS at 08:23

## 2025-08-08 RX ADMIN — SODIUM CHLORIDE 500 ML: 9 INJECTION, SOLUTION INTRAVENOUS at 07:30

## 2025-08-12 ENCOUNTER — OFFICE VISIT (OUTPATIENT)
Dept: UROLOGY | Age: 70
End: 2025-08-12
Payer: MEDICARE

## 2025-08-12 ENCOUNTER — TELEPHONE (OUTPATIENT)
Dept: PULMONOLOGY | Facility: CLINIC | Age: 70
End: 2025-08-12
Payer: MEDICARE

## 2025-08-12 VITALS — HEIGHT: 63 IN | BODY MASS INDEX: 24.34 KG/M2 | WEIGHT: 137.35 LBS | RESPIRATION RATE: 18 BRPM

## 2025-08-12 DIAGNOSIS — N32.81 OAB (OVERACTIVE BLADDER): ICD-10-CM

## 2025-08-12 DIAGNOSIS — N39.41 URGE INCONTINENCE: Primary | ICD-10-CM

## 2025-08-12 LAB
BILIRUB BLD-MCNC: NEGATIVE MG/DL
CLARITY, POC: CLEAR
COLOR UR: YELLOW
EXPIRATION DATE: NORMAL
GLUCOSE UR STRIP-MCNC: NEGATIVE MG/DL
KETONES UR QL: NEGATIVE
LEUKOCYTE EST, POC: NEGATIVE
Lab: NORMAL
NITRITE UR-MCNC: NEGATIVE MG/ML
PH UR: 6.5 [PH] (ref 5–8)
PROT UR STRIP-MCNC: NEGATIVE MG/DL
RBC # UR STRIP: NEGATIVE /UL
SP GR UR: 1.01 (ref 1–1.03)
URINE VOLUME: 18
UROBILINOGEN UR QL: NORMAL

## 2025-08-12 PROCEDURE — 99214 OFFICE O/P EST MOD 30 MIN: CPT | Performed by: NURSE PRACTITIONER

## 2025-08-12 PROCEDURE — 51798 US URINE CAPACITY MEASURE: CPT | Performed by: NURSE PRACTITIONER

## 2025-08-12 PROCEDURE — 81003 URINALYSIS AUTO W/O SCOPE: CPT | Performed by: NURSE PRACTITIONER

## 2025-08-12 PROCEDURE — 1159F MED LIST DOCD IN RCRD: CPT | Performed by: NURSE PRACTITIONER

## 2025-08-12 PROCEDURE — 1160F RVW MEDS BY RX/DR IN RCRD: CPT | Performed by: NURSE PRACTITIONER

## 2025-08-12 PROCEDURE — G2211 COMPLEX E/M VISIT ADD ON: HCPCS | Performed by: NURSE PRACTITIONER

## 2025-08-14 ENCOUNTER — HOSPITAL ENCOUNTER (OUTPATIENT)
Dept: GENERAL RADIOLOGY | Facility: HOSPITAL | Age: 70
Discharge: HOME | End: 2025-08-14
Admitting: NURSE PRACTITIONER
Payer: MEDICARE

## 2025-08-14 DIAGNOSIS — J47.1 BRONCHIECTASIS WITH ACUTE EXACERBATION: ICD-10-CM

## 2025-08-14 DIAGNOSIS — J18.9 PNEUMONIA OF LEFT LOWER LOBE DUE TO INFECTIOUS ORGANISM: ICD-10-CM

## 2025-08-14 PROCEDURE — A9270 NON-COVERED ITEM OR SERVICE: HCPCS | Performed by: NURSE PRACTITIONER

## 2025-08-14 PROCEDURE — 63710000001 BARIUM SULFATE 98 % RECONSTITUTED SUSPENSION: Performed by: NURSE PRACTITIONER

## 2025-08-14 PROCEDURE — 74240 X-RAY XM UPR GI TRC 1CNTRST: CPT

## 2025-08-14 PROCEDURE — 63710000001 BARIUM SULFATE 700 MG TABLET: Performed by: NURSE PRACTITIONER

## 2025-08-14 PROCEDURE — 63710000001 BARIUM SULFATE 60 % SUSPENSION: Performed by: NURSE PRACTITIONER

## 2025-08-14 RX ADMIN — BARIUM SULFATE 355 ML: 0.6 SUSPENSION ORAL at 10:09

## 2025-08-14 RX ADMIN — BARIUM SULFATE 700 MG: 700 TABLET ORAL at 10:09

## 2025-08-14 RX ADMIN — BARIUM SULFATE 135 ML: 980 POWDER, FOR SUSPENSION ORAL at 10:09

## 2025-08-15 DIAGNOSIS — J15.69: Primary | ICD-10-CM

## 2025-08-20 ENCOUNTER — OFFICE VISIT (OUTPATIENT)
Dept: BARIATRICS/WEIGHT MGMT | Facility: CLINIC | Age: 70
End: 2025-08-20
Payer: MEDICARE

## 2025-08-20 VITALS
HEIGHT: 63 IN | BODY MASS INDEX: 24.7 KG/M2 | SYSTOLIC BLOOD PRESSURE: 132 MMHG | HEART RATE: 64 BPM | TEMPERATURE: 97.3 F | DIASTOLIC BLOOD PRESSURE: 66 MMHG

## 2025-08-20 DIAGNOSIS — E11.69 TYPE 2 DIABETES MELLITUS WITH OBESITY: ICD-10-CM

## 2025-08-20 DIAGNOSIS — Z87.891 FORMER SMOKER: ICD-10-CM

## 2025-08-20 DIAGNOSIS — T17.918A GASTRIC REFLUX WITH ASPIRATION: ICD-10-CM

## 2025-08-20 DIAGNOSIS — G45.9 TIA (TRANSIENT ISCHEMIC ATTACK): ICD-10-CM

## 2025-08-20 DIAGNOSIS — E66.9 TYPE 2 DIABETES MELLITUS WITH OBESITY: ICD-10-CM

## 2025-08-20 DIAGNOSIS — K21.9 GASTRIC REFLUX WITH ASPIRATION: ICD-10-CM

## 2025-08-20 DIAGNOSIS — K21.00 GASTROESOPHAGEAL REFLUX DISEASE WITH ESOPHAGITIS WITHOUT HEMORRHAGE: Primary | ICD-10-CM

## 2025-08-20 DIAGNOSIS — Z98.84 HISTORY OF REMOVAL OF LAPAROSCOPIC GASTRIC BANDING DEVICE: ICD-10-CM

## 2025-08-20 DIAGNOSIS — Z98.84 S/P LAPAROSCOPIC SLEEVE GASTRECTOMY: ICD-10-CM

## 2025-08-20 DIAGNOSIS — K44.9 PARAESOPHAGEAL HERNIA: ICD-10-CM

## 2025-08-29 ENCOUNTER — TELEPHONE (OUTPATIENT)
Age: 70
End: 2025-08-29
Payer: MEDICARE

## 2025-08-29 DIAGNOSIS — J69.0 ASPIRATION PNEUMONIA, UNSPECIFIED ASPIRATION PNEUMONIA TYPE, UNSPECIFIED LATERALITY, UNSPECIFIED PART OF LUNG: Primary | ICD-10-CM

## (undated) DEVICE — ADHS SKIN DERMABOND

## (undated) DEVICE — BLCK/BITE BLOX W/DENTL/RIM W/STRAP 54F

## (undated) DEVICE — VISUALIZATION SYSTEM: Brand: CLEARIFY

## (undated) DEVICE — TRAP,MUCUS SPECIMEN,40CC: Brand: MEDLINE

## (undated) DEVICE — TRY PREP SCRB VAG PVP

## (undated) DEVICE — KT ORCA ORCAPOD DISP STRL

## (undated) DEVICE — SNAR E/S POLYP SNAREMASTER OVL/10MM 2.8X2300MM YEL

## (undated) DEVICE — SYS IRR PUMP SGL ACTN VAC SYR 10CC

## (undated) DEVICE — BLCK/BITE BLOX WO/DENTL/RIM W/STRAP 54F

## (undated) DEVICE — NDL FLTR BLNT 18G 1 1/2IN

## (undated) DEVICE — Y-TYPE TUR/BLADDER IRRIGATION SET, REGULATING CLAMP

## (undated) DEVICE — GW SUREGLIDE FLXTIP STR/TP .035 3X150CM

## (undated) DEVICE — SOLIDIFIER LIQLOC PLS 1500CC BT

## (undated) DEVICE — BASIC SINGLE BASIN-LF: Brand: MEDLINE INDUSTRIES, INC.

## (undated) DEVICE — SOL IRRG H2O PL/BG 1000ML STRL

## (undated) DEVICE — GW SUREGLIDE FLXTIP ANG/TP .038 3X150CM

## (undated) DEVICE — SENSR O2 OXIMAX FNGR A/ 18IN NONSTR

## (undated) DEVICE — GLV SURG SENSICARE GREEN W/ALOE PF LF 8.5 STRL

## (undated) DEVICE — SUT SILK 0 SH 30IN K834H

## (undated) DEVICE — SINGLE USE BIOPSY VALVE MAJ-210: Brand: SINGLE USE BIOPSY VALVE (STERILE)

## (undated) DEVICE — FRCP BX RADJAW4 NDL 2.8 240CM LG OG BX40

## (undated) DEVICE — SHEATH ACC URETRL UROPASS 12/14F 24CM

## (undated) DEVICE — GOWN,REINFORCE,POLY,SIRUS,BREATH SLV,XLG: Brand: MEDLINE

## (undated) DEVICE — GLV SURG SENSICARE MICRO PF LF 8.5 STRL

## (undated) DEVICE — COLON KIT: Brand: MEDLINE INDUSTRIES, INC.

## (undated) DEVICE — SYR SLP TP 10ML DISP

## (undated) DEVICE — GLV SURG SENSICARE SLT PF LF 6.5 STRL

## (undated) DEVICE — CONTAINER,SPEC,PNEUM TUBE,3OZ,STRL PATH: Brand: MEDLINE

## (undated) DEVICE — TUBING, SUCTION, 1/4" X 10', STRAIGHT: Brand: MEDLINE

## (undated) DEVICE — VISIGI 3D®  CALIBRATION SYSTEM  SIZE 36FR STD W/ BULB: Brand: BOEHRINGER® VISIGI 3D™ SLEEVE GASTRECTOMY CALIBRATION SYSTEM, SIZE 36FR W/BULB

## (undated) DEVICE — TOWEL,OR,DSP,ST,BLUE,STD,4/PK,20PK/CS: Brand: MEDLINE

## (undated) DEVICE — ADAPT SWVL FIBROPTIC BRONCH

## (undated) DEVICE — MSK PROC CURAPLEX O2 2/ADAPT 7FT

## (undated) DEVICE — ENSEAL LAPAROSCOPIC TISSUE SEALER G2 ARTICULATING CURVED JAW FOR USE WITH G2 GENERATOR 5MM DIAMETER 45CM SHAFT LENGTH: Brand: ENSEAL

## (undated) DEVICE — GLV SURG SENSICARE MICRO PF LF 6 STRL

## (undated) DEVICE — THE SINGLE USE ETRAP – POLYP TRAP IS USED FOR SUCTION RETRIEVAL OF ENDOSCOPICALLY REMOVED POLYPS.: Brand: ETRAP

## (undated) DEVICE — SINGLE USE SUCTION VALVE MAJ-209: Brand: SINGLE USE SUCTION VALVE (STERILE)

## (undated) DEVICE — LINER SURG CANSTR SXN S/RIGD 1500CC

## (undated) DEVICE — Device: Brand: DEFENDO AIR/WATER/SUCTION AND BIOPSY VALVE

## (undated) DEVICE — GLV SURG SENSICARE GREEN W/ALOE PF LF 6 STRL

## (undated) DEVICE — APL DUPLOSPRAYER MIS 40CM

## (undated) DEVICE — ENDOSCOPIC VALVE WITH ADAPTER.: Brand: SURSEAL® II

## (undated) DEVICE — KT COMPLIANCE ENDO PREV INFCT

## (undated) DEVICE — PK OSC LAP SLV 40

## (undated) DEVICE — ADAPT CLN BIOGUARD AIR/H2O DISP

## (undated) DEVICE — SYR LUER SLPTP 50ML

## (undated) DEVICE — Device

## (undated) DEVICE — SOL IRR NACL 0.9PCT 3000ML

## (undated) DEVICE — SOL IRR NACL 0.9PCT BO 500ML

## (undated) DEVICE — LN SMPL CO2 SHTRM SD STREAM W/M LUER

## (undated) DEVICE — DEV ATOMIZATION MUCOSAL/NASALTRACH

## (undated) DEVICE — JELLY,LUBE,STERILE,FLIP TOP,TUBE,4-OZ: Brand: MEDLINE

## (undated) DEVICE — CYSTO PACK: Brand: MEDLINE INDUSTRIES, INC.